# Patient Record
Sex: MALE | Race: WHITE | Employment: OTHER | URBAN - METROPOLITAN AREA
[De-identification: names, ages, dates, MRNs, and addresses within clinical notes are randomized per-mention and may not be internally consistent; named-entity substitution may affect disease eponyms.]

---

## 2017-01-19 DIAGNOSIS — F33.1 MAJOR DEPRESSIVE DISORDER, RECURRENT EPISODE, MODERATE (H): Primary | ICD-10-CM

## 2017-01-19 RX ORDER — CITALOPRAM HYDROBROMIDE 20 MG/1
20 TABLET ORAL DAILY
Qty: 90 TABLET | Refills: 3 | Status: SHIPPED | OUTPATIENT
Start: 2017-01-19 | End: 2018-06-27

## 2017-01-19 NOTE — TELEPHONE ENCOUNTER
citalopram (CELEXA) 20 MG      Last Written Prescription Date:  12/15/15  Last Fill Quantity: 90,   # refills: 3  Last Office Visit : 12/21/16  Future Office visit:  NONE  Last PHQ-9 score on record=   PHQ-9 SCORE 6/3/2016   Total Score -   Total Score 11     Protocol FOR DX  PHQ-9 every 6 months (6/3/16)

## 2017-02-06 DIAGNOSIS — Z98.2 S/P VP SHUNT: Primary | ICD-10-CM

## 2017-02-14 ENCOUNTER — OFFICE VISIT (OUTPATIENT)
Dept: NEUROSURGERY | Facility: CLINIC | Age: 82
End: 2017-02-14

## 2017-02-14 VITALS — DIASTOLIC BLOOD PRESSURE: 56 MMHG | SYSTOLIC BLOOD PRESSURE: 113 MMHG | HEART RATE: 67 BPM | HEIGHT: 71 IN

## 2017-02-14 DIAGNOSIS — Z98.2 S/P VP SHUNT: Primary | ICD-10-CM

## 2017-02-14 ASSESSMENT — PAIN SCALES - GENERAL: PAINLEVEL: NO PAIN (0)

## 2017-02-14 NOTE — MR AVS SNAPSHOT
After Visit Summary   2/14/2017    Gorge Corona    MRN: [id number]           Patient Information     Date Of Birth          11/27/1930        Visit Information        Provider Department      2/14/2017 4:00 PM Feroz Ventura MD [facility] Neurosurgery        Today's Diagnoses     S/P  shunt    -  1       Follow-ups after your visit        Follow-up notes from your care team     Return if symptoms worsen or fail to improve.      Your next 10 appointments already scheduled     Apr 03, 2017 12:00 PM CDT   Lab with  LAB   [facility] Lab ([facility])    [address] [place] [id number] [phone]            Apr 03, 2017 12:30 PM CDT   PFT VISIT with  PFL A   [facility] Pulmonary Function Testing ([facility])    [address] [place] [address] [id number] [phone]            Apr 03, 2017  1:00 PM CDT   (Arrive by 12:45 PM)   RETURN ARRHYTHMIA with Santi Ocasio MD   [facility] Heart Care ([facility])    [address] [place] [id number] [phone]              Who to contact     Please call your clinic at [phone] to:    Ask questions about your health    Make or cancel appointments    Discuss your medicines    Learn about your test results    Speak to your doctor   If you have compliments or concerns about an experience at your clinic, or if you wish to file a complaint, please contact [facility] Patient Relations at [phone] or email us at [email or web address]         Additional Information About Your Visit        MyChart Information     [employer] is an electronic gateway that provides easy, online access to your medical records. With [employer], you can request a clinic appointment, read your test results, renew a prescription or communicate with your care team.     To sign up for MyChart visit  "the website at [email or web address]   You will be asked to enter the access code listed below, as well as some personal information. Please follow the directions to create your username and password.     Your access code is: [email or web address]  Expires: 3/21/2017  1:00 PM     Your access code will  in 90 days. If you need help or a new code, please contact your [facility] or call [phone] for assistance.        Care EveryWhere ID     This is your Care EveryWhere ID. This could be used by other organizations to access your [facility] medical records  [id number]        Your Vitals Were     Pulse Height                67 1.791 m (5' 10.5\")           Blood Pressure from Last 3 Encounters:   17 114/70   17 113/56   16 110/70    Weight from Last 3 Encounters:   17 93.1 kg (205 lb 4.8 oz)   16 91.9 kg (202 lb 11.2 oz)   10/25/16 92.7 kg (204 lb 6.4 oz)              Today, you had the following     No orders found for display       Primary Care Provider Office Phone # Fax #    Monisha Olea [phone]       [address] [place] [address]        Thank you!     Thank you for choosing [facility]  for your care. Our goal is always to provide you with excellent care. Hearing back from our patients is one way we can continue to improve our services. Please take a few minutes to complete the written survey that you may receive in the mail after your visit with us. Thank you!             Your Updated Medication List - Protect others around you: Learn how to safely use, store and throw away your medicines at www.disposemymeds.org.          This list is accurate as of: 17 11:59 PM.  Always use your most recent med list.                   Brand Name Dispense Instructions for use    albuterol 108 (90 BASE) MCG/ACT Inhaler    PROAIR HFA/PROVENTIL HFA/VENTOLIN HFA    1 Inhaler    Inhale " 2 puffs into the lungs every 4 hours as needed for shortness of breath / dyspnea or wheezing       amiodarone 200 MG tablet    PACERONE/CODARONE    90 tablet    Take 1 tablet (200 mg) by mouth daily       Ammonium Lactate 10 % Crea     120 mL    Apply thin layer to skin on the body at least once daily.       aspirin 81 MG tablet      Take 81 mg by mouth daily       atorvastatin 10 MG tablet    LIPITOR    90 tablet    Take 1 tablet (10 mg) by mouth daily       calcium 600 MG tablet     100 tablet    Take 1 tablet by mouth 2 times daily.       citalopram 20 MG tablet    celeXA    90 tablet    Take 1 tablet (20 mg) by mouth daily       clindamycin 300 MG capsule    CLEOCIN    6 capsule    Take 2 capsules by mouth as needed for 1 dose. 1 hour prior to dental work.       coenzyme Q-10 75 MG Caps      Take  by mouth.       fluticasone-salmeterol 250-50 MCG/DOSE diskus inhaler    ADVAIR    120 Inhaler    Inhale 1 puff into the lungs every 12 hours       furosemide 40 MG tablet    LASIX    135 tablet    Take 1 tablet (40 mg) by mouth daily Take 1 1/2 tablets (60 mg) each am       HYDROcodone-acetaminophen 5-325 MG per tablet    NORCO    30 tablet    Take 1 tablet by mouth every 6 hours as needed for moderate to severe pain       levothyroxine 50 MCG tablet    SYNTHROID/LEVOTHROID    90 tablet    Take 1 tablet (50 mcg) by mouth daily       metoprolol 25 MG tablet    LOPRESSOR    30 tablet    Take 1 tablet (25 mg) by mouth as needed       morphine 15 MG 12 hr tablet    MS CONTIN    30 tablet    Take 1 tablet (15 mg) by mouth every 12 hours       OCUVITE PO      Take  by mouth.       order for DME     1 each    [place]       senna-docusate 8.6-50 MG per tablet    SENOKOT-S;PERICOLACE    60 tablet    Take 1 tablet by mouth 2 times daily.       tamsulosin 0.4 MG capsule    FLOMAX    90 capsule    Take 1 capsule (0.4 mg) by mouth daily       triamcinolone 0.1 % cream    KENALOG    80 g    Apply sparingly to affected area three  times daily as needed       vitamin D 1000 UNITS capsule      Take 1 capsule by mouth daily.

## 2017-02-14 NOTE — LETTER
2017       RE: Srikanth Steven  [address] [facility] [address]     Dear Colleague,    Thank you for referring your patient, Srikanth Steven, to the [facility] NEUROSURGERY at [facility]. Please see a copy of my visit note below.    HISTORY OF PRESENT ILLNESS:  We had the pleasure of seeing Mr. Steven at the [facility] Neurosurgery Clinic for followup after his last clinic appointment on 10/25/2016.  He had been going to [facility] for therapies and had been progressing well.  He presents to clinic today for followup with several falls and evaluation as well for his shunt.  He describes having increasing frequency of falls that occur out of nowhere and suddenly.  This has been going on for the last 3-4 months.  He does not describe any loss of consciousness or any numbness or weakness preceding or after the falls.  He has not hit his head during these falls either.  As part of the workup for these falls, he has undergone a CT scan of the head 02/10, which demonstrates his right frontal  shunt and stable ventricular size.   This imaging was reviewed with the patient as well as his wife, who is here with him.        Given the lack of findings present on his CT scan as well as the atypical falls the patient has been having, we reassured the patient and his wife that these falls do not sound shunt related.  At this point, we recommended evaluation by a medical specialist, specifically his primary care provider, for a more thorough investigation of other possible etiologies for his falls.      DICTATED BY:  Elida Landin M.D., Resident         EDUARDO MOLINA MD       As dictated by ELIDA LANDIN MD            D: 2017 19:36   T: 2017 13:24   MT: BRIGITTE      Name:     SRIKANTH STEVEN   MRN:      [id number]        Account:      [id number]   :      1930           Service Date: 2017      Document: [id number]

## 2017-02-16 DIAGNOSIS — E87.6 HYPOKALEMIA: ICD-10-CM

## 2017-02-16 NOTE — TELEPHONE ENCOUNTER
Spironolactone  Last Written Prescription Date: 1/12/16  Last Fill Quantity: 90, # refills: 3  Last Office Visit with [facility], [facility] or [facility] prescribing provider: 12/21/16       Potassium   Date Value Ref [place] Status   12/21/2016 4.0 3.4 - 5.3 mmol/L Final     Creatinine   Date Value Ref [place] Status   12/21/2016 1.34 (H) 0.66 - 1.25 mg/dL Final     BP Readings from Last 3 Encounters:   02/14/17 113/56   12/21/16 110/70   11/24/16 118/63   routing due to creat.

## 2017-02-20 NOTE — PROGRESS NOTES
HISTORY OF PRESENT ILLNESS:  We had the pleasure of seeing Mr. Steven at the [facility] Neurosurgery Clinic for followup after his last clinic appointment on 10/25/2016.  He had been going to [facility] for therapies and had been progressing well.  He presents to clinic today for followup with several falls and evaluation as well for his shunt.  He describes having increasing frequency of falls that occur out of nowhere and suddenly.  This has been going on for the last 3-4 months.  He does not describe any loss of consciousness or any numbness or weakness preceding or after the falls.  He has not hit his head during these falls either.  As part of the workup for these falls, he has undergone a CT scan of the head 02/10, which demonstrates his right frontal  shunt and stable ventricular size.   This imaging was reviewed with the patient as well as his wife, who is here with him.        Given the lack of findings present on his CT scan as well as the atypical falls the patient has been having, we reassured the patient and his wife that these falls do not sound shunt related.  At this point, we recommended evaluation by a medical specialist, specifically his primary care provider, for a more thorough investigation of other possible etiologies for his falls.      DICTATED BY:  Elida Landin M.D., Resident         EDUARDO MOLINA MD       As dictated by ELIDA LANDIN MD            D: 2017 19:36   T: 2017 13:24   MT: BRIGITTE      Name:     SRIKANTH STEVEN   MRN:      [id number]        Account:      [id number]   :      1930           Service Date: 2017      Document: [id number]

## 2017-02-21 RX ORDER — SPIRONOLACTONE 25 MG/1
25 TABLET ORAL DAILY
Qty: 90 TABLET | Refills: 3 | Status: SHIPPED | OUTPATIENT
Start: 2017-02-21 | End: 2018-07-10

## 2017-03-03 DIAGNOSIS — E03.8 OTHER SPECIFIED HYPOTHYROIDISM: ICD-10-CM

## 2017-03-04 ENCOUNTER — OFFICE VISIT (OUTPATIENT)
Dept: INTERNAL MEDICINE | Facility: CLINIC | Age: 82
End: 2017-03-04

## 2017-03-04 VITALS
HEART RATE: 64 BPM | OXYGEN SATURATION: 96 % | WEIGHT: 205.3 LBS | RESPIRATION RATE: 18 BRPM | DIASTOLIC BLOOD PRESSURE: 70 MMHG | SYSTOLIC BLOOD PRESSURE: 114 MMHG | BODY MASS INDEX: 29.04 KG/M2

## 2017-03-04 DIAGNOSIS — H81.10 BPPV (BENIGN PAROXYSMAL POSITIONAL VERTIGO), UNSPECIFIED LATERALITY: Primary | ICD-10-CM

## 2017-03-04 RX ORDER — MECLIZINE HYDROCHLORIDE 25 MG/1
25 TABLET ORAL 3 TIMES DAILY PRN
Qty: 30 TABLET | Refills: 3 | Status: SHIPPED | OUTPATIENT
Start: 2017-03-04 | End: 2017-06-10

## 2017-03-04 ASSESSMENT — PAIN SCALES - GENERAL: PAINLEVEL: NO PAIN (0)

## 2017-03-04 NOTE — MR AVS SNAPSHOT
After Visit Summary   3/4/2017    Gorge Corona    MRN: [id number]           Patient Information     Date Of Birth          11/27/1930        Visit Information        Provider Department      3/4/2017 11:30 AM Monisha Medina MD [facility] Primary [facility]        Today's Diagnoses     BPPV (benign paroxysmal positional vertigo), unspecified laterality    -  1      Care Instructions    Primary Care Center Phone Number [phone]  Primary Care Center Medication Refill Request Information:  * Please contact your pharmacy regarding ANY request for medication refills.  ** [facility] Prescription Fax = [phone]  * Please allow 3 business days for routine medication refills.  * Please allow 5 business days for controlled substance medication refills.     Primary Care Center Test Result notification information:  *You will be notified with in 7-10 days of your appointment day regarding the results of your test.  If you are on MyChart you will be notified as soon as the provider has reviewed the results and signed off on them.                Follow-ups after your visit        Additional Services     PHYSICAL THERAPY REFERRAL       *This therapy referral will be filtered to a centralized scheduling office at [facility] and the patient will receive a call to schedule an appointment at a [facility] location most convenient for them. *     [facility] provides Physical Therapy evaluation and treatment and many specialty services across the [facility] system.  If requesting a specialty program, please choose from the list below.    If you have not heard from the scheduling office within 2 business days, please call [phone] for all locations, with the exception of [place], please call [phone].  Treatment: Evaluation & Treatment  Special Instructions/Modalities: BBPV and peripheral neuropathy; falls  Special Programs: Balance/Vestibular    Please be aware  that coverage of these services is subject to the terms and limitations of your health insurance plan.  Call member services at your health plan with any benefit or coverage questions.      **Note to Provider:  If you are referring outside of [facility] for the therapy appointment, please list the name of the location in the  special instructions  above, print the referral and give to the patient to schedule the appointment.                  Your next 10 appointments already scheduled     Apr 03, 2017 12:00 PM CDT   Lab with  LAB    [facility] Lab ([facility])    [address] [place] [address] [id number] [phone]            Apr 03, 2017 12:30 PM CDT   PFT VISIT with  PFL A   [facility] Pulmonary Function Testing ([facility])    [address] [place] [id number] [phone]            Apr 03, 2017  1:00 PM CDT   (Arrive by 12:45 PM)   RETURN ARRHYTHMIA with Santi Ocasio MD   [facility] Heart Care ([facility])    [address] [place] [id number] [phone]              Who to contact     Please call your clinic at [phone] to:    Ask questions about your health    Make or cancel appointments    Discuss your medicines    Learn about your test results    Speak to your doctor   If you have compliments or concerns about an experience at your clinic, or if you wish to file a complaint, please contact [facility] Patient Relations at [phone] or email us at [email or web address]         Additional Information About Your Visit        [employer] Information     [employer] is an electronic gateway that provides easy, online access to your medical records. With [employer], you can request a clinic appointment, read your test results, renew a prescription or communicate with your care team.     To sign up for [employer] visit the website at  [email or web address]   You will be asked to enter the access code listed below, as well as some personal information. Please follow the directions to create your username and password.     Your access code is: [email or web address]  Expires: 3/21/2017  1:00 PM     Your access code will  in 90 days. If you need help or a new code, please contact your [facility] or call [phone] for assistance.        Care EveryWhere ID     This is your Care EveryWhere ID. This could be used by other organizations to access your [facility] [place] medical records  [id number]        Your Vitals Were     Pulse Respirations Pulse Oximetry BMI (Body Mass Index)          64 18 96% 29.04 kg/m2         Blood Pressure from Last 3 Encounters:   17 114/70   17 113/56   16 110/70    Weight from Last 3 Encounters:   17 93.1 kg (205 lb 4.8 oz)   16 91.9 kg (202 lb 11.2 oz)   10/25/16 92.7 kg (204 lb 6.4 oz)              We Performed the Following     PHYSICAL THERAPY REFERRAL          Today's Medication Changes          These changes are accurate as of: 3/4/17 11:59 PM.  If you have any questions, ask your nurse or doctor.               Start taking these medicines.        Dose/Directions    meclizine 25 MG tablet   Commonly known as:  ANTIVERT   Used for:  BPPV (benign paroxysmal positional vertigo), unspecified laterality   Started by:  Monisha Medina MD        Dose:  25 mg   Take 1 tablet (25 mg) by mouth 3 times daily as needed for dizziness   Quantity:  30 tablet   Refills:  3            Where to get your medicines      These medications were sent to [place] [address] [facility] - [facility], [place] - [address]  [address], [facility] [address]     Phone:  [phone]     meclizine 25 MG tablet                Primary Care Provider Office Phone # Fax #    Monisha Olea [phone]       [address]  [address]  [facility] [address]        Thank you!     Thank you for choosing [facility]  for your care. Our goal is always to provide you with excellent care. Hearing back from our patients is one way we can continue to improve our services. Please take a few minutes to complete the written survey that you may receive in the mail after your visit with us. Thank you!             Your Updated Medication List - Protect others around you: Learn how to safely use, store and throw away your medicines at www.disposemymeds.org.          This list is accurate as of: 3/4/17 11:59 PM.  Always use your most recent med list.                   Brand Name Dispense Instructions for use    albuterol 108 (90 BASE) MCG/ACT Inhaler    PROAIR HFA/PROVENTIL HFA/VENTOLIN HFA    1 Inhaler    Inhale 2 puffs into the lungs every 4 hours as needed for shortness of breath / dyspnea or wheezing       amiodarone 200 MG tablet    PACERONE/CODARONE    90 tablet    Take 1 tablet (200 mg) by mouth daily       Ammonium Lactate 10 % Crea     120 mL    Apply thin layer to skin on the body at least once daily.       aspirin 81 MG tablet      Take 81 mg by mouth daily       atorvastatin 10 MG tablet    LIPITOR    90 tablet    Take 1 tablet (10 mg) by mouth daily       calcium 600 MG tablet     100 tablet    Take 1 tablet by mouth 2 times daily.       citalopram 20 MG tablet    celeXA    90 tablet    Take 1 tablet (20 mg) by mouth daily       clindamycin 300 MG capsule    CLEOCIN    6 capsule    Take 2 capsules by mouth as needed for 1 dose. 1 hour prior to dental work.       coenzyme Q-10 75 MG Caps      Take  by mouth.       fluticasone-salmeterol 250-50 MCG/DOSE diskus inhaler    ADVAIR    120 Inhaler    Inhale 1 puff into the lungs every 12 hours       furosemide 40 MG tablet    LASIX    135 tablet    Take 1 tablet (40 mg) by mouth daily Take 1 1/2 tablets (60 mg) each am       HYDROcodone-acetaminophen 5-325 MG per tablet    NORCO    30  tablet    Take 1 tablet by mouth every 6 hours as needed for moderate to severe pain       levothyroxine 50 MCG tablet    SYNTHROID/LEVOTHROID    90 tablet    Take 1 tablet (50 mcg) by mouth daily       meclizine 25 MG tablet    ANTIVERT    30 tablet    Take 1 tablet (25 mg) by mouth 3 times daily as needed for dizziness       metoprolol 25 MG tablet    LOPRESSOR    30 tablet    Take 1 tablet (25 mg) by mouth as needed       morphine 15 MG 12 hr tablet    MS CONTIN    30 tablet    Take 1 tablet (15 mg) by mouth every 12 hours       OCUVITE PO      Take  by mouth.       order for DME     1 each    [place]       senna-docusate 8.6-50 MG per tablet    SENOKOT-S;PERICOLACE    60 tablet    Take 1 tablet by mouth 2 times daily.       spironolactone 25 MG tablet    ALDACTONE    90 tablet    Take 1 tablet (25 mg) by mouth daily       tamsulosin 0.4 MG capsule    FLOMAX    90 capsule    Take 1 capsule (0.4 mg) by mouth daily       triamcinolone 0.1 % cream    KENALOG    80 g    Apply sparingly to affected area three times daily as needed       vitamin D 1000 UNITS capsule      Take 1 capsule by mouth daily.

## 2017-03-04 NOTE — NURSING NOTE
Chief Complaint   Patient presents with     Fall     pt is here to discuss recent falls and balance issues        Namita Botello CMA at 11:03 AM on 3/4/2017

## 2017-03-04 NOTE — PATIENT INSTRUCTIONS
Primary Care Center Phone Number [phone]  Primary Care Center Medication Refill Request Information:  * Please contact your pharmacy regarding ANY request for medication refills.  ** [facility] Prescription Fax = [phone]  * Please allow 3 business days for routine medication refills.  * Please allow 5 business days for controlled substance medication refills.     Primary Care Center Test Result notification information:  *You will be notified with in 7-10 days of your appointment day regarding the results of your test.  If you are on MyChart you will be notified as soon as the provider has reviewed the results and signed off on them.

## 2017-03-04 NOTE — NURSING NOTE
Patient tolerated ear wash well.  Moderate amount of impacted cerumen removed from Bilateral ear/ears.  Tympanic membrane visible.  Namita Botello CMA at 11:33 AM on 3/4/2017

## 2017-03-06 NOTE — TELEPHONE ENCOUNTER
levothyroxine 50 MCG      Last Written Prescription Date:  11/27/16  Last Fill Quantity: 90,   # refills: 0  Last Office Visit : 3/4/17  Future Office visit:  NONE  TSH   Date Value Ref [place] Status   12/09/2015 2.52 0.40 - 4.00 mU/L Final    Routing refill request to provider for review/approval because:OVERDUE TSH  2015

## 2017-03-07 RX ORDER — LEVOTHYROXINE SODIUM 50 UG/1
50 TABLET ORAL DAILY
Qty: 90 TABLET | Refills: 3 | Status: SHIPPED | OUTPATIENT
Start: 2017-03-07 | End: 2017-12-29

## 2017-03-07 NOTE — PROGRESS NOTES
Gorge is a 86 year old man with  has a past medical history of Atrial fibrillation (H); Atrial flutter (H); Basal cell carcinoma of skin of trunk; BPH (benign prostatic hypertrophy) with urinary obstruction; CAD (coronary artery disease); Depression; H/O CT scan of head (1/11/2013); Hyperlipidemia; Lumbar spinal stenosis; Mitral regurgitation; Palpitations; Paroxysmal supraventricular tachycardia (H); Squamous cell carcinoma (H); Tachycardia; and  (ventriculoperitoneal) shunt status.    He was seen today for falls.  He had one episode in the recent past where he got up to go to the [employer], and after turning around in the bathroom, had an acute onset of a room spinning sensation; it caused him to fall, he called for his wife, who helped him up.  No trauma or head injury noted but it did concern them.  He reports that he's had overall worsening of his balance over the past few months, since Luis.  He has a previous diagnosis of peripheral neuropathy and a large workup at [facility] did not pinpoint any specific cause.  There have been no new medications, no other recent illness.  He denies chest pain, palpitations, syncope or near syncope type events.  He was also seen and evaluated just this week by Neurosurgery and it was determined that the  shunt was fine, it was not the cause of the problem.    On exam  B/P: 114/70, P: 64, R: 18  HEENT:  Bilateral cerumen  Cor: RRR, 2/6 systolic murmur noted  Lungs:  CTA  Neuro:  Positive Rhomberg test is noted; he sways, then opens his eyes immediately to right himself.  Remainder of the neuro exam is negative.  Good strength and coordination noted.    A/P:  Gorge is experiencing falls, likely multifactorial.  His peripheral neuropathy is certainly contributing.  The acute episode also sounds like BPPV and this could be treated, if recurrent, with home Eply (handouts given) 30 minutes after meclizine.    He might also benefit from more specific PT to help with strength and  balance.    BPPV (benign paroxysmal positional vertigo), unspecified laterality  -     meclizine (ANTIVERT) 25 MG tablet; Take 1 tablet (25 mg) by mouth 3 times daily as needed for dizziness  -     PHYSICAL THERAPY REFERRAL      Lefty Hobbs MD

## 2017-03-28 DIAGNOSIS — R33.9 URINARY RETENTION: ICD-10-CM

## 2017-03-30 RX ORDER — TAMSULOSIN HYDROCHLORIDE 0.4 MG/1
0.4 CAPSULE ORAL DAILY
Qty: 90 CAPSULE | Refills: 3 | Status: SHIPPED | OUTPATIENT
Start: 2017-03-30 | End: 2018-07-19

## 2017-03-30 NOTE — TELEPHONE ENCOUNTER
tamsulosin (FLOMAX) 0.4 MG      Last Written Prescription Date:  2/15/16  Last Fill Quantity: 90,   # refills: 3  Last Office Visit : 3/4/17  Future Office visit:NONE  BP Readings from Last 3 Encounters:   03/04/17 114/70   02/14/17 113/56   12/21/16 110/70

## 2017-04-03 ENCOUNTER — OFFICE VISIT (OUTPATIENT)
Dept: CARDIOLOGY | Facility: CLINIC | Age: 82
End: 2017-04-03
Attending: INTERNAL MEDICINE
Payer: MEDICARE

## 2017-04-03 ENCOUNTER — PRE VISIT (OUTPATIENT)
Dept: CARDIOLOGY | Facility: CLINIC | Age: 82
End: 2017-04-03

## 2017-04-03 VITALS
HEART RATE: 61 BPM | DIASTOLIC BLOOD PRESSURE: 63 MMHG | HEIGHT: 70 IN | OXYGEN SATURATION: 96 % | WEIGHT: 206.5 LBS | SYSTOLIC BLOOD PRESSURE: 120 MMHG | BODY MASS INDEX: 29.56 KG/M2

## 2017-04-03 DIAGNOSIS — I48.91 ATRIAL FIBRILLATION, UNSPECIFIED TYPE (H): ICD-10-CM

## 2017-04-03 DIAGNOSIS — Z79.899 ON AMIODARONE THERAPY: ICD-10-CM

## 2017-04-03 DIAGNOSIS — I50.32 CHRONIC DIASTOLIC HEART FAILURE (H): ICD-10-CM

## 2017-04-03 DIAGNOSIS — R06.00 DYSPNEA, UNSPECIFIED TYPE: ICD-10-CM

## 2017-04-03 DIAGNOSIS — I47.10 SVT (SUPRAVENTRICULAR TACHYCARDIA) (H): ICD-10-CM

## 2017-04-03 DIAGNOSIS — I06.0 RHEUMATIC AORTIC STENOSIS: Primary | ICD-10-CM

## 2017-04-03 DIAGNOSIS — Z98.61 CAD S/P PERCUTANEOUS CORONARY ANGIOPLASTY: ICD-10-CM

## 2017-04-03 DIAGNOSIS — I25.10 CAD S/P PERCUTANEOUS CORONARY ANGIOPLASTY: ICD-10-CM

## 2017-04-03 DIAGNOSIS — J98.6 ELEVATED DIAPHRAGM: ICD-10-CM

## 2017-04-03 DIAGNOSIS — I47.10 SVT (SUPRAVENTRICULAR TACHYCARDIA) (H): Primary | ICD-10-CM

## 2017-04-03 DIAGNOSIS — J44.9 CHRONIC OBSTRUCTIVE PULMONARY DISEASE, UNSPECIFIED COPD TYPE (H): ICD-10-CM

## 2017-04-03 LAB
ALBUMIN SERPL-MCNC: 3.4 G/DL (ref 3.4–5)
ALP SERPL-CCNC: 72 U/L (ref 40–150)
ALT SERPL W P-5'-P-CCNC: 20 U/L (ref 0–70)
ANION GAP SERPL CALCULATED.3IONS-SCNC: 5 MMOL/L (ref 3–14)
AST SERPL W P-5'-P-CCNC: 14 U/L (ref 0–45)
BILIRUB SERPL-MCNC: 0.7 MG/DL (ref 0.2–1.3)
BUN SERPL-MCNC: 17 MG/DL (ref 7–30)
CALCIUM SERPL-MCNC: 8.3 MG/DL (ref 8.5–10.1)
CHLORIDE SERPL-SCNC: 106 MMOL/L (ref 94–109)
CO2 SERPL-SCNC: 29 MMOL/L (ref 20–32)
CREAT SERPL-MCNC: 1.23 MG/DL (ref 0.66–1.25)
ERYTHROCYTE [DISTWIDTH] IN BLOOD BY AUTOMATED COUNT: 13.3 % (ref 10–15)
EXPTIME-PRE: 8.12 SEC
FEF2575-%PRED-PRE: 88 %
FEF2575-PRE: 1.53 L/SEC
FEF2575-PRED: 1.73 L/SEC
FEFMAX-%PRED-PRE: 88 %
FEFMAX-PRE: 5.67 L/SEC
FEFMAX-PRED: 6.39 L/SEC
FEV1-%PRED-PRE: 70 %
FEV1-PRE: 1.77 L
FEV1FEV6-PRE: 79 %
FEV1FEV6-PRED: 75 %
FEV1FVC-PRE: 80 %
FEV1FVC-PRED: 75 %
FIFMAX-PRE: 3.02 L/SEC
FVC-%PRED-PRE: 64 %
FVC-PRE: 2.21 L
FVC-PRED: 3.4 L
GFR SERPL CREATININE-BSD FRML MDRD: 56 ML/MIN/1.7M2
GLUCOSE SERPL-MCNC: 96 MG/DL (ref 70–99)
HCT VFR BLD AUTO: 38.1 % (ref 40–53)
HGB BLD-MCNC: 12.5 G/DL (ref 13.3–17.7)
MCH RBC QN AUTO: 31.3 PG (ref 26.5–33)
MCHC RBC AUTO-ENTMCNC: 32.8 G/DL (ref 31.5–36.5)
MCV RBC AUTO: 96 FL (ref 78–100)
MEP-PRE: 75 CMH2O
MIP-PRE: -60 CMH2O
MVV-%PRED-PRE: 49 %
MVV-PRE: 53 L/MIN
MVV-PRED: 107 L/MIN
PLATELET # BLD AUTO: 111 10E9/L (ref 150–450)
POTASSIUM SERPL-SCNC: 4 MMOL/L (ref 3.4–5.3)
PROT SERPL-MCNC: 6.6 G/DL (ref 6.8–8.8)
RBC # BLD AUTO: 3.99 10E12/L (ref 4.4–5.9)
SODIUM SERPL-SCNC: 141 MMOL/L (ref 133–144)
T4 FREE SERPL-MCNC: 1.09 NG/DL (ref 0.76–1.46)
TSH SERPL DL<=0.005 MIU/L-ACNC: 5.86 MU/L (ref 0.4–4)
WBC # BLD AUTO: 6.4 10E9/L (ref 4–11)

## 2017-04-03 PROCEDURE — 99213 OFFICE O/P EST LOW 20 MIN: CPT

## 2017-04-03 PROCEDURE — 84443 ASSAY THYROID STIM HORMONE: CPT | Performed by: INTERNAL MEDICINE

## 2017-04-03 PROCEDURE — 93010 ELECTROCARDIOGRAM REPORT: CPT | Mod: ZP | Performed by: INTERNAL MEDICINE

## 2017-04-03 PROCEDURE — 36415 COLL VENOUS BLD VENIPUNCTURE: CPT | Performed by: INTERNAL MEDICINE

## 2017-04-03 PROCEDURE — 99214 OFFICE O/P EST MOD 30 MIN: CPT | Mod: GC | Performed by: INTERNAL MEDICINE

## 2017-04-03 PROCEDURE — 80053 COMPREHEN METABOLIC PANEL: CPT | Performed by: INTERNAL MEDICINE

## 2017-04-03 PROCEDURE — 84439 ASSAY OF FREE THYROXINE: CPT | Performed by: INTERNAL MEDICINE

## 2017-04-03 PROCEDURE — 85027 COMPLETE CBC AUTOMATED: CPT | Performed by: INTERNAL MEDICINE

## 2017-04-03 PROCEDURE — 93005 ELECTROCARDIOGRAM TRACING: CPT | Mod: ZF

## 2017-04-03 RX ORDER — FUROSEMIDE 40 MG
40 TABLET ORAL DAILY
Qty: 90 TABLET | Refills: 0 | COMMUNITY
Start: 2017-04-03 | End: 2018-06-27

## 2017-04-03 ASSESSMENT — PAIN SCALES - GENERAL: PAINLEVEL: NO PAIN (0)

## 2017-04-03 NOTE — MR AVS SNAPSHOT
After Visit Summary   4/3/2017    Gorge Corona    MRN: [id number]           Patient Information     Date Of Birth          11/27/1930        Visit Information        Provider Department      4/3/2017 1:00 PM Santi Ocasio MD [facility]        Today's Diagnoses     Rheumatic aortic stenosis    -  1    SVT (supraventricular tachycardia) (H)        CAD S/P percutaneous coronary angioplasty        Atrial fibrillation, unspecified type (H)        Chronic diastolic heart failure (H)          Care Instructions    Echocardiogram on Thursday.   Return to clinic in 6 months with PFTs and lab.      Please do not hesitate to call if you have any cardiology related questions or concerns, or need to schedule an appointment, at [phone].         Cardiology Medication Refill Request Information:  * Please contact your pharmacy regarding ANY request for medication refills.  ** [facility] Prescription Fax = [phone]  * Please allow 3 business days for routine medication refills.    Cardiology Test Result notification information:  *You will be notified with in 7-10 days of your appointment day regarding the results of your test. If you are on MyChart you will be notified as soon as the provider has reviewed the results and signed off on them. Please call RN Care Coordinator with questions regarding results.            Follow-ups after your visit        Follow-up notes from your care team     Write patient with results Return in about 6 months (around 10/3/2017) for Ninfa, palpitations, aortic stenosis.      Your next 10 appointments already scheduled     Apr 06, 2017 10:30 AM CDT   Ech Complete with [id number] Echo ([facility])    [address] [place] [id number] [phone]           1.  Please bring or wear a comfortable two-piece outfit. 2.  You may eat, drink and take your normal medicines. 3.  For any questions that cannot be  answered, please contact the ordering physician            Oct 02, 2017  2:00 PM CDT   Lab with  LAB    Health Lab ([facility])    [address]  1st Floor  [facility] [id number] [phone]            Oct 02, 2017  2:30 PM CDT   PFT VISIT with  PFL B   [facility] Pulmonary Function Testing ([facility])    [address]  3rd [facility] [id number] [phone]            Oct 02, 2017  3:00 PM CDT   (Arrive by 2:45 PM)   RETURN ARRHYTHMIA with Santi Ocasio MD   [facility] Heart Care ([facility])    [address]  [address] [facility] [address] [phone]              Future tests that were ordered for you today     Open Future Orders        Priority Expected Expires Ordered    Comprehensive metabolic panel Routine  7/3/2018 4/3/2017    TSH with free T4 reflex Routine  7/3/2018 4/3/2017    General PFT Lab (Please always keep checked) Routine  7/3/2018 4/3/2017    Pulmonary Function Test Routine  7/3/2018 4/3/2017    Echocardiogram Complete Routine 4/6/2017 4/3/2018 4/3/2017            Who to contact     If you have questions or need follow up information about today's clinic visit or your schedule please contact [facility] directly at [phone].  Normal or non-critical lab and imaging results will be communicated to you by [employer], letter or phone within 4 business days after the clinic has received the results. If you do not hear from us within 7 days, please contact the clinic through [employer] or phone. If you have a critical or abnormal lab result, we will notify you by phone as soon as possible.  Submit refill requests through [employer] or call your pharmacy and they will forward the refill request to us. Please allow 3 business days for your refill to be completed.          Additional Information About Your Visit        [employer] Information     [employer] lets you send  "messages to your doctor, view your test results, renew your prescriptions, schedule appointments and more. To sign up, go to [email or web address] . Click on \"Log in\" on the left side of the screen, which will take you to the Welcome page. Then click on \"Sign up Now\" on the right side of the page.     You will be asked to enter the access code listed below, as well as some personal information. Please follow the directions to create your username and password.     Your access code is: [email or web address]  Expires: 2017  2:07 PM     Your access code will  in 90 days. If you need help or a new code, please call your [place] clinic or [phone].        Care EveryWhere ID     This is your Care EveryWhere ID. This could be used by other organizations to access your [place] medical records  [id number]        Your Vitals Were     Pulse Height Pulse Oximetry BMI (Body Mass Index)          61 1.778 m (5' 10\") 96% 29.63 kg/m2         Blood Pressure from Last 3 Encounters:   17 120/63   17 114/70   17 113/56    Weight from Last 3 Encounters:   17 93.7 kg (206 lb 8 oz)   17 93.1 kg (205 lb 4.8 oz)   16 91.9 kg (202 lb 11.2 oz)              We Performed the Following     EKG 12-lead, tracing only (Future)          Today's Medication Changes          These changes are accurate as of: 4/3/17  2:07 PM.  If you have any questions, ask your nurse or doctor.               These medicines have changed or have updated prescriptions.        Dose/Directions    furosemide 40 MG tablet   Commonly known as:  LASIX   This may have changed:  additional instructions   Used for:  Chronic diastolic heart failure (H), CAD S/P percutaneous coronary angioplasty   Changed by:  Santi Ocasio MD        Dose:  40 mg   Take 1 tablet (40 mg) by mouth daily   Quantity:  90 tablet   Refills:  0                Primary Care Provider Office Phone # Fax #    Monisha Jenna Olea [phone] " [phone]       [address]  [facility] [address]        Thank you!     Thank you for choosing [facility]  for your care. Our goal is always to provide you with excellent care. Hearing back from our patients is one way we can continue to improve our services. Please take a few minutes to complete the written survey that you may receive in the mail after your visit with us. Thank you!             Your Updated Medication List - Protect others around you: Learn how to safely use, store and throw away your medicines at www.disposemymeds.org.          This list is accurate as of: 4/3/17  2:07 PM.  Always use your most recent med list.                   Brand Name Dispense Instructions for use    albuterol 108 (90 BASE) MCG/ACT Inhaler    PROAIR HFA/PROVENTIL HFA/VENTOLIN HFA    1 Inhaler    Inhale 2 puffs into the lungs every 4 hours as needed for shortness of breath / dyspnea or wheezing       amiodarone 200 MG tablet    PACERONE/CODARONE    90 tablet    Take 1 tablet (200 mg) by mouth daily       Ammonium Lactate 10 % Crea     120 mL    Apply thin layer to skin on the body at least once daily.       aspirin 81 MG tablet      Take 81 mg by mouth daily       atorvastatin 10 MG tablet    LIPITOR    90 tablet    Take 1 tablet (10 mg) by mouth daily       calcium 600 MG tablet     100 tablet    Take 1 tablet by mouth 2 times daily.       citalopram 20 MG tablet    celeXA    90 tablet    Take 1 tablet (20 mg) by mouth daily       clindamycin 300 MG capsule    CLEOCIN    6 capsule    Take 2 capsules by mouth as needed for 1 dose. 1 hour prior to dental work.       coenzyme Q-10 75 MG Caps      Take  by mouth.       fluticasone-salmeterol 250-50 MCG/DOSE diskus inhaler    ADVAIR    120 Inhaler    Inhale 1 puff into the lungs every 12 hours       furosemide 40 MG tablet    LASIX    90 tablet    Take 1 tablet (40 mg) by mouth daily       HYDROcodone-acetaminophen 5-325 MG per tablet     NORCO    30 tablet    Take 1 tablet by mouth every 6 hours as needed for moderate to severe pain       levothyroxine 50 MCG tablet    SYNTHROID/LEVOTHROID    90 tablet    Take 1 tablet (50 mcg) by mouth daily       meclizine 25 MG tablet    ANTIVERT    30 tablet    Take 1 tablet (25 mg) by mouth 3 times daily as needed for dizziness       metoprolol 25 MG tablet    LOPRESSOR    30 tablet    Take 1 tablet (25 mg) by mouth as needed       morphine 15 MG 12 hr tablet    MS CONTIN    30 tablet    Take 1 tablet (15 mg) by mouth every 12 hours       OCUVITE PO      Take  by mouth.       order for DME     1 each    Walker       senna-docusate 8.6-50 MG per tablet    SENOKOT-S;PERICOLACE    60 tablet    Take 1 tablet by mouth 2 times daily.       spironolactone 25 MG tablet    ALDACTONE    90 tablet    Take 1 tablet (25 mg) by mouth daily       tamsulosin 0.4 MG capsule    FLOMAX    90 capsule    Take 1 capsule (0.4 mg) by mouth daily       triamcinolone 0.1 % cream    KENALOG    80 g    Apply sparingly to affected area three times daily as needed       vitamin D 1000 UNITS capsule      Take 1 capsule by mouth daily.

## 2017-04-03 NOTE — NURSING NOTE
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

## 2017-04-03 NOTE — NURSING NOTE
Cardiac Testing: Patient given instructions regarding  echocardiogram . Discussed purpose, preparation, procedure and when to expect results reported back to the patient. Patient demonstrated understanding of this information and agreed to call with further questions or concerns.  Med Reconcile: Reviewed and verified all current medications with the patient. The updated medication list was printed and given to the patient.  Return Appointment: Patient given instructions regarding scheduling next clinic visit. Patient demonstrated understanding of this information and agreed to call with further questions or concerns.  Patient stated he understood all health information given and agreed to call with further questions or concerns.

## 2017-04-03 NOTE — PROGRESS NOTES
"      Clinical Cardiac Electrophysiology    Chief Complaint: Evaluation of SVT    HPI:   Mr. Gorge Corona is a 85-year old male with a PMHx of CAD s/p CABG, presumed SVT (Aflutter vs. AFib per chart) who presented to clinic today on 09/23 to establish care after being transferred from Dr. Billy's clinic cohort.    Briefly, Mr. Corona was noted to have a EKG in 11/2014 that was suspicious for SVT with a HR of 144. He was followed by Dr. Billy and noted on multiple occasions that he was disinclined to pursue an invasive procedure. Hence Dr. Billy sought treatment with metoprolol 25 mg PO PRN for symptomatic relief. Mr. Corona noted that he was having regular episodes of palpitations requiring metoprolol, therefore he was started on amiodarone in Jul 2016. Following this, he has not had recurrences of the palpitations. Aside from this, Mr. Cornoa has also been devoid of lightheadedness/dizziness, chest pain, dyspnea on exertion, PND/orthopnea, and syncope. He has been troubled by petechiae over the dorsal aspects of his hands. Of note, Mr. Corona's granddaughter is a medical student at the [employer].     03April 2016....Interval Update: Overall doing well, feels he is \"healthy\". Biggest issue of recent has been falls, all mechanical, all related to loosing balance and/or tripping, often when turning. Now using a 4-wheeled walker all of the time. Worst fall was in the bathroom after getting out of the shower. This caused him and his wife to purchase a walk-in tub ($16k; they are planning to ask primary care provider whether there is any insurance deduction for medical necessity). Falls are not preceded by chest pain/pressure, palpitations, dyspnea, syncope, or tunnel vision. Has been re-evaluated by Neurology for his Normal Pressure Hydrocephalus and  shunt working properly without neurologic cause for falls. Prior physical therapy and balance rehab has not been helpful. Him and wife concur " that their house is overall very safe, no stairs, no slippery rugs, installed handlebars in multiple locations, etc.   He denies any exertional chest pain or pressure. No PND/Orthopnea, no lower extremity edema, but remains on Lasix and Spironolactone. No ongoing palpitations while on Amiodarone, they are very happy with the response of this medication in comparison to Metoprolol. Monitor blood pressure frequently; he was worried about a systolic blood pressure of 140 after a recent hot bath (provided reassurance). Wife was also worried diastolic blood pressure too low recently, in 50s. No lightheadedness, dizziness, incessant headaches.      Current Outpatient Prescriptions   Medication Sig Dispense Refill     tamsulosin (FLOMAX) 0.4 MG capsule Take 1 capsule (0.4 mg) by mouth daily 90 capsule 3     levothyroxine (SYNTHROID/LEVOTHROID) 50 MCG tablet Take 1 tablet (50 mcg) by mouth daily 90 tablet 3     meclizine (ANTIVERT) 25 MG tablet Take 1 tablet (25 mg) by mouth 3 times daily as needed for dizziness 30 tablet 3     spironolactone (ALDACTONE) 25 MG tablet Take 1 tablet (25 mg) by mouth daily 90 tablet 3     citalopram (CELEXA) 20 MG tablet Take 1 tablet (20 mg) by mouth daily 90 tablet 3     Ammonium Lactate 10 % CREA Apply thin layer to skin on the body at least once daily. 120 mL 11     fluticasone-salmeterol (ADVAIR) 250-50 MCG/DOSE diskus inhaler Inhale 1 puff into the lungs every 12 hours 120 Inhaler 1     aspirin 81 MG tablet Take 81 mg by mouth daily       amiodarone (PACERONE/CODARONE) 200 MG tablet Take 1 tablet (200 mg) by mouth daily 90 tablet 3     atorvastatin (LIPITOR) 10 MG tablet Take 1 tablet (10 mg) by mouth daily 90 tablet 3     order for DME Walker 1 each 0     triamcinolone (KENALOG) 0.1 % cream Apply sparingly to affected area three times daily as needed 80 g 1     HYDROcodone-acetaminophen (NORCO) 5-325 MG per tablet Take 1 tablet by mouth every 6 hours as needed for moderate to severe  pain 30 tablet 0     morphine (MS CONTIN) 15 MG 12 hr tablet Take 1 tablet (15 mg) by mouth every 12 hours 30 tablet 0     furosemide (LASIX) 40 MG tablet Take 1 tablet (40 mg) by mouth daily Take 1 1/2 tablets (60 mg) each am 135 tablet 3     metoprolol (LOPRESSOR) 25 MG tablet Take 1 tablet (25 mg) by mouth as needed 30 tablet 1     albuterol (PROAIR HFA, PROVENTIL HFA, VENTOLIN HFA) 108 (90 BASE) MCG/ACT inhaler Inhale 2 puffs into the lungs every 4 hours as needed for shortness of breath / dyspnea or wheezing 1 Inhaler 1     clindamycin (CLEOCIN) 300 MG capsule Take 2 capsules by mouth as needed for 1 dose. 1 hour prior to dental work. 6 capsule 0     senna-docusate (SENOKOT-S;PERICOLACE) 8.6-50 MG per tablet Take 1 tablet by mouth 2 times daily. 60 tablet      Cholecalciferol (VITAMIN D) 1000 UNITS capsule Take 1 capsule by mouth daily.       Calcium 600 MG tablet Take 1 tablet by mouth 2 times daily. 100 tablet 3     coenzyme Q-10 75 MG CAPS Take  by mouth.       Multiple Vitamins-Minerals (OCUVITE PO) Take  by mouth.  0       Past Medical History:   Diagnosis Date     Atrial fibrillation (H)      Atrial flutter (H)      Basal cell carcinoma of skin of trunk      BPH (benign prostatic hypertrophy) with urinary obstruction      CAD (coronary artery disease)     s/p CABG 1/2013; RUSSEL in 2/2013     Depression      H/O CT scan of head 1/11/2013 7/20/2006    Result Impression     CT of the Head without contrast 7/20/2006  History: Ataxia, incontinence, and NPH  Comparison Study: MR obtained to 4/19/2004  Technique: Axial CT images of the head were obtained at 2 intervals from the skull base to the vertex without intravenous contrast. The images were reviewed in brain, subdural and bone windows.  Findings: Patient has had a moderate promin     Hyperlipidemia      Lumbar spinal stenosis      Mitral regurgitation     s/p bioprosthesis 1/2013     Palpitations      Paroxysmal supraventricular tachycardia (H)       Squamous cell carcinoma (H)      Tachycardia     baroreflex injury dut to surgery      (ventriculoperitoneal) shunt status     for Normal pressure hydrocephalus       Past Surgical History:   Procedure Laterality Date     ANESTHESIA CARDIOVERSION  4/11/2013    Procedure: ANESTHESIA CARDIOVERSION;  Cardioversion;  Surgeon: Provider, Generic Anesthesia;  Location: [facility] OR     BYPASS GRAFT ARTERY CORONARY, REPAIR VALVE MITRAL, COMBINED  1/14/2013    Procedure: COMBINED BYPASS GRAFT ARTERY CORONARY, REPAIR VALVE MITRAL;  Median sternotomy, coronary artery bypass graft X3 using left internal mammary artery & right saphenous vein , mitral valve Replacement and on pump oxygenator, flexible cystoscopy, urethral dilation and insertion of stuart catheter.;  Surgeon: Chan Peres MD;  Location: [facility] OR     CORONARY ANGIOGRAPHY ADULT ORDER       CORONARY ARTERY BYPASS  1/2013    mitral valve tissue      INSERT CATHETER BLADDER  1/14/2013    Procedure: INSERT CATHETER BLADDER;  Flexible cystoscopy, urethral dilation,& insertion of stuart catheter;  Surgeon: Pete Bedoya MD;  Location: [facility] OR     MITRAL VALVE REPLACEMENT  2013    29 mm Epic porcine valve     MOHS MICROGRAPHIC PROCEDURE       REPLACE VALVE MITRAL  1/2013    tissue valve       Family History   Problem Relation Age of Onset     CANCER Father      KIDNEY DISEASE Mother        Social History   Substance Use Topics     Smoking status: Former Smoker     Types: Cigars, Pipe     Quit date: 1/1/1990     Smokeless tobacco: Former User     Alcohol use No       Allergies   Allergen Reactions     Penicillins Rash         ROS:   CONSTITUTIONAL:No report of fever, chills, or change in weight. Appetite is good.   RESPIRATORY: No cough, wheezing, SOB, or hemoptysis  CARDIOVASCULAR: see HPI  MUSCULO-SKELETAL: No joint pain/swelling, diffuse arthritis pain (mild)  NEURO: No paresthesias, syncope, pre-syncope, light headness, dizziness or vertigo  ENDOCRINE: No  "temperature intolerance, no skin/hair changes  PSYCHIATRIC: No change in mood, feeling down/anxious, no change in sleep or appetite  GI: no melena or hematochezia, no change in bowel habits  : no hematuria or dysuria  HEME: no easy bruising or bleeding, no history of anemia, no history of blood clots  SKIN: no rashes or sores, no unusual itching      Physical Examination:  Vitals: /63  Pulse 61  Ht 1.778 m (5' 10\")  Wt 93.7 kg (206 lb 8 oz)  SpO2 96%  BMI 29.63 kg/m2  BMI= Body mass index is 29.63 kg/(m^2).  GEN: A & O, healthy appearing elderly male, resting comfortably in exam chair, NAD, cooperative and conversational   HEENT: PERRL, no scleral icterus   NECK: Supple, no LAD, JVP not elevated above clavicle  RESP: CTA bilaterally, no wheezing, no crackles, no increased work of breathing   CV: Regular, bradycardic, S1 and S2 with mid-peaking systolic murmur II/VI heard throughout precordium, no S4  ABD: Soft, nontender to palpation   EXT: No peripheral edema, warm/well perfused   NEURO: CN II-XII intact, grossly nonfocal  SKIN: Dry skin    Laboratory/Imaging:   Ref. Range 4/3/2017 11:51   Sodium Latest Ref Range: 133 - 144 mmol/L 141   Potassium Latest Ref Range: 3.4 - 5.3 mmol/L 4.0   Chloride Latest Ref Range: 94 - 109 mmol/L 106   Carbon Dioxide Latest Ref Range: 20 - 32 mmol/L 29   Urea Nitrogen Latest Ref Range: 7 - 30 mg/dL 17   Creatinine Latest Ref Range: 0.66 - 1.25 mg/dL 1.23   GFR Estimate Latest Ref Range: >60 mL/min/1.7m2 56 (L)   GFR Estimate If Black Latest Ref Range: >60 mL/min/1.7m2 67   Calcium Latest Ref Range: 8.5 - 10.1 mg/dL 8.3 (L)   Anion Gap Latest Ref Range: 3 - 14 mmol/L 5   Albumin Latest Ref Range: 3.4 - 5.0 g/dL 3.4   Protein Total Latest Ref Range: 6.8 - 8.8 g/dL 6.6 (L)   Bilirubin Total Latest Ref Range: 0.2 - 1.3 mg/dL 0.7   Alkaline Phosphatase Latest Ref Range: 40 - 150 U/L 72   ALT Latest Ref Range: 0 - 70 U/L 20   AST Latest Ref Range: 0 - 45 U/L 14   TSH Latest " Ref Range: 0.40 - 4.00 mU/L 5.86 (H)   Glucose Latest Ref Range: 70 - 99 mg/dL 96   WBC Latest Ref Range: 4.0 - 11.0 10e9/L 6.4   Hemoglobin Latest Ref Range: 13.3 - 17.7 g/dL 12.5 (L)   Hematocrit Latest Ref Range: 40.0 - 53.0 % 38.1 (L)   Platelet Count Latest Ref Range: 150 - 450 10e9/L 111 (L)   RBC Count Latest Ref Range: 4.4 - 5.9 10e12/L 3.99 (L)   MCV Latest Ref Range: 78 - 100 fl 96   MCH Latest Ref Range: 26.5 - 33.0 pg 31.3   MCHC Latest Ref Range: 31.5 - 36.5 g/dL 32.8   RDW Latest Ref Range: 10.0 - 15.0 % 13.3     PFTs 4/3/17:  FEV1: 1.77 L  FVC:  2.21 L  FEV1/FVC: 80%    EKG 3/4/17: Sinus rhythm, 1st degree AV block (210 ms), nonspecific IVCD (128ms) normal axis, LVH by voltage criteria    EKG 09/23/16: sinus rhythm with first degree heart block, pathologic Q waves and T wave inversion in III and ST depression and TWI in III and aVF. All changes are chronic and noted on EKG from 02/2015 (though ND was borderline at 194).    TTE 07/15/2016:   Technically difficult study.Extremely poor acoustic windows. Limited information was obtained during study. Right ventricular function, chamber size, wall motion, and thickness are normal. A bioprosthetic mitral valve is present. The aortic valve cannot be assessed.    =============================================================    Assessment and Recommendations:  Mr. Gorge Corona is a 85-year old male with a PMHx of CAD s/p CABG (2013), 29mm EPIC mitral valve repalcement (mitral regurgitation), moderate AS by TTE, here for follow-up of SVT which is currently managed with Amiodarone.    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    - Remains intent on avoiding anticoagulation and any invasive procedure   - Continue Amiodarone 200mg daily; if palpitations remain quiescent will plan to reduce dose to 100mg at next visit   - Amiodarone labs (LFTs, TSH & Free T4) all within normal limits today; PFTs actually better than  prior   - Recheck PFTs, liver enzymes, and TFTs in 6 months at return visit    - CAD s/p CABG in 2013 and RUSSEL in 2013  - Probable Diastolic Dysfunction, HFpEF   - Continue Aspirin 81 mg q24h   - Continue Atorvastatin 10 mg q24h, Spironolactone 25 mg q24h, and Lasix 40mg daily   - Given 1st degree heart block and preserved EF, continue to defer regular BB therapy     - Aortic stenosis (moderate)   - Moderate per RAY 02/2015: BETI 1.4, Ao max gradient 31, peak velocity 2.8 m/s, EF 55-60%; not well seen on recent TTE 7/2016   - Appears to be asymptomatic    - Repeat TTE this week for progression     - Mitral valve regurgitation s/p bioprosthetic valve replacement in 2013   - Asymptomatic, monitor symptoms     RTC in 6 months with repeat Amiodarone monitoring labs and PFTs.    Seen and examined with Dr. Ocasio.     Sowmya Olivia MD  Cardiology Fellow  [phone]    Attending: Patient seen and examined with Dr. Olivia. The H&P is accurate as recorded. Any additional findings of my own have been incorporated into the body of the note. All labs and imaging studies reviewed personally. The assessment and plans outlined reflect our joint decision making after careful discussion and review.    Santi Ocasio MD

## 2017-04-03 NOTE — LETTER
"4/3/2017      RE: Gorge Corona  [id number] [facility] [address]       Dear Colleague,    Thank you for the opportunity to participate in the care of your patient, Gorge Corona, at the [facility]. Please see a copy of my visit note below.          Clinical Cardiac Electrophysiology    Chief Complaint: Evaluation of SVT    HPI:   Mr. Gorge Corona is a 85-year old male with a PMHx of CAD s/p CABG, presumed SVT (Aflutter vs. AFib per chart) who presented to clinic today on 09/23 to establish care after being transferred from Dr. Billy's clinic cohort.    Briefly, Mr. Corona was noted to have a EKG in 11/2014 that was suspicious for SVT with a HR of 144. He was followed by Dr. Billy and noted on multiple occasions that he was disinclined to pursue an invasive procedure. Hence Dr. Billy sought treatment with metoprolol 25 mg PO PRN for symptomatic relief. Mr. Corona noted that he was having regular episodes of palpitations requiring metoprolol, therefore he was started on amiodarone in Jul 2016. Following this, he has not had recurrences of the palpitations. Aside from this, Mr. Corona has also been devoid of lightheadedness/dizziness, chest pain, dyspnea on exertion, PND/orthopnea, and syncope. He has been troubled by petechiae over the dorsal aspects of his hands. Of note, Mr. Corona's granddaughter is a medical student at the [employer].     03April 2016....Interval Update: Overall doing well, feels he is \"healthy\". Biggest issue of recent has been falls, all mechanical, all related to loosing balance and/or tripping, often when turning. Now using a 4-wheeled walker all of the time. Worst fall was in the bathroom after getting out of the shower. This caused him and his wife to purchase a walk-in tub ($16k; they are planning to ask primary care provider whether there is any insurance deduction for medical " necessity). Falls are not preceded by chest pain/pressure, palpitations, dyspnea, syncope, or tunnel vision. Has been re-evaluated by Neurology for his Normal Pressure Hydrocephalus and  shunt working properly without neurologic cause for falls. Prior physical therapy and balance rehab has not been helpful. Him and wife concur that their house is overall very safe, no stairs, no slippery rugs, installed handlebars in multiple locations, etc.   He denies any exertional chest pain or pressure. No PND/Orthopnea, no lower extremity edema, but remains on Lasix and Spironolactone. No ongoing palpitations while on Amiodarone, they are very happy with the response of this medication in comparison to Metoprolol. Monitor blood pressure frequently; he was worried about a systolic blood pressure of 140 after a recent hot bath (provided reassurance). Wife was also worried diastolic blood pressure too low recently, in 50s. No lightheadedness, dizziness, incessant headaches.      Current Outpatient Prescriptions   Medication Sig Dispense Refill     tamsulosin (FLOMAX) 0.4 MG capsule Take 1 capsule (0.4 mg) by mouth daily 90 capsule 3     levothyroxine (SYNTHROID/LEVOTHROID) 50 MCG tablet Take 1 tablet (50 mcg) by mouth daily 90 tablet 3     meclizine (ANTIVERT) 25 MG tablet Take 1 tablet (25 mg) by mouth 3 times daily as needed for dizziness 30 tablet 3     spironolactone (ALDACTONE) 25 MG tablet Take 1 tablet (25 mg) by mouth daily 90 tablet 3     citalopram (CELEXA) 20 MG tablet Take 1 tablet (20 mg) by mouth daily 90 tablet 3     Ammonium Lactate 10 % CREA Apply thin layer to skin on the body at least once daily. 120 mL 11     fluticasone-salmeterol (ADVAIR) 250-50 MCG/DOSE diskus inhaler Inhale 1 puff into the lungs every 12 hours 120 Inhaler 1     aspirin 81 MG tablet Take 81 mg by mouth daily       amiodarone (PACERONE/CODARONE) 200 MG tablet Take 1 tablet (200 mg) by mouth daily 90 tablet 3     atorvastatin (LIPITOR) 10  MG tablet Take 1 tablet (10 mg) by mouth daily 90 tablet 3     order for DME Walker 1 each 0     triamcinolone (KENALOG) 0.1 % cream Apply sparingly to affected area three times daily as needed 80 g 1     HYDROcodone-acetaminophen (NORCO) 5-325 MG per tablet Take 1 tablet by mouth every 6 hours as needed for moderate to severe pain 30 tablet 0     morphine (MS CONTIN) 15 MG 12 hr tablet Take 1 tablet (15 mg) by mouth every 12 hours 30 tablet 0     furosemide (LASIX) 40 MG tablet Take 1 tablet (40 mg) by mouth daily Take 1 1/2 tablets (60 mg) each am 135 tablet 3     metoprolol (LOPRESSOR) 25 MG tablet Take 1 tablet (25 mg) by mouth as needed 30 tablet 1     albuterol (PROAIR HFA, PROVENTIL HFA, VENTOLIN HFA) 108 (90 BASE) MCG/ACT inhaler Inhale 2 puffs into the lungs every 4 hours as needed for shortness of breath / dyspnea or wheezing 1 Inhaler 1     clindamycin (CLEOCIN) 300 MG capsule Take 2 capsules by mouth as needed for 1 dose. 1 hour prior to dental work. 6 capsule 0     senna-docusate (SENOKOT-S;PERICOLACE) 8.6-50 MG per tablet Take 1 tablet by mouth 2 times daily. 60 tablet      Cholecalciferol (VITAMIN D) 1000 UNITS capsule Take 1 capsule by mouth daily.       Calcium 600 MG tablet Take 1 tablet by mouth 2 times daily. 100 tablet 3     coenzyme Q-10 75 MG CAPS Take  by mouth.       Multiple Vitamins-Minerals (OCUVITE PO) Take  by mouth.  0       Past Medical History:   Diagnosis Date     Atrial fibrillation (H)      Atrial flutter (H)      Basal cell carcinoma of skin of trunk      BPH (benign prostatic hypertrophy) with urinary obstruction      CAD (coronary artery disease)     s/p CABG 1/2013; RUSSEL in 2/2013     Depression      H/O CT scan of head 1/11/2013 7/20/2006    Result Impression     CT of the Head without contrast 7/20/2006  History: Ataxia, incontinence, and NPH  Comparison Study: MR obtained to 4/19/2004  Technique: Axial CT images of the head were obtained at 2 intervals from the skull base  to the vertex without intravenous contrast. The images were reviewed in brain, subdural and bone windows.  Findings: Patient has had a moderate promin     Hyperlipidemia      Lumbar spinal stenosis      Mitral regurgitation     s/p bioprosthesis 1/2013     Palpitations      Paroxysmal supraventricular tachycardia (H)      Squamous cell carcinoma (H)      Tachycardia     baroreflex injury dut to surgery      (ventriculoperitoneal) shunt status     for Normal pressure hydrocephalus       Past Surgical History:   Procedure Laterality Date     ANESTHESIA CARDIOVERSION  4/11/2013    Procedure: ANESTHESIA CARDIOVERSION;  Cardioversion;  Surgeon: Provider, Generic Anesthesia;  Location: [facility] OR     BYPASS GRAFT ARTERY CORONARY, REPAIR VALVE MITRAL, COMBINED  1/14/2013    Procedure: COMBINED BYPASS GRAFT ARTERY CORONARY, REPAIR VALVE MITRAL;  Median sternotomy, coronary artery bypass graft X3 using left internal mammary artery & right saphenous vein , mitral valve Replacement and on pump oxygenator, flexible cystoscopy, urethral dilation and insertion of stuart catheter.;  Surgeon: Chan Peres MD;  Location: [facility] OR     CORONARY ANGIOGRAPHY ADULT ORDER       CORONARY ARTERY BYPASS  1/2013    mitral valve tissue      INSERT CATHETER BLADDER  1/14/2013    Procedure: INSERT CATHETER BLADDER;  Flexible cystoscopy, urethral dilation,& insertion of stuart catheter;  Surgeon: Pete Bedoya MD;  Location: [facility] OR     MITRAL VALVE REPLACEMENT  2013    29 mm Epic porcine valve     MOHS MICROGRAPHIC PROCEDURE       REPLACE VALVE MITRAL  1/2013    tissue valve       Family History   Problem Relation Age of Onset     CANCER Father      KIDNEY DISEASE Mother        Social History   Substance Use Topics     Smoking status: Former Smoker     Types: Cigars, Pipe     Quit date: 1/1/1990     Smokeless tobacco: Former User     Alcohol use No       Allergies   Allergen Reactions     Penicillins Rash         ROS:  "  CONSTITUTIONAL:No report of fever, chills, or change in weight. Appetite is good.   RESPIRATORY: No cough, wheezing, SOB, or hemoptysis  CARDIOVASCULAR: see HPI  MUSCULO-SKELETAL: No joint pain/swelling, diffuse arthritis pain (mild)  NEURO: No paresthesias, syncope, pre-syncope, light headness, dizziness or vertigo  ENDOCRINE: No temperature intolerance, no skin/hair changes  PSYCHIATRIC: No change in mood, feeling down/anxious, no change in sleep or appetite  GI: no melena or hematochezia, no change in bowel habits  : no hematuria or dysuria  HEME: no easy bruising or bleeding, no history of anemia, no history of blood clots  SKIN: no rashes or sores, no unusual itching      Physical Examination:  Vitals: /63  Pulse 61  Ht 1.778 m (5' 10\")  Wt 93.7 kg (206 lb 8 oz)  SpO2 96%  BMI 29.63 kg/m2  BMI= Body mass index is 29.63 kg/(m^2).  GEN: A & O, healthy appearing elderly male, resting comfortably in exam chair, NAD, cooperative and conversational   HEENT: PERRL, no scleral icterus   NECK: Supple, no LAD, JVP not elevated above clavicle  RESP: CTA bilaterally, no wheezing, no crackles, no increased work of breathing   CV: Regular, bradycardic, S1 and S2 with mid-peaking systolic murmur II/VI heard throughout precordium, no S4  ABD: Soft, nontender to palpation   EXT: No peripheral edema, warm/well perfused   NEURO: CN II-XII intact, grossly nonfocal  SKIN: Dry skin    Laboratory/Imaging:   Ref. Range 4/3/2017 11:51   Sodium Latest Ref Range: 133 - 144 mmol/L 141   Potassium Latest Ref Range: 3.4 - 5.3 mmol/L 4.0   Chloride Latest Ref Range: 94 - 109 mmol/L 106   Carbon Dioxide Latest Ref Range: 20 - 32 mmol/L 29   Urea Nitrogen Latest Ref Range: 7 - 30 mg/dL 17   Creatinine Latest Ref Range: 0.66 - 1.25 mg/dL 1.23   GFR Estimate Latest Ref Range: >60 mL/min/1.7m2 56 (L)   GFR Estimate If Black Latest Ref Range: >60 mL/min/1.7m2 67   Calcium Latest Ref Range: 8.5 - 10.1 mg/dL 8.3 (L)   Anion Gap Latest " Ref Range: 3 - 14 mmol/L 5   Albumin Latest Ref Range: 3.4 - 5.0 g/dL 3.4   Protein Total Latest Ref Range: 6.8 - 8.8 g/dL 6.6 (L)   Bilirubin Total Latest Ref Range: 0.2 - 1.3 mg/dL 0.7   Alkaline Phosphatase Latest Ref Range: 40 - 150 U/L 72   ALT Latest Ref Range: 0 - 70 U/L 20   AST Latest Ref Range: 0 - 45 U/L 14   TSH Latest Ref Range: 0.40 - 4.00 mU/L 5.86 (H)   Glucose Latest Ref Range: 70 - 99 mg/dL 96   WBC Latest Ref Range: 4.0 - 11.0 10e9/L 6.4   Hemoglobin Latest Ref Range: 13.3 - 17.7 g/dL 12.5 (L)   Hematocrit Latest Ref Range: 40.0 - 53.0 % 38.1 (L)   Platelet Count Latest Ref Range: 150 - 450 10e9/L 111 (L)   RBC Count Latest Ref Range: 4.4 - 5.9 10e12/L 3.99 (L)   MCV Latest Ref Range: 78 - 100 fl 96   MCH Latest Ref Range: 26.5 - 33.0 pg 31.3   MCHC Latest Ref Range: 31.5 - 36.5 g/dL 32.8   RDW Latest Ref Range: 10.0 - 15.0 % 13.3     PFTs 4/3/17:  FEV1: 1.77 L  FVC:  2.21 L  FEV1/FVC: 80%    EKG 3/4/17: Sinus rhythm, 1st degree AV block (210 ms), nonspecific IVCD (128ms) normal axis, LVH by voltage criteria    EKG 09/23/16: sinus rhythm with first degree heart block, pathologic Q waves and T wave inversion in III and ST depression and TWI in III and aVF. All changes are chronic and noted on EKG from 02/2015 (though NM was borderline at 194).    TTE 07/15/2016:   Technically difficult study.Extremely poor acoustic windows. Limited information was obtained during study. Right ventricular function, chamber size, wall motion, and thickness are normal. A bioprosthetic mitral valve is present. The aortic valve cannot be assessed.    =============================================================    Assessment and Recommendations:  Mr. Gorge Corona is a 85-year old male with a PMHx of CAD s/p CABG (2013), 29mm EPIC mitral valve repalcement (mitral regurgitation), moderate AS by TTE, here for follow-up of SVT which is currently managed with Amiodarone.    - Palpitations and presumed SVT, probable  paroxysmal AFib (never documented) vs. AVNRT    - Remains intent on avoiding anticoagulation and any invasive procedure   - Continue Amiodarone 200mg daily; if palpitations remain quiescent will plan to reduce dose to 100mg at next visit   - Amiodarone labs (LFTs, TSH & Free T4) all within normal limits today; PFTs actually better than prior   - Recheck PFTs, liver enzymes, and TFTs in 6 months at return visit    - CAD s/p CABG in 2013 and RUSSEL in 2013  - Probable Diastolic Dysfunction, HFpEF   - Continue Aspirin 81 mg q24h   - Continue Atorvastatin 10 mg q24h, Spironolactone 25 mg q24h, and Lasix 40mg daily   - Given 1st degree heart block and preserved EF, continue to defer regular BB therapy     - Aortic stenosis (moderate)   - Moderate per RAY 02/2015: BETI 1.4, Ao max gradient 31, peak velocity 2.8 m/s, EF 55-60%; not well seen on recent TTE 7/2016   - Appears to be asymptomatic    - Repeat TTE this week for progression     - Mitral valve regurgitation s/p bioprosthetic valve replacement in 2013   - Asymptomatic, monitor symptoms     RTC in 6 months with repeat Amiodarone monitoring labs and PFTs.    Seen and examined with Dr. Ocasio.     Sowmya Olivia MD  Cardiology Fellow  [phone]    Attending: Patient seen and examined with Dr. Olivia. The H&P is accurate as recorded. Any additional findings of my own have been incorporated into the body of the note. All labs and imaging studies reviewed personally. The assessment and plans outlined reflect our joint decision making after careful discussion and review.    Santi Ocasio MD

## 2017-04-03 NOTE — PATIENT INSTRUCTIONS
Echocardiogram on Thursday.   Return to clinic in 6 months with PFTs and lab.      Please do not hesitate to call if you have any cardiology related questions or concerns, or need to schedule an appointment, at [phone].         Cardiology Medication Refill Request Information:  * Please contact your pharmacy regarding ANY request for medication refills.  ** [facility] Prescription Fax = [phone]  * Please allow 3 business days for routine medication refills.    Cardiology Test Result notification information:  *You will be notified with in 7-10 days of your appointment day regarding the results of your test. If you are on MyChart you will be notified as soon as the provider has reviewed the results and signed off on them. Please call RN Care Coordinator with questions regarding results.

## 2017-04-04 LAB — INTERPRETATION ECG - MUSE: NORMAL

## 2017-04-06 ENCOUNTER — RADIANT APPOINTMENT (OUTPATIENT)
Dept: CARDIOLOGY | Facility: CLINIC | Age: 82
End: 2017-04-06

## 2017-04-06 DIAGNOSIS — I06.0 RHEUMATIC AORTIC STENOSIS: ICD-10-CM

## 2017-04-11 ENCOUNTER — CARE COORDINATION (OUTPATIENT)
Dept: CARDIOLOGY | Facility: CLINIC | Age: 82
End: 2017-04-11

## 2017-04-11 DIAGNOSIS — I35.0 AORTIC VALVE STENOSIS: Primary | ICD-10-CM

## 2017-04-11 NOTE — PROGRESS NOTES
Spoke with pts wife. Stated pt is sleeping.   Reviewed echocardiogram results. Will discuss option of RAY with pt. Did mention she has heard patient wheezing and concerned over this. Afebrile.     Encouraged spouse to have pt see PCP and evaluate wheezing. Sp agreed and wcb if want to proceed with RAY.

## 2017-06-10 DIAGNOSIS — H81.10 BPPV (BENIGN PAROXYSMAL POSITIONAL VERTIGO), UNSPECIFIED LATERALITY: ICD-10-CM

## 2017-06-10 RX ORDER — MECLIZINE HYDROCHLORIDE 25 MG/1
25 TABLET ORAL 3 TIMES DAILY PRN
Qty: 30 TABLET | Refills: 3 | Status: SHIPPED | OUTPATIENT
Start: 2017-06-10 | End: 2019-09-30

## 2017-06-10 NOTE — TELEPHONE ENCOUNTER
meclizine (ANTIVERT) 25 MG tablet  Last Written Prescription Date:  3/4/17  Last Fill Quantity: 30,   # refills: 3  Last Office Visit with [facility], [facility] or [facility] prescribing provider: 3/4/17  Future Office visit:   none

## 2017-08-02 DIAGNOSIS — J20.9 ACUTE BRONCHITIS: ICD-10-CM

## 2017-08-02 DIAGNOSIS — R05.9 COUGH: ICD-10-CM

## 2017-08-03 NOTE — TELEPHONE ENCOUNTER
ADVAIR      Last Written Prescription Date:  11/23/16  Last Fill Quantity: 120,   # refills: 1  Last Office Visit : 3/4/17  Future Office visit:  NONE

## 2017-09-18 ENCOUNTER — ALLIED HEALTH/NURSE VISIT (OUTPATIENT)
Dept: INTERNAL MEDICINE | Facility: CLINIC | Age: 82
End: 2017-09-18

## 2017-09-18 DIAGNOSIS — Z23 NEED FOR PROPHYLACTIC VACCINATION AND INOCULATION AGAINST INFLUENZA: Primary | ICD-10-CM

## 2017-09-18 NOTE — NURSING NOTE
Gorge Corona comes into clinic today at the request of Dr. Hobbs Ordering Provider for Ear Wash and Influenza vaccine.    FLU VACCINE QUESTIONNAIRE:  Ask the following questions of all parties who want influenza vaccination:     CONTRAINDICATIONS  1.  Is the patient age less than 6 months?  NO  2.  Has the person to be vaccinated ever had Guillain-Warren syndrome? NO  3.  Has the person to be vaccinated had the vaccine this year? NO  4.  Is the person to be vaccinated sick today? NO  5.  Does the person to be vaccinated have an allergy to eggs or a component of the vaccine? NO  6.  Has the person to vaccinated ever had a serious reaction to an influenza vaccination in the past? NO    This service provided today was under the supervising provider of the day Dr. Mohan, who was available if needed.    Reason for visit: Med Injection only flu shot     Pt presents to clinic for bilateral ear wash, no amount of wax removed.  Patient had a piece of his hearing aid stuck in the canal of his ears.  This was removed and tympanic membranes visualized, pt tolerated procedure well.    DEEP NAJERA at 2:04 PM on 9/18/2017.

## 2017-10-09 ENCOUNTER — OFFICE VISIT (OUTPATIENT)
Dept: CARDIOLOGY | Facility: CLINIC | Age: 82
End: 2017-10-09
Attending: INTERNAL MEDICINE
Payer: MEDICARE

## 2017-10-09 ENCOUNTER — PRE VISIT (OUTPATIENT)
Dept: CARDIOLOGY | Facility: CLINIC | Age: 82
End: 2017-10-09

## 2017-10-09 VITALS
BODY MASS INDEX: 30.35 KG/M2 | SYSTOLIC BLOOD PRESSURE: 122 MMHG | WEIGHT: 212 LBS | HEART RATE: 63 BPM | OXYGEN SATURATION: 94 % | HEIGHT: 70 IN | DIASTOLIC BLOOD PRESSURE: 67 MMHG

## 2017-10-09 DIAGNOSIS — I25.10 CAD S/P PERCUTANEOUS CORONARY ANGIOPLASTY: ICD-10-CM

## 2017-10-09 DIAGNOSIS — I35.0 AORTIC VALVE STENOSIS, ETIOLOGY OF CARDIAC VALVE DISEASE UNSPECIFIED: ICD-10-CM

## 2017-10-09 DIAGNOSIS — I48.91 ATRIAL FIBRILLATION, UNSPECIFIED TYPE (H): ICD-10-CM

## 2017-10-09 DIAGNOSIS — Z98.61 CAD S/P PERCUTANEOUS CORONARY ANGIOPLASTY: ICD-10-CM

## 2017-10-09 DIAGNOSIS — I34.0 MITRAL VALVE INSUFFICIENCY, UNSPECIFIED ETIOLOGY: Primary | ICD-10-CM

## 2017-10-09 DIAGNOSIS — I50.32 CHRONIC DIASTOLIC HEART FAILURE (H): ICD-10-CM

## 2017-10-09 DIAGNOSIS — I47.10 SVT (SUPRAVENTRICULAR TACHYCARDIA) (H): ICD-10-CM

## 2017-10-09 DIAGNOSIS — I47.10 SVT (SUPRAVENTRICULAR TACHYCARDIA) (H): Primary | ICD-10-CM

## 2017-10-09 LAB
ALBUMIN SERPL-MCNC: 3.3 G/DL (ref 3.4–5)
ALP SERPL-CCNC: 69 U/L (ref 40–150)
ALT SERPL W P-5'-P-CCNC: 18 U/L (ref 0–70)
ANION GAP SERPL CALCULATED.3IONS-SCNC: 7 MMOL/L (ref 3–14)
AST SERPL W P-5'-P-CCNC: 16 U/L (ref 0–45)
BILIRUB SERPL-MCNC: 0.6 MG/DL (ref 0.2–1.3)
BUN SERPL-MCNC: 17 MG/DL (ref 7–30)
CALCIUM SERPL-MCNC: 8.3 MG/DL (ref 8.5–10.1)
CHLORIDE SERPL-SCNC: 105 MMOL/L (ref 94–109)
CO2 SERPL-SCNC: 26 MMOL/L (ref 20–32)
CREAT SERPL-MCNC: 1.38 MG/DL (ref 0.66–1.25)
GFR SERPL CREATININE-BSD FRML MDRD: 49 ML/MIN/1.7M2
GLUCOSE SERPL-MCNC: 99 MG/DL (ref 70–99)
POTASSIUM SERPL-SCNC: 4.2 MMOL/L (ref 3.4–5.3)
PROT SERPL-MCNC: 6.5 G/DL (ref 6.8–8.8)
SODIUM SERPL-SCNC: 138 MMOL/L (ref 133–144)
T4 FREE SERPL-MCNC: 1.14 NG/DL (ref 0.76–1.46)
TSH SERPL DL<=0.005 MIU/L-ACNC: 4.13 MU/L (ref 0.4–4)

## 2017-10-09 PROCEDURE — 36415 COLL VENOUS BLD VENIPUNCTURE: CPT | Performed by: INTERNAL MEDICINE

## 2017-10-09 PROCEDURE — 40000809 ZZH STATISTIC NO DOCUMENTATION TO SUPPORT CHARGE

## 2017-10-09 PROCEDURE — 99215 OFFICE O/P EST HI 40 MIN: CPT | Mod: ZP | Performed by: INTERNAL MEDICINE

## 2017-10-09 PROCEDURE — 84439 ASSAY OF FREE THYROXINE: CPT | Performed by: INTERNAL MEDICINE

## 2017-10-09 PROCEDURE — 99212 OFFICE O/P EST SF 10 MIN: CPT | Mod: ZF

## 2017-10-09 PROCEDURE — 84443 ASSAY THYROID STIM HORMONE: CPT | Performed by: INTERNAL MEDICINE

## 2017-10-09 PROCEDURE — 80053 COMPREHEN METABOLIC PANEL: CPT | Performed by: INTERNAL MEDICINE

## 2017-10-09 RX ORDER — AMIODARONE HYDROCHLORIDE 200 MG/1
200 TABLET ORAL DAILY
Qty: 90 TABLET | Refills: 3 | Status: ON HOLD | OUTPATIENT
Start: 2017-10-09 | End: 2018-08-18

## 2017-10-09 ASSESSMENT — PAIN SCALES - GENERAL: PAINLEVEL: NO PAIN (0)

## 2017-10-09 NOTE — MR AVS SNAPSHOT
After Visit Summary   10/9/2017    Gorge Corona    MRN: [id number]           Patient Information     Date Of Birth          11/27/1930        Visit Information        Provider Department      10/9/2017 5:00 PM Santi Ocasio MD [facility]        Today's Diagnoses     Mitral valve insufficiency, unspecified etiology    -  1    Aortic stenosis        CAD S/P percutaneous coronary angioplasty        Atrial fibrillation, unspecified type (H)          Care Instructions    Return to clinic in 6 months with an echocardiogram.       Please do not hesitate to call if you have any cardiology related questions or concerns, or need to schedule an appointment, at [phone].         Cardiology Medication Refill Request Information:  * Please contact your pharmacy regarding ANY request for medication refills.  ** [facility] Prescription Fax = [phone]  * Please allow 3 business days for routine medication refills.    Cardiology Test Result notification information:  *You will be notified with in 7-10 days of your appointment day regarding the results of your test. If you are on MyChart you will be notified as soon as the provider has reviewed the results and signed off on them. Please call RN Care Coordinator with questions regarding results.              Follow-ups after your visit        Follow-up notes from your care team     Discussed this visit Return in about 6 months (around 4/9/2018) for Jaquelin Ocasio, Routine Visit, Echo.      Your next 10 appointments already scheduled     Dec 27, 2017  9:00 AM CST   (Arrive by 8:45 AM)   PHYSICAL with Clovis Adames MD   [facility] ([facility])    [address] [place] [address]  [address] [employer] [address]   [phone]            Apr 09, 2018  2:00 PM CDT   Ech Complete with [id number]   [facility] Echo ([facility])    [address] [place] [address]  [address] [employer] [address]  "  [phone]           1. Please bring or wear a comfortable two-piece outfit. 2. You may eat, drink and take your normal medicines. 3. For any questions that cannot be answered, please contact the ordering physician            Apr 09, 2018  3:20 PM CDT   (Arrive by 3:05 PM)   RETURN ARRHYTHMIA with Santi Ocasio MD   [facility] ([facility])    [address] [place] [address]  3rd [facility] [id number] [phone]              Future tests that were ordered for you today     Open Future Orders        Priority Expected Expires Ordered    Echocardiogram Routine  10/9/2018 10/9/2017            Who to contact     If you have questions or need follow up information about today's clinic visit or your schedule please contact [facility] directly at [phone].  Normal or non-critical lab and imaging results will be communicated to you by Haloadhart, letter or phone within 4 business days after the clinic has received the results. If you do not hear from us within 7 days, please contact the clinic through Haloadhart or phone. If you have a critical or abnormal lab result, we will notify you by phone as soon as possible.  Submit refill requests through Quotte or call your pharmacy and they will forward the refill request to us. Please allow 3 business days for your refill to be completed.          Additional Information About Your Visit        Quotte Information     Quotte lets you send messages to your doctor, view your test results, renew your prescriptions, schedule appointments and more. To sign up, go to [email or web address] . Click on \"Log in\" on the left side of the screen, which will take you to the Welcome page. Then click on \"Sign up Now\" on the right side of the page.     You will be asked to enter the access code listed below, as well as some personal information. Please follow the directions to create your username and password.     Your access code is: " "[email or web address]  Expires: 2017  3:48 PM     Your access code will  in 90 days. If you need help or a new code, please call your [facility] clinic or [phone].        Care EveryWhere ID     This is your Care EveryWhere ID. This could be used by other organizations to access your [facility] medical records  [id number]        Your Vitals Were     Pulse Height Pulse Oximetry BMI (Body Mass Index)          63 1.778 m (5' 10\") 94% 30.42 kg/m2         Blood Pressure from Last 3 Encounters:   10/09/17 122/67   17 120/63   17 114/70    Weight from Last 3 Encounters:   10/09/17 96.2 kg (212 lb)   17 93.7 kg (206 lb 8 oz)   17 93.1 kg (205 lb 4.8 oz)                 Where to get your medicines      These medications were sent to [facility] [address] [place] [address] [address]  [address] [place] [address]     Phone:  [phone]     amiodarone 200 MG tablet          Primary Care Provider Office Phone # Fax #    Monisha Jenna Olea [phone]       [address] [address]        Equal Access to Services     NILDA DIAZ : Hadii aad ku hadasho Soyumikoali, waaxda luqadaha, qaybta kaalmada adeegyada, tejas tian. So [facility] [phone].    ATENCIÓN: Si habla español, tiene a ferrari disposición servicios gratuitos de asistencia lingüística. Llame al [phone].    We comply with applicable federal civil rights laws and [place] laws. We do not discriminate on the basis of race, color, national origin, age, disability, sex, sexual orientation, or gender identity.            Thank you!     Thank you for choosing [facility]  for your care. Our goal is always to provide you with excellent care. Hearing back from our patients is one way we can continue to improve our services. Please take a few minutes to complete the written survey that you may receive in the mail after your visit with us. Thank " you!             Your Updated Medication List - Protect others around you: Learn how to safely use, store and throw away your medicines at www.disposemymeds.org.          This list is accurate as of: 10/9/17  5:14 PM.  Always use your most recent med list.                   Brand Name Dispense Instructions for use Diagnosis    albuterol 108 (90 BASE) MCG/ACT Inhaler    PROAIR HFA/PROVENTIL HFA/VENTOLIN HFA    1 Inhaler    Inhale 2 puffs into the lungs every 4 hours as needed for shortness of breath / dyspnea or wheezing    Cough       amiodarone 200 MG tablet    PACERONE/CODARONE    90 tablet    Take 1 tablet (200 mg) by mouth daily    Atrial fibrillation, unspecified type (H)       Ammonium Lactate 10 % Crea     120 mL    Apply thin layer to skin on the body at least once daily.    Xerosis cutis, Nummular eczema       aspirin 81 MG tablet      Take 81 mg by mouth daily        atorvastatin 10 MG tablet    LIPITOR    90 tablet    Take 1 tablet (10 mg) by mouth daily    Major depressive disorder, recurrent episode, moderate (H), Essential hypertension, benign, Shortness of breath, Dyslipidemia       calcium 600 MG tablet     100 tablet    Take 1 tablet by mouth 2 times daily.        citalopram 20 MG tablet    celeXA    90 tablet    Take 1 tablet (20 mg) by mouth daily    Major depressive disorder, recurrent episode, moderate (H)       clindamycin 300 MG capsule    CLEOCIN    6 capsule    Take 2 capsules by mouth as needed for 1 dose. 1 hour prior to dental work.    Prophylactic antibiotic       coenzyme Q-10 75 MG Caps      Take  by mouth.        fluticasone-salmeterol 250-50 MCG/DOSE diskus inhaler    ADVAIR DISKUS    120 Inhaler    Inhale 1 puff into the lungs every 12 hours    Acute bronchitis, Cough       furosemide 40 MG tablet    LASIX    90 tablet    Take 1 tablet (40 mg) by mouth daily    Chronic diastolic heart failure (H), CAD S/P percutaneous coronary angioplasty       HYDROcodone-acetaminophen 5-325 MG per  tablet    NORCO    30 tablet    Take 1 tablet by mouth every 6 hours as needed for moderate to severe pain    Hip pain, left       levothyroxine 50 MCG tablet    SYNTHROID/LEVOTHROID    90 tablet    Take 1 tablet (50 mcg) by mouth daily    Other specified hypothyroidism       meclizine 25 MG tablet    ANTIVERT    30 tablet    Take 1 tablet (25 mg) by mouth 3 times daily as needed for dizziness    BPPV (benign paroxysmal positional vertigo), unspecified laterality       metoprolol 25 MG tablet    LOPRESSOR    30 tablet    Take 1 tablet (25 mg) by mouth as needed    Chronic diastolic heart failure (H), Atrial fibrillation (H)       morphine 15 MG 12 hr tablet    MS CONTIN    30 tablet    Take 1 tablet (15 mg) by mouth every 12 hours    Hip pain, left       OCUVITE PO      Take  by mouth.        order for DME     1 each    Walker    Spinal stenosis, lumbar region, without neurogenic claudication       senna-docusate 8.6-50 MG per tablet    SENOKOT-S;PERICOLACE    60 tablet    Take 1 tablet by mouth 2 times daily.    S/P CABG x 3       spironolactone 25 MG tablet    ALDACTONE    90 tablet    Take 1 tablet (25 mg) by mouth daily    Hypokalemia       tamsulosin 0.4 MG capsule    FLOMAX    90 capsule    Take 1 capsule (0.4 mg) by mouth daily    Urinary retention       triamcinolone 0.1 % cream    KENALOG    80 g    Apply sparingly to affected area three times daily as needed    Nummular eczema       vitamin D 1000 UNITS capsule      Take 1 capsule by mouth daily.

## 2017-10-09 NOTE — LETTER
"10/9/2017      RE: Gorge Corona  [address] [facility] [address]       Dear Colleague,    Thank you for the opportunity to participate in the care of your patient, Gorge Corona, at the [facility]. Please see a copy of my visit note below.    Clinical Cardiac Electrophysiology    Chief Complaint: Evaluation of SVT    HPI:   Mr. Gorge Corona is a 85-year old male with a PMHx of CAD s/p CABG, presumed SVT (Aflutter vs. AFib per chart) who presented to clinic today on 09/23 to establish care after being transferred from Dr. Billy's clinic cohort.    Briefly, Mr. Corona was noted to have a EKG in 11/2014 that was suspicious for SVT with a HR of 144. He was followed by Dr. Billy and noted on multiple occasions that he was disinclined to pursue an invasive procedure. Hence Dr. Billy sought treatment with metoprolol 25 mg PO PRN for symptomatic relief. Mr. Corona noted that he was having regular episodes of palpitations requiring metoprolol, therefore he was started on amiodarone in Jul 2016. Following this, he has not had recurrences of the palpitations. Aside from this, Mr. Corona has also been devoid of lightheadedness/dizziness, chest pain, dyspnea on exertion, PND/orthopnea, and syncope. He has been troubled by petechiae over the dorsal aspects of his hands. Of note, Mr. Corona's granddaughter is a medical student at the [employer].     03April 2016....Interval Update: Overall doing well, feels he is \"healthy\". Biggest issue of recent has been falls, all mechanical, all related to loosing balance and/or tripping, often when turning. Now using a 4-wheeled walker all of the time. Worst fall was in the bathroom after getting out of the shower. This caused him and his wife to purchase a walk-in tub ($16k; they are planning to ask primary care provider whether there is any insurance deduction for medical necessity). " Falls are not preceded by chest pain/pressure, palpitations, dyspnea, syncope, or tunnel vision. Has been re-evaluated by Neurology for his Normal Pressure Hydrocephalus and  shunt working properly without neurologic cause for falls. Prior physical therapy and balance rehab has not been helpful. Him and wife concur that their house is overall very safe, no stairs, no slippery rugs, installed handlebars in multiple locations, etc.   He denies any exertional chest pain or pressure. No PND/Orthopnea, no lower extremity edema, but remains on Lasix and Spironolactone. No ongoing palpitations while on Amiodarone, they are very happy with the response of this medication in comparison to Metoprolol. Monitor blood pressure frequently; he was worried about a systolic blood pressure of 140 after a recent hot bath (provided reassurance). Wife was also worried diastolic blood pressure too low recently, in 50s. No lightheadedness, dizziness, incessant headaches.     09Oct2017 Interval history: He reports no palpitations. He is not very active. He denies chest pain/discomfort or dyspnea on exertion. He just doesn't feel like doing much.      Current Outpatient Prescriptions   Medication Sig Dispense Refill     fluticasone-salmeterol (ADVAIR DISKUS) 250-50 MCG/DOSE diskus inhaler Inhale 1 puff into the lungs every 12 hours 120 Inhaler 1     meclizine (ANTIVERT) 25 MG tablet Take 1 tablet (25 mg) by mouth 3 times daily as needed for dizziness 30 tablet 3     furosemide (LASIX) 40 MG tablet Take 1 tablet (40 mg) by mouth daily 90 tablet 0     tamsulosin (FLOMAX) 0.4 MG capsule Take 1 capsule (0.4 mg) by mouth daily 90 capsule 3     levothyroxine (SYNTHROID/LEVOTHROID) 50 MCG tablet Take 1 tablet (50 mcg) by mouth daily 90 tablet 3     spironolactone (ALDACTONE) 25 MG tablet Take 1 tablet (25 mg) by mouth daily 90 tablet 3     citalopram (CELEXA) 20 MG tablet Take 1 tablet (20 mg) by mouth daily 90 tablet 3     Ammonium Lactate 10 %  CREA Apply thin layer to skin on the body at least once daily. 120 mL 11     aspirin 81 MG tablet Take 81 mg by mouth daily       amiodarone (PACERONE/CODARONE) 200 MG tablet Take 1 tablet (200 mg) by mouth daily 90 tablet 3     atorvastatin (LIPITOR) 10 MG tablet Take 1 tablet (10 mg) by mouth daily 90 tablet 3     order for DME Walker 1 each 0     triamcinolone (KENALOG) 0.1 % cream Apply sparingly to affected area three times daily as needed 80 g 1     HYDROcodone-acetaminophen (NORCO) 5-325 MG per tablet Take 1 tablet by mouth every 6 hours as needed for moderate to severe pain 30 tablet 0     metoprolol (LOPRESSOR) 25 MG tablet Take 1 tablet (25 mg) by mouth as needed 30 tablet 1     albuterol (PROAIR HFA, PROVENTIL HFA, VENTOLIN HFA) 108 (90 BASE) MCG/ACT inhaler Inhale 2 puffs into the lungs every 4 hours as needed for shortness of breath / dyspnea or wheezing 1 Inhaler 1     clindamycin (CLEOCIN) 300 MG capsule Take 2 capsules by mouth as needed for 1 dose. 1 hour prior to dental work. 6 capsule 0     senna-docusate (SENOKOT-S;PERICOLACE) 8.6-50 MG per tablet Take 1 tablet by mouth 2 times daily. 60 tablet      Cholecalciferol (VITAMIN D) 1000 UNITS capsule Take 1 capsule by mouth daily.       Calcium 600 MG tablet Take 1 tablet by mouth 2 times daily. 100 tablet 3     coenzyme Q-10 75 MG CAPS Take  by mouth.       Multiple Vitamins-Minerals (OCUVITE PO) Take  by mouth.  0     morphine (MS CONTIN) 15 MG 12 hr tablet Take 1 tablet (15 mg) by mouth every 12 hours (Patient not taking: Reported on 10/9/2017) 30 tablet 0       Past Medical History:   Diagnosis Date     Atrial fibrillation (H)      Atrial flutter (H)      Basal cell carcinoma of skin of trunk      BPH (benign prostatic hypertrophy) with urinary obstruction      CAD (coronary artery disease)     s/p CABG 1/2013; RUSSEL in 2/2013     Depression      H/O CT scan of head 1/11/2013 7/20/2006    Result Impression     CT of the Head without contrast  7/20/2006  History: Ataxia, incontinence, and NPH  Comparison Study: MR obtained to 4/19/2004  Technique: Axial CT images of the head were obtained at 2 intervals from the skull base to the vertex without intravenous contrast. The images were reviewed in brain, subdural and bone windows.  Findings: Patient has had a moderate promin     Hyperlipidemia      Lumbar spinal stenosis      Mitral regurgitation     s/p bioprosthesis 1/2013     Palpitations      Paroxysmal supraventricular tachycardia (H)      Squamous cell carcinoma      Tachycardia     baroreflex injury dut to surgery      (ventriculoperitoneal) shunt status     for Normal pressure hydrocephalus       Past Surgical History:   Procedure Laterality Date     ANESTHESIA CARDIOVERSION  4/11/2013    Procedure: ANESTHESIA CARDIOVERSION;  Cardioversion;  Surgeon: Provider, Generic Anesthesia;  Location: [facility] OR     BYPASS GRAFT ARTERY CORONARY, REPAIR VALVE MITRAL, COMBINED  1/14/2013    Procedure: COMBINED BYPASS GRAFT ARTERY CORONARY, REPAIR VALVE MITRAL;  Median sternotomy, coronary artery bypass graft X3 using left internal mammary artery & right saphenous vein , mitral valve Replacement and on pump oxygenator, flexible cystoscopy, urethral dilation and insertion of stuart catheter.;  Surgeon: Chan Peres MD;  Location: [facility] OR     CORONARY ANGIOGRAPHY ADULT ORDER       CORONARY ARTERY BYPASS  1/2013    mitral valve tissue      INSERT CATHETER BLADDER  1/14/2013    Procedure: INSERT CATHETER BLADDER;  Flexible cystoscopy, urethral dilation,& insertion of stuart catheter;  Surgeon: Pete Bedoya MD;  Location: [facility] OR     MITRAL VALVE REPLACEMENT  2013    29 mm Epic porcine valve     MOHS MICROGRAPHIC PROCEDURE       REPLACE VALVE MITRAL  1/2013    tissue valve       Family History   Problem Relation Age of Onset     CANCER Father      KIDNEY DISEASE Mother        Social History   Substance Use Topics     Smoking status: Former Smoker      "Types: Cigars, Pipe     Quit date: 1/1/1990     Smokeless tobacco: Former User     Alcohol use No       Allergies   Allergen Reactions     Penicillins Rash         ROS:   CONSTITUTIONAL:No report of fever, chills, or change in weight. Appetite is good.   RESPIRATORY: No cough, wheezing, SOB, or hemoptysis  CARDIOVASCULAR: see HPI  MUSCULO-SKELETAL: No joint pain/swelling, diffuse arthritis pain (mild)  NEURO: No paresthesias, syncope, pre-syncope, light headness, dizziness or vertigo  ENDOCRINE: No temperature intolerance, no skin/hair changes  PSYCHIATRIC: No change in mood, feeling down/anxious, no change in sleep or appetite  GI: no melena or hematochezia, no change in bowel habits  : no hematuria or dysuria  HEME: no easy bruising or bleeding, no history of anemia, no history of blood clots  SKIN: no rashes or sores, no unusual itching  09Oct2017/woa    Physical Examination:  Vitals: /67 (BP Location: Left arm, Patient Position: Chair, Cuff Size: Adult Regular)  Pulse 63  Ht 1.778 m (5' 10\")  Wt 96.2 kg (212 lb)  SpO2 94%  BMI 30.42 kg/m2  BMI= Body mass index is 30.42 kg/(m^2).  GEN: A & O, healthy appearing elderly male, resting comfortably in exam chair, NAD, cooperative and conversational   HEENT: PERRL, no scleral icterus   NECK: Supple, no LAD, JVP not elevated above clavicle  RESP: CTA bilaterally, no wheezing, no crackles, no increased work of breathing   CV: Regular, bradycardic, S1 and S2 with mid-peaking systolic murmur II/VI heard throughout precordium, no S4  ABD: Soft, nontender to palpation   EXT: No peripheral edema, warm/well perfused   NEURO: CN II-XII intact, grossly nonfocal  SKIN: Dry skin    Laboratory/Imaging:    Results for MAURIZIOSRIKANTH (MRN [id number]) as of 10/12/2017 12:30   Ref. Range 10/9/2017 15:34   ALT Latest Ref Range: 0 - 70 U/L 18   AST Latest Ref Range: 0 - 45 U/L 16   T4 Free Latest Ref Range: 0.76 - 1.46 ng/dL 1.14   TSH Latest Ref Range: 0.40 - 4.00 mU/L " 4.13 (H)     Results for SRIKANTH STEVEN (MRN [id number]) as of 10/12/2017 12:30   Ref. Range 10/9/2017 15:49   DLCOunc-Pred Latest Units: ml/min/mmHg 22.28   DLCOunc-Pre Latest Units: ml/min/mmHg 17.80   DLCOunc-%Pred-Pre Latest Units: % 79       [facility]  Diagnostic and Treamtent-3rd Floor  [address] [place] [address]     Name: SRIKANTH STEVEN  MRN: [id number]  : 1930  Study Date: 2017 09:51 AM  Age: 86 yrs  Gender: Male  [place] [facility]  Reason For Study: Rheumatic aortic stenosis  Ordering Physician: SHEILA BLISS  Referring Physician: SHEILA BLISS  Performed By: Cindy Barbosa RDCS     BSA: 2.1 m2  Height: 70 in  Weight: 205 lb  BP: 135/67 mmHg  _____________________________________________________________________________  __        Procedure  Echocardiogram with two-dimensional, color and spectral Doppler performed.  Technically difficult study.  _____________________________________________________________________________  __        Interpretation Summary  Technically difficult study.  Global and regional left ventricular function is normal with an EF of 55-60%.  Right ventricular function, chamber size, wall motion, and thickness are  normal.  Mild aortic stenosis.  A bioprosthetic mitral valve is present. Mean gradient is 6 mmHg at 54 bpm.  This study was compared to one done on 2016. Mild aortic stenosis is  likely present. Consider RAY for better assessment of the aortic valve.    Previous studies reviewed:  PFTs 4/3/17:  FEV1: 1.77 L  FVC:  2.21 L  FEV1/FVC: 80%    EKG 3/4/17: Sinus rhythm, 1st degree AV block (210 ms), nonspecific IVCD (128ms) normal axis, LVH by voltage criteria    EKG 16: sinus rhythm with first degree heart block, pathologic Q waves and T wave inversion in III and ST depression and TWI in III and aVF. All changes are chronic and noted on EKG from 2015 (though KS was borderline  at 194).    TTE 07/15/2016:   Technically difficult study.Extremely poor acoustic windows. Limited information was obtained during study. Right ventricular function, chamber size, wall motion, and thickness are normal. A bioprosthetic mitral valve is present. The aortic valve cannot be assessed.    =============================================================    Assessment and Recommendations:  Mr. Gorge Corona is a 85-year old male with a PMHx of CAD s/p CABG (2013), 29mm EPIC mitral valve repalcement (mitral regurgitation), moderate AS by TTE, here for follow-up of SVT which is currently managed with Amiodarone.    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    - he will reduce amiodarone dose to 100 mg daily    - CAD s/p CABG in 2013 and RUSSEL in 2013  - Probable Diastolic Dysfunction, HFpEF   - Continue Aspirin 81 mg q24h   - Continue Atorvastatin 10 mg q24h, Spironolactone 25 mg q24h, and Lasix 40mg daily   - Given 1st degree heart block and preserved EF, continue to defer regular BB therapy     - Aortic stenosis (moderate)   - appears mild to moderate by trans-thoracic echocardiogram     - Mitral valve regurgitation s/p bioprosthetic valve replacement in 2013   - Asymptomatic, monitor symptoms     RTC in 6 months with repeat Amiodarone monitoring labs and PFTs.    I appreciate the chance to help with Mr. Corona's care. Please let me know if you have any questions or concerns.      Santi Ocasio MD

## 2017-10-09 NOTE — PROGRESS NOTES
"      Clinical Cardiac Electrophysiology    Chief Complaint: Evaluation of SVT    HPI:   Mr. Gorge Corona is a 85-year old male with a PMHx of CAD s/p CABG, presumed SVT (Aflutter vs. AFib per chart) who presented to clinic today on 09/23 to establish care after being transferred from Dr. Billy's clinic cohort.    Briefly, Mr. Corona was noted to have a EKG in 11/2014 that was suspicious for SVT with a HR of 144. He was followed by Dr. iBlly and noted on multiple occasions that he was disinclined to pursue an invasive procedure. Hence Dr. Billy sought treatment with metoprolol 25 mg PO PRN for symptomatic relief. Mr. Corona noted that he was having regular episodes of palpitations requiring metoprolol, therefore he was started on amiodarone in Jul 2016. Following this, he has not had recurrences of the palpitations. Aside from this, Mr. Corona has also been devoid of lightheadedness/dizziness, chest pain, dyspnea on exertion, PND/orthopnea, and syncope. He has been troubled by petechiae over the dorsal aspects of his hands. Of note, Mr. Corona's granddaughter is a medical student at the [employer].     03April 2016....Interval Update: Overall doing well, feels he is \"healthy\". Biggest issue of recent has been falls, all mechanical, all related to loosing balance and/or tripping, often when turning. Now using a 4-wheeled walker all of the time. Worst fall was in the bathroom after getting out of the shower. This caused him and his wife to purchase a walk-in tub ($16k; they are planning to ask primary care provider whether there is any insurance deduction for medical necessity). Falls are not preceded by chest pain/pressure, palpitations, dyspnea, syncope, or tunnel vision. Has been re-evaluated by Neurology for his Normal Pressure Hydrocephalus and  shunt working properly without neurologic cause for falls. Prior physical therapy and balance rehab has not been helpful. Him and wife concur " that their house is overall very safe, no stairs, no slippery rugs, installed handlebars in multiple locations, etc.   He denies any exertional chest pain or pressure. No PND/Orthopnea, no lower extremity edema, but remains on Lasix and Spironolactone. No ongoing palpitations while on Amiodarone, they are very happy with the response of this medication in comparison to Metoprolol. Monitor blood pressure frequently; he was worried about a systolic blood pressure of 140 after a recent hot bath (provided reassurance). Wife was also worried diastolic blood pressure too low recently, in 50s. No lightheadedness, dizziness, incessant headaches.     09Oct2017 Interval history: He reports no palpitations. He is not very active. He denies chest pain/discomfort or dyspnea on exertion. He just doesn't feel like doing much.      Current Outpatient Prescriptions   Medication Sig Dispense Refill     fluticasone-salmeterol (ADVAIR DISKUS) 250-50 MCG/DOSE diskus inhaler Inhale 1 puff into the lungs every 12 hours 120 Inhaler 1     meclizine (ANTIVERT) 25 MG tablet Take 1 tablet (25 mg) by mouth 3 times daily as needed for dizziness 30 tablet 3     furosemide (LASIX) 40 MG tablet Take 1 tablet (40 mg) by mouth daily 90 tablet 0     tamsulosin (FLOMAX) 0.4 MG capsule Take 1 capsule (0.4 mg) by mouth daily 90 capsule 3     levothyroxine (SYNTHROID/LEVOTHROID) 50 MCG tablet Take 1 tablet (50 mcg) by mouth daily 90 tablet 3     spironolactone (ALDACTONE) 25 MG tablet Take 1 tablet (25 mg) by mouth daily 90 tablet 3     citalopram (CELEXA) 20 MG tablet Take 1 tablet (20 mg) by mouth daily 90 tablet 3     Ammonium Lactate 10 % CREA Apply thin layer to skin on the body at least once daily. 120 mL 11     aspirin 81 MG tablet Take 81 mg by mouth daily       amiodarone (PACERONE/CODARONE) 200 MG tablet Take 1 tablet (200 mg) by mouth daily 90 tablet 3     atorvastatin (LIPITOR) 10 MG tablet Take 1 tablet (10 mg) by mouth daily 90 tablet 3      order for DME Walker 1 each 0     triamcinolone (KENALOG) 0.1 % cream Apply sparingly to affected area three times daily as needed 80 g 1     HYDROcodone-acetaminophen (NORCO) 5-325 MG per tablet Take 1 tablet by mouth every 6 hours as needed for moderate to severe pain 30 tablet 0     metoprolol (LOPRESSOR) 25 MG tablet Take 1 tablet (25 mg) by mouth as needed 30 tablet 1     albuterol (PROAIR HFA, PROVENTIL HFA, VENTOLIN HFA) 108 (90 BASE) MCG/ACT inhaler Inhale 2 puffs into the lungs every 4 hours as needed for shortness of breath / dyspnea or wheezing 1 Inhaler 1     clindamycin (CLEOCIN) 300 MG capsule Take 2 capsules by mouth as needed for 1 dose. 1 hour prior to dental work. 6 capsule 0     senna-docusate (SENOKOT-S;PERICOLACE) 8.6-50 MG per tablet Take 1 tablet by mouth 2 times daily. 60 tablet      Cholecalciferol (VITAMIN D) 1000 UNITS capsule Take 1 capsule by mouth daily.       Calcium 600 MG tablet Take 1 tablet by mouth 2 times daily. 100 tablet 3     coenzyme Q-10 75 MG CAPS Take  by mouth.       Multiple Vitamins-Minerals (OCUVITE PO) Take  by mouth.  0     morphine (MS CONTIN) 15 MG 12 hr tablet Take 1 tablet (15 mg) by mouth every 12 hours (Patient not taking: Reported on 10/9/2017) 30 tablet 0       Past Medical History:   Diagnosis Date     Atrial fibrillation (H)      Atrial flutter (H)      Basal cell carcinoma of skin of trunk      BPH (benign prostatic hypertrophy) with urinary obstruction      CAD (coronary artery disease)     s/p CABG 1/2013; RUSSEL in 2/2013     Depression      H/O CT scan of head 1/11/2013 7/20/2006    Result Impression     CT of the Head without contrast 7/20/2006  History: Ataxia, incontinence, and NPH  Comparison Study: MR obtained to 4/19/2004  Technique: Axial CT images of the head were obtained at 2 intervals from the skull base to the vertex without intravenous contrast. The images were reviewed in brain, subdural and bone windows.  Findings: Patient has had a  moderate promin     Hyperlipidemia      Lumbar spinal stenosis      Mitral regurgitation     s/p bioprosthesis 1/2013     Palpitations      Paroxysmal supraventricular tachycardia (H)      Squamous cell carcinoma      Tachycardia     baroreflex injury dut to surgery      (ventriculoperitoneal) shunt status     for Normal pressure hydrocephalus       Past Surgical History:   Procedure Laterality Date     ANESTHESIA CARDIOVERSION  4/11/2013    Procedure: ANESTHESIA CARDIOVERSION;  Cardioversion;  Surgeon: Provider, Generic Anesthesia;  Location: [facility] OR     BYPASS GRAFT ARTERY CORONARY, REPAIR VALVE MITRAL, COMBINED  1/14/2013    Procedure: COMBINED BYPASS GRAFT ARTERY CORONARY, REPAIR VALVE MITRAL;  Median sternotomy, coronary artery bypass graft X3 using left internal mammary artery & right saphenous vein , mitral valve Replacement and on pump oxygenator, flexible cystoscopy, urethral dilation and insertion of stuart catheter.;  Surgeon: Chan Peres MD;  Location: [facility] OR     CORONARY ANGIOGRAPHY ADULT ORDER       CORONARY ARTERY BYPASS  1/2013    mitral valve tissue      INSERT CATHETER BLADDER  1/14/2013    Procedure: INSERT CATHETER BLADDER;  Flexible cystoscopy, urethral dilation,& insertion of stuart catheter;  Surgeon: Pete Bedoya MD;  Location: [facility] OR     MITRAL VALVE REPLACEMENT  2013    29 mm Epic porcine valve     MOHS MICROGRAPHIC PROCEDURE       REPLACE VALVE MITRAL  1/2013    tissue valve       Family History   Problem Relation Age of Onset     CANCER Father      KIDNEY DISEASE Mother        Social History   Substance Use Topics     Smoking status: Former Smoker     Types: Cigars, Pipe     Quit date: 1/1/1990     Smokeless tobacco: Former User     Alcohol use No       Allergies   Allergen Reactions     Penicillins Rash         ROS:   CONSTITUTIONAL:No report of fever, chills, or change in weight. Appetite is good.   RESPIRATORY: No cough, wheezing, SOB, or  "hemoptysis  CARDIOVASCULAR: see HPI  MUSCULO-SKELETAL: No joint pain/swelling, diffuse arthritis pain (mild)  NEURO: No paresthesias, syncope, pre-syncope, light headness, dizziness or vertigo  ENDOCRINE: No temperature intolerance, no skin/hair changes  PSYCHIATRIC: No change in mood, feeling down/anxious, no change in sleep or appetite  GI: no melena or hematochezia, no change in bowel habits  : no hematuria or dysuria  HEME: no easy bruising or bleeding, no history of anemia, no history of blood clots  SKIN: no rashes or sores, no unusual itching  09Oct2017/woa    Physical Examination:  Vitals: /67 (BP Location: Left arm, Patient Position: Chair, Cuff Size: Adult Regular)  Pulse 63  Ht 1.778 m (5' 10\")  Wt 96.2 kg (212 lb)  SpO2 94%  BMI 30.42 kg/m2  BMI= Body mass index is 30.42 kg/(m^2).  GEN: A & O, healthy appearing elderly male, resting comfortably in exam chair, NAD, cooperative and conversational   HEENT: PERRL, no scleral icterus   NECK: Supple, no LAD, JVP not elevated above clavicle  RESP: CTA bilaterally, no wheezing, no crackles, no increased work of breathing   CV: Regular, bradycardic, S1 and S2 with mid-peaking systolic murmur II/VI heard throughout precordium, no S4  ABD: Soft, nontender to palpation   EXT: No peripheral edema, warm/well perfused   NEURO: CN II-XII intact, grossly nonfocal  SKIN: Dry skin    Laboratory/Imaging:    Results for SRIKANTH STEVEN (MRN [id number]) as of 10/12/2017 12:30   Ref. Range 10/9/2017 15:34   ALT Latest Ref Range: 0 - 70 U/L 18   AST Latest Ref Range: 0 - 45 U/L 16   T4 Free Latest Ref Range: 0.76 - 1.46 ng/dL 1.14   TSH Latest Ref Range: 0.40 - 4.00 mU/L 4.13 (H)     Results for SRIKANTH STEVEN (MRN [id number]) as of 10/12/2017 12:30   Ref. Range 10/9/2017 15:49   DLCOunc-Pred Latest Units: ml/min/mmHg 22.28   DLCOunc-Pre Latest Units: ml/min/mmHg 17.80   DLCOunc-%Pred-Pre Latest Units: % 79       [facility]  [facility]3rd Floor  [address] [place] [address]     Name: SRIKANTH STEVEN  MRN: [id number]  : 1930  Study Date: 2017 09:51 AM  Age: 86 yrs  Gender: Male  Patient Location: [facility]  Reason For Study: Rheumatic aortic stenosis  Ordering Physician: SHEILA BLISS  Referring Physician: SHEILA BLISS  Performed By: Cindy Barbosa RDCS     BSA: 2.1 m2  Height: 70 in  Weight: 205 lb  BP: 135/67 mmHg  _____________________________________________________________________________  __        Procedure  Echocardiogram with two-dimensional, color and spectral Doppler performed.  Technically difficult study.  _____________________________________________________________________________  __        Interpretation Summary  Technically difficult study.  Global and regional left ventricular function is normal with an EF of 55-60%.  Right ventricular function, chamber size, wall motion, and thickness are  normal.  Mild aortic stenosis.  A bioprosthetic mitral valve is present. Mean gradient is 6 mmHg at 54 bpm.  This study was compared to one done on 2016. Mild aortic stenosis is  likely present. Consider RAY for better assessment of the aortic valve.    Previous studies reviewed:  PFTs 4/3/17:  FEV1: 1.77 L  FVC:  2.21 L  FEV1/FVC: 80%    EKG 3/4/17: Sinus rhythm, 1st degree AV block (210 ms), nonspecific IVCD (128ms) normal axis, LVH by voltage criteria    EKG 16: sinus rhythm with first degree heart block, pathologic Q waves and T wave inversion in III and ST depression and TWI in III and aVF. All changes are chronic and noted on EKG from 2015 (though ME was borderline at 194).    TTE 07/15/2016:   Technically difficult study.Extremely poor acoustic windows. Limited information was obtained during study. Right ventricular function, chamber size, wall motion, and thickness are normal. A bioprosthetic mitral valve is present. The aortic valve cannot be  assessed.    =============================================================    Assessment and Recommendations:  Mr. Gorge Corona is a 85-year old male with a PMHx of CAD s/p CABG (2013), 29mm EPIC mitral valve repalcement (mitral regurgitation), moderate AS by TTE, here for follow-up of SVT which is currently managed with Amiodarone.    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    - he will reduce amiodarone dose to 100 mg daily    - CAD s/p CABG in 2013 and RUSSEL in 2013  - Probable Diastolic Dysfunction, HFpEF   - Continue Aspirin 81 mg q24h   - Continue Atorvastatin 10 mg q24h, Spironolactone 25 mg q24h, and Lasix 40mg daily   - Given 1st degree heart block and preserved EF, continue to defer regular BB therapy     - Aortic stenosis (moderate)   - appears mild to moderate by trans-thoracic echocardiogram     - Mitral valve regurgitation s/p bioprosthetic valve replacement in 2013   - Asymptomatic, monitor symptoms     RTC in 6 months with repeat Amiodarone monitoring labs and PFTs.    I appreciate the chance to help with Mr. Corona's care. Please let me know if you have any questions or concerns.

## 2017-10-09 NOTE — PATIENT INSTRUCTIONS
Return to clinic in 6 months with an echocardiogram.       Please do not hesitate to call if you have any cardiology related questions or concerns, or need to schedule an appointment, at [phone].         Cardiology Medication Refill Request Information:  * Please contact your pharmacy regarding ANY request for medication refills.  ** [facility] Prescription Fax = [phone]  * Please allow 3 business days for routine medication refills.    Cardiology Test Result notification information:  *You will be notified with in 7-10 days of your appointment day regarding the results of your test. If you are on MyChart you will be notified as soon as the provider has reviewed the results and signed off on them. Please call RN Care Coordinator with questions regarding results.

## 2017-10-18 DIAGNOSIS — R06.02 SHORTNESS OF BREATH: ICD-10-CM

## 2017-10-18 DIAGNOSIS — F33.1 MAJOR DEPRESSIVE DISORDER, RECURRENT EPISODE, MODERATE (H): ICD-10-CM

## 2017-10-18 DIAGNOSIS — E78.5 DYSLIPIDEMIA: ICD-10-CM

## 2017-10-18 DIAGNOSIS — I10 ESSENTIAL HYPERTENSION, BENIGN: ICD-10-CM

## 2017-10-18 NOTE — TELEPHONE ENCOUNTER
Last Written Prescription Date:  8/21/16  Last Fill Quantity: 90,   # refills: 3  Last Office Visit : 3/4/17  Future Office visit:  12/27/17  Routing refill request to provider for review/approval because:  LDL   6/3/16   12/27/17 APPT.

## 2017-10-20 RX ORDER — ATORVASTATIN CALCIUM 10 MG/1
10 TABLET, FILM COATED ORAL DAILY
Qty: 90 TABLET | Refills: 1 | Status: SHIPPED | OUTPATIENT
Start: 2017-10-20 | End: 2018-08-08

## 2017-10-23 LAB
DLCOUNC-%PRED-PRE: 79 %
DLCOUNC-PRE: 17.8 ML/MIN/MMHG
DLCOUNC-PRED: 22.28 ML/MIN/MMHG
ERV-%PRED-PRE: 12 %
ERV-PRE: 0.08 L
ERV-PRED: 0.65 L
EXPTIME-PRE: 7.59 SEC
FEF2575-%PRED-POST: 91 %
FEF2575-%PRED-PRE: 85 %
FEF2575-POST: 1.56 L/SEC
FEF2575-PRE: 1.45 L/SEC
FEF2575-PRED: 1.71 L/SEC
FEFMAX-%PRED-PRE: 72 %
FEFMAX-PRE: 4.56 L/SEC
FEFMAX-PRED: 6.31 L/SEC
FEV1-%PRED-PRE: 63 %
FEV1-PRE: 1.6 L
FEV1FEV6-PRE: 80 %
FEV1FEV6-PRED: 75 %
FEV1FVC-PRE: 80 %
FEV1FVC-PRED: 75 %
FEV1SVC-PRE: 78 %
FEV1SVC-PRED: 57 %
FIFMAX-PRE: 3.56 L/SEC
FVC-%PRED-PRE: 59 %
FVC-PRE: 2.01 L
FVC-PRED: 3.39 L
IC-%PRED-PRE: 52 %
IC-PRE: 1.96 L
IC-PRED: 3.72 L
VA-%PRED-PRE: 46 %
VA-PRE: 3.18 L
VC-%PRED-PRE: 46 %
VC-PRE: 2.04 L
VC-PRED: 4.37 L

## 2017-12-08 ENCOUNTER — OFFICE VISIT (OUTPATIENT)
Dept: INTERNAL MEDICINE | Facility: CLINIC | Age: 82
End: 2017-12-08

## 2017-12-08 VITALS
DIASTOLIC BLOOD PRESSURE: 76 MMHG | HEART RATE: 69 BPM | WEIGHT: 210.1 LBS | RESPIRATION RATE: 20 BRPM | SYSTOLIC BLOOD PRESSURE: 118 MMHG | BODY MASS INDEX: 30.15 KG/M2

## 2017-12-08 DIAGNOSIS — L98.9 SKIN LESION: Primary | ICD-10-CM

## 2017-12-08 DIAGNOSIS — I35.0 NONRHEUMATIC AORTIC VALVE STENOSIS: ICD-10-CM

## 2017-12-08 ASSESSMENT — PAIN SCALES - GENERAL: PAINLEVEL: NO PAIN (0)

## 2017-12-08 NOTE — PATIENT INSTRUCTIONS
[facility] Phone Number [phone]  [facility] Medication Refill Request Information:  * Please contact your pharmacy regarding ANY request for medication refills.  ** [facility] Prescription Fax = [phone]  * Please allow 3 business days for routine medication refills.  * Please allow 5 business days for controlled substance medication refills.     [facility] Test Result notification information:  *You will be notified with in 7-10 days of your appointment day regarding the results of your test.  If you are on MyChart you will be notified as soon as the provider has reviewed the results and signed off on them.      Please schedule the following appointments:  Cardiology (Dr. Morris Jj) [phone] ([facility] 3rd floor)  Dermatology [phone] ([facility] 3rd floor)

## 2017-12-08 NOTE — NURSING NOTE
Chief Complaint   Patient presents with     Derm Problem     pt is here to discuss red area on neck x 6 weeks       Namita Botello CMA at 1:05 PM on 12/8/2017

## 2017-12-08 NOTE — PROGRESS NOTES
Rooming Note    Health Maintenance  Health Maintenance Due   Topic Date Due     ASTHMA ACTION PLAN Q1 YR  1935     ASTHMA CONTROL TEST Q6 MOS  1935     ADVANCE DIRECTIVE PLANNING Q5 YRS  11/27/1985     FALL RISK ASSESSMENT  11/27/1995     PHQ-9 Q3 MONTHS  09/03/2016     LIPID MONITORING Q1 YEAR  06/03/2017     DEPRESSION ACTION PLAN Q1 YR  06/03/2017   All health maintenance items discussed and pended.      Namita Botello CMA at 1:07 PM on 12/8/2017    Gorge is a 87 year old male with a past medical history of Atrial fibrillation (H); Atrial flutter (H); Basal cell carcinoma of skin of trunk; BPH (benign prostatic hypertrophy) with urinary obstruction; CAD (coronary artery disease); Depression; H/O CT scan of head (1/11/2013); Hyperlipidemia; Lumbar spinal stenosis; Mitral regurgitation; Palpitations; Paroxysmal supraventricular tachycardia (H); Squamous cell carcinoma; Tachycardia; and  (ventriculoperitoneal) shunt status.     He was seen today for derm problem and dyspnea on exertion.  There is a red nodular area on the lower angle of the jaw that is slowly enlarging.  No drainage, no fevers chills or other symptoms.  He also reports shortness of breath with exertion, a chronic problem, but worse recently.  No cough or sputum production; no fevers or chills, no leg edema more than usual.    On exam  B/P: 118/76, P: 69, R: 20  Cor RRR with II/VI harsh late peaking systolic murmur  Lungs CTA  Skin 7-8 mm nodule, raised, erythematous, lower angle of jaw    A/P 87 year old yo man with skin lesion of uncertain significance, also symptomatic AS.  I believe the progression of dyspnea is related to the aortic valve as he has no signs or symptoms of COPD, pneumonia, etc.      Skin lesion; has h/o multiple cancers (basal cell, squamous cell) needs Derm eval  -     DERMATOLOGY REFERRAL    Nonrheumatic aortic valve stenosis  -     CARDIOLOGY EVAL ADULT REFERRAL    Other orders  -     Lipid panel reflex to direct  LDL Fasting; Future      Monisha Hobbs MD

## 2017-12-08 NOTE — MR AVS SNAPSHOT
After Visit Summary   12/8/2017    Gorge Corona    MRN: [id number]           Patient Information     Date Of Birth          11/27/1930        Visit Information        Provider Department      12/8/2017 1:10 PM Monisha Medina MD [facility]        Today's Diagnoses     Skin lesion    -  1    Nonrheumatic aortic valve stenosis          Care Instructions    Primary Care Center Phone Number [phone]  Primary Care Center Medication Refill Request Information:  * Please contact your pharmacy regarding ANY request for medication refills.  ** [facility] Prescription Fax = [phone]  * Please allow 3 business days for routine medication refills.  * Please allow 5 business days for controlled substance medication refills.     Primary Care Center Test Result notification information:  *You will be notified with in 7-10 days of your appointment day regarding the results of your test.  If you are on MyChart you will be notified as soon as the provider has reviewed the results and signed off on them.      Please schedule the following appointments:  Cardiology (Dr. Morris Jj) [phone] ([facility])  Dermatology [phone] ([facility] [place])          Follow-ups after your visit        Additional Services     CARDIOLOGY EVAL ADULT REFERRAL       Your provider has referred you to:  CARDIOLOGY EVAL ADULT   Dr. Morris Jj    Please be aware that coverage of these services is subject to the terms and limitations of your health insurance plan.  Call member services at your health plan with any benefit or coverage questions.      Type of Referral:  New Cardiology Consult    Timeframe requested:  1 Week    Please bring the following to your appointment:  >>   Any x-rays, CTs or MRIs which have been performed.  Contact the facility where they were done to arrange for  prior to your scheduled appointment.    >>   List of current medications  >>   This referral  request   >>   Any documents/labs given to you for this referral            DERMATOLOGY REFERRAL       Your provider has referred you to: Adult Derm    Please be aware that coverage of these services is subject to the terms and limitations of your health insurance plan.  Call member services at your health plan with any benefit or coverage questions.      Please bring the following with you to your appointment:    (1) Any X-Rays, CTs or MRIs which have been performed.  Contact the facility where they were done to arrange for  prior to your scheduled appointment.    (2) List of current medications  (3) This referral request   (4) Any documents/labs given to you for this referral                  Your next 10 appointments already scheduled     Dec 27, 2017  9:00 AM CST   (Arrive by 8:45 AM)   PHYSICAL with Clovis Adames MD   [facility] ([facility])    [address] [place] [address]   [phone]            Feb 08, 2018  2:00 PM CST   (Arrive by 1:45 PM)   Return Visit with Elijah Alejandre MD   [facility] Dermatology ([facility])    [address] [place] [id number]   [phone]            Mar 15, 2018  6:30 PM CDT   (Arrive by 6:15 PM)   New Patient Visit with Morris Jj MD   [facility] Heart Care ([facility])    [address] [place] [id number] [phone]            Apr 09, 2018  2:00 PM CDT   Ech Complete with [email or web address]   [facility] Echo ([facility])    [address] [place] [id number] [phone]           1. Please bring or wear a comfortable two-piece outfit. 2. You may eat, drink and take your normal medicines. 3. For any questions that cannot be answered, please contact the ordering physician            Apr 09, 2018  3:20 PM CDT   (Arrive by 3:05 PM)   RETURN  ARRHYTHMIA with Santi Ocasio MD   [facility] ([facility])    [address] [address] [phone]              Who to contact     Please call your clinic at [phone] to:    Ask questions about your health    Make or cancel appointments    Discuss your medicines    Learn about your test results    Speak to your doctor   If you have compliments or concerns about an experience at your clinic, or if you wish to file a complaint, please contact [facility] Patient Relations at [phone] or email us at [email or web address]         Additional Information About Your Visit        [email or web address] Information     ReelBigt is an electronic gateway that provides easy, online access to your medical records. With Playblazer, you can request a clinic appointment, read your test results, renew a prescription or communicate with your care team.     To sign up for Playblazer visit the website at [email or web address]   You will be asked to enter the access code listed below, as well as some personal information. Please follow the directions to create your username and password.     Your access code is: [email or web address]  Expires: 2017  2:48 PM     Your access code will  in 90 days. If you need help or a new code, please contact your [facility] Clinic or call [phone] for assistance.        Care EveryWhere ID     This is your Care EveryWhere ID. This could be used by other organizations to access your [facility] medical records  [id number]        Your Vitals Were     Pulse Respirations BMI (Body Mass Index)             69 20 30.15 kg/m2          Blood Pressure from Last 3 Encounters:   17 118/76   10/09/17 122/67   17 120/63    Weight from Last 3 Encounters:   17 95.3 kg (210 lb 1.6 oz)   10/09/17 96.2 kg (212 lb)   17 93.7 kg (206 lb 8 oz)              We Performed  the Following     CARDIOLOGY EVAL ADULT REFERRAL     DERMATOLOGY REFERRAL        Primary Care Provider Office Phone # Fax #    Monisha Jenna Olea [phone] [address] [address]        Equal Access to Services     NILDA JOE : Hadii aad ku hadasho Soomaali, waaxda luqadaha, qaybta kaalmada adeegyada, waxelda idiin hayaan adecarmelo mg laelyseabhi jaylan. So [facility] [phone].    ATENCIÓN: Si habla español, tiene a ferrari disposición servicios gratuitos de asistencia lingüística. Llame al [phone].    We comply with applicable federal civil rights laws and [place] laws. We do not discriminate on the basis of race, color, national origin, age, disability, sex, sexual orientation, or gender identity.            Thank you!     Thank you for choosing [facility]  for your care. Our goal is always to provide you with excellent care. Hearing back from our patients is one way we can continue to improve our services. Please take a few minutes to complete the written survey that you may receive in the mail after your visit with us. Thank you!             Your Updated Medication List - Protect others around you: Learn how to safely use, store and throw away your medicines at www.disposemymeds.org.          This list is accurate as of: 12/8/17  2:04 PM.  Always use your most recent med list.                   Brand Name Dispense Instructions for use Diagnosis    albuterol 108 (90 BASE) MCG/ACT Inhaler    PROAIR HFA/PROVENTIL HFA/VENTOLIN HFA    1 Inhaler    Inhale 2 puffs into the lungs every 4 hours as needed for shortness of breath / dyspnea or wheezing    Cough       amiodarone 200 MG tablet    PACERONE/CODARONE    90 tablet    Take 1 tablet (200 mg) by mouth daily    Atrial fibrillation, unspecified type (H)       Ammonium Lactate 10 % Crea     120 mL    Apply thin layer to skin on the body at least once daily.    Xerosis cutis, Nummular eczema       aspirin  81 MG tablet      Take 81 mg by mouth daily        atorvastatin 10 MG tablet    LIPITOR    90 tablet    Take 1 tablet (10 mg) by mouth daily    Major depressive disorder, recurrent episode, moderate (H), Essential hypertension, benign, Shortness of breath, Dyslipidemia       calcium 600 MG tablet     100 tablet    Take 1 tablet by mouth 2 times daily.        citalopram 20 MG tablet    celeXA    90 tablet    Take 1 tablet (20 mg) by mouth daily    Major depressive disorder, recurrent episode, moderate (H)       clindamycin 300 MG capsule    CLEOCIN    6 capsule    Take 2 capsules by mouth as needed for 1 dose. 1 hour prior to dental work.    Prophylactic antibiotic       coenzyme Q-10 75 MG Caps      Take  by mouth.        fluticasone-salmeterol 250-50 MCG/DOSE diskus inhaler    ADVAIR DISKUS    120 Inhaler    Inhale 1 puff into the lungs every 12 hours    Acute bronchitis, Cough       furosemide 40 MG tablet    LASIX    90 tablet    Take 1 tablet (40 mg) by mouth daily    Chronic diastolic heart failure (H), CAD S/P percutaneous coronary angioplasty       HYDROcodone-acetaminophen 5-325 MG per tablet    NORCO    30 tablet    Take 1 tablet by mouth every 6 hours as needed for moderate to severe pain    Hip pain, left       levothyroxine 50 MCG tablet    SYNTHROID/LEVOTHROID    90 tablet    Take 1 tablet (50 mcg) by mouth daily    Other specified hypothyroidism       meclizine 25 MG tablet    ANTIVERT    30 tablet    Take 1 tablet (25 mg) by mouth 3 times daily as needed for dizziness    BPPV (benign paroxysmal positional vertigo), unspecified laterality       metoprolol 25 MG tablet    LOPRESSOR    30 tablet    Take 1 tablet (25 mg) by mouth as needed    Chronic diastolic heart failure (H), Atrial fibrillation (H)       morphine 15 MG 12 hr tablet    MS CONTIN    30 tablet    Take 1 tablet (15 mg) by mouth every 12 hours    Hip pain, left       OCUVITE PO      Take  by mouth.        order for DME     1 each    Walker     Spinal stenosis, lumbar region, without neurogenic claudication       senna-docusate 8.6-50 MG per tablet    SENOKOT-S;PERICOLACE    60 tablet    Take 1 tablet by mouth 2 times daily.    S/P CABG x 3       spironolactone 25 MG tablet    ALDACTONE    90 tablet    Take 1 tablet (25 mg) by mouth daily    Hypokalemia       tamsulosin 0.4 MG capsule    FLOMAX    90 capsule    Take 1 capsule (0.4 mg) by mouth daily    Urinary retention       triamcinolone 0.1 % cream    KENALOG    80 g    Apply sparingly to affected area three times daily as needed    Nummular eczema       vitamin D 1000 UNITS capsule      Take 1 capsule by mouth daily.

## 2017-12-14 ENCOUNTER — TELEPHONE (OUTPATIENT)
Dept: DERMATOLOGY | Facility: CLINIC | Age: 82
End: 2017-12-14

## 2017-12-14 ENCOUNTER — OFFICE VISIT (OUTPATIENT)
Dept: DERMATOLOGY | Facility: CLINIC | Age: 82
End: 2017-12-14
Payer: MEDICARE

## 2017-12-14 DIAGNOSIS — L82.1 SK (SEBORRHEIC KERATOSIS): ICD-10-CM

## 2017-12-14 DIAGNOSIS — Z12.83 SKIN CANCER SCREENING: ICD-10-CM

## 2017-12-14 DIAGNOSIS — L30.0 NUMMULAR DERMATITIS: ICD-10-CM

## 2017-12-14 DIAGNOSIS — D48.5 NEOPLASM OF UNCERTAIN BEHAVIOR OF SKIN: Primary | ICD-10-CM

## 2017-12-14 DIAGNOSIS — Z85.828 HISTORY OF NONMELANOMA SKIN CANCER: ICD-10-CM

## 2017-12-14 DIAGNOSIS — L57.0 AK (ACTINIC KERATOSIS): ICD-10-CM

## 2017-12-14 PROCEDURE — 88305 TISSUE EXAM BY PATHOLOGIST: CPT | Performed by: DERMATOLOGY

## 2017-12-14 RX ORDER — LIDOCAINE HYDROCHLORIDE AND EPINEPHRINE 10; 10 MG/ML; UG/ML
3 INJECTION, SOLUTION INFILTRATION; PERINEURAL ONCE
Qty: 3 ML | Refills: 0 | OUTPATIENT
Start: 2017-12-14 | End: 2017-12-14

## 2017-12-14 ASSESSMENT — PAIN SCALES - GENERAL
PAINLEVEL: MILD PAIN (2)
PAINLEVEL: NO PAIN (0)
PAINLEVEL: NO PAIN (0)

## 2017-12-14 NOTE — LETTER
"12/14/2017       RE: Gorge Corona  [address] [facility] [address]     Dear Colleague,    Thank you for referring your patient, Gorge Corona, to the [facility] DERMATOLOGY at [facility]. Please see a copy of my visit note below.    [facility] Dermatology Note    Dermatology Problem List  FBSE 12/14/17  0. NUB, L lower back. Ddx AK, SK, BLK, r/o SCC/BCC. S/p shave biopsy for removal on 12/14/17.   0. NUB, R clavicle. Ddx inflamed SK. S/p shave biopsy for removal on 12/14/17.   1. Hx NMSC   - nBCC, nasal tip, s/p MMS 8/2015   - nBCC with fibrosis, R dorsal forearms, s/p excision 6/2014   - SCCis, right superior forehead, s/p MMS 7/2012   - nBCC, left mid-back, s/p excision 6/2011  2. AKs. LiqN2.    3. Nummular dermatitis. Triamcinolone 0.1% ointment BID PRN.       Encounter Date: Dec 14, 2017    CC:   Chief Complaint   Patient presents with     Skin Check     Gorge is here for a skin check. There is a spot on his clavicle that was frozen that needs to be retreated.       History of Present Illness:  Mr. Gorge Corona is a 87 year old male who presents as a follow-up for a full body skin examination. The patient was last seen 12/7/16 when he had numerous AKs treated with cryotherapy. Today, he returns for a full body skin check.      Today, has a few concerning spots. First, he reports that he saw his PCP a few days ago and was treated with cryotherapy for a \"skin tag\" on his R clavicle. He states he has had an exuberant reaction to treatment and the spot has not resolved. He would like it removed today. Also reports multiple brown spots on his chest that are becoming more numerous and larger, but no associated pain, tenderness of bleeding. He continues to have intermittent itchy spots on his legs. He using triamcinolone 0.1% cream with good relief though is interested in a therapy to \"stop these from happening\". He intermittently uses " amlactin lotion as a moisturizer. He otherwise denies any new or changing lesions. No other bleeding, painful, pruritic or concerning spots. Health otherwise stable. No other skin concerns.     Past Medical History:   Patient Active Problem List   Diagnosis     Lumbar spinal stenosis     Lumbar spondylolysis     Major depressive disorder, recurrent episode, moderate (H)     Actinic keratosis     Other malignant neoplasm of skin, site unspecified     Benign neoplasm of skin of trunk, except scrotum     Other seborrheic keratosis     History of SCC (squamous cell carcinoma) of skin     H/O colonoscopy     Normal pressure hydrocephalus     ASCVD (arteriosclerotic cardiovascular disease)     Coronary artery disease, occlusive     H/O CT scan of head     Mitral valve regurgitation     CAD (coronary artery disease)     Atrial fibrillation (H)     Dyslipidemia     Preventative health care     CAD S/P percutaneous coronary angioplasty     BCC (basal cell carcinoma of skin)     Hypothyroidism     CHF (congestive heart failure) (H)     SCC (squamous cell carcinoma)     SVT (supraventricular tachycardia) (H)     Status post coronary angiogram     Past Medical History:   Diagnosis Date     Atrial fibrillation (H)      Atrial flutter (H)      Basal cell carcinoma of skin of trunk      BPH (benign prostatic hypertrophy) with urinary obstruction      CAD (coronary artery disease)     s/p CABG 1/2013; RUSSEL in 2/2013     Depression      H/O CT scan of head 1/11/2013 7/20/2006    Result Impression     CT of the Head without contrast 7/20/2006  History: Ataxia, incontinence, and NPH  Comparison Study: MR obtained to 4/19/2004  Technique: Axial CT images of the head were obtained at 2 intervals from the skull base to the vertex without intravenous contrast. The images were reviewed in brain, subdural and bone windows.  Findings: Patient has had a moderate promin     Hyperlipidemia      Lumbar spinal stenosis      Mitral regurgitation      s/p bioprosthesis 1/2013     Palpitations      Paroxysmal supraventricular tachycardia (H)      Squamous cell carcinoma      Tachycardia     baroreflex injury dut to surgery      (ventriculoperitoneal) shunt status     for Normal pressure hydrocephalus     Past Surgical History:   Procedure Laterality Date     ANESTHESIA CARDIOVERSION  4/11/2013    Procedure: ANESTHESIA CARDIOVERSION;  Cardioversion;  Surgeon: Provider, Generic Anesthesia;  Location: [facility] OR     BYPASS GRAFT ARTERY CORONARY, REPAIR VALVE MITRAL, COMBINED  1/14/2013    Procedure: COMBINED BYPASS GRAFT ARTERY CORONARY, REPAIR VALVE MITRAL;  Median sternotomy, coronary artery bypass graft X3 using left internal mammary artery & right saphenous vein , mitral valve Replacement and on pump oxygenator, flexible cystoscopy, urethral dilation and insertion of stuart catheter.;  Surgeon: Chan Peres MD;  Location: [facility] OR     CORONARY ANGIOGRAPHY ADULT ORDER       CORONARY ARTERY BYPASS  1/2013    mitral valve tissue      INSERT CATHETER BLADDER  1/14/2013    Procedure: INSERT CATHETER BLADDER;  Flexible cystoscopy, urethral dilation,& insertion of stuart catheter;  Surgeon: Pete Bedoya MD;  Location: [facility] OR     MITRAL VALVE REPLACEMENT  2013    29 mm Epic porcine valve     MOHS MICROGRAPHIC PROCEDURE       REPLACE VALVE MITRAL  1/2013    tissue valve       Social History:  The patient {works:875254}. The patient {denies/admits to:187753} use of tanning beds.    Family History:  {Familyhxderm:548576}    Medications:  Current Outpatient Prescriptions   Medication Sig Dispense Refill     atorvastatin (LIPITOR) 10 MG tablet Take 1 tablet (10 mg) by mouth daily 90 tablet 1     amiodarone (PACERONE/CODARONE) 200 MG tablet Take 1 tablet (200 mg) by mouth daily 90 tablet 3     fluticasone-salmeterol (ADVAIR DISKUS) 250-50 MCG/DOSE diskus inhaler Inhale 1 puff into the lungs every 12 hours 120 Inhaler 1     meclizine (ANTIVERT) 25 MG tablet  Take 1 tablet (25 mg) by mouth 3 times daily as needed for dizziness 30 tablet 3     furosemide (LASIX) 40 MG tablet Take 1 tablet (40 mg) by mouth daily 90 tablet 0     tamsulosin (FLOMAX) 0.4 MG capsule Take 1 capsule (0.4 mg) by mouth daily 90 capsule 3     levothyroxine (SYNTHROID/LEVOTHROID) 50 MCG tablet Take 1 tablet (50 mcg) by mouth daily 90 tablet 3     spironolactone (ALDACTONE) 25 MG tablet Take 1 tablet (25 mg) by mouth daily 90 tablet 3     citalopram (CELEXA) 20 MG tablet Take 1 tablet (20 mg) by mouth daily 90 tablet 3     Ammonium Lactate 10 % CREA Apply thin layer to skin on the body at least once daily. 120 mL 11     aspirin 81 MG tablet Take 81 mg by mouth daily       order for DME Walker 1 each 0     triamcinolone (KENALOG) 0.1 % cream Apply sparingly to affected area three times daily as needed 80 g 1     HYDROcodone-acetaminophen (NORCO) 5-325 MG per tablet Take 1 tablet by mouth every 6 hours as needed for moderate to severe pain 30 tablet 0     metoprolol (LOPRESSOR) 25 MG tablet Take 1 tablet (25 mg) by mouth as needed 30 tablet 1     albuterol (PROAIR HFA, PROVENTIL HFA, VENTOLIN HFA) 108 (90 BASE) MCG/ACT inhaler Inhale 2 puffs into the lungs every 4 hours as needed for shortness of breath / dyspnea or wheezing 1 Inhaler 1     clindamycin (CLEOCIN) 300 MG capsule Take 2 capsules by mouth as needed for 1 dose. 1 hour prior to dental work. 6 capsule 0     senna-docusate (SENOKOT-S;PERICOLACE) 8.6-50 MG per tablet Take 1 tablet by mouth 2 times daily. 60 tablet      Cholecalciferol (VITAMIN D) 1000 UNITS capsule Take 1 capsule by mouth daily.       Calcium 600 MG tablet Take 1 tablet by mouth 2 times daily. 100 tablet 3     coenzyme Q-10 75 MG CAPS Take  by mouth.       Multiple Vitamins-Minerals (OCUVITE PO) Take  by mouth.  0     morphine (MS CONTIN) 15 MG 12 hr tablet Take 1 tablet (15 mg) by mouth every 12 hours (Patient not taking: Reported on 12/14/2017) 30 tablet 0        Allergies    Allergen Reactions     Penicillins Rash         Review of Systems:  -Skin Establ Pt: The patient denies any new rash, pruritus, or lesions that are symptomatic, changing or bleeding, except as per HPI.  -Constitutional: The patient denies fatigue, fevers, chills, unintended weight loss, and night sweats.  -HEENT: Patient denies nonhealing oral sores.  -Skin: As above in HPI. No additional skin concerns.    Physical exam:  Vitals: There were no vitals taken for this visit.  GEN: This is a well developed, well-nourished male in no acute distress, in a pleasant mood.    SKIN: Total skin excluding the undergarment areas was performed. The exam included the head/face, neck, both arms, chest, back, abdomen, both legs, digits and/or nails.   - Olivas Type I  - There are waxy stuck on tan to brown papules on the trunk and extremiteis.  - There are erythematous macules with overyling adherent scale on the bilateral face, helices, and lower back. Total of 7 lesions.  - Few brown macules and papules without concerning dermoscopy features.   - Well healed scar at prior NMSC excision site without evidence of recurrence  - On the L lower back, there is a ~ 6 mm pink hyperkeratotic papule, mildly indurated.   - On the R clavicle, there is a ~ 1.2 cm white to brown waxy, stuck-on appearing plaque   - Few scattered pink eczematous papules and plaques on bilateral lower extremities without associated mild to moderate generalized xerosis   -No other lesions of concern on areas examined.     Impression/Plan:      1. Neoplasm of uncertain behavior on the right clavicle. The differential diagnosis includes inflamed SK. Will shave remove in clinic today given size and irritation to patient. Unlikely this will resolve with prior cryotherapy treatment.     Shave biopsy:  After discussion of benefits and risks including but not limited to bleeding/bruising, pain/swelling, infection, scar, incomplete removal, nerve damage/numbness,  recurrence, and non-diagnostic biopsy, written consent, verbal consent and photographs were obtained. Time-out was performed. The area was cleaned with isopropyl alcohol.  was injected to obtain adequate anesthesia of the lesion on the right clavicle. 3 ml of 1% lidocaine with 1:100,000 epinephrine was injected to obtain adequate anesthesia. A  shave biopsy was performed. Hemostasis was achieved with aluminium chloride. Vaseline and a sterile dressing were applied. The patient tolerated the procedure and no complications were noted. The patient was provided with verbal and written post care instructions.      2. Neoplasm of uncertain behavior on the left lower back. The differential diagnosis includes AK, irritated SK, BLK, r/o SCC/BCC.     Shave biopsy:  After discussion of benefits and risks including but not limited to bleeding/bruising, pain/swelling, infection, scar, incomplete removal, nerve damage/numbness, recurrence, and non-diagnostic biopsy, written consent, verbal consent and photographs were obtained. Time-out was performed. The area was cleaned with isopropyl alcohol.  was injected to obtain adequate anesthesia of the lesion on the L lower back. 1.0 ml of 1% lidocaine with 1:100,000 epinephrine was injected to obtain adequate anesthesia. A  shave biopsy was performed. Hemostasis was achieved with aluminium chloride. Vaseline and a sterile dressing were applied. The patient tolerated the procedure and no complications were noted. The patient was provided with verbal and written post care instructions.      3. Actinic keratosis    Cryotherapy procedure note: After verbal consent and discussion of risks and benefits including but no limited to dyspigmentation/scar, blister, and pain, 7 lesions as above were treated with 1-2mm freeze border for 1 cycles with liquid nitrogen. Post cryotherapy instructions were provided.     4. Seborrheic keratosis, multiple benign nevi. ABCDEs of melanoma reviewed. Sun  protection advised.     5. Nummular dermatitis. Etiology discussed with patient. Recommended more frequent emolliation with an ointment based moisturizer (Vaseline, Aquaphor) in addition to amlactin. Can continue to use triamcinolone 0.1% cream BID PRN for flares.     Follow-up in 1 year, earlier for new or changing lesions, or as needed pending biopsy results as above.   Dr. Parker staffed the patient.    Staff Involved:  Resident(Jose Coyle)/Staff(as above)                Photo placed in patient's chart for future reference    I talked with and examined Gorge Corona and I agree with the assessment and the plan. I was present for the entire procedure. MARAL Parker MD.

## 2017-12-14 NOTE — MR AVS SNAPSHOT
After Visit Summary   12/14/2017    Gorge Corona    MRN: [id number]           Patient Information     Date Of Birth          11/27/1930        Visit Information        Provider Department      12/14/2017 9:00 AM Jose Coyle MD [facility]        Today's Diagnoses     Neoplasm of uncertain behavior of skin    -  1      Care Instructions    Wound Care After a Biopsy    What is a skin biopsy?  A skin biopsy allows the doctor to examine a very small piece of tissue under the microscope to determine the diagnosis and the best treatment for the skin condition. A local anesthetic (numbing medicine)  is injected with a very small needle into the skin area to be tested. A small piece of skin is taken from the area. Sometimes a suture (stitch) is used.     What are the risks of a skin biopsy?  I will experience scar, bleeding, swelling, pain, crusting and redness. I may experience incomplete removal or recurrence. Risks of this procedure are excessive bleeding, bruising, infection, nerve damage, numbness, thick (hypertrophic or keloidal) scar and non-diagnostic biopsy.    How should I care for my wound for the first 24 hours?    Keep the wound dry and covered for 24 hours    If it bleeds, hold direct pressure on the area for 15 minutes. If bleeding does not stop then go to the emergency room    Avoid strenuous exercise the first 1-2 days or as your doctor instructs you    How should I care for the wound after 24 hours?    After 24 hours, remove the bandage    You may bathe or shower as normal    If you had a scalp biopsy, you can shampoo as usual and can use shower water to clean the biopsy site daily    Clean the wound twice a day with gentle soap and water    Do not scrub, be gentle    Apply white petroleum/Vaseline after cleaning the wound with a cotton swab or a clean finger, and keep the site covered with a Bandaid /bandage. Bandages are not necessary with a scalp biopsy    If you are  unable to cover the site with a Bandaid /bandage, re-apply ointment 2-3 times a day to keep the site moist. Moisture will help with healing    Avoid strenuous activity for first 1-2 days    Avoid lakes, rivers, pools, and oceans until the stitches are removed or the site is healed    How do I clean my wound?    Wash hands thoroughly with soap or use hand  before all wound care    Clean the wound with gentle soap and water    Apply white petroleum/Vaseline  to wound after it is clean    Replace the Bandaid /bandage to keep the wound covered for the first few days or as instructed by your doctor    If you had a scalp biopsy, warm shower water to the area on a daily basis should suffice    What should I use to clean my wound?     Cotton-tipped applicators (Qtips )    White petroleum jelly (Vaseline ). Use a clean new container and use Q-tips to apply.    Bandaids   as needed    Gentle soap     How should I care for my wound long term?    Do not get your wound dirty    Keep up with wound care for one week or until the area is healed.    A small scab will form and fall off by itself when the area is completely healed. The area will be red and will become pink in color as it heals. Sun protection is very important for how your scar will turn out. Sunscreen with an SPF 30 or greater is recommended once the area is healed.    If you have stitches, stitches need to be removed in 14 days. You may return to our clinic for this or you may have it done locally at your doctor s office.    You should have some soreness but it should be mild and slowly go away over several days. Talk to your doctor about using tylenol for pain,    When should I call my doctor?  If you have increased:     Pain or swelling    Pus or drainage (clear or slightly yellow drainage is ok)    Temperature over 100F    Spreading redness or warmth around wound    When will I hear about my results?  The biopsy results can take 2-3 weeks to come back.  The clinic will call you with the results, send you a Transcriptic message, or have you schedule a follow-up clinic or phone time to discuss the results. Contact our clinics if you do not hear from us in 3 weeks.     Who should I call with questions?    [facility]: [phone]     [facility]: [phone]    For urgent needs outside of business hours call the [facility] at [phone] and ask for the dermatology resident on call      Cryotherapy    What is it?    Use of a very cold liquid, such as liquid nitrogen, to freeze and destroy abnormal skin cells that need to be removed    What should I expect?    Tenderness and redness    A small blister that might grow and fill with dark purple blood. There may be crusting.    More than one treatment may be needed if the lesions do not go away.    How do I care for the treated area?    Gently wash the area with your hands when bathing.    Use a thin layer of Vaseline to help with healing. You may use a Band-Aid.     The area should heal within 7-10 days and may leave behind a pink or lighter color.     Do not use an antibiotic or Neosporin ointment.     You may take acetaminophen (Tylenol) for pain.     Call your Doctor if you have:    Severe pain    Signs of infection (warmth, redness, cloudy yellow drainage, and or a bad smell)    Questions or concerns    Who should I call with questions?       [facility]: [phone]       [facility]: [phone]       For urgent needs outside of business hours call the [facility] at [phone]        and ask for the dermatology resident on call            Follow-ups after your visit        Your next 10 appointments already scheduled     Dec 27, 2017  9:00 AM CST   (Arrive by 8:45 AM)   PHYSICAL with Clovis Adames MD   [facility] ([facility])     [address] [id number] [phone]            Mar 15, 2018  6:30 PM CDT   (Arrive by 6:15 PM)   New Patient Visit with Morris Jj MD    [facility])    [address] [place] [id number] [phone]            Apr 09, 2018  2:00 PM CDT   Ech Complete with [email or web address]    [facility] ([facility])    [address] [place] [id number] [phone]           1. Please bring or wear a comfortable two-piece outfit. 2. You may eat, drink and take your normal medicines. 3. For any questions that cannot be answered, please contact the ordering physician            Apr 09, 2018  3:20 PM CDT   (Arrive by 3:05 PM)   RETURN ARRHYTHMIA with Santi Ocasio MD   [facility] ([facility])    [address] [place] [id number] [phone]              Who to contact     Please call your clinic at [phone] to:    Ask questions about your health    Make or cancel appointments    Discuss your medicines    Learn about your test results    Speak to your doctor   If you have compliments or concerns about an experience at your clinic, or if you wish to file a complaint, please contact [facility] Patient Relations at [phone] or email us at [email or web address]         Additional Information About Your Visit        Kaybushart Information     DancingAnchovy is an electronic gateway that provides easy, online access to your medical records. With DancingAnchovy, you can request a clinic appointment, read your test results, renew a prescription or communicate with your care team.     To sign up for DancingAnchovy visit the website at [email or web address]   You will be asked to enter the access code listed below, as well as some personal information. Please follow the directions to  create your username and password.     Your access code is: [email or web address]  Expires: 2017  2:48 PM     Your access code will  in 90 days. If you need help or a new code, please contact your [facility] or call [phone] for assistance.        Care EveryWhere ID     This is your Care EveryWhere ID. This could be used by other organizations to access your [facility] medical records  [id number]         Blood Pressure from Last 3 Encounters:   17 118/76   10/09/17 122/67   17 120/63    Weight from Last 3 Encounters:   17 95.3 kg (210 lb 1.6 oz)   10/09/17 96.2 kg (212 lb)   17 93.7 kg (206 lb 8 oz)              We Performed the Following     Surgical pathology exam        Primary Care Provider Office Phone # Fax #    Monisha Jenna Olea [phone]       [address] [address]        Equal Access to Services     [facility]: Hadii aad ku hadasho Soomaali, waaxda luqadaha, qaybta kaalmada adeegyada, waxay reina haydavid griffin . So [facility] [phone].    ATENCIÓN: Si habla español, tiene a ferrari disposición servicios gratuitos de asistencia lingüística. Llame al [phone].    We comply with applicable federal civil rights laws and [place] laws. We do not discriminate on the basis of race, color, national origin, age, disability, sex, sexual orientation, or gender identity.            Thank you!     Thank you for choosing [facility]  for your care. Our goal is always to provide you with excellent care. Hearing back from our patients is one way we can continue to improve our services. Please take a few minutes to complete the written survey that you may receive in the mail after your visit with us. Thank you!             Your Updated Medication List - Protect others around you: Learn how to safely use, store and throw away your medicines at www.disposemymeds.org.          This list is  accurate as of: 12/14/17  9:45 AM.  Always use your most recent med list.                   Brand Name Dispense Instructions for use Diagnosis    albuterol 108 (90 BASE) MCG/ACT Inhaler    PROAIR HFA/PROVENTIL HFA/VENTOLIN HFA    1 Inhaler    Inhale 2 puffs into the lungs every 4 hours as needed for shortness of breath / dyspnea or wheezing    Cough       amiodarone 200 MG tablet    PACERONE/CODARONE    90 tablet    Take 1 tablet (200 mg) by mouth daily    Atrial fibrillation, unspecified type (H)       Ammonium Lactate 10 % Crea     120 mL    Apply thin layer to skin on the body at least once daily.    Xerosis cutis, Nummular eczema       aspirin 81 MG tablet      Take 81 mg by mouth daily        atorvastatin 10 MG tablet    LIPITOR    90 tablet    Take 1 tablet (10 mg) by mouth daily    Major depressive disorder, recurrent episode, moderate (H), Essential hypertension, benign, Shortness of breath, Dyslipidemia       calcium 600 MG tablet     100 tablet    Take 1 tablet by mouth 2 times daily.        citalopram 20 MG tablet    celeXA    90 tablet    Take 1 tablet (20 mg) by mouth daily    Major depressive disorder, recurrent episode, moderate (H)       clindamycin 300 MG capsule    CLEOCIN    6 capsule    Take 2 capsules by mouth as needed for 1 dose. 1 hour prior to dental work.    Prophylactic antibiotic       coenzyme Q-10 75 MG Caps      Take  by mouth.        fluticasone-salmeterol 250-50 MCG/DOSE diskus inhaler    ADVAIR DISKUS    120 Inhaler    Inhale 1 puff into the lungs every 12 hours    Acute bronchitis, Cough       furosemide 40 MG tablet    LASIX    90 tablet    Take 1 tablet (40 mg) by mouth daily    Chronic diastolic heart failure (H), CAD S/P percutaneous coronary angioplasty       HYDROcodone-acetaminophen 5-325 MG per tablet    NORCO    30 tablet    Take 1 tablet by mouth every 6 hours as needed for moderate to severe pain    Hip pain, left       levothyroxine 50 MCG tablet    SYNTHROID/LEVOTHROID     90 tablet    Take 1 tablet (50 mcg) by mouth daily    Other specified hypothyroidism       meclizine 25 MG tablet    ANTIVERT    30 tablet    Take 1 tablet (25 mg) by mouth 3 times daily as needed for dizziness    BPPV (benign paroxysmal positional vertigo), unspecified laterality       metoprolol 25 MG tablet    LOPRESSOR    30 tablet    Take 1 tablet (25 mg) by mouth as needed    Chronic diastolic heart failure (H), Atrial fibrillation (H)       morphine 15 MG 12 hr tablet    MS CONTIN    30 tablet    Take 1 tablet (15 mg) by mouth every 12 hours    Hip pain, left       OCUVITE PO      Take  by mouth.        order for DME     1 each    Walker    Spinal stenosis, lumbar region, without neurogenic claudication       senna-docusate 8.6-50 MG per tablet    SENOKOT-S;PERICOLACE    60 tablet    Take 1 tablet by mouth 2 times daily.    S/P CABG x 3       spironolactone 25 MG tablet    ALDACTONE    90 tablet    Take 1 tablet (25 mg) by mouth daily    Hypokalemia       tamsulosin 0.4 MG capsule    FLOMAX    90 capsule    Take 1 capsule (0.4 mg) by mouth daily    Urinary retention       triamcinolone 0.1 % cream    KENALOG    80 g    Apply sparingly to affected area three times daily as needed    Nummular eczema       vitamin D 1000 UNITS capsule      Take 1 capsule by mouth daily.

## 2017-12-14 NOTE — TELEPHONE ENCOUNTER
Called patient's wife back to see how he was doing. They got the bleeding to stop. Instructed them to reapply vaseline and a bandage if they took it off and to keep it on securely for 24 hours. Instructed them to call with any further questions.      Ainsley Pedro CMA

## 2017-12-14 NOTE — NURSING NOTE
Dermatology Rooming Note    Gorge Corona's goals for this visit include:   Chief Complaint   Patient presents with     Skin Check     Gorge is here for a skin check. There is a spot on his clavicle that was frozen that needs to be retreated.     Ainsley Pedro CMA

## 2017-12-14 NOTE — PATIENT INSTRUCTIONS
Wound Care After a Biopsy    What is a skin biopsy?  A skin biopsy allows the doctor to examine a very small piece of tissue under the microscope to determine the diagnosis and the best treatment for the skin condition. A local anesthetic (numbing medicine)  is injected with a very small needle into the skin area to be tested. A small piece of skin is taken from the area. Sometimes a suture (stitch) is used.     What are the risks of a skin biopsy?  I will experience scar, bleeding, swelling, pain, crusting and redness. I may experience incomplete removal or recurrence. Risks of this procedure are excessive bleeding, bruising, infection, nerve damage, numbness, thick (hypertrophic or keloidal) scar and non-diagnostic biopsy.    How should I care for my wound for the first 24 hours?    Keep the wound dry and covered for 24 hours    If it bleeds, hold direct pressure on the area for 15 minutes. If bleeding does not stop then go to the emergency room    Avoid strenuous exercise the first 1-2 days or as your doctor instructs you    How should I care for the wound after 24 hours?    After 24 hours, remove the bandage    You may bathe or shower as normal    If you had a scalp biopsy, you can shampoo as usual and can use shower water to clean the biopsy site daily    Clean the wound twice a day with gentle soap and water    Do not scrub, be gentle    Apply white petroleum/Vaseline after cleaning the wound with a cotton swab or a clean finger, and keep the site covered with a Bandaid /bandage. Bandages are not necessary with a scalp biopsy    If you are unable to cover the site with a Bandaid /bandage, re-apply ointment 2-3 times a day to keep the site moist. Moisture will help with healing    Avoid strenuous activity for first 1-2 days    Avoid lakes, rivers, pools, and oceans until the stitches are removed or the site is healed    How do I clean my wound?    Wash hands thoroughly with soap or use hand  before all  wound care    Clean the wound with gentle soap and water    Apply white petroleum/Vaseline  to wound after it is clean    Replace the Bandaid /bandage to keep the wound covered for the first few days or as instructed by your doctor    If you had a scalp biopsy, warm shower water to the area on a daily basis should suffice    What should I use to clean my wound?     Cotton-tipped applicators (Qtips )    White petroleum jelly (Vaseline ). Use a clean new container and use Q-tips to apply.    Bandaids   as needed    Gentle soap     How should I care for my wound long term?    Do not get your wound dirty    Keep up with wound care for one week or until the area is healed.    A small scab will form and fall off by itself when the area is completely healed. The area will be red and will become pink in color as it heals. Sun protection is very important for how your scar will turn out. Sunscreen with an SPF 30 or greater is recommended once the area is healed.    If you have stitches, stitches need to be removed in 14 days. You may return to our clinic for this or you may have it done locally at your doctor s office.    You should have some soreness but it should be mild and slowly go away over several days. Talk to your doctor about using tylenol for pain,    When should I call my doctor?  If you have increased:     Pain or swelling    Pus or drainage (clear or slightly yellow drainage is ok)    Temperature over 100F    Spreading redness or warmth around wound    When will I hear about my results?  The biopsy results can take 2-3 weeks to come back. The clinic will call you with the results, send you a CamSemit message, or have you schedule a follow-up clinic or phone time to discuss the results. Contact our clinics if you do not hear from us in 3 weeks.     Who should I call with questions?    [facility]: [phone]     [facility]: [phone]    For  urgent needs outside of business hours call the [facility] at [phone] and ask for the dermatology resident on call      Cryotherapy    What is it?    Use of a very cold liquid, such as liquid nitrogen, to freeze and destroy abnormal skin cells that need to be removed    What should I expect?    Tenderness and redness    A small blister that might grow and fill with dark purple blood. There may be crusting.    More than one treatment may be needed if the lesions do not go away.    How do I care for the treated area?    Gently wash the area with your hands when bathing.    Use a thin layer of Vaseline to help with healing. You may use a Band-Aid.     The area should heal within 7-10 days and may leave behind a pink or lighter color.     Do not use an antibiotic or Neosporin ointment.     You may take acetaminophen (Tylenol) for pain.     Call your Doctor if you have:    Severe pain    Signs of infection (warmth, redness, cloudy yellow drainage, and or a bad smell)    Questions or concerns    Who should I call with questions?       [facility]: [phone]       [facility]: [phone]       For urgent needs outside of business hours call the [facility] at [phone]        and ask for the dermatology resident on call

## 2017-12-14 NOTE — LETTER
"12/14/2017      RE: Gorge Corona  [id number] [facility] [id number]       [facility] Dermatology Note    Dermatology Problem List  FBSE 12/14/17  0. NUB, L lower back. Ddx AK, SK, BLK, r/o SCC/BCC. S/p shave biopsy for removal on 12/14/17.   0. NUB, R clavicle. Ddx inflamed SK. S/p shave biopsy for removal on 12/14/17.   1. Hx NMSC   - nBCC, nasal tip, s/p MMS 8/2015   - nBCC with fibrosis, R dorsal forearms, s/p excision 6/2014   - SCCis, right superior forehead, s/p MMS 7/2012   - nBCC, left mid-back, s/p excision 6/2011  2. AKs. LiqN2.    3. Nummular dermatitis. Triamcinolone 0.1% ointment BID PRN.       Encounter Date: Dec 14, 2017    CC:   Chief Complaint   Patient presents with     Skin Check     Gorge is here for a skin check. There is a spot on his clavicle that was frozen that needs to be retreated.       History of Present Illness:  Mr. Gorge Corona is a 87 year old male who presents as a follow-up for a full body skin examination. The patient was last seen 12/7/16 when he had numerous AKs treated with cryotherapy. Today, he returns for a full body skin check.      Today, has a few concerning spots. First, he reports that he saw his PCP a few days ago and was treated with cryotherapy for a \"skin tag\" on his R clavicle. He states he has had an exuberant reaction to treatment and the spot has not resolved. He would like it removed today. Also reports multiple brown spots on his chest that are becoming more numerous and larger, but no associated pain, tenderness of bleeding. He continues to have intermittent itchy spots on his legs. He using triamcinolone 0.1% cream with good relief though is interested in a therapy to \"stop these from happening\". He intermittently uses amlactin lotion as a moisturizer. He otherwise denies any new or changing lesions. No other bleeding, painful, pruritic or concerning spots. Health otherwise stable. No other skin concerns.     Past " Medical History:   Patient Active Problem List   Diagnosis     Lumbar spinal stenosis     Lumbar spondylolysis     Major depressive disorder, recurrent episode, moderate (H)     Actinic keratosis     Other malignant neoplasm of skin, site unspecified     Benign neoplasm of skin of trunk, except scrotum     Other seborrheic keratosis     History of SCC (squamous cell carcinoma) of skin     H/O colonoscopy     Normal pressure hydrocephalus     ASCVD (arteriosclerotic cardiovascular disease)     Coronary artery disease, occlusive     H/O CT scan of head     Mitral valve regurgitation     CAD (coronary artery disease)     Atrial fibrillation (H)     Dyslipidemia     Preventative health care     CAD S/P percutaneous coronary angioplasty     BCC (basal cell carcinoma of skin)     Hypothyroidism     CHF (congestive heart failure) (H)     SCC (squamous cell carcinoma)     SVT (supraventricular tachycardia) (H)     Status post coronary angiogram     Past Medical History:   Diagnosis Date     Atrial fibrillation (H)      Atrial flutter (H)      Basal cell carcinoma of skin of trunk      BPH (benign prostatic hypertrophy) with urinary obstruction      CAD (coronary artery disease)     s/p CABG 1/2013; RUSSEL in 2/2013     Depression      H/O CT scan of head 1/11/2013 7/20/2006    Result Impression     CT of the Head without contrast 7/20/2006  History: Ataxia, incontinence, and NPH  Comparison Study: MR obtained to 4/19/2004  Technique: Axial CT images of the head were obtained at 2 intervals from the skull base to the vertex without intravenous contrast. The images were reviewed in brain, subdural and bone windows.  Findings: Patient has had a moderate promin     Hyperlipidemia      Lumbar spinal stenosis      Mitral regurgitation     s/p bioprosthesis 1/2013     Palpitations      Paroxysmal supraventricular tachycardia (H)      Squamous cell carcinoma      Tachycardia     baroreflex injury dut to surgery       (ventriculoperitoneal) shunt status     for Normal pressure hydrocephalus     Past Surgical History:   Procedure Laterality Date     ANESTHESIA CARDIOVERSION  4/11/2013    Procedure: ANESTHESIA CARDIOVERSION;  Cardioversion;  Surgeon: Provider, Generic Anesthesia;  Location: [facility] OR     BYPASS GRAFT ARTERY CORONARY, REPAIR VALVE MITRAL, COMBINED  1/14/2013    Procedure: COMBINED BYPASS GRAFT ARTERY CORONARY, REPAIR VALVE MITRAL;  Median sternotomy, coronary artery bypass graft X3 using left internal mammary artery & right saphenous vein , mitral valve Replacement and on pump oxygenator, flexible cystoscopy, urethral dilation and insertion of stuart catheter.;  Surgeon: Chan Peres MD;  Location: [facility] OR     CORONARY ANGIOGRAPHY ADULT ORDER       CORONARY ARTERY BYPASS  1/2013    mitral valve tissue      INSERT CATHETER BLADDER  1/14/2013    Procedure: INSERT CATHETER BLADDER;  Flexible cystoscopy, urethral dilation,& insertion of stuart catheter;  Surgeon: Pete Bedoya MD;  Location: [facility] OR     MITRAL VALVE REPLACEMENT  2013    29 mm Epic porcine valve     MOHS MICROGRAPHIC PROCEDURE       REPLACE VALVE MITRAL  1/2013    tissue valve       Medications:  Current Outpatient Prescriptions   Medication Sig Dispense Refill     atorvastatin (LIPITOR) 10 MG tablet Take 1 tablet (10 mg) by mouth daily 90 tablet 1     amiodarone (PACERONE/CODARONE) 200 MG tablet Take 1 tablet (200 mg) by mouth daily 90 tablet 3     fluticasone-salmeterol (ADVAIR DISKUS) 250-50 MCG/DOSE diskus inhaler Inhale 1 puff into the lungs every 12 hours 120 Inhaler 1     meclizine (ANTIVERT) 25 MG tablet Take 1 tablet (25 mg) by mouth 3 times daily as needed for dizziness 30 tablet 3     furosemide (LASIX) 40 MG tablet Take 1 tablet (40 mg) by mouth daily 90 tablet 0     tamsulosin (FLOMAX) 0.4 MG capsule Take 1 capsule (0.4 mg) by mouth daily 90 capsule 3     levothyroxine (SYNTHROID/LEVOTHROID) 50 MCG tablet Take 1 tablet (50 mcg)  by mouth daily 90 tablet 3     spironolactone (ALDACTONE) 25 MG tablet Take 1 tablet (25 mg) by mouth daily 90 tablet 3     citalopram (CELEXA) 20 MG tablet Take 1 tablet (20 mg) by mouth daily 90 tablet 3     Ammonium Lactate 10 % CREA Apply thin layer to skin on the body at least once daily. 120 mL 11     aspirin 81 MG tablet Take 81 mg by mouth daily       order for DME Walker 1 each 0     triamcinolone (KENALOG) 0.1 % cream Apply sparingly to affected area three times daily as needed 80 g 1     HYDROcodone-acetaminophen (NORCO) 5-325 MG per tablet Take 1 tablet by mouth every 6 hours as needed for moderate to severe pain 30 tablet 0     metoprolol (LOPRESSOR) 25 MG tablet Take 1 tablet (25 mg) by mouth as needed 30 tablet 1     albuterol (PROAIR HFA, PROVENTIL HFA, VENTOLIN HFA) 108 (90 BASE) MCG/ACT inhaler Inhale 2 puffs into the lungs every 4 hours as needed for shortness of breath / dyspnea or wheezing 1 Inhaler 1     clindamycin (CLEOCIN) 300 MG capsule Take 2 capsules by mouth as needed for 1 dose. 1 hour prior to dental work. 6 capsule 0     senna-docusate (SENOKOT-S;PERICOLACE) 8.6-50 MG per tablet Take 1 tablet by mouth 2 times daily. 60 tablet      Cholecalciferol (VITAMIN D) 1000 UNITS capsule Take 1 capsule by mouth daily.       Calcium 600 MG tablet Take 1 tablet by mouth 2 times daily. 100 tablet 3     coenzyme Q-10 75 MG CAPS Take  by mouth.       Multiple Vitamins-Minerals (OCUVITE PO) Take  by mouth.  0     morphine (MS CONTIN) 15 MG 12 hr tablet Take 1 tablet (15 mg) by mouth every 12 hours (Patient not taking: Reported on 12/14/2017) 30 tablet 0        Allergies   Allergen Reactions     Penicillins Rash         Review of Systems:  -Skin Establ Pt: The patient denies any new rash, pruritus, or lesions that are symptomatic, changing or bleeding, except as per HPI.  -Constitutional: The patient denies fatigue, fevers, chills, unintended weight loss, and night sweats.  -HEENT: Patient denies  nonhealing oral sores.  -Skin: As above in HPI. No additional skin concerns.    Physical exam:  Vitals: There were no vitals taken for this visit.  GEN: This is a well developed, well-nourished male in no acute distress, in a pleasant mood.    SKIN: Total skin excluding the undergarment areas was performed. The exam included the head/face, neck, both arms, chest, back, abdomen, both legs, digits and/or nails.   - Olivas Type I  - There are waxy stuck on tan to brown papules on the trunk and extremiteis.  - There are erythematous macules with overyling adherent scale on the bilateral face, helices, and lower back. Total of 7 lesions.  - Few brown macules and papules without concerning dermoscopy features.   - Well healed scar at prior NMSC excision site without evidence of recurrence  - On the L lower back, there is a ~ 6 mm pink hyperkeratotic papule, mildly indurated.   - On the R clavicle, there is a ~ 1.2 cm white to brown waxy, stuck-on appearing plaque   - Few scattered pink eczematous papules and plaques on bilateral lower extremities without associated mild to moderate generalized xerosis   -No other lesions of concern on areas examined.     Impression/Plan:      1. Neoplasm of uncertain behavior on the right clavicle. The differential diagnosis includes inflamed SK. Will shave remove in clinic today given size and irritation to patient. Unlikely this will resolve with prior cryotherapy treatment.     Shave biopsy:  After discussion of benefits and risks including but not limited to bleeding/bruising, pain/swelling, infection, scar, incomplete removal, nerve damage/numbness, recurrence, and non-diagnostic biopsy, written consent, verbal consent and photographs were obtained. Time-out was performed. The area was cleaned with isopropyl alcohol.  was injected to obtain adequate anesthesia of the lesion on the right clavicle. 3 ml of 1% lidocaine with 1:100,000 epinephrine was injected to obtain adequate  anesthesia. A  shave biopsy was performed. Hemostasis was achieved with aluminium chloride. Vaseline and a sterile dressing were applied. The patient tolerated the procedure and no complications were noted. The patient was provided with verbal and written post care instructions.      2. Neoplasm of uncertain behavior on the left lower back. The differential diagnosis includes AK, irritated SK, BLK, r/o SCC/BCC.     Shave biopsy:  After discussion of benefits and risks including but not limited to bleeding/bruising, pain/swelling, infection, scar, incomplete removal, nerve damage/numbness, recurrence, and non-diagnostic biopsy, written consent, verbal consent and photographs were obtained. Time-out was performed. The area was cleaned with isopropyl alcohol.  was injected to obtain adequate anesthesia of the lesion on the L lower back. 1.0 ml of 1% lidocaine with 1:100,000 epinephrine was injected to obtain adequate anesthesia. A  shave biopsy was performed. Hemostasis was achieved with aluminium chloride. Vaseline and a sterile dressing were applied. The patient tolerated the procedure and no complications were noted. The patient was provided with verbal and written post care instructions.      3. Actinic keratosis    Cryotherapy procedure note: After verbal consent and discussion of risks and benefits including but no limited to dyspigmentation/scar, blister, and pain, 7 lesions as above were treated with 1-2mm freeze border for 1 cycles with liquid nitrogen. Post cryotherapy instructions were provided.     4. Seborrheic keratosis, multiple benign nevi. ABCDEs of melanoma reviewed. Sun protection advised.     5. Nummular dermatitis. Etiology discussed with patient. Recommended more frequent emolliation with an ointment based moisturizer (Vaseline, Aquaphor) in addition to amlactin. Can continue to use triamcinolone 0.1% cream BID PRN for flares.     Follow-up in 1 year, earlier for new or changing lesions, or as  needed pending biopsy results as above.   Dr. Parker staffed the patient.    Staff Involved:  Resident(Jose Coyle)/Staff(as above)                Photo placed in patient's chart for future reference    I talked with and examined Gorge ALFONSO Corona and I agree with the assessment and the plan. I was present for the entire procedure. MARAL Parker MD.      Jose Coyle MD

## 2017-12-14 NOTE — NURSING NOTE
Biopsy performed on the Right clavicle. Injected 3.0 ml of Xylocaine  (Lidocaine 1%     Biopsy performed on the Left Lower Back. Injected 1.5ml of Xylocaine  (Lidocaine 1%

## 2017-12-14 NOTE — PROGRESS NOTES
"[facility] Dermatology Note    Dermatology Problem List  FBSE 12/14/17  0. NUB, L lower back. Ddx AK, SK, BLK, r/o SCC/BCC. S/p shave biopsy for removal on 12/14/17.   0. NUB, R clavicle. Ddx inflamed SK. S/p shave biopsy for removal on 12/14/17.   1. Hx NMSC   - nBCC, nasal tip, s/p MMS 8/2015   - nBCC with fibrosis, R dorsal forearms, s/p excision 6/2014   - SCCis, right superior forehead, s/p MMS 7/2012   - nBCC, left mid-back, s/p excision 6/2011  2. AKs. LiqN2.    3. Nummular dermatitis. Triamcinolone 0.1% ointment BID PRN.       Encounter Date: Dec 14, 2017    CC:   Chief Complaint   Patient presents with     Skin Check     Gorge is here for a skin check. There is a spot on his clavicle that was frozen that needs to be retreated.       History of Present Illness:  Mr. Gorge Corona is a 87 year old male who presents as a follow-up for a full body skin examination. The patient was last seen 12/7/16 when he had numerous AKs treated with cryotherapy. Today, he returns for a full body skin check.      Today, has a few concerning spots. First, he reports that he saw his PCP a few days ago and was treated with cryotherapy for a \"skin tag\" on his R clavicle. He states he has had an exuberant reaction to treatment and the spot has not resolved. He would like it removed today. Also reports multiple brown spots on his chest that are becoming more numerous and larger, but no associated pain, tenderness of bleeding. He continues to have intermittent itchy spots on his legs. He using triamcinolone 0.1% cream with good relief though is interested in a therapy to \"stop these from happening\". He intermittently uses amlactin lotion as a moisturizer. He otherwise denies any new or changing lesions. No other bleeding, painful, pruritic or concerning spots. Health otherwise stable. No other skin concerns.     Past Medical History:   Patient Active Problem List   Diagnosis     Lumbar spinal stenosis     " Lumbar spondylolysis     Major depressive disorder, recurrent episode, moderate (H)     Actinic keratosis     Other malignant neoplasm of skin, site unspecified     Benign neoplasm of skin of trunk, except scrotum     Other seborrheic keratosis     History of SCC (squamous cell carcinoma) of skin     H/O colonoscopy     Normal pressure hydrocephalus     ASCVD (arteriosclerotic cardiovascular disease)     Coronary artery disease, occlusive     H/O CT scan of head     Mitral valve regurgitation     CAD (coronary artery disease)     Atrial fibrillation (H)     Dyslipidemia     Preventative health care     CAD S/P percutaneous coronary angioplasty     BCC (basal cell carcinoma of skin)     Hypothyroidism     CHF (congestive heart failure) (H)     SCC (squamous cell carcinoma)     SVT (supraventricular tachycardia) (H)     Status post coronary angiogram     Past Medical History:   Diagnosis Date     Atrial fibrillation (H)      Atrial flutter (H)      Basal cell carcinoma of skin of trunk      BPH (benign prostatic hypertrophy) with urinary obstruction      CAD (coronary artery disease)     s/p CABG 1/2013; RUSSEL in 2/2013     Depression      H/O CT scan of head 1/11/2013 7/20/2006    Result Impression     CT of the Head without contrast 7/20/2006  History: Ataxia, incontinence, and NPH  Comparison Study: MR obtained to 4/19/2004  Technique: Axial CT images of the head were obtained at 2 intervals from the skull base to the vertex without intravenous contrast. The images were reviewed in brain, subdural and bone windows.  Findings: Patient has had a moderate promin     Hyperlipidemia      Lumbar spinal stenosis      Mitral regurgitation     s/p bioprosthesis 1/2013     Palpitations      Paroxysmal supraventricular tachycardia (H)      Squamous cell carcinoma      Tachycardia     baroreflex injury dut to surgery      (ventriculoperitoneal) shunt status     for Normal pressure hydrocephalus     Past Surgical History:    Procedure Laterality Date     ANESTHESIA CARDIOVERSION  4/11/2013    Procedure: ANESTHESIA CARDIOVERSION;  Cardioversion;  Surgeon: Provider, Generic Anesthesia;  Location: [facility] OR     BYPASS GRAFT ARTERY CORONARY, REPAIR VALVE MITRAL, COMBINED  1/14/2013    Procedure: COMBINED BYPASS GRAFT ARTERY CORONARY, REPAIR VALVE MITRAL;  Median sternotomy, coronary artery bypass graft X3 using left internal mammary artery & right saphenous vein , mitral valve Replacement and on pump oxygenator, flexible cystoscopy, urethral dilation and insertion of stuart catheter.;  Surgeon: Chan Peres MD;  Location: [facility] OR     CORONARY ANGIOGRAPHY ADULT ORDER       CORONARY ARTERY BYPASS  1/2013    mitral valve tissue      INSERT CATHETER BLADDER  1/14/2013    Procedure: INSERT CATHETER BLADDER;  Flexible cystoscopy, urethral dilation,& insertion of stuart catheter;  Surgeon: Pete Bedoya MD;  Location: [facility] OR     MITRAL VALVE REPLACEMENT  2013    29 mm Epic porcine valve     MOHS MICROGRAPHIC PROCEDURE       REPLACE VALVE MITRAL  1/2013    tissue valve       Medications:  Current Outpatient Prescriptions   Medication Sig Dispense Refill     atorvastatin (LIPITOR) 10 MG tablet Take 1 tablet (10 mg) by mouth daily 90 tablet 1     amiodarone (PACERONE/CODARONE) 200 MG tablet Take 1 tablet (200 mg) by mouth daily 90 tablet 3     fluticasone-salmeterol (ADVAIR DISKUS) 250-50 MCG/DOSE diskus inhaler Inhale 1 puff into the lungs every 12 hours 120 Inhaler 1     meclizine (ANTIVERT) 25 MG tablet Take 1 tablet (25 mg) by mouth 3 times daily as needed for dizziness 30 tablet 3     furosemide (LASIX) 40 MG tablet Take 1 tablet (40 mg) by mouth daily 90 tablet 0     tamsulosin (FLOMAX) 0.4 MG capsule Take 1 capsule (0.4 mg) by mouth daily 90 capsule 3     levothyroxine (SYNTHROID/LEVOTHROID) 50 MCG tablet Take 1 tablet (50 mcg) by mouth daily 90 tablet 3     spironolactone (ALDACTONE) 25 MG tablet Take 1 tablet (25 mg) by  mouth daily 90 tablet 3     citalopram (CELEXA) 20 MG tablet Take 1 tablet (20 mg) by mouth daily 90 tablet 3     Ammonium Lactate 10 % CREA Apply thin layer to skin on the body at least once daily. 120 mL 11     aspirin 81 MG tablet Take 81 mg by mouth daily       order for DME Walker 1 each 0     triamcinolone (KENALOG) 0.1 % cream Apply sparingly to affected area three times daily as needed 80 g 1     HYDROcodone-acetaminophen (NORCO) 5-325 MG per tablet Take 1 tablet by mouth every 6 hours as needed for moderate to severe pain 30 tablet 0     metoprolol (LOPRESSOR) 25 MG tablet Take 1 tablet (25 mg) by mouth as needed 30 tablet 1     albuterol (PROAIR HFA, PROVENTIL HFA, VENTOLIN HFA) 108 (90 BASE) MCG/ACT inhaler Inhale 2 puffs into the lungs every 4 hours as needed for shortness of breath / dyspnea or wheezing 1 Inhaler 1     clindamycin (CLEOCIN) 300 MG capsule Take 2 capsules by mouth as needed for 1 dose. 1 hour prior to dental work. 6 capsule 0     senna-docusate (SENOKOT-S;PERICOLACE) 8.6-50 MG per tablet Take 1 tablet by mouth 2 times daily. 60 tablet      Cholecalciferol (VITAMIN D) 1000 UNITS capsule Take 1 capsule by mouth daily.       Calcium 600 MG tablet Take 1 tablet by mouth 2 times daily. 100 tablet 3     coenzyme Q-10 75 MG CAPS Take  by mouth.       Multiple Vitamins-Minerals (OCUVITE PO) Take  by mouth.  0     morphine (MS CONTIN) 15 MG 12 hr tablet Take 1 tablet (15 mg) by mouth every 12 hours (Patient not taking: Reported on 12/14/2017) 30 tablet 0        Allergies   Allergen Reactions     Penicillins Rash         Review of Systems:  -Skin Establ Pt: The patient denies any new rash, pruritus, or lesions that are symptomatic, changing or bleeding, except as per HPI.  -Constitutional: The patient denies fatigue, fevers, chills, unintended weight loss, and night sweats.  -HEENT: Patient denies nonhealing oral sores.  -Skin: As above in HPI. No additional skin concerns.    Physical  exam:  Vitals: There were no vitals taken for this visit.  GEN: This is a well developed, well-nourished male in no acute distress, in a pleasant mood.    SKIN: Total skin excluding the undergarment areas was performed. The exam included the head/face, neck, both arms, chest, back, abdomen, both legs, digits and/or nails.   - Olivas Type I  - There are waxy stuck on tan to brown papules on the trunk and extremiteis.  - There are erythematous macules with overyling adherent scale on the bilateral face, helices, and lower back. Total of 7 lesions.  - Few brown macules and papules without concerning dermoscopy features.   - Well healed scar at prior NMSC excision site without evidence of recurrence  - On the L lower back, there is a ~ 6 mm pink hyperkeratotic papule, mildly indurated.   - On the R clavicle, there is a ~ 1.2 cm white to brown waxy, stuck-on appearing plaque   - Few scattered pink eczematous papules and plaques on bilateral lower extremities without associated mild to moderate generalized xerosis   -No other lesions of concern on areas examined.     Impression/Plan:      1. Neoplasm of uncertain behavior on the right clavicle. The differential diagnosis includes inflamed SK. Will shave remove in clinic today given size and irritation to patient. Unlikely this will resolve with prior cryotherapy treatment.     Shave biopsy:  After discussion of benefits and risks including but not limited to bleeding/bruising, pain/swelling, infection, scar, incomplete removal, nerve damage/numbness, recurrence, and non-diagnostic biopsy, written consent, verbal consent and photographs were obtained. Time-out was performed. The area was cleaned with isopropyl alcohol.  was injected to obtain adequate anesthesia of the lesion on the right clavicle. 3 ml of 1% lidocaine with 1:100,000 epinephrine was injected to obtain adequate anesthesia. A  shave biopsy was performed. Hemostasis was achieved with aluminium chloride.  Vaseline and a sterile dressing were applied. The patient tolerated the procedure and no complications were noted. The patient was provided with verbal and written post care instructions.      2. Neoplasm of uncertain behavior on the left lower back. The differential diagnosis includes AK, irritated SK, BLK, r/o SCC/BCC.     Shave biopsy:  After discussion of benefits and risks including but not limited to bleeding/bruising, pain/swelling, infection, scar, incomplete removal, nerve damage/numbness, recurrence, and non-diagnostic biopsy, written consent, verbal consent and photographs were obtained. Time-out was performed. The area was cleaned with isopropyl alcohol.  was injected to obtain adequate anesthesia of the lesion on the L lower back. 1.0 ml of 1% lidocaine with 1:100,000 epinephrine was injected to obtain adequate anesthesia. A  shave biopsy was performed. Hemostasis was achieved with aluminium chloride. Vaseline and a sterile dressing were applied. The patient tolerated the procedure and no complications were noted. The patient was provided with verbal and written post care instructions.      3. Actinic keratosis    Cryotherapy procedure note: After verbal consent and discussion of risks and benefits including but no limited to dyspigmentation/scar, blister, and pain, 7 lesions as above were treated with 1-2mm freeze border for 1 cycles with liquid nitrogen. Post cryotherapy instructions were provided.     4. Seborrheic keratosis, multiple benign nevi. ABCDEs of melanoma reviewed. Sun protection advised.     5. Nummular dermatitis. Etiology discussed with patient. Recommended more frequent emolliation with an ointment based moisturizer (Vaseline, Aquaphor) in addition to amlactin. Can continue to use triamcinolone 0.1% cream BID PRN for flares.     Follow-up in 1 year, earlier for new or changing lesions, or as needed pending biopsy results as above.   Dr. Parker staffed the patient.    Staff  Involved:  Resident(Jose Coyle)/Staff(as above)

## 2017-12-14 NOTE — TELEPHONE ENCOUNTER
Patient's wife Kassi called triage stating the biopsy site was bleeding. I recommended her to use gauze or a wash cloth and hold firm pressure without peaking for 20 minutes. After 20 minutes to call my direct line [phone] and ask for Ainlsey.    Ainsley Pedro CMA

## 2017-12-18 NOTE — PROGRESS NOTES
Photo placed in patient's chart for future reference    I talked with and examined Gorge Corona and I agree with the assessment and the plan. I was present for the entire procedure. MARAL Parker MD.

## 2017-12-20 LAB — COPATH REPORT: NORMAL

## 2017-12-27 ENCOUNTER — OFFICE VISIT (OUTPATIENT)
Dept: INTERNAL MEDICINE | Facility: CLINIC | Age: 82
End: 2017-12-27
Payer: MEDICARE

## 2017-12-27 VITALS
HEART RATE: 61 BPM | DIASTOLIC BLOOD PRESSURE: 76 MMHG | RESPIRATION RATE: 18 BRPM | BODY MASS INDEX: 29.78 KG/M2 | SYSTOLIC BLOOD PRESSURE: 143 MMHG | TEMPERATURE: 97.7 F | OXYGEN SATURATION: 95 % | WEIGHT: 208 LBS | HEIGHT: 70 IN

## 2017-12-27 DIAGNOSIS — E78.5 DYSLIPIDEMIA: ICD-10-CM

## 2017-12-27 DIAGNOSIS — H61.23 BILATERAL IMPACTED CERUMEN: ICD-10-CM

## 2017-12-27 DIAGNOSIS — E03.9 HYPOTHYROIDISM, UNSPECIFIED TYPE: ICD-10-CM

## 2017-12-27 DIAGNOSIS — D69.6 THROMBOCYTOPENIA (H): ICD-10-CM

## 2017-12-27 DIAGNOSIS — I25.10 CORONARY ARTERY DISEASE, OCCLUSIVE: ICD-10-CM

## 2017-12-27 DIAGNOSIS — G47.00 INSOMNIA, UNSPECIFIED TYPE: Primary | ICD-10-CM

## 2017-12-27 LAB
ANION GAP SERPL CALCULATED.3IONS-SCNC: 6 MMOL/L (ref 3–14)
BASOPHILS # BLD AUTO: 0 10E9/L (ref 0–0.2)
BASOPHILS NFR BLD AUTO: 0.4 %
BUN SERPL-MCNC: 18 MG/DL (ref 7–30)
CALCIUM SERPL-MCNC: 8.1 MG/DL (ref 8.5–10.1)
CHLORIDE SERPL-SCNC: 108 MMOL/L (ref 94–109)
CHOLEST SERPL-MCNC: 147 MG/DL
CO2 SERPL-SCNC: 25 MMOL/L (ref 20–32)
CREAT SERPL-MCNC: 1.17 MG/DL (ref 0.66–1.25)
DIFFERENTIAL METHOD BLD: ABNORMAL
EOSINOPHIL # BLD AUTO: 0.2 10E9/L (ref 0–0.7)
EOSINOPHIL NFR BLD AUTO: 3.1 %
ERYTHROCYTE [DISTWIDTH] IN BLOOD BY AUTOMATED COUNT: 13.6 % (ref 10–15)
GFR SERPL CREATININE-BSD FRML MDRD: 59 ML/MIN/1.7M2
GLUCOSE SERPL-MCNC: 102 MG/DL (ref 70–99)
HCT VFR BLD AUTO: 38.1 % (ref 40–53)
HDLC SERPL-MCNC: 61 MG/DL
HGB BLD-MCNC: 12.3 G/DL (ref 13.3–17.7)
IMM GRANULOCYTES # BLD: 0.1 10E9/L (ref 0–0.4)
IMM GRANULOCYTES NFR BLD: 0.7 %
LDLC SERPL CALC-MCNC: 74 MG/DL
LYMPHOCYTES # BLD AUTO: 1.5 10E9/L (ref 0.8–5.3)
LYMPHOCYTES NFR BLD AUTO: 22.2 %
MCH RBC QN AUTO: 30.8 PG (ref 26.5–33)
MCHC RBC AUTO-ENTMCNC: 32.3 G/DL (ref 31.5–36.5)
MCV RBC AUTO: 95 FL (ref 78–100)
MONOCYTES # BLD AUTO: 0.9 10E9/L (ref 0–1.3)
MONOCYTES NFR BLD AUTO: 13.6 %
NEUTROPHILS # BLD AUTO: 4 10E9/L (ref 1.6–8.3)
NEUTROPHILS NFR BLD AUTO: 60 %
NONHDLC SERPL-MCNC: 86 MG/DL
NRBC # BLD AUTO: 0 10*3/UL
NRBC BLD AUTO-RTO: 0 /100
PLATELET # BLD AUTO: 105 10E9/L (ref 150–450)
POTASSIUM SERPL-SCNC: 4.2 MMOL/L (ref 3.4–5.3)
RBC # BLD AUTO: 4 10E12/L (ref 4.4–5.9)
SODIUM SERPL-SCNC: 139 MMOL/L (ref 133–144)
TRIGL SERPL-MCNC: 62 MG/DL
TSH SERPL DL<=0.005 MIU/L-ACNC: 7.73 MU/L (ref 0.4–4)
WBC # BLD AUTO: 6.7 10E9/L (ref 4–11)

## 2017-12-27 RX ORDER — LANOLIN ALCOHOL/MO/W.PET/CERES
3 CREAM (GRAM) TOPICAL
Qty: 100 TABLET | Refills: 3 | Status: SHIPPED | OUTPATIENT
Start: 2017-12-27

## 2017-12-27 ASSESSMENT — PAIN SCALES - GENERAL: PAINLEVEL: MODERATE PAIN (5)

## 2017-12-27 NOTE — NURSING NOTE
Patient tolerated ear wash well. Moderate amount of impacted cerumen removed from both ears. Jennifer Owens CMA 9:52 AM on 12/27/2017.

## 2017-12-27 NOTE — MR AVS SNAPSHOT
After Visit Summary   12/27/2017    Gorge Corona    MRN: [id number]           Patient Information     Date Of Birth          11/27/1930        Visit Information        Provider Department      12/27/2017 9:00 AM Clovis Adames MD [facility]        Today's Diagnoses     Insomnia, unspecified type    -  1    Coronary artery disease, occlusive        Hypothyroidism, unspecified type        Dyslipidemia        Thrombocytopenia (H)        Bilateral impacted cerumen           Follow-ups after your visit        Your next 10 appointments already scheduled     Dec 27, 2017 10:00 AM CST   LAB with  LAB   [facility] ([facility])    [address] [place] [id number] [phone]           Please do not eat 10-12 hours before your appointment if you are coming in fasting for labs on lipids, cholesterol, or glucose (sugar). This does not apply to pregnant women. Water, hot tea and black coffee (with nothing added) are okay. Do not drink other fluids, diet soda or chew gum.            Mar 15, 2018  6:30 PM CDT   (Arrive by 6:15 PM)   New Patient Visit with Morris Jj MD   [facility] ([facility])    [address] [place] [id number] [phone]            Apr 09, 2018  2:00 PM CDT   Ech Complete with [id number]   [facility] Echo ([facility])    [address] [place] [id number] [phone]           1. Please bring or wear a comfortable two-piece outfit. 2. You may eat, drink and take your normal medicines. 3. For any questions that cannot be answered, please contact the ordering physician            Apr 09, 2018  3:20 PM CDT   (Arrive by 3:05 PM)   RETURN ARRHYTHMIA with Santi Ocasio MD   [facility] ([facility])    [address] [place] [id number]  "  [phone]              Future tests that were ordered for you today     Open Future Orders        Priority Expected Expires Ordered    CBC with platelets differential Routine  2018    Basic metabolic panel Routine  2018    Lipid Profile Routine  2018    TSH Routine  2018            Who to contact     Please call your clinic at [phone] to:    Ask questions about your health    Make or cancel appointments    Discuss your medicines    Learn about your test results    Speak to your doctor   If you have compliments or concerns about an experience at your clinic, or if you wish to file a complaint, please contact [facility] Patient Relations at [phone] or email us at [email or web address]         Additional Information About Your Visit        [email or web address] Information     [email or web address] is an electronic gateway that provides easy, online access to your medical records. With [email or web address], you can request a clinic appointment, read your test results, renew a prescription or communicate with your care team.     To sign up for [email or web address] visit the website at [email or web address]   You will be asked to enter the access code listed below, as well as some personal information. Please follow the directions to create your username and password.     Your access code is: [email or web address]  Expires: 3/27/2018  9:46 AM     Your access code will  in 90 days. If you need help or a new code, please contact your [facility] or call [phone] for assistance.        Care EveryWhere ID     This is your Care EveryWhere ID. This could be used by other organizations to access your [facility] medical records  [id number]        Your Vitals Were     Pulse Temperature Respirations Height Pulse Oximetry BMI (Body Mass Index)    61 97.7  F (36.5  C) (Oral) 18 1.765 m (5' 9.5\") 95% 30.28 kg/m2       Blood Pressure " from Last 3 Encounters:   12/27/17 143/76   12/08/17 118/76   10/09/17 122/67    Weight from Last 3 Encounters:   12/27/17 94.3 kg (208 lb)   12/08/17 95.3 kg (210 lb 1.6 oz)   10/09/17 96.2 kg (212 lb)              We Performed the Following     REMOVE IMPACTED CERUMEN          Today's Medication Changes          These changes are accurate as of: 12/27/17  9:46 AM.  If you have any questions, ask your nurse or doctor.               Start taking these medicines.        Dose/Directions    melatonin 3 MG tablet   Used for:  Insomnia, unspecified type   Started by:  Clovis Adames MD        Dose:  3 mg   Take 1 tablet (3 mg) by mouth nightly as needed for sleep   Quantity:  100 tablet   Refills:  3         Stop taking these medicines if you haven't already. Please contact your care team if you have questions.     HYDROcodone-acetaminophen 5-325 MG per tablet   Commonly known as:  NORCO   Stopped by:  Clovis Adames MD           morphine 15 MG 12 hr tablet   Commonly known as:  MS CONTIN   Stopped by:  Clovis Adames MD                Where to get your medicines      These medications were sent to [facility] [address] [place] [address] [address]  [address] [place] [id number]     Phone:  [phone]     melatonin 3 MG tablet                Primary Care Provider Office Phone # Fax #    Monisha Jenna Olea [phone]       [address] [address]        Equal Access to Services     [facility]: Hadii aad ku hadasho Soomaali, waaxda luqadaha, qaybta kaalmada adeegyada, waxay reina tian. So [facility] [phone].    ATENCIÓN: Si habla español, tiene a ferrari disposición servicios gratuitos de asistencia lingüística. Llrye al [phone].    We comply with applicable federal civil rights laws and [place] laws. We do not discriminate on the basis of race, color, national origin, age, disability, sex,  sexual orientation, or gender identity.            Thank you!     Thank you for choosing [facility]  for your care. Our goal is always to provide you with excellent care. Hearing back from our patients is one way we can continue to improve our services. Please take a few minutes to complete the written survey that you may receive in the mail after your visit with us. Thank you!             Your Updated Medication List - Protect others around you: Learn how to safely use, store and throw away your medicines at www.disposemymeds.org.          This list is accurate as of: 12/27/17  9:46 AM.  Always use your most recent med list.                   Brand Name Dispense Instructions for use Diagnosis    albuterol 108 (90 BASE) MCG/ACT Inhaler    PROAIR HFA/PROVENTIL HFA/VENTOLIN HFA    1 Inhaler    Inhale 2 puffs into the lungs every 4 hours as needed for shortness of breath / dyspnea or wheezing    Cough       amiodarone 200 MG tablet    PACERONE/CODARONE    90 tablet    Take 1 tablet (200 mg) by mouth daily    Atrial fibrillation, unspecified type (H)       Ammonium Lactate 10 % Crea     120 mL    Apply thin layer to skin on the body at least once daily.    Xerosis cutis, Nummular eczema       aspirin 81 MG tablet      Take 81 mg by mouth daily        atorvastatin 10 MG tablet    LIPITOR    90 tablet    Take 1 tablet (10 mg) by mouth daily    Major depressive disorder, recurrent episode, moderate (H), Essential hypertension, benign, Shortness of breath, Dyslipidemia       calcium 600 MG tablet     100 tablet    Take 1 tablet by mouth 2 times daily.        citalopram 20 MG tablet    celeXA    90 tablet    Take 1 tablet (20 mg) by mouth daily    Major depressive disorder, recurrent episode, moderate (H)       clindamycin 300 MG capsule    CLEOCIN    6 capsule    Take 2 capsules by mouth as needed for 1 dose. 1 hour prior to dental work.    Prophylactic antibiotic       coenzyme Q-10 75 MG Caps      Take   by mouth.        fluticasone-salmeterol 250-50 MCG/DOSE diskus inhaler    ADVAIR DISKUS    120 Inhaler    Inhale 1 puff into the lungs every 12 hours    Acute bronchitis, Cough       furosemide 40 MG tablet    LASIX    90 tablet    Take 1 tablet (40 mg) by mouth daily    Chronic diastolic heart failure (H), CAD S/P percutaneous coronary angioplasty       levothyroxine 50 MCG tablet    SYNTHROID/LEVOTHROID    90 tablet    Take 1 tablet (50 mcg) by mouth daily    Other specified hypothyroidism       meclizine 25 MG tablet    ANTIVERT    30 tablet    Take 1 tablet (25 mg) by mouth 3 times daily as needed for dizziness    BPPV (benign paroxysmal positional vertigo), unspecified laterality       melatonin 3 MG tablet     100 tablet    Take 1 tablet (3 mg) by mouth nightly as needed for sleep    Insomnia, unspecified type       metoprolol 25 MG tablet    LOPRESSOR    30 tablet    Take 1 tablet (25 mg) by mouth as needed    Chronic diastolic heart failure (H), Atrial fibrillation (H)       OCUVITE PO      Take  by mouth.        order for DME     1 each    Walker    Spinal stenosis, lumbar region, without neurogenic claudication       senna-docusate 8.6-50 MG per tablet    SENOKOT-S;PERICOLACE    60 tablet    Take 1 tablet by mouth 2 times daily.    S/P CABG x 3       spironolactone 25 MG tablet    ALDACTONE    90 tablet    Take 1 tablet (25 mg) by mouth daily    Hypokalemia       tamsulosin 0.4 MG capsule    FLOMAX    90 capsule    Take 1 capsule (0.4 mg) by mouth daily    Urinary retention       triamcinolone 0.1 % cream    KENALOG    80 g    Apply sparingly to affected area three times daily as needed    Nummular eczema       vitamin D 1000 UNITS capsule      Take 1 capsule by mouth daily.

## 2017-12-27 NOTE — NURSING NOTE
Chief Complaint   Patient presents with     Physical     Patient is here for annual physical     Jennifer Owens CMA 8:46 AM on 12/27/2017.

## 2017-12-27 NOTE — PROGRESS NOTES
HPI  87-year-old presents today for physical examination.  He reports that he has been having more difficulty sleeping recently.  He will occasionally experience a bad night where he has difficulty falling asleep and reports that last night he did not sleep until 4:30 in the morning.  He typically goes to bed at 11 wakes up in the morning feeling refreshed.  Periodically has difficulty falling asleep and this seems to be more common in the last several weeks.  He has the past used a drill to help him sleep but has found that this is less effective recently.  Otherwise he is feeling somewhat unsteady.  He stumbled and fallen a couple of times at home but not recently.  He feels a little unsteady on his feet a little lightheaded and dizzy but he denies any syncope or loss of consciousness.  He has had more shortness of breath however with exertion.  He gets short of breath walking just in the house.  He is doing limited outside physical activity or exercise.  He denies any chest pain in association with this he has not had any PND orthopnea or peripheral edema.  He is scheduled for cardiology evaluation next year.  He has had previous porcine mitral valve replacement.  Reports no palpitations or irregular heartbeat.    Past and Family hx reviewed and updated    Past Medical History:   Diagnosis Date     Atrial fibrillation (H)      Atrial flutter (H)      Basal cell carcinoma of skin of trunk      BPH (benign prostatic hypertrophy) with urinary obstruction      CAD (coronary artery disease)     s/p CABG 1/2013; RUSSEL in 2/2013     Depression      H/O CT scan of head 1/11/2013 7/20/2006    Result Impression     CT of the Head without contrast 7/20/2006  History: Ataxia, incontinence, and NPH  Comparison Study: MR obtained to 4/19/2004  Technique: Axial CT images of the head were obtained at 2 intervals from the skull base to the vertex without intravenous contrast. The images were reviewed in brain, subdural and bone  windows.  Findings: Patient has had a moderate promin     Hyperlipidemia      Lumbar spinal stenosis      Mitral regurgitation     s/p bioprosthesis 1/2013     Palpitations      Paroxysmal supraventricular tachycardia (H)      Squamous cell carcinoma      Tachycardia     baroreflex injury dut to surgery      (ventriculoperitoneal) shunt status     for Normal pressure hydrocephalus     Past Surgical History:   Procedure Laterality Date     ANESTHESIA CARDIOVERSION  4/11/2013    Procedure: ANESTHESIA CARDIOVERSION;  Cardioversion;  Surgeon: Provider, Generic Anesthesia;  Location: [facility] OR     BYPASS GRAFT ARTERY CORONARY, REPAIR VALVE MITRAL, COMBINED  1/14/2013    Procedure: COMBINED BYPASS GRAFT ARTERY CORONARY, REPAIR VALVE MITRAL;  Median sternotomy, coronary artery bypass graft X3 using left internal mammary artery & right saphenous vein , mitral valve Replacement and on pump oxygenator, flexible cystoscopy, urethral dilation and insertion of stuart catheter.;  Surgeon: Chan Peres MD;  Location: [facility] OR     CORONARY ANGIOGRAPHY ADULT ORDER       CORONARY ARTERY BYPASS  1/2013    mitral valve tissue      INSERT CATHETER BLADDER  1/14/2013    Procedure: INSERT CATHETER BLADDER;  Flexible cystoscopy, urethral dilation,& insertion of stuart catheter;  Surgeon: Pete Bedoya MD;  Location: [facility] OR     MITRAL VALVE REPLACEMENT  2013    29 mm Epic porcine valve     MOHS MICROGRAPHIC PROCEDURE       REPLACE VALVE MITRAL  1/2013    tissue valve     Family History   Problem Relation Age of Onset     CANCER Father      KIDNEY DISEASE Mother      Social History     Social History     Marital status:      Spouse name: N/A     Number of children: N/A     Years of education: N/A     Social History Main Topics     Smoking status: Former Smoker     Types: Cigars, Pipe     Quit date: 1/1/1990     Smokeless tobacco: Former User     Alcohol use No     Drug use: No     Sexual activity: Not Asked     Other  "Topics Concern     None     Social History Narrative     Complete review of symptoms negative except as noted above.    Exam:  /76 (BP Location: Right arm, Patient Position: Sitting, Cuff Size: Adult Large)  Pulse 61  Temp 97.7  F (36.5  C) (Oral)  Resp 18  Ht 1.765 m (5' 9.5\")  Wt 94.3 kg (208 lb)  SpO2 95%  BMI 30.28 kg/m2  208 lbs 0 oz  Physical Exam   The patient is alert, oriented with a clear sensorium.   Skin shows no lesions or rashes and good turgor.   Head is normocephalic and atraumatic.   Eyes show PERRLA with miosis preventing view of optic fundi.   Ears show cerumen bilaterally.   Mouth shows clear oral mucosa.   Neck shows no nodes, thyromegaly or bruits.   Back is non tender.  Lungs are clear to percussion and auscultation.   Heart shows 3/6 SE murmur radiating to carotid with delayed carotid upstoke.   Abdomen is soft and nontender without masses or organomegaly.   Genitalia show atrophic testes. No evidence of inguinal hernia.  Extremities show no edema and no evidence of active synovitis.   Neurologic examination shows cranial nerves II-XII intact. Motor shows 5/5 strength. Reflexes are symmetric. Romberg positive.    ASSESSMENT  1 Insomnia  2 possible symptomatic aortic stenosis  3 coronary artery disease asymptomatic  4 hyperlipidemia needs follow-up  5 anemia and thrombocytopenia needs follow-up  6 bilateral impacted ear cerumen  7 status post mitral valve replacement  8 his depression in remission  9 history of lumbar spinal stenosis  10 hypothyroidism on replacement with recent elevated TSH, needs dose increase to 75 mcg daily  11 atrial fibrillation controlled on amiodarone  12 Impaired glucose tolerance    Plan  Try him on melatonin for sleep will irrigate the cerumen from his ears will assess his lipids TSH and BMP today.  Await his echo and cardiology consultation regarding his murmur and dyspnea.    This note was completed using Dragon voice recognition software.  Although " reviewed after completion, some word and grammatical errors may occur.    Clovis Adames MD  General Internal Medicine  [facility]  [phone]

## 2017-12-29 DIAGNOSIS — J44.1 COPD EXACERBATION (H): Primary | ICD-10-CM

## 2017-12-29 DIAGNOSIS — E03.8 OTHER SPECIFIED HYPOTHYROIDISM: ICD-10-CM

## 2017-12-29 RX ORDER — LEVOTHYROXINE SODIUM 50 UG/1
75 TABLET ORAL DAILY
Qty: 90 TABLET | Refills: 3 | Status: SHIPPED | OUTPATIENT
Start: 2017-12-29 | End: 2018-10-23

## 2017-12-29 RX ORDER — AZITHROMYCIN 250 MG/1
TABLET, FILM COATED ORAL
Qty: 6 TABLET | Refills: 0 | Status: SHIPPED | OUTPATIENT
Start: 2017-12-29 | End: 2018-02-28

## 2017-12-29 NOTE — TELEPHONE ENCOUNTER
I received a call from pt's wife today. She stated pt has had deep cough, cold symptoms including nasal congestion, wheezing, and a fever of up to 101 F. She stated pt gets these symptoms every year, would like to know if zpack is possible as it has helped in the past.     Per Dr. Hobbs, OK to order zpack but pt should also be evaluated to rule out influenza. Order placed and pt's wife updated on recommendations. She voiced understanding.    Alexandra Connolly RN

## 2018-02-28 ENCOUNTER — OFFICE VISIT (OUTPATIENT)
Dept: INTERNAL MEDICINE | Facility: CLINIC | Age: 83
End: 2018-02-28
Payer: MEDICARE

## 2018-02-28 VITALS
DIASTOLIC BLOOD PRESSURE: 75 MMHG | BODY MASS INDEX: 29.87 KG/M2 | HEART RATE: 66 BPM | WEIGHT: 205.2 LBS | RESPIRATION RATE: 19 BRPM | SYSTOLIC BLOOD PRESSURE: 136 MMHG

## 2018-02-28 DIAGNOSIS — R26.2 INABILITY TO AMBULATE DUE TO MULTIPLE JOINTS: Primary | ICD-10-CM

## 2018-02-28 ASSESSMENT — PAIN SCALES - GENERAL: PAINLEVEL: NO PAIN (0)

## 2018-02-28 NOTE — PATIENT INSTRUCTIONS
[facility] Medication Refill Request Information:  * Please contact your pharmacy regarding ANY request for medication refills.  ** [facility] Prescription Fax = [phone]  * Please allow 3 business days for routine medication refills.  * Please allow 5 business days for controlled substance medication refills.     [facility] Test Result notification information:  *You will be notified with in 7-10 days of your appointment day regarding the results of your test.  If you are on MyChart you will be notified as soon as the provider has reviewed the results and signed off on them.    [facility] [phone]

## 2018-02-28 NOTE — MR AVS SNAPSHOT
"              After Visit Summary   2/28/2018    Gorge Corona    MRN: [id number]           Patient Information     Date Of Birth          11/27/1930        Visit Information        Provider Department      2/28/2018 12:00 PM Monisha Medina MD [facility]        Today's Diagnoses     Inability to ambulate due to multiple joints    -  1      Care Instructions    Primary Care Center Medication Refill Request Information:  * Please contact your pharmacy regarding ANY request for medication refills.  ** [facility] Prescription Fax = [phone]  * Please allow 3 business days for routine medication refills.  * Please allow 5 business days for controlled substance medication refills.     Primary Care Center Test Result notification information:  *You will be notified with in 7-10 days of your appointment day regarding the results of your test.  If you are on MyChart you will be notified as soon as the provider has reviewed the results and signed off on them.    [facility] [phone]             Follow-ups after your visit        Additional Services     PHYSICAL THERAPY REFERRAL       Special Programs: Seating & Wheeled Mobility ([facility] location only)    Please be aware that coverage of these services is subject to the terms and limitations of your health insurance plan.  Call member services at your health plan with any benefit or coverage questions.      **Note to Provider:  If you are referring outside of [place] for the therapy appointment, please list the name of the location in the \"special instructions\" above, print the referral and give to the patient to schedule the appointment.                  Follow-up notes from your care team     Return in about 6 months (around 8/28/2018) for Routine Visit.      Your next 10 appointments already scheduled     Feb 28, 2018 12:00 PM CST   (Arrive by 11:45 AM)   Return Visit with Monisha Olea MD   [facility] " ([facility])    [address]  4th Floor  [place] [id number] [phone]            2018  5:00 PM CDT   Ech Complete with [id number]   [facility] ([facility])    [address]  3rd Floor  [place] [id number] [phone]           1. Please bring or wear a comfortable two-piece outfit. 2. You may eat, drink and take your normal medicines. 3. For any questions that cannot be answered, please contact the ordering physician            2018  6:30 PM CDT   (Arrive by 6:15 PM)   New Patient Visit with Morris Jj MD   [facility] ([facility])    [address]  [address]  [place] [id number] [phone]              Who to contact     Please call your clinic at [phone] to:    Ask questions about your health    Make or cancel appointments    Discuss your medicines    Learn about your test results    Speak to your doctor            Additional Information About Your Visit        Gather App Information     [employer] App is an electronic gateway that provides easy, online access to your medical records. With [employer] App, you can request a clinic appointment, read your test results, renew a prescription or communicate with your care team.     To sign up for [employer] App visit the website at [email or web address]   You will be asked to enter the access code listed below, as well as some personal information. Please follow the directions to create your username and password.     Your access code is: [email or web address]  Expires: 3/27/2018  9:46 AM     Your access code will  in 90 days. If you need help or a new code, please contact your [facility] or call [phone] for assistance.        Care EveryWhere ID     This is your Care EveryWhere ID. This could be used by other organizations to access your [place] medical records  [id number]        Your  Vitals Were     Pulse Respirations BMI (Body Mass Index)             66 19 29.87 kg/m2          Blood Pressure from Last 3 Encounters:   02/28/18 136/75   12/27/17 143/76   12/08/17 118/76    Weight from Last 3 Encounters:   02/28/18 93.1 kg (205 lb 3.2 oz)   12/27/17 94.3 kg (208 lb)   12/08/17 95.3 kg (210 lb 1.6 oz)              We Performed the Following     PHYSICAL THERAPY REFERRAL          Today's Medication Changes          These changes are accurate as of 2/28/18 11:55 AM.  If you have any questions, ask your nurse or doctor.               Stop taking these medicines if you haven't already. Please contact your care team if you have questions.     azithromycin 250 MG tablet   Commonly known as:  ZITHROMAX   Stopped by:  Monisha Medina MD                    Primary Care Provider Office Phone # Fax #    Monisha Olea [phone] [address] [address]        Equal Access to Services     [facility] AH: Hadii kg ku hadasho Soomaali, waaxda luqadaha, qaybta kaalmada adeegyada, waxelda huang haydavid griffin . So [facility] [phone].    ATENCIÓN: Si habla español, tiene a ferrari disposición servicios gratuitos de asistencia lingüística. Llame al [phone].    We comply with applicable federal civil rights laws and [place] laws. We do not discriminate on the basis of race, color, national origin, age, disability, sex, sexual orientation, or gender identity.            Thank you!     Thank you for choosing [facility]  for your care. Our goal is always to provide you with excellent care. Hearing back from our patients is one way we can continue to improve our services. Please take a few minutes to complete the written survey that you may receive in the mail after your visit with us. Thank you!             Your Updated Medication List - Protect others around you: Learn how to safely use, store and throw away your  medicines at www.disposemymeds.org.          This list is accurate as of 2/28/18 11:55 AM.  Always use your most recent med list.                   Brand Name Dispense Instructions for use Diagnosis    albuterol 108 (90 BASE) MCG/ACT Inhaler    PROAIR HFA/PROVENTIL HFA/VENTOLIN HFA    1 Inhaler    Inhale 2 puffs into the lungs every 4 hours as needed for shortness of breath / dyspnea or wheezing    Cough       amiodarone 200 MG tablet    PACERONE/CODARONE    90 tablet    Take 1 tablet (200 mg) by mouth daily    Atrial fibrillation, unspecified type (H)       Ammonium Lactate 10 % Crea     120 mL    Apply thin layer to skin on the body at least once daily.    Xerosis cutis, Nummular eczema       aspirin 81 MG tablet      Take 81 mg by mouth daily        atorvastatin 10 MG tablet    LIPITOR    90 tablet    Take 1 tablet (10 mg) by mouth daily    Major depressive disorder, recurrent episode, moderate (H), Essential hypertension, benign, Shortness of breath, Dyslipidemia       calcium 600 MG tablet     100 tablet    Take 1 tablet by mouth 2 times daily.        citalopram 20 MG tablet    celeXA    90 tablet    Take 1 tablet (20 mg) by mouth daily    Major depressive disorder, recurrent episode, moderate (H)       clindamycin 300 MG capsule    CLEOCIN    6 capsule    Take 2 capsules by mouth as needed for 1 dose. 1 hour prior to dental work.    Prophylactic antibiotic       coenzyme Q-10 75 MG Caps      Take  by mouth.        fluticasone-salmeterol 250-50 MCG/DOSE diskus inhaler    ADVAIR DISKUS    120 Inhaler    Inhale 1 puff into the lungs every 12 hours    Acute bronchitis, Cough       furosemide 40 MG tablet    LASIX    90 tablet    Take 1 tablet (40 mg) by mouth daily    Chronic diastolic heart failure (H), CAD S/P percutaneous coronary angioplasty       levothyroxine 50 MCG tablet    SYNTHROID/LEVOTHROID    90 tablet    Take 1.5 tablets (75 mcg) by mouth daily    Other specified hypothyroidism       meclizine 25 MG  tablet    ANTIVERT    30 tablet    Take 1 tablet (25 mg) by mouth 3 times daily as needed for dizziness    BPPV (benign paroxysmal positional vertigo), unspecified laterality       melatonin 3 MG tablet     100 tablet    Take 1 tablet (3 mg) by mouth nightly as needed for sleep    Insomnia, unspecified type       metoprolol tartrate 25 MG tablet    LOPRESSOR    30 tablet    Take 1 tablet (25 mg) by mouth as needed    Chronic diastolic heart failure (H), Atrial fibrillation (H)       OCUVITE PO      Take  by mouth.        order for DME     1 each    Walker    Spinal stenosis, lumbar region, without neurogenic claudication       senna-docusate 8.6-50 MG per tablet    SENOKOT-S;PERICOLACE    60 tablet    Take 1 tablet by mouth 2 times daily.    S/P CABG x 3       spironolactone 25 MG tablet    ALDACTONE    90 tablet    Take 1 tablet (25 mg) by mouth daily    Hypokalemia       tamsulosin 0.4 MG capsule    FLOMAX    90 capsule    Take 1 capsule (0.4 mg) by mouth daily    Urinary retention       triamcinolone 0.1 % cream    KENALOG    80 g    Apply sparingly to affected area three times daily as needed    Nummular eczema       vitamin D 1000 UNITS capsule      Take 1 capsule by mouth daily.

## 2018-02-28 NOTE — NURSING NOTE
Chief Complaint   Patient presents with     Dme     Patient is here to request motorized scooter     Sergei Ferrera CMA ([facility]) at 11:40 AM on 2/28/2018

## 2018-03-02 NOTE — PROGRESS NOTES
Gorge was seen today to discuss referrals as well as f/u HTN, afib, aortic stenosis, COPD.  Total time spent 25 minutes.  More than 50% of the time spent with Mr. Corona on counseling / coordinating his care.    He would like to be considered for a motorized scooter due to inability to ambulate secondary to joint pain and stiffness.  He denies chest pain or shortness of breath as his limitations.  He has been getting around with a walker, but it is not optimal.  He denies any recent falls.    Meanwhile I referred him to Dr. Jj to consider TAVR but he could not get an appointment until June.  He again denies chest pain, palpitations, dyspnea, syncope/near syncope or any cardiac symptoms.    On exam  B/P: 136/75, T: Data Unavailable, P: 66, R: 19  Cor RRR with III/IV systolic murmur, crescendo/descresendo pattern, mid to late peaking  Lungs CTA  Ext 1+ edema        A/P    Inability to ambulate due to multiple joints  -     PHYSICAL THERAPY REFERRAL  -     OCCUPATIONAL THERAPY REFERRAL    Symptomatic AS   --He is really doing fairly well, I offered to expedite the Cardiology referral but he prefers to wait until June.  He seems optimized medically at this point.      HTN, afib    --continue current medications.  Well controlled.    RTC 6 months or sooner prn    --Monisha Hobbs MD

## 2018-03-15 ENCOUNTER — TELEPHONE (OUTPATIENT)
Dept: INTERNAL MEDICINE | Facility: CLINIC | Age: 83
End: 2018-03-15

## 2018-03-15 DIAGNOSIS — M62.81 GENERALIZED MUSCLE WEAKNESS: Primary | ICD-10-CM

## 2018-03-15 DIAGNOSIS — Z91.81 PERSONAL HISTORY OF FALL: ICD-10-CM

## 2018-03-15 DIAGNOSIS — I50.9 CHF (CONGESTIVE HEART FAILURE) (H): ICD-10-CM

## 2018-03-15 NOTE — TELEPHONE ENCOUNTER
Pt's wife called into clinic requesting an order for a safe-step tub. Ordered per verbal with readback from discussion with PCP.     Alexandra Connolly RN

## 2018-03-19 ENCOUNTER — TELEPHONE (OUTPATIENT)
Dept: INTERNAL MEDICINE | Facility: CLINIC | Age: 83
End: 2018-03-19

## 2018-03-19 DIAGNOSIS — R26.2 INABILITY TO AMBULATE DUE TO MULTIPLE JOINTS: Primary | ICD-10-CM

## 2018-03-19 NOTE — TELEPHONE ENCOUNTER
----- Message from Ranulfo Jensen sent at 3/19/2018 10:56 AM CDT -----  Regarding: Referral   Contact: [phone]  Keny with [facility] outpatient scheduling called.   [phone]    She states she have sent a few messages in regards to correction for patient's referral but has not heard back.     They are not able to schedule the patient until it is corrected.     This is regarding a PT order (needed to be OT instead) that was ordered by Dr. Hobbs. She states Dr. Hobbs did changed it to OT but forgot to put referring for seating and wheel mobility. Need a new referral for this.         Referral placed per verbal with readback.    Alexandra Connolly, RN

## 2018-05-08 ENCOUNTER — RECORDS - HEALTHEAST (OUTPATIENT)
Dept: LAB | Facility: CLINIC | Age: 83
End: 2018-05-08

## 2018-05-09 ENCOUNTER — RECORDS - HEALTHEAST (OUTPATIENT)
Dept: LAB | Facility: CLINIC | Age: 83
End: 2018-05-09

## 2018-05-09 LAB
ALBUMIN UR-MCNC: ABNORMAL MG/DL
ANION GAP SERPL CALCULATED.3IONS-SCNC: 5 MMOL/L (ref 5–18)
APPEARANCE UR: ABNORMAL
BACTERIA #/AREA URNS HPF: ABNORMAL HPF
BILIRUB UR QL STRIP: NEGATIVE
BUN SERPL-MCNC: 9 MG/DL (ref 8–28)
CALCIUM SERPL-MCNC: 8.1 MG/DL (ref 8.5–10.5)
CAOX CRY #/AREA URNS HPF: PRESENT /[HPF]
CHLORIDE BLD-SCNC: 104 MMOL/L (ref 98–107)
CO2 SERPL-SCNC: 29 MMOL/L (ref 22–31)
COLOR UR AUTO: YELLOW
CREAT SERPL-MCNC: 0.96 MG/DL (ref 0.7–1.3)
ERYTHROCYTE [DISTWIDTH] IN BLOOD BY AUTOMATED COUNT: 15.9 % (ref 11–14.5)
GFR SERPL CREATININE-BSD FRML MDRD: >60 ML/MIN/1.73M2
GLUCOSE BLD-MCNC: 80 MG/DL (ref 70–125)
GLUCOSE UR STRIP-MCNC: NEGATIVE MG/DL
HCT VFR BLD AUTO: 24.7 % (ref 40–54)
HGB BLD-MCNC: 7.7 G/DL (ref 14–18)
HGB UR QL STRIP: NEGATIVE
KETONES UR STRIP-MCNC: NEGATIVE MG/DL
LEUKOCYTE ESTERASE UR QL STRIP: ABNORMAL
MCH RBC QN AUTO: 30.4 PG (ref 27–34)
MCHC RBC AUTO-ENTMCNC: 31.2 G/DL (ref 32–36)
MCV RBC AUTO: 98 FL (ref 80–100)
NITRATE UR QL: NEGATIVE
PH UR STRIP: 5.5 [PH] (ref 4.5–8)
PLATELET # BLD AUTO: 200 THOU/UL (ref 140–440)
PMV BLD AUTO: 12.2 FL (ref 8.5–12.5)
POTASSIUM BLD-SCNC: 3.7 MMOL/L (ref 3.5–5)
RBC # BLD AUTO: 2.53 MILL/UL (ref 4.4–6.2)
RBC #/AREA URNS AUTO: ABNORMAL HPF
SODIUM SERPL-SCNC: 138 MMOL/L (ref 136–145)
SP GR UR STRIP: 1.02 (ref 1–1.03)
SQUAMOUS #/AREA URNS AUTO: ABNORMAL LPF
UROBILINOGEN UR STRIP-ACNC: ABNORMAL
WBC #/AREA URNS AUTO: >100 HPF
WBC CLUMPS #/AREA URNS HPF: PRESENT /[HPF]
WBC: 5 THOU/UL (ref 4–11)

## 2018-05-12 LAB
BACTERIA SPEC CULT: ABNORMAL
BACTERIA SPEC CULT: ABNORMAL

## 2018-05-16 ENCOUNTER — RECORDS - HEALTHEAST (OUTPATIENT)
Dept: LAB | Facility: CLINIC | Age: 83
End: 2018-05-16

## 2018-05-16 LAB
25(OH)D3 SERPL-MCNC: 35.3 NG/ML (ref 30–80)
CALCIUM SERPL-MCNC: 8 MG/DL (ref 8.5–10.5)
HGB BLD-MCNC: 7.5 G/DL (ref 14–18)
HGB BLD-MCNC: 7.5 G/DL (ref 14–18)
PTH-INTACT SERPL-MCNC: 31 PG/ML (ref 10–86)

## 2018-05-18 ENCOUNTER — RECORDS - HEALTHEAST (OUTPATIENT)
Dept: LAB | Facility: CLINIC | Age: 83
End: 2018-05-18

## 2018-05-18 LAB
ALBUMIN UR-MCNC: ABNORMAL MG/DL
APPEARANCE UR: ABNORMAL
BACTERIA #/AREA URNS HPF: ABNORMAL HPF
BILIRUB UR QL STRIP: NEGATIVE
COLOR UR AUTO: YELLOW
GLUCOSE UR STRIP-MCNC: NEGATIVE MG/DL
HGB UR QL STRIP: NEGATIVE
KETONES UR STRIP-MCNC: NEGATIVE MG/DL
LEUKOCYTE ESTERASE UR QL STRIP: ABNORMAL
MUCOUS THREADS #/AREA URNS LPF: ABNORMAL LPF
NITRATE UR QL: NEGATIVE
PH UR STRIP: 6.5 [PH] (ref 4.5–8)
RBC #/AREA URNS AUTO: ABNORMAL HPF
SP GR UR STRIP: 1.01 (ref 1–1.03)
SQUAMOUS #/AREA URNS AUTO: ABNORMAL LPF
UROBILINOGEN UR STRIP-ACNC: ABNORMAL
WBC #/AREA URNS AUTO: >100 HPF
WBC CLUMPS #/AREA URNS HPF: PRESENT /[HPF]

## 2018-05-21 LAB — BACTERIA SPEC CULT: ABNORMAL

## 2018-05-22 ENCOUNTER — RECORDS - HEALTHEAST (OUTPATIENT)
Dept: LAB | Facility: CLINIC | Age: 83
End: 2018-05-22

## 2018-05-23 LAB
ANION GAP SERPL CALCULATED.3IONS-SCNC: 6 MMOL/L (ref 5–18)
BUN SERPL-MCNC: 10 MG/DL (ref 8–28)
CALCIUM SERPL-MCNC: 8.2 MG/DL (ref 8.5–10.5)
CHLORIDE BLD-SCNC: 107 MMOL/L (ref 98–107)
CO2 SERPL-SCNC: 27 MMOL/L (ref 22–31)
CREAT SERPL-MCNC: 0.81 MG/DL (ref 0.7–1.3)
GFR SERPL CREATININE-BSD FRML MDRD: >60 ML/MIN/1.73M2
GLUCOSE BLD-MCNC: 71 MG/DL (ref 70–125)
POTASSIUM BLD-SCNC: 4.2 MMOL/L (ref 3.5–5)
SODIUM SERPL-SCNC: 140 MMOL/L (ref 136–145)

## 2018-06-01 ENCOUNTER — RECORDS - HEALTHEAST (OUTPATIENT)
Dept: LAB | Facility: CLINIC | Age: 83
End: 2018-06-01

## 2018-06-01 ENCOUNTER — MEDICAL CORRESPONDENCE (OUTPATIENT)
Dept: HEALTH INFORMATION MANAGEMENT | Facility: CLINIC | Age: 83
End: 2018-06-01

## 2018-06-04 LAB
ANION GAP SERPL CALCULATED.3IONS-SCNC: 7 MMOL/L (ref 5–18)
BUN SERPL-MCNC: 14 MG/DL (ref 8–28)
CALCIUM SERPL-MCNC: 8.5 MG/DL (ref 8.5–10.5)
CHLORIDE BLD-SCNC: 107 MMOL/L (ref 98–107)
CO2 SERPL-SCNC: 27 MMOL/L (ref 22–31)
CREAT SERPL-MCNC: 0.78 MG/DL (ref 0.7–1.3)
GFR SERPL CREATININE-BSD FRML MDRD: >60 ML/MIN/1.73M2
GLUCOSE BLD-MCNC: 80 MG/DL (ref 70–125)
HGB BLD-MCNC: 8.5 G/DL (ref 14–18)
POTASSIUM BLD-SCNC: 4.2 MMOL/L (ref 3.5–5)
SODIUM SERPL-SCNC: 141 MMOL/L (ref 136–145)

## 2018-06-07 ENCOUNTER — RADIANT APPOINTMENT (OUTPATIENT)
Dept: CARDIOLOGY | Facility: CLINIC | Age: 83
End: 2018-06-07
Payer: MEDICARE

## 2018-06-07 ENCOUNTER — OFFICE VISIT (OUTPATIENT)
Dept: CARDIOLOGY | Facility: CLINIC | Age: 83
End: 2018-06-07
Attending: INTERNAL MEDICINE
Payer: MEDICARE

## 2018-06-07 VITALS
SYSTOLIC BLOOD PRESSURE: 136 MMHG | OXYGEN SATURATION: 96 % | WEIGHT: 197 LBS | DIASTOLIC BLOOD PRESSURE: 74 MMHG | BODY MASS INDEX: 28.2 KG/M2 | HEART RATE: 68 BPM | HEIGHT: 70 IN

## 2018-06-07 DIAGNOSIS — Z98.61 CAD S/P PERCUTANEOUS CORONARY ANGIOPLASTY: ICD-10-CM

## 2018-06-07 DIAGNOSIS — I35.0 AORTIC VALVE STENOSIS, ETIOLOGY OF CARDIAC VALVE DISEASE UNSPECIFIED: ICD-10-CM

## 2018-06-07 DIAGNOSIS — I50.32 CHRONIC DIASTOLIC HEART FAILURE (H): ICD-10-CM

## 2018-06-07 DIAGNOSIS — I25.10 CAD S/P PERCUTANEOUS CORONARY ANGIOPLASTY: ICD-10-CM

## 2018-06-07 DIAGNOSIS — I34.0 MITRAL VALVE INSUFFICIENCY, UNSPECIFIED ETIOLOGY: ICD-10-CM

## 2018-06-07 PROCEDURE — 99214 OFFICE O/P EST MOD 30 MIN: CPT | Mod: GC | Performed by: INTERNAL MEDICINE

## 2018-06-07 PROCEDURE — G0463 HOSPITAL OUTPT CLINIC VISIT: HCPCS | Mod: ZF

## 2018-06-07 RX ORDER — FLUNISOLIDE 0.25 MG/ML
1 SOLUTION NASAL EVERY 12 HOURS
COMMUNITY
End: 2018-06-27

## 2018-06-07 RX ORDER — FERROUS SULFATE 325(65) MG
325 TABLET ORAL
COMMUNITY
End: 2018-06-27

## 2018-06-07 RX ORDER — METOPROLOL TARTRATE 25 MG/1
12.5 TABLET, FILM COATED ORAL 2 TIMES DAILY
Qty: 90 TABLET | Refills: 3 | Status: SHIPPED | OUTPATIENT
Start: 2018-06-07 | End: 2018-06-27

## 2018-06-07 ASSESSMENT — PAIN SCALES - GENERAL: PAINLEVEL: NO PAIN (0)

## 2018-06-07 NOTE — PATIENT INSTRUCTIONS
You were seen today in the Cardiovascular Clinic at the [facility].      Cardiology Providers you saw during your visit:  Dr. Jj    Recommendations:  Please start Metoprolol 12.5 mg twice daily by mouth.  We will be working on obtaining the CD of your RAY images from [facility]    Follow-up:   Further follow up and plans will be based on the results of the images on the CD after the MD's review them.      Thank you for your visit today!     Jennifer Hugo RN  Structural Heart Care Coordinator   TAVR, MitraClip and Watchman Programs  [facility]  Office: [phone]    Lennie Sarabia RN  Structural Heart Care Coordinator   TAVR, MitraClip and Watchman Programs  [facility]  Office: [phone]    Clinics and Surgery Center  [address] [place] [address]  Cardiology Clinic [id number]  [place] [address]

## 2018-06-07 NOTE — LETTER
6/7/2018    RE: Gorge Corona  [address] [facility] [address]     Dear Colleague,    Thank you for the opportunity to participate in the care of your patient, Gorge Corona, at the [facility] at [facility]. Please see a copy of my visit note below.            CC  Follow up after recent hospital visit    HPI  Mr. Gorge Corona is a 87 yr old male with H/o SVT, post ablation and on amiodarone, H/o CAD s/p CABG x 4 ( LIMA to LAD, SVG to PDA, SVG to D1 and SVG to OM, S/p PCI with LM into OM for occluded SVG, MVR with procine 29 mm valve, aortic stenosis presenting for evaluation.   He was seen by the PCP and was referred for evaluation of his aortic stenosis in 02/2018. He had cardiac catheterization and RHC to evaluate his aortic stenosis in 2015. That time, it was deemed to be moderate aortic stenosis. However, could not follow up then. He had recent hospitalisation after fall and has femur fracture fixation. He is in a care home and is being discharged in couple of days.    Patient was seeing Dr. Billy until 2016. He also saw Dr. Ocasio for his SVT. He was on metoprolol and later amiodarone for it. When he was recently for his hip fracture hospitalization, he also had ablation for possible aflutter/AVT. Details of which are not currently know. He is not on any anticoagulation. He is taking metoprolol as needed and amiodarone every other day. However, wife is not sure about the medications as they are given by the staff in the care home.    He denies any chest pain, shortness of breath, orthopnea or PND. However, he does get lightheaded on and off. He is also feeling tired and fatigue. He has been diagnosed with anemia post hip surgery and is getting supplements for it.         PAST MEDICAL HISTORY:     Past Medical History:   Diagnosis Date     Atrial fibrillation (H)      Atrial flutter (H)      Basal cell carcinoma of skin of trunk      BPH  (benign prostatic hypertrophy) with urinary obstruction      CAD (coronary artery disease)     s/p CABG 1/2013; RUSSEL in 2/2013     Depression      H/O CT scan of head 1/11/2013 7/20/2006    Result Impression     CT of the Head without contrast 7/20/2006  History: Ataxia, incontinence, and NPH  Comparison Study: MR obtained to 4/19/2004  Technique: Axial CT images of the head were obtained at 2 intervals from the skull base to the vertex without intravenous contrast. The images were reviewed in brain, subdural and bone windows.  Findings: Patient has had a moderate promin     Hyperlipidemia      Lumbar spinal stenosis      Mitral regurgitation     s/p bioprosthesis 1/2013     Palpitations      Paroxysmal supraventricular tachycardia (H)      Squamous cell carcinoma      Tachycardia     baroreflex injury dut to surgery      (ventriculoperitoneal) shunt status     for Normal pressure hydrocephalus        PAST SURGICAL HISTORY:     Past Surgical History:   Procedure Laterality Date     ANESTHESIA CARDIOVERSION  4/11/2013    Procedure: ANESTHESIA CARDIOVERSION;  Cardioversion;  Surgeon: Provider, Generic Anesthesia;  Location: [facility] OR     BYPASS GRAFT ARTERY CORONARY, REPAIR VALVE MITRAL, COMBINED  1/14/2013    Procedure: COMBINED BYPASS GRAFT ARTERY CORONARY, REPAIR VALVE MITRAL;  Median sternotomy, coronary artery bypass graft X3 using left internal mammary artery & right saphenous vein , mitral valve Replacement and on pump oxygenator, flexible cystoscopy, urethral dilation and insertion of stuart catheter.;  Surgeon: Chan Peres MD;  Location: [facility] OR     CORONARY ANGIOGRAPHY ADULT ORDER       CORONARY ARTERY BYPASS  1/2013    mitral valve tissue      INSERT CATHETER BLADDER  1/14/2013    Procedure: INSERT CATHETER BLADDER;  Flexible cystoscopy, urethral dilation,& insertion of stuart catheter;  Surgeon: Pete Bedoya MD;  Location: [facility] OR     MITRAL VALVE REPLACEMENT  2013    29 mm Epic porcine  valve     MOHS MICROGRAPHIC PROCEDURE       REPLACE VALVE MITRAL  1/2013    tissue valve        CURRENT MEDICATIONS:     Current Outpatient Prescriptions   Medication Sig Dispense Refill     albuterol (PROAIR HFA, PROVENTIL HFA, VENTOLIN HFA) 108 (90 BASE) MCG/ACT inhaler Inhale 2 puffs into the lungs every 4 hours as needed for shortness of breath / dyspnea or wheezing 1 Inhaler 1     amiodarone (PACERONE/CODARONE) 200 MG tablet Take 1 tablet (200 mg) by mouth daily (Patient not taking: Reported on 6/7/2018) 90 tablet 3     Ammonium Lactate 10 % CREA Apply thin layer to skin on the body at least once daily. (Patient not taking: Reported on 6/7/2018) 120 mL 11     aspirin 81 MG tablet Take 81 mg by mouth daily       atorvastatin (LIPITOR) 10 MG tablet Take 1 tablet (10 mg) by mouth daily 90 tablet 1     Calcium 600 MG tablet Take 1 tablet by mouth 2 times daily. 100 tablet 3     Cholecalciferol (VITAMIN D) 1000 UNITS capsule Take 1 capsule by mouth daily.       citalopram (CELEXA) 20 MG tablet Take 1 tablet (20 mg) by mouth daily 90 tablet 3     clindamycin (CLEOCIN) 300 MG capsule Take 2 capsules by mouth as needed for 1 dose. 1 hour prior to dental work. (Patient not taking: Reported on 6/7/2018) 6 capsule 0     coenzyme Q-10 75 MG CAPS Take  by mouth.       ferrous sulfate (IRON) 325 (65 Fe) MG tablet Take 325 mg by mouth daily (with breakfast)       flunisolide (NASALIDE) 25 MCG/ACT (0.025%) SOLN spray Spray 1 spray into both nostrils every 12 hours       fluticasone-salmeterol (ADVAIR DISKUS) 250-50 MCG/DOSE diskus inhaler Inhale 1 puff into the lungs every 12 hours 120 Inhaler 1     furosemide (LASIX) 40 MG tablet Take 1 tablet (40 mg) by mouth daily 90 tablet 0     LANSOPRAZOLE PO Take 30 mg by mouth       levothyroxine (SYNTHROID/LEVOTHROID) 50 MCG tablet Take 1.5 tablets (75 mcg) by mouth daily 90 tablet 3     MAGNESIUM OXIDE PO Take 400 mg by mouth       meclizine (ANTIVERT) 25 MG tablet Take 1 tablet (25  mg) by mouth 3 times daily as needed for dizziness (Patient not taking: Reported on 6/7/2018) 30 tablet 3     melatonin 3 MG tablet Take 1 tablet (3 mg) by mouth nightly as needed for sleep 100 tablet 3     metoprolol tartrate (LOPRESSOR) 25 MG tablet Take 0.5 tablets (12.5 mg) by mouth 2 times daily 90 tablet 3     Multiple Vitamins-Minerals (OCUVITE PO) Take  by mouth.  0     order for DME Equipment being ordered: Safe step tub / walk-in tub (Patient not taking: Reported on 6/7/2018) 1 Device 0     order for DME Walker (Patient not taking: Reported on 6/7/2018) 1 each 0     senna-docusate (SENOKOT-S;PERICOLACE) 8.6-50 MG per tablet Take 1 tablet by mouth 2 times daily. 60 tablet      spironolactone (ALDACTONE) 25 MG tablet Take 1 tablet (25 mg) by mouth daily (Patient not taking: Reported on 6/7/2018) 90 tablet 3     tamsulosin (FLOMAX) 0.4 MG capsule Take 1 capsule (0.4 mg) by mouth daily 90 capsule 3     triamcinolone (KENALOG) 0.1 % cream Apply sparingly to affected area three times daily as needed (Patient not taking: Reported on 6/7/2018) 80 g 1     [DISCONTINUED] metoprolol (LOPRESSOR) 25 MG tablet Take 1 tablet (25 mg) by mouth as needed 30 tablet 1        ALLERGIES:     Allergies   Allergen Reactions     Penicillins Rash        FAMILY HISTORY:     Family History   Problem Relation Age of Onset     CANCER Father      KIDNEY DISEASE Mother         SOCIAL HISTORY:     Social History     Social History     Marital status:      Spouse name: N/A     Number of children: N/A     Years of education: N/A     Social History Main Topics     Smoking status: Former Smoker     Types: Cigars, Pipe     Quit date: 1/1/1990     Smokeless tobacco: Former User     Alcohol use No     Drug use: No     Sexual activity: Not Asked     Other Topics Concern     None     Social History Narrative        REVIEW OF SYSTEMS:     Constitutional: No fevers or chills  Skin: No new rash or itching  Eyes: No acute change in  "vision  Ears/Nose/Throat: No purulent rhinorrhea, new hearing loss, or new vertigo  Respiratory: No cough or hemoptysis  Cardiovascular: See HPI  Gastrointestinal: No change in appetite, vomiting, hematemesis or diarrhea  Genitourinary: No dysuria or hematuria  Musculoskeletal: as above  Neurologic: No new headaches, focal weakness or behavior changes  Psychiatric: No hallucinations, excessive alcohol consumption or illegal drug usage  Hematologic/Lymphatic/Immunologic: No bleeding, chills, fever, night sweats or weight loss  Endocrine: No new cold intolerance, heat intolerance, polyphagia, polydipsia or polyuria      PHYSICAL EXAMINATION:     /74 (BP Location: Left arm, Patient Position: Chair, Cuff Size: Adult Regular)  Pulse 68  Ht 1.778 m (5' 10\")  Wt 89.4 kg (197 lb)  SpO2 96%  BMI 28.27 kg/m2    GENERAL: No acute distress. He is in an wheel chair  HEENT: EOMI. Sclerae white, not injected. Nares clear. Pharynx without erythema or exudate.   Neck: No adenopathy. No thyromegaly. Symmetrical.   Heart: Regular rate and rhythm. Harsh crescendo decrescendo late peaking systolic murmur with radiation to carotids. Delayed carotid pulses. Old sternotomy scar noted   Lungs: Clear to auscultation. No ronchi, wheezes, rales.   Abdomen: Soft, nontender, nondistended. Bowel sounds present.  Extremities: No clubbing, cyanosis, or edema.   Neurologic: Alert and oriented to person/place/time, normal speech and affect. No focal deficits.  Skin: No petechiae, purpura or rash.     LABORATORY DATA:     LIPID RESULTS:  Lab Results   Component Value Date    CHOL 147 12/27/2017    HDL 61 12/27/2017    LDL 74 12/27/2017    TRIG 62 12/27/2017    CHOLHDLRATIO 3.1 12/17/2013       LIVER ENZYME RESULTS:  Lab Results   Component Value Date    AST 16 10/09/2017    ALT 18 10/09/2017       CBC RESULTS:  Lab Results   Component Value Date    WBC 6.7 12/27/2017    RBC 4.00 (L) 12/27/2017    HGB 12.3 (L) 12/27/2017    HCT 38.1 (L) " 12/27/2017    MCV 95 12/27/2017    MCH 30.8 12/27/2017    MCHC 32.3 12/27/2017    RDW 13.6 12/27/2017     (L) 12/27/2017       BMP RESULTS:  Lab Results   Component Value Date     12/27/2017    POTASSIUM 4.2 12/27/2017    CHLORIDE 108 12/27/2017    CO2 25 12/27/2017    ANIONGAP 6 12/27/2017     (H) 12/27/2017    BUN 18 12/27/2017    CR 1.17 12/27/2017    GFRESTIMATED 59 (L) 12/27/2017    GFRESTBLACK 71 12/27/2017    HE 8.1 (L) 12/27/2017        A1C RESULTS:  Lab Results   Component Value Date    A1C 5.3 01/14/2013       INR RESULTS:  Lab Results   Component Value Date    INR 1.91 (H) 04/18/2013    INR 2.26 (H) 04/14/2013          CLINICAL IMPRESSION:     87 yr old male with H/o SVT vs Aflutter, post ablation and on amiodorone, H/o CAD s/p CABG x 4 ( LIMA to LAD, SVG to PDA, SVG to D1 and SVG to OM ), S/p PCI with LM into OM for occluded SVG, MVR with procine Epic 29 mm valve, aortic stenosis presenting for evaluation.     Reviewed his echocardiogram and BETI is in the moderate range and there is increase in gradient across the mitral valve. Compared to prior echo dated 4/2017, mean gradient has increased from 6 to 10 mm Hg. However, this could be from anemia.    -to restart metoprolol at 12.5 mg bid  -to get RAY to evaluate the mitral valve and the aortic valve  -if imaging is not conclusive with his symptoms, might need RHC/LHC  -continue his amiodarone at the current dose  -I presume he is not on anticoagulation due to anemia.   -collect records about his recent hospitalisation    Patient was seen and examined with Dr. Анна Sosa MD  Interventional Cardiology Fellow    CC  Patient Care Team:  Monisha Medina MD as PCP - General (Internal Medicine)  Jose Manuel Weaver MD as MD (Dermatology)  Yara Galaviz, RN as Nurse Coordinator  MONISHA MEDINA          Patient seen and examined with Cardiovascular fellow and agree with the assessment and plan  described above.     Morris Jj M.D.  Interventional Cardiology  [facility]

## 2018-06-07 NOTE — NURSING NOTE
Chief Complaint   Patient presents with     New Patient     Aortic Valve Stenosis per [facility]      Vitals were taken and medications were reconciled.    Marlene Burris RMA  5:50 PM

## 2018-06-07 NOTE — MR AVS SNAPSHOT
After Visit Summary   6/7/2018    Gorge Corona    MRN: [id number]           Patient Information     Date Of Birth          11/27/1930        Visit Information        Provider Department      6/7/2018 6:30 PM Morris Jj MD [facility]        Today's Diagnoses     Chronic diastolic heart failure (H)        Cardiomyopathy (H)           Care Instructions    You were seen today in the Cardiovascular Clinic at the [facility].      Cardiology Providers you saw during your visit:  Dr. Jj    Recommendations:  Please start Metoprolol 12.5 mg twice daily by mouth.  We will be working on obtaining the CD of your RAY images from [facility]    Follow-up:   Further follow up and plans will be based on the results of the images on the CD after the MD's review them.      Thank you for your visit today!     Jennifer Hugo RN  Structural Heart Care Coordinator   TAVR, MitraClip and Watchman Programs  [facility]  Office: [phone]    Lennie Sarabia RN  Structural Heart Care Coordinator   TAVR, MitraClip and Watchman Programs  [facility]  Office: [phone]    Clinics and Surgery Center  [address] [place] [address]  Cardiology Clinic [address] [address]                  Follow-ups after your visit        Future tests that were ordered for you today     Open Future Orders        Priority Expected Expires Ordered    Transesophageal Echocardiogram Routine 6/11/2018 6/7/2019 6/7/2018            Who to contact     If you have questions or need follow up information about today's clinic visit or your schedule please contact [facility] directly at [phone].  Normal or non-critical lab and imaging results will be communicated to you by MyChart, letter or phone within 4 business days after the clinic has received the results. If you do not hear from us within 7 days, please contact the clinic through MyChart or phone. If  "you have a critical or abnormal lab result, we will notify you by phone as soon as possible.  Submit refill requests through [employer] or call your pharmacy and they will forward the refill request to us. Please allow 3 business days for your refill to be completed.          Additional Information About Your Visit        [employer]art Information     [employer] lets you send messages to your doctor, view your test results, renew your prescriptions, schedule appointments and more. To sign up, go to [email or web address] . Click on \"Log in\" on the left side of the screen, which will take you to the Welcome page. Then click on \"Sign up Now\" on the right side of the page.     You will be asked to enter the access code listed below, as well as some personal information. Please follow the directions to create your username and password.     Your access code is: [email or web address]  Expires: 2018  7:06 PM     Your access code will  in 90 days. If you need help or a new code, please call your [place] clinic or [phone].        Care EveryWhere ID     This is your Care EveryWhere ID. This could be used by other organizations to access your [place] medical records  [id number]        Your Vitals Were     Pulse Height Pulse Oximetry BMI (Body Mass Index)          68 1.778 m (5' 10\") 96% 28.27 kg/m2         Blood Pressure from Last 3 Encounters:   18 136/74   18 136/75   17 143/76    Weight from Last 3 Encounters:   18 89.4 kg (197 lb)   18 93.1 kg (205 lb 3.2 oz)   17 94.3 kg (208 lb)                 Today's Medication Changes          These changes are accurate as of 18  7:11 PM.  If you have any questions, ask your nurse or doctor.               These medicines have changed or have updated prescriptions.        Dose/Directions    metoprolol tartrate 25 MG tablet   Commonly known as:  LOPRESSOR   This may have changed:    - how much to take  - when to take this  - reasons to take this "   Used for:  Chronic diastolic heart failure (H)   Changed by:  Morris Jj MD        Dose:  12.5 mg   Take 0.5 tablets (12.5 mg) by mouth 2 times daily   Quantity:  90 tablet   Refills:  3            Where to get your medicines      These medications were sent to [id number] - [place], [place] - [address]  [address], [facility] [address]     Phone:  [phone]     metoprolol tartrate 25 MG tablet                Primary Care Provider Office Phone # Fax #    Monisha Jenna Anjel Olea [phone]       [address] [facility] [address] [place] [address]        Equal Access to Services     [facility] : [address] haddeepako [place], waaxda luqadaha, qaybta kaalmada adeegyada, tejas griffin . So [facility] [phone].    ATENCIÓN: Si habla español, tiene a ferrari disposición servicios gratuitos de asistencia lingüística. Llame al [phone].    We comply with applicable federal civil rights laws and [place] laws. We do not discriminate on the basis of race, color, national origin, age, disability, sex, sexual orientation, or gender identity.            Thank you!     Thank you for choosing [facility]  for your care. Our goal is always to provide you with excellent care. Hearing back from our patients is one way we can continue to improve our services. Please take a few minutes to complete the written survey that you may receive in the mail after your visit with us. Thank you!             Your Updated Medication List - Protect others around you: Learn how to safely use, store and throw away your medicines at www.disposemymeds.org.          This list is accurate as of 6/7/18  7:11 PM.  Always use your most recent med list.                   Brand Name Dispense Instructions for use Diagnosis    albuterol 108 (90 Base) MCG/ACT Inhaler    PROAIR HFA/PROVENTIL HFA/VENTOLIN HFA    1 Inhaler    Inhale 2 puffs into the lungs every 4 hours as needed for  shortness of breath / dyspnea or wheezing    Cough       amiodarone 200 MG tablet    PACERONE/CODARONE    90 tablet    Take 1 tablet (200 mg) by mouth daily    Atrial fibrillation, unspecified type (H)       Ammonium Lactate 10 % Crea     120 mL    Apply thin layer to skin on the body at least once daily.    Xerosis cutis, Nummular eczema       aspirin 81 MG tablet      Take 81 mg by mouth daily        atorvastatin 10 MG tablet    LIPITOR    90 tablet    Take 1 tablet (10 mg) by mouth daily    Major depressive disorder, recurrent episode, moderate (H), Essential hypertension, benign, Shortness of breath, Dyslipidemia       calcium 600 MG tablet     100 tablet    Take 1 tablet by mouth 2 times daily.        citalopram 20 MG tablet    celeXA    90 tablet    Take 1 tablet (20 mg) by mouth daily    Major depressive disorder, recurrent episode, moderate (H)       clindamycin 300 MG capsule    CLEOCIN    6 capsule    Take 2 capsules by mouth as needed for 1 dose. 1 hour prior to dental work.    Prophylactic antibiotic       coenzyme Q-10 75 MG Caps      Take  by mouth.        ferrous sulfate 325 (65 Fe) MG tablet    IRON     Take 325 mg by mouth daily (with breakfast)        flunisolide 25 MCG/ACT (0.025%) Soln spray    NASALIDE     Spray 1 spray into both nostrils every 12 hours        fluticasone-salmeterol 250-50 MCG/DOSE diskus inhaler    ADVAIR DISKUS    120 Inhaler    Inhale 1 puff into the lungs every 12 hours    Acute bronchitis, Cough       furosemide 40 MG tablet    LASIX    90 tablet    Take 1 tablet (40 mg) by mouth daily    Chronic diastolic heart failure (H), CAD S/P percutaneous coronary angioplasty       LANSOPRAZOLE PO      Take 30 mg by mouth        levothyroxine 50 MCG tablet    SYNTHROID/LEVOTHROID    90 tablet    Take 1.5 tablets (75 mcg) by mouth daily    Other specified hypothyroidism       MAGNESIUM OXIDE PO      Take 400 mg by mouth        meclizine 25 MG tablet    ANTIVERT    30 tablet    Take 1  tablet (25 mg) by mouth 3 times daily as needed for dizziness    BPPV (benign paroxysmal positional vertigo), unspecified laterality       melatonin 3 MG tablet     100 tablet    Take 1 tablet (3 mg) by mouth nightly as needed for sleep    Insomnia, unspecified type       metoprolol tartrate 25 MG tablet    LOPRESSOR    90 tablet    Take 0.5 tablets (12.5 mg) by mouth 2 times daily    Chronic diastolic heart failure (H)       OCUVITE PO      Take  by mouth.        * order for DME     1 each    Walker    Spinal stenosis, lumbar region, without neurogenic claudication       * order for DME     1 Device    Equipment being ordered: Safe step tub / walk-in tub    Generalized muscle weakness, Personal history of fall, CHF (congestive heart failure) (H)       senna-docusate 8.6-50 MG per tablet    SENOKOT-S;PERICOLACE    60 tablet    Take 1 tablet by mouth 2 times daily.    S/P CABG x 3       spironolactone 25 MG tablet    ALDACTONE    90 tablet    Take 1 tablet (25 mg) by mouth daily    Hypokalemia       tamsulosin 0.4 MG capsule    FLOMAX    90 capsule    Take 1 capsule (0.4 mg) by mouth daily    Urinary retention       triamcinolone 0.1 % cream    KENALOG    80 g    Apply sparingly to affected area three times daily as needed    Nummular eczema       vitamin D 1000 units capsule      Take 1 capsule by mouth daily.        * Notice:  This list has 2 medication(s) that are the same as other medications prescribed for you. Read the directions carefully, and ask your doctor or other care provider to review them with you.

## 2018-06-07 NOTE — PROGRESS NOTES
CC  Follow up after recent hospital visit    HPI  Mr. Gorge Corona is a 87 yr old male with H/o SVT, post ablation and on amiodarone, H/o CAD s/p CABG x 4 ( LIMA to LAD, SVG to PDA, SVG to D1 and SVG to OM, S/p PCI with LM into OM for occluded SVG, MVR with procine 29 mm valve, aortic stenosis presenting for evaluation.   He was seen by the PCP and was referred for evaluation of his aortic stenosis in 02/2018. He had cardiac catheterization and RHC to evaluate his aortic stenosis in 2015. That time, it was deemed to be moderate aortic stenosis. However, could not follow up then. He had recent hospitalisation after fall and has femur fracture fixation. He is in a care home and is being discharged in couple of days.    Patient was seeing Dr. Billy until 2016. He also saw Dr. Ocasio for his SVT. He was on metoprolol and later amiodarone for it. When he was recently for his hip fracture hospitalization, he also had ablation for possible aflutter/AVT. Details of which are not currently know. He is not on any anticoagulation. He is taking metoprolol as needed and amiodarone every other day. However, wife is not sure about the medications as they are given by the staff in the care home.    He denies any chest pain, shortness of breath, orthopnea or PND. However, he does get lightheaded on and off. He is also feeling tired and fatigue. He has been diagnosed with anemia post hip surgery and is getting supplements for it.         PAST MEDICAL HISTORY:     Past Medical History:   Diagnosis Date     Atrial fibrillation (H)      Atrial flutter (H)      Basal cell carcinoma of skin of trunk      BPH (benign prostatic hypertrophy) with urinary obstruction      CAD (coronary artery disease)     s/p CABG 1/2013; RUSSEL in 2/2013     Depression      H/O CT scan of head 1/11/2013 7/20/2006    Result Impression     CT of the Head without contrast 7/20/2006  History: Ataxia, incontinence, and NPH  Comparison Study:   obtained to 4/19/2004  Technique: Axial CT images of the head were obtained at 2 intervals from the skull base to the vertex without intravenous contrast. The images were reviewed in brain, subdural and bone windows.  Findings: Patient has had a moderate promin     Hyperlipidemia      Lumbar spinal stenosis      Mitral regurgitation     s/p bioprosthesis 1/2013     Palpitations      Paroxysmal supraventricular tachycardia (H)      Squamous cell carcinoma      Tachycardia     baroreflex injury dut to surgery      (ventriculoperitoneal) shunt status     for Normal pressure hydrocephalus        PAST SURGICAL HISTORY:     Past Surgical History:   Procedure Laterality Date     ANESTHESIA CARDIOVERSION  4/11/2013    Procedure: ANESTHESIA CARDIOVERSION;  Cardioversion;  Surgeon: Provider, Generic Anesthesia;  Location: [facility] OR     BYPASS GRAFT ARTERY CORONARY, REPAIR VALVE MITRAL, COMBINED  1/14/2013    Procedure: COMBINED BYPASS GRAFT ARTERY CORONARY, REPAIR VALVE MITRAL;  Median sternotomy, coronary artery bypass graft X3 using left internal mammary artery & right saphenous vein , mitral valve Replacement and on pump oxygenator, flexible cystoscopy, urethral dilation and insertion of stuart catheter.;  Surgeon: Chan Peres MD;  Location: [facility] OR     CORONARY ANGIOGRAPHY ADULT ORDER       CORONARY ARTERY BYPASS  1/2013    mitral valve tissue      INSERT CATHETER BLADDER  1/14/2013    Procedure: INSERT CATHETER BLADDER;  Flexible cystoscopy, urethral dilation,& insertion of stuart catheter;  Surgeon: Pete Bedoya MD;  Location: [facility] OR     MITRAL VALVE REPLACEMENT  2013    29 mm Epic porcine valve     MOHS MICROGRAPHIC PROCEDURE       REPLACE VALVE MITRAL  1/2013    tissue valve        CURRENT MEDICATIONS:     Current Outpatient Prescriptions   Medication Sig Dispense Refill     albuterol (PROAIR HFA, PROVENTIL HFA, VENTOLIN HFA) 108 (90 BASE) MCG/ACT inhaler Inhale 2 puffs into the lungs every 4 hours  as needed for shortness of breath / dyspnea or wheezing 1 Inhaler 1     amiodarone (PACERONE/CODARONE) 200 MG tablet Take 1 tablet (200 mg) by mouth daily (Patient not taking: Reported on 6/7/2018) 90 tablet 3     Ammonium Lactate 10 % CREA Apply thin layer to skin on the body at least once daily. (Patient not taking: Reported on 6/7/2018) 120 mL 11     aspirin 81 MG tablet Take 81 mg by mouth daily       atorvastatin (LIPITOR) 10 MG tablet Take 1 tablet (10 mg) by mouth daily 90 tablet 1     Calcium 600 MG tablet Take 1 tablet by mouth 2 times daily. 100 tablet 3     Cholecalciferol (VITAMIN D) 1000 UNITS capsule Take 1 capsule by mouth daily.       citalopram (CELEXA) 20 MG tablet Take 1 tablet (20 mg) by mouth daily 90 tablet 3     clindamycin (CLEOCIN) 300 MG capsule Take 2 capsules by mouth as needed for 1 dose. 1 hour prior to dental work. (Patient not taking: Reported on 6/7/2018) 6 capsule 0     coenzyme Q-10 75 MG CAPS Take  by mouth.       ferrous sulfate (IRON) 325 (65 Fe) MG tablet Take 325 mg by mouth daily (with breakfast)       flunisolide (NASALIDE) 25 MCG/ACT (0.025%) SOLN spray Spray 1 spray into both nostrils every 12 hours       fluticasone-salmeterol (ADVAIR DISKUS) 250-50 MCG/DOSE diskus inhaler Inhale 1 puff into the lungs every 12 hours 120 Inhaler 1     furosemide (LASIX) 40 MG tablet Take 1 tablet (40 mg) by mouth daily 90 tablet 0     LANSOPRAZOLE PO Take 30 mg by mouth       levothyroxine (SYNTHROID/LEVOTHROID) 50 MCG tablet Take 1.5 tablets (75 mcg) by mouth daily 90 tablet 3     MAGNESIUM OXIDE PO Take 400 mg by mouth       meclizine (ANTIVERT) 25 MG tablet Take 1 tablet (25 mg) by mouth 3 times daily as needed for dizziness (Patient not taking: Reported on 6/7/2018) 30 tablet 3     melatonin 3 MG tablet Take 1 tablet (3 mg) by mouth nightly as needed for sleep 100 tablet 3     metoprolol tartrate (LOPRESSOR) 25 MG tablet Take 0.5 tablets (12.5 mg) by mouth 2 times daily 90 tablet 3      Multiple Vitamins-Minerals (OCUVITE PO) Take  by mouth.  0     order for DME Equipment being ordered: Safe step tub / walk-in tub (Patient not taking: Reported on 6/7/2018) 1 Device 0     order for DME Walker (Patient not taking: Reported on 6/7/2018) 1 each 0     senna-docusate (SENOKOT-S;PERICOLACE) 8.6-50 MG per tablet Take 1 tablet by mouth 2 times daily. 60 tablet      spironolactone (ALDACTONE) 25 MG tablet Take 1 tablet (25 mg) by mouth daily (Patient not taking: Reported on 6/7/2018) 90 tablet 3     tamsulosin (FLOMAX) 0.4 MG capsule Take 1 capsule (0.4 mg) by mouth daily 90 capsule 3     triamcinolone (KENALOG) 0.1 % cream Apply sparingly to affected area three times daily as needed (Patient not taking: Reported on 6/7/2018) 80 g 1     [DISCONTINUED] metoprolol (LOPRESSOR) 25 MG tablet Take 1 tablet (25 mg) by mouth as needed 30 tablet 1        ALLERGIES:     Allergies   Allergen Reactions     Penicillins Rash        FAMILY HISTORY:     Family History   Problem Relation Age of Onset     CANCER Father      KIDNEY DISEASE Mother         SOCIAL HISTORY:     Social History     Social History     Marital status:      Spouse name: N/A     Number of children: N/A     Years of education: N/A     Social History Main Topics     Smoking status: Former Smoker     Types: Cigars, Pipe     Quit date: 1/1/1990     Smokeless tobacco: Former User     Alcohol use No     Drug use: No     Sexual activity: Not Asked     Other Topics Concern     None     Social History Narrative        REVIEW OF SYSTEMS:     Constitutional: No fevers or chills  Skin: No new rash or itching  Eyes: No acute change in vision  Ears/Nose/Throat: No purulent rhinorrhea, new hearing loss, or new vertigo  Respiratory: No cough or hemoptysis  Cardiovascular: See HPI  Gastrointestinal: No change in appetite, vomiting, hematemesis or diarrhea  Genitourinary: No dysuria or hematuria  Musculoskeletal: as above  Neurologic: No new headaches, focal  "weakness or behavior changes  Psychiatric: No hallucinations, excessive alcohol consumption or illegal drug usage  Hematologic/Lymphatic/Immunologic: No bleeding, chills, fever, night sweats or weight loss  Endocrine: No new cold intolerance, heat intolerance, polyphagia, polydipsia or polyuria      PHYSICAL EXAMINATION:     /74 (BP Location: Left arm, Patient Position: Chair, Cuff Size: Adult Regular)  Pulse 68  Ht 1.778 m (5' 10\")  Wt 89.4 kg (197 lb)  SpO2 96%  BMI 28.27 kg/m2    GENERAL: No acute distress. He is in an wheel chair  HEENT: EOMI. Sclerae white, not injected. Nares clear. Pharynx without erythema or exudate.   Neck: No adenopathy. No thyromegaly. Symmetrical.   Heart: Regular rate and rhythm. Harsh crescendo decrescendo late peaking systolic murmur with radiation to carotids. Delayed carotid pulses. Old sternotomy scar noted   Lungs: Clear to auscultation. No ronchi, wheezes, rales.   Abdomen: Soft, nontender, nondistended. Bowel sounds present.  Extremities: No clubbing, cyanosis, or edema.   Neurologic: Alert and oriented to person/place/time, normal speech and affect. No focal deficits.  Skin: No petechiae, purpura or rash.     LABORATORY DATA:     LIPID RESULTS:  Lab Results   Component Value Date    CHOL 147 12/27/2017    HDL 61 12/27/2017    LDL 74 12/27/2017    TRIG 62 12/27/2017    CHOLHDLRATIO 3.1 12/17/2013       LIVER ENZYME RESULTS:  Lab Results   Component Value Date    AST 16 10/09/2017    ALT 18 10/09/2017       CBC RESULTS:  Lab Results   Component Value Date    WBC 6.7 12/27/2017    RBC 4.00 (L) 12/27/2017    HGB 12.3 (L) 12/27/2017    HCT 38.1 (L) 12/27/2017    MCV 95 12/27/2017    MCH 30.8 12/27/2017    MCHC 32.3 12/27/2017    RDW 13.6 12/27/2017     (L) 12/27/2017       BMP RESULTS:  Lab Results   Component Value Date     12/27/2017    POTASSIUM 4.2 12/27/2017    CHLORIDE 108 12/27/2017    CO2 25 12/27/2017    ANIONGAP 6 12/27/2017     (H) " 12/27/2017    BUN 18 12/27/2017    CR 1.17 12/27/2017    GFRESTIMATED 59 (L) 12/27/2017    GFRESTBLACK 71 12/27/2017    HE 8.1 (L) 12/27/2017        A1C RESULTS:  Lab Results   Component Value Date    A1C 5.3 01/14/2013       INR RESULTS:  Lab Results   Component Value Date    INR 1.91 (H) 04/18/2013    INR 2.26 (H) 04/14/2013          CLINICAL IMPRESSION:     87 yr old male with H/o SVT vs Aflutter, post ablation and on amiodorone, H/o CAD s/p CABG x 4 ( LIMA to LAD, SVG to PDA, SVG to D1 and SVG to OM ), S/p PCI with LM into OM for occluded SVG, MVR with procine Epic 29 mm valve, aortic stenosis presenting for evaluation.     Reviewed his echocardiogram and BETI is in the moderate range and there is increase in gradient across the mitral valve. Compared to prior echo dated 4/2017, mean gradient has increased from 6 to 10 mm Hg. However, this could be from anemia.    -to restart metoprolol at 12.5 mg bid  -to get RAY to evaluate the mitral valve and the aortic valve  -if imaging is not conclusive with his symptoms, might need RHC/LHC  -continue his amiodarone at the current dose  -I presume he is not on anticoagulation due to anemia.   -collect records about his recent hospitalisation    Patient was seen and examined with Dr. Анна Sosa MD  Interventional Cardiology Fellow    CC  Patient Care Team:  Monisha Medina MD as PCP - General (Internal Medicine)  Jose Manuel Weaver MD as MD (Dermatology)  Yara Galaviz, RN as Nurse Coordinator  MONISHA MEDINA          Patient seen and examined with Cardiovascular fellow and agree with the assessment and plan described above.     Morris Jj M.D.  Interventional Cardiology  [facility]

## 2018-06-14 ENCOUNTER — MEDICAL CORRESPONDENCE (OUTPATIENT)
Dept: HEALTH INFORMATION MANAGEMENT | Facility: CLINIC | Age: 83
End: 2018-06-14

## 2018-06-14 DIAGNOSIS — M25.50 PAIN IN JOINT: Primary | ICD-10-CM

## 2018-06-14 NOTE — TELEPHONE ENCOUNTER
[facility] Call Center    Phone Message    May a detailed message be left on voicemail: yes    Reason for Call: Medication Refill Request    Has the patient contacted the pharmacy for the refill? Yes   Name of medication being requested: Tramadol  Provider who prescribed the medication: While in transitional care  Date medication is needed: 6/17/18         Action Taken: Message routed to:  [facility] ([facility]): Pt was prescribed Tramadol 50 MG 1/2 tab twice daily while in transitional care - spouse is requesting a refill to last until his appt. on 6/27/18

## 2018-06-15 RX ORDER — TRAMADOL HYDROCHLORIDE 50 MG/1
25 TABLET ORAL 2 TIMES DAILY PRN
Qty: 15 TABLET | Refills: 0 | Status: SHIPPED | OUTPATIENT
Start: 2018-06-15 | End: 2020-10-30

## 2018-06-15 NOTE — TELEPHONE ENCOUNTER
Patient is requesting Tramadol, which was received in the TCU,  was reviewed today, last fill was for # 7 tabs Tramadol 50 mg on 6/11/2018.  Patient is requesting a refill to last until his appt. on 6/27/18.  Patient taking one tab daily.    Yara Mott RN on 6/15/2018 at 11:13 AM

## 2018-06-15 NOTE — TELEPHONE ENCOUNTER
Rx was called into [facility] pharmacy in [place] at [phone]..    Yara Mott RN on 6/15/2018 at 1:28 PM

## 2018-06-27 ENCOUNTER — OFFICE VISIT (OUTPATIENT)
Dept: INTERNAL MEDICINE | Facility: CLINIC | Age: 83
End: 2018-06-27
Payer: MEDICARE

## 2018-06-27 VITALS
HEART RATE: 62 BPM | SYSTOLIC BLOOD PRESSURE: 111 MMHG | BODY MASS INDEX: 28.12 KG/M2 | DIASTOLIC BLOOD PRESSURE: 64 MMHG | WEIGHT: 196 LBS

## 2018-06-27 DIAGNOSIS — I35.0 NONRHEUMATIC AORTIC VALVE STENOSIS: ICD-10-CM

## 2018-06-27 DIAGNOSIS — D64.9 ANEMIA, UNSPECIFIED TYPE: Primary | ICD-10-CM

## 2018-06-27 DIAGNOSIS — F33.1 MAJOR DEPRESSIVE DISORDER, RECURRENT EPISODE, MODERATE (H): ICD-10-CM

## 2018-06-27 DIAGNOSIS — M25.552 HIP PAIN, LEFT: ICD-10-CM

## 2018-06-27 DIAGNOSIS — I50.32 CHRONIC DIASTOLIC HEART FAILURE (H): ICD-10-CM

## 2018-06-27 DIAGNOSIS — D64.9 ANEMIA, UNSPECIFIED TYPE: ICD-10-CM

## 2018-06-27 DIAGNOSIS — I25.10 CAD S/P PERCUTANEOUS CORONARY ANGIOPLASTY: ICD-10-CM

## 2018-06-27 DIAGNOSIS — Z98.61 CAD S/P PERCUTANEOUS CORONARY ANGIOPLASTY: ICD-10-CM

## 2018-06-27 LAB
ANION GAP SERPL CALCULATED.3IONS-SCNC: 7 MMOL/L (ref 3–14)
BUN SERPL-MCNC: 19 MG/DL (ref 7–30)
CALCIUM SERPL-MCNC: 8.5 MG/DL (ref 8.5–10.1)
CHLORIDE SERPL-SCNC: 106 MMOL/L (ref 94–109)
CO2 SERPL-SCNC: 27 MMOL/L (ref 20–32)
CREAT SERPL-MCNC: 1.03 MG/DL (ref 0.66–1.25)
ERYTHROCYTE [DISTWIDTH] IN BLOOD BY AUTOMATED COUNT: 13.6 % (ref 10–15)
GFR SERPL CREATININE-BSD FRML MDRD: 68 ML/MIN/1.7M2
GLUCOSE SERPL-MCNC: 106 MG/DL (ref 70–99)
HCT VFR BLD AUTO: 35.3 % (ref 40–53)
HGB BLD-MCNC: 11.3 G/DL (ref 13.3–17.7)
MCH RBC QN AUTO: 28.9 PG (ref 26.5–33)
MCHC RBC AUTO-ENTMCNC: 32 G/DL (ref 31.5–36.5)
MCV RBC AUTO: 90 FL (ref 78–100)
PLATELET # BLD AUTO: 143 10E9/L (ref 150–450)
POTASSIUM SERPL-SCNC: 3.9 MMOL/L (ref 3.4–5.3)
RBC # BLD AUTO: 3.91 10E12/L (ref 4.4–5.9)
SODIUM SERPL-SCNC: 140 MMOL/L (ref 133–144)
WBC # BLD AUTO: 6.4 10E9/L (ref 4–11)

## 2018-06-27 RX ORDER — TRAMADOL HYDROCHLORIDE 50 MG/1
50 TABLET ORAL DAILY PRN
Qty: 10 TABLET | Refills: 0 | Status: SHIPPED | OUTPATIENT
Start: 2018-06-27 | End: 2020-10-30

## 2018-06-27 RX ORDER — FUROSEMIDE 40 MG
20 TABLET ORAL DAILY
Qty: 90 TABLET | Refills: 0 | Status: SHIPPED | OUTPATIENT
Start: 2018-06-27 | End: 2018-11-02

## 2018-06-27 RX ORDER — TRAMADOL HYDROCHLORIDE 50 MG/1
50 TABLET ORAL AT BEDTIME
Qty: 10 TABLET | Refills: 0 | Status: CANCELLED | OUTPATIENT
Start: 2018-06-27

## 2018-06-27 ASSESSMENT — PAIN SCALES - GENERAL: PAINLEVEL: MODERATE PAIN (4)

## 2018-06-27 NOTE — MR AVS SNAPSHOT
After Visit Summary   6/27/2018    Gorge Corona    MRN: [id number]           Patient Information     Date Of Birth          11/27/1930        Visit Information        Provider Department      6/27/2018 2:30 PM Ja Carvalho MD M [facility]        Today's Diagnoses     Anemia, unspecified type    -  1    Chronic diastolic heart failure (H)        CAD S/P percutaneous coronary angioplasty        Hip pain, left        Nonrheumatic aortic valve stenosis          Care Instructions    Primary Care Center: [phone]     Primary Care Center Medication Refill Request Information:  * Please contact your pharmacy regarding ANY request for medication refills.  ** [facility] Prescription Fax = [phone]  * Please allow 3 business days for routine medication refills.  * Please allow 5 business days for controlled substance medication refills.     Primary Care Center Test Result notification information:  *You will be notified with in 7-10 days of your appointment day regarding the results of your test.  If you are on MyChart you will be notified as soon as the provider has reviewed the results and signed off on them.          Follow-ups after your visit        Your next 10 appointments already scheduled     Jul 02, 2018 11:30 AM CDT   (Arrive by 11:15 AM)   Return Visit with MD VINCENT Dukes [facility] ([facility])    [address]  [address] [facility] [id number] [phone]            Jul 03, 2018  1:00 PM CDT   Ech Abraham with UUETEER1   [facility] [place],  [facility] ([facility] [place])    [address] [id number] [phone]           1.  Please bring or wear a comfortable two-piece outfit. 2.  Arrival time: -   [facility]:  arrive 75 minutes prior to examination time. -   [facility]:  arrive 90 minutes prior to examination time. -   [facility]:   arrive 15 minutes  prior to examination time. 3.  Plan to have someone here to drive you home after the test. -   Someone should stay with you for 6 hours after your test. 4.  No food or drink: -   6 hours before the test 5.  If you take antacids or water pills (diuretics): Do not take them until after your test. You may take blood pressure medicine with a few sips of water. 6.  If you have diabetes: -   Morning slots preferred -   If you take insulin, call your diabetes care team. Ask if you should take a   dose the morning of your test. -   If you take diabetes medicine by mouth, don't take it on the morning of your test. Bring it with you to take after the test. (If you have questions, call your diabetes care team.) 7.  Bring a list of any medicines you are taking. 8.  Do not drive for 24 hours after the test. 9.   A responsible adult must stay with you for 24 hours after the test.  10.  For any questions that cannot be answered, please contact the ordering physician              Future tests that were ordered for you today     Open Future Orders        Priority Expected Expires Ordered    Transesophageal Echocardiogram Routine 7/3/2018 7/10/2018 6/27/2018    BASIC METABOLIC PANEL (Today) Routine 6/27/2018 6/27/2019 6/27/2018    CBC with platelets Routine 6/27/2018 7/11/2018 6/27/2018            Who to contact     Please call your clinic at [phone] to:    Ask questions about your health    Make or cancel appointments    Discuss your medicines    Learn about your test results    Speak to your doctor            Additional Information About Your Visit        [employer] Information     [employer] is an electronic gateway that provides easy, online access to your medical records. With [employer], you can request a clinic appointment, read your test results, renew a prescription or communicate with your care team.     To sign up for [employer] visit the website at [email or web address]   You will be asked to enter the access code listed  below, as well as some personal information. Please follow the directions to create your username and password.     Your access code is: [email or web address]  Expires: 2018  7:06 PM     Your access code will  in 90 days. If you need help or a new code, please contact your [facility] or call [phone] for assistance.        Care EveryWhere ID     This is your Care EveryWhere ID. This could be used by other organizations to access your [facility] medical records  [id number]        Your Vitals Were     Pulse BMI (Body Mass Index)                62 28.12 kg/m2           Blood Pressure from Last 3 Encounters:   18 111/64   18 136/74   18 136/75    Weight from Last 3 Encounters:   18 88.9 kg (196 lb)   18 89.4 kg (197 lb)   18 93.1 kg (205 lb 3.2 oz)                 Today's Medication Changes          These changes are accurate as of 18  3:31 PM.  If you have any questions, ask your nurse or doctor.               These medicines have changed or have updated prescriptions.        Dose/Directions    furosemide 40 MG tablet   Commonly known as:  LASIX   This may have changed:  how much to take   Used for:  Chronic diastolic heart failure (H), CAD S/P percutaneous coronary angioplasty        Dose:  20 mg   Take 0.5 tablets (20 mg) by mouth daily   Quantity:  90 tablet   Refills:  0       * traMADol 50 MG tablet   Commonly known as:  ULTRAM   This may have changed:  Another medication with the same name was added. Make sure you understand how and when to take each.   Used for:  Pain in joint        Dose:  25 mg   Take 0.5 tablets (25 mg) by mouth 2 times daily as needed for severe pain   Quantity:  15 tablet   Refills:  0       * traMADol 50 MG tablet   Commonly known as:  ULTRAM   This may have changed:  You were already taking a medication with the same name, and this prescription was added. Make sure you understand how and when to take each.   Used  for:  Hip pain, left        Dose:  50 mg   Take 1 tablet (50 mg) by mouth daily as needed for severe pain   Quantity:  10 tablet   Refills:  0       * Notice:  This list has 2 medication(s) that are the same as other medications prescribed for you. Read the directions carefully, and ask your doctor or other care provider to review them with you.      Stop taking these medicines if you haven't already. Please contact your care team if you have questions.     metoprolol tartrate 25 MG tablet   Commonly known as:  LOPRESSOR                Where to get your medicines      These medications were sent to [facility] [address] [place], [place] - [address]  [address] [facility] [address]     Phone:  [phone]     furosemide 40 MG tablet         Some of these will need a paper prescription and others can be bought over the counter.  Ask your nurse if you have questions.     Bring a paper prescription for each of these medications     traMADol 50 MG tablet               Information about OPIOIDS     PRESCRIPTION OPIOIDS: WHAT YOU NEED TO KNOW   We gave you an opioid (narcotic) pain medicine. It is important to manage your pain, but opioids are not always the best choice. You should first try all the other options your care team gave you. Take this medicine for as short a time (and as few doses) as possible.     These medicines have risks:    DO NOT drive when on new or higher doses of pain medicine. These medicines can affect your alertness and reaction times, and you could be arrested for driving under the influence (DUI). If you need to use opioids long-term, talk to your care team about driving.    DO NOT operate heave machinery    DO NOT do any other dangerous activities while taking these medicines.     DO NOT drink any alcohol while taking these medicines.      If the opioid prescribed includes acetaminophen, DO NOT take with any other medicines that contain acetaminophen. Read all labels carefully.  Look for the word  acetaminophen  or  Tylenol.  Ask your pharmacist if you have questions or are unsure.    You can get addicted to pain medicines, especially if you have a history of addiction (chemical, alcohol or substance dependence). Talk to your care team about ways to reduce this risk.    Store your pills in a secure place, locked if possible. We will not replace any lost or stolen medicine. If you don t finish your medicine, please throw away (dispose) as directed by your pharmacist. The [place] Pollution Control Agency has more information about safe disposal: https://www.pca.[facility].[place]/living-green/managing-unwanted-medications.     All opioids tend to cause constipation. Drink plenty of water and eat foods that have a lot of fiber, such as fruits, vegetables, prune juice, apple juice and high-fiber cereal. Take a laxative (Miralax, milk of magnesia, Colace, Senna) if you don t move your bowels at least every other day.          Primary Care Provider Office Phone # Fax #    Monisha Olea [phone] [address] [address]        Equal Access to Services     [facility] : Hadii aad ku hadasho Soyumikoali, waaxda luqadaha, qaybta kaalmada adecarmeloyada, tejas griffin . So [facility] [phone].    ATENCIÓN: Si habla español, tiene a ferrari disposición servicios gratuitos de asistencia lingüística. Llame al [phone].    We comply with applicable federal civil rights laws and [place] laws. We do not discriminate on the basis of race, color, national origin, age, disability, sex, sexual orientation, or gender identity.            Thank you!     Thank you for choosing [facility]  for your care. Our goal is always to provide you with excellent care. Hearing back from our patients is one way we can continue to improve our services. Please take a few minutes to complete the written survey that you may receive in the  mail after your visit with us. Thank you!             Your Updated Medication List - Protect others around you: Learn how to safely use, store and throw away your medicines at www.disposemymeds.org.          This list is accurate as of 6/27/18  3:31 PM.  Always use your most recent med list.                   Brand Name Dispense Instructions for use Diagnosis    albuterol 108 (90 Base) MCG/ACT Inhaler    PROAIR HFA/PROVENTIL HFA/VENTOLIN HFA    1 Inhaler    Inhale 2 puffs into the lungs every 4 hours as needed for shortness of breath / dyspnea or wheezing    Cough       amiodarone 200 MG tablet    PACERONE/CODARONE    90 tablet    Take 1 tablet (200 mg) by mouth daily    Atrial fibrillation, unspecified type (H)       Ammonium Lactate 10 % Crea     120 mL    Apply thin layer to skin on the body at least once daily.    Xerosis cutis, Nummular eczema       atorvastatin 10 MG tablet    LIPITOR    90 tablet    Take 1 tablet (10 mg) by mouth daily    Major depressive disorder, recurrent episode, moderate (H), Essential hypertension, benign, Shortness of breath, Dyslipidemia       calcium 600 MG tablet     100 tablet    Take 1 tablet by mouth 2 times daily.        citalopram 20 MG tablet    celeXA    90 tablet    Take 1 tablet (20 mg) by mouth daily    Major depressive disorder, recurrent episode, moderate (H)       clindamycin 300 MG capsule    CLEOCIN    6 capsule    Take 2 capsules by mouth as needed for 1 dose. 1 hour prior to dental work.    Prophylactic antibiotic       coenzyme Q-10 75 MG Caps      Take  by mouth.        fluticasone-salmeterol 250-50 MCG/DOSE diskus inhaler    ADVAIR DISKUS    120 Inhaler    Inhale 1 puff into the lungs every 12 hours    Acute bronchitis, Cough       furosemide 40 MG tablet    LASIX    90 tablet    Take 0.5 tablets (20 mg) by mouth daily    Chronic diastolic heart failure (H), CAD S/P percutaneous coronary angioplasty       LANSOPRAZOLE PO      Take 30 mg by mouth         levothyroxine 50 MCG tablet    SYNTHROID/LEVOTHROID    90 tablet    Take 1.5 tablets (75 mcg) by mouth daily    Other specified hypothyroidism       MAGNESIUM OXIDE PO      Take 400 mg by mouth        meclizine 25 MG tablet    ANTIVERT    30 tablet    Take 1 tablet (25 mg) by mouth 3 times daily as needed for dizziness    BPPV (benign paroxysmal positional vertigo), unspecified laterality       melatonin 3 MG tablet     100 tablet    Take 1 tablet (3 mg) by mouth nightly as needed for sleep    Insomnia, unspecified type       OCUVITE PO      Take  by mouth.        * order for DME     1 each    Walker    Spinal stenosis, lumbar region, without neurogenic claudication       * order for DME     1 Device    Equipment being ordered: Safe step tub / walk-in tub    Generalized muscle weakness, Personal history of fall, CHF (congestive heart failure) (H)       senna-docusate 8.6-50 MG per tablet    SENOKOT-S;PERICOLACE    60 tablet    Take 1 tablet by mouth 2 times daily.    S/P CABG x 3       spironolactone 25 MG tablet    ALDACTONE    90 tablet    Take 1 tablet (25 mg) by mouth daily    Hypokalemia       tamsulosin 0.4 MG capsule    FLOMAX    90 capsule    Take 1 capsule (0.4 mg) by mouth daily    Urinary retention       * traMADol 50 MG tablet    ULTRAM    15 tablet    Take 0.5 tablets (25 mg) by mouth 2 times daily as needed for severe pain    Pain in joint       * traMADol 50 MG tablet    ULTRAM    10 tablet    Take 1 tablet (50 mg) by mouth daily as needed for severe pain    Hip pain, left       triamcinolone 0.1 % cream    KENALOG    80 g    Apply sparingly to affected area three times daily as needed    Nummular eczema       vitamin D 1000 units capsule      Take 1 capsule by mouth daily.        * Notice:  This list has 4 medication(s) that are the same as other medications prescribed for you. Read the directions carefully, and ask your doctor or other care provider to review them with you.

## 2018-06-27 NOTE — PROGRESS NOTES
[facility]  Primary Care Center   Ja Carvalho MD  06/27/2018      Chief Complaint:   Hospital F/U     History of Present Illness:   Gorge Corona is a 87 year old male with a history of HTN, afib, aortic stenosis, and COPD who presents for follow-up from transitional care after left hip arthroplasty.    He does not feel he has healed well from his left hip arthroplasty. He has sharp pain from his left knee to his hip. Currently his pain rates 6-7/10 but it can increase to 10/10 pain when he walks. It is better when he lays down, worse with movement. He has no limitation to movement other than pain. Working with PT/OT to improve strength and also try to address his pain. He has no swelling, erythema, fevers, chills, numbness, or tingling. Tramadol and acetaminophen did not help. Ice packs and ibuprofen helped significantly but only for a short period of time. He spoke with his surgeon about his pain who thought that tramadol in combination with PT/OT would improve his pain. He has noticed some improvement in pain with PT/OT but is worried that his pain is persisting.    He is also frustrated by urinary frequency. He has to use the bathroom repeatedly throughout the day and finds this frustrated given that he has pain with walking. He tries to be careful when he moves around the house and uses a walker to avoid falls. Of note he is on furosemide for chronic diastolic heart failure.    Also has intermittent low blood pressure. He feels tired and is dizzy when his blood pressure is in the 90s. He checks his blood pressure with a monitor at home.    His wife is his primary caretaker.    Review of Systems:   Pertinent items are noted in HPI, remainder of complete ROS is negative.      Past Medical History:  Past Medical History:   Diagnosis Date     Atrial fibrillation (H)      Atrial flutter (H)      Basal cell carcinoma of skin of trunk      BPH (benign prostatic hypertrophy) with urinary obstruction      CAD  (coronary artery disease)     s/p CABG 1/2013; RUSSEL in 2/2013     Depression      H/O CT scan of head 1/11/2013 7/20/2006    Result Impression     CT of the Head without contrast 7/20/2006  History: Ataxia, incontinence, and NPH  Comparison Study: MR obtained to 4/19/2004  Technique: Axial CT images of the head were obtained at 2 intervals from the skull base to the vertex without intravenous contrast. The images were reviewed in brain, subdural and bone windows.  Findings: Patient has had a moderate promin     Hyperlipidemia      Lumbar spinal stenosis      Mitral regurgitation     s/p bioprosthesis 1/2013     Palpitations      Paroxysmal supraventricular tachycardia (H)      Squamous cell carcinoma      Tachycardia     baroreflex injury dut to surgery      (ventriculoperitoneal) shunt status     for Normal pressure hydrocephalus     Patient Active Problem List   Diagnosis     Lumbar spinal stenosis     Lumbar spondylolysis     Major depressive disorder, recurrent episode, moderate (H)     Actinic keratosis     Other malignant neoplasm of skin, site unspecified     Benign neoplasm of skin of trunk, except scrotum     Other seborrheic keratosis     History of SCC (squamous cell carcinoma) of skin     H/O colonoscopy     Normal pressure hydrocephalus     ASCVD (arteriosclerotic cardiovascular disease)     Coronary artery disease, occlusive     H/O CT scan of head     Mitral valve regurgitation     CAD (coronary artery disease)     Atrial fibrillation (H)     Dyslipidemia     Preventative health care     CAD S/P percutaneous coronary angioplasty     BCC (basal cell carcinoma of skin)     Hypothyroidism     CHF (congestive heart failure) (H)     SCC (squamous cell carcinoma)     SVT (supraventricular tachycardia) (H)     Status post coronary angiogram     Past Surgical History:  Past Surgical History:   Procedure Laterality Date     ANESTHESIA CARDIOVERSION  4/11/2013    Procedure: ANESTHESIA CARDIOVERSION;   Cardioversion;  Surgeon: Provider, Generic Anesthesia;  Location: [facility] OR     BYPASS GRAFT ARTERY CORONARY, REPAIR VALVE MITRAL, COMBINED  1/14/2013    Procedure: COMBINED BYPASS GRAFT ARTERY CORONARY, REPAIR VALVE MITRAL;  Median sternotomy, coronary artery bypass graft X3 using left internal mammary artery & right saphenous vein , mitral valve Replacement and on pump oxygenator, flexible cystoscopy, urethral dilation and insertion of stuart catheter.;  Surgeon: Chan Peres MD;  Location: [facility] OR     CORONARY ANGIOGRAPHY ADULT ORDER       CORONARY ARTERY BYPASS  1/2013    mitral valve tissue      INSERT CATHETER BLADDER  1/14/2013    Procedure: INSERT CATHETER BLADDER;  Flexible cystoscopy, urethral dilation,& insertion of stuart catheter;  Surgeon: Pete Bedoya MD;  Location: [facility] OR     MITRAL VALVE REPLACEMENT  2013    29 mm Epic porcine valve     MOHS MICROGRAPHIC PROCEDURE       REPLACE VALVE MITRAL  1/2013    tissue valve     Active Medications:  Current Outpatient Prescriptions:      albuterol (PROAIR HFA, PROVENTIL HFA, VENTOLIN HFA) 108 (90 BASE) MCG/ACT inhaler, Inhale 2 puffs into the lungs every 4 hours as needed for shortness of breath / dyspnea or wheezing, Disp: 1 Inhaler, Rfl: 1     amiodarone (PACERONE/CODARONE) 200 MG tablet, Take 1 tablet (200 mg) by mouth daily, Disp: 90 tablet, Rfl: 3     Ammonium Lactate 10 % CREA, Apply thin layer to skin on the body at least once daily., Disp: 120 mL, Rfl: 11     aspirin 81 MG tablet, Take 81 mg by mouth daily, Disp: , Rfl:      atorvastatin (LIPITOR) 10 MG tablet, Take 1 tablet (10 mg) by mouth daily, Disp: 90 tablet, Rfl: 1     Calcium 600 MG tablet, Take 1 tablet by mouth 2 times daily., Disp: 100 tablet, Rfl: 3     Cholecalciferol (VITAMIN D) 1000 UNITS capsule, Take 1 capsule by mouth daily., Disp: , Rfl:      citalopram (CELEXA) 20 MG tablet, Take 1 tablet (20 mg) by mouth daily, Disp: 90 tablet, Rfl: 3     clindamycin (CLEOCIN) 300  MG capsule, Take 2 capsules by mouth as needed for 1 dose. 1 hour prior to dental work., Disp: 6 capsule, Rfl: 0     coenzyme Q-10 75 MG CAPS, Take  by mouth., Disp: , Rfl:      ferrous sulfate (IRON) 325 (65 Fe) MG tablet, Take 325 mg by mouth daily (with breakfast), Disp: , Rfl:      flunisolide (NASALIDE) 25 MCG/ACT (0.025%) SOLN spray, Spray 1 spray into both nostrils every 12 hours, Disp: , Rfl:      fluticasone-salmeterol (ADVAIR DISKUS) 250-50 MCG/DOSE diskus inhaler, Inhale 1 puff into the lungs every 12 hours, Disp: 120 Inhaler, Rfl: 1     furosemide (LASIX) 40 MG tablet, Take 1 tablet (40 mg) by mouth daily, Disp: 90 tablet, Rfl: 0     LANSOPRAZOLE PO, Take 30 mg by mouth, Disp: , Rfl:      levothyroxine (SYNTHROID/LEVOTHROID) 50 MCG tablet, Take 1.5 tablets (75 mcg) by mouth daily, Disp: 90 tablet, Rfl: 3     MAGNESIUM OXIDE PO, Take 400 mg by mouth, Disp: , Rfl:      meclizine (ANTIVERT) 25 MG tablet, Take 1 tablet (25 mg) by mouth 3 times daily as needed for dizziness, Disp: 30 tablet, Rfl: 3     melatonin 3 MG tablet, Take 1 tablet (3 mg) by mouth nightly as needed for sleep, Disp: 100 tablet, Rfl: 3     metoprolol tartrate (LOPRESSOR) 25 MG tablet, Take 0.5 tablets (12.5 mg) by mouth 2 times daily, Disp: 90 tablet, Rfl: 3     Multiple Vitamins-Minerals (OCUVITE PO), Take  by mouth., Disp: , Rfl: 0     order for DME, Equipment being ordered: Safe step tub / walk-in tub, Disp: 1 Device, Rfl: 0     order for DME, Walker, Disp: 1 each, Rfl: 0     senna-docusate (SENOKOT-S;PERICOLACE) 8.6-50 MG per tablet, Take 1 tablet by mouth 2 times daily., Disp: 60 tablet, Rfl:      spironolactone (ALDACTONE) 25 MG tablet, Take 1 tablet (25 mg) by mouth daily, Disp: 90 tablet, Rfl: 3     tamsulosin (FLOMAX) 0.4 MG capsule, Take 1 capsule (0.4 mg) by mouth daily, Disp: 90 capsule, Rfl: 3     traMADol (ULTRAM) 50 MG tablet, Take 0.5 tablets (25 mg) by mouth 2 times daily as needed for severe pain, Disp: 15 tablet, Rfl:  0     triamcinolone (KENALOG) 0.1 % cream, Apply sparingly to affected area three times daily as needed, Disp: 80 g, Rfl: 1      Allergies:   Penicillins     Family History:   Family History   Problem Relation Age of Onset     Cancer Father      KIDNEY DISEASE Mother       Social History:   Social History   Substance Use Topics     Smoking status: Former Smoker     Types: Cigars, Pipe     Quit date: 1/1/1990     Smokeless tobacco: Former User     Alcohol use No     Physical Exam:   /64  Pulse 62  Wt 88.9 kg (196 lb)  BMI 28.12 kg/m2     Constitutional: Sitting in wheelchair, in no apparent distress  HEENT: Normocephalic, EOMI  Neck: Supple  CV: RRR, normal S1/S2, grade III/VI systolic crescendo-decrescendo murmur  Resp: CTAB, no crackles or wheezes  GI: Soft, non-distended, non-tender, normoactive bowel sounds  MSK: Extremities grossly normal, no swelling  Skin: No rashes or lesions  Neuro: A&O, CN II-XII grossly intact  Psych: Appropriate affect, communicative     Assessment and Plan:  Chronic diastolic heart failure (H)  CAD S/P percutaneous coronary angioplasty  - BASIC METABOLIC PANEL (Today)  - furosemide (LASIX) 40 MG tablet  Dispense: 90 tablet; Refill: 0    Nonrheumatic aortic valve stenosis  He has aortic stenosis and mitral insufficiency. Recently saw Cardiology who recommended RAY. He will follow-up on Monday and will schedule the RAY afterwards.  - Transesophageal Echocardiogram    Anemia, unspecified type  History of anemia 2/2 bleeding. Improving over the past 2 months. Continues to be fatigued and short of breath. Ordered CBC to check Hgb.  - CBC with platelets    Hip pain, left  Recovering from left hip arthroplasty, here for follow-up from transitional care. Continues to have pain in left upper leg in the setting of PT/OT. Recommend tramadol at night and heating pads for symptomatic relief, also to continue moving as tolerated.  - traMADol (ULTRAM) 50 MG tablet  Dispense: 10 tablet; Refill:  0      Follow-up: Return on Monday for further follow-up.      Adrian Montilla, MS4  Addend:  Mr Corona was seen and examined with the Student Mr Montilla I have reviewed his note and plan to see me again on 7/2/18.I agree.The patient's labs show marked resolution of the anemia,  But he was transfused 6 units so need to assess retic count now,His renal and electrolytes  Are wnl. Results transmitted to patient's wife and will see again to discuss RAY requested by cardiology his wife is sole caregiver and may need social service intervention to assist her  Ja Carvalho MD

## 2018-06-27 NOTE — NURSING NOTE
Chief Complaint   Patient presents with     Hospital F/U     pt here following a hospital visit       Zoey Son CMA at 2:13 PM on 6/27/2018.

## 2018-06-28 ENCOUNTER — TELEPHONE (OUTPATIENT)
Dept: CARDIOLOGY | Facility: CLINIC | Age: 83
End: 2018-06-28

## 2018-06-28 NOTE — TELEPHONE ENCOUNTER
citalopram (CELEXA) 20 MG tablet    Last Written Prescription Date:  1/19/17  Last Fill Quantity: 90,   # refills: 3  Last Office Visit : 6/27/18  Future Office visit:  7/2/18    Routing  Because:   [id number]

## 2018-06-28 NOTE — TELEPHONE ENCOUNTER
[facility] Call Center    Phone Message    May a detailed message be left on voicemail: no    Reason for Call: Other: Pt called in to speak with Lennie or Jennifer regarding the Pt's order for a RAY and getting some information from [facility]. She was not sure of the whole situation but wanted a call back.     Action Taken: Message routed to:  [facility] ([facility]): [facility] ADULT [facility]

## 2018-06-29 RX ORDER — CITALOPRAM HYDROBROMIDE 20 MG/1
20 TABLET ORAL DAILY
Qty: 90 TABLET | Refills: 1 | Status: SHIPPED | OUTPATIENT
Start: 2018-06-29 | End: 2018-11-09

## 2018-06-29 NOTE — TELEPHONE ENCOUNTER
[facility] Call Center    Phone Message    May a detailed message be left on voicemail: no    Reason for Call: Other: Pt's wife Kassi called in again this morning to talk with Lennie or Jennifer. She now has more information to share. Pt was prescribed Lasix which one of the PCC providers cut in half and changed the dosage on his metoprolol (which I don't see listed). She also wants to follow-up on the Pt's RAY scheduled on 7/3/18; she said that apparently cardiology was waiting on his RAY recs from an outside clinic before doing another RAY. Please call Kassi back asap per her request.     Action Taken: Message routed to:  [facility] ([facility]): [facility]OLOG[facility] ADULT [facility]

## 2018-07-02 NOTE — TELEPHONE ENCOUNTER
Spoke with Kassi on Friday about her concerns on the medication changes the Gorge's PCP wanted for him and the need for the repeat RAY.     2nd request for RAY images from [facility] requested. CD received on 6/29 images will be uploaded. Will have Dr. Jj review ASAP.    Phone call place this morning no answer voicemail left letting them know that CD is here will review with Dr. Jj but maybe next week when this happens due to availability of MD. Contact info let for any further questions

## 2018-07-10 DIAGNOSIS — E87.6 HYPOKALEMIA: ICD-10-CM

## 2018-07-10 RX ORDER — SPIRONOLACTONE 25 MG/1
25 TABLET ORAL DAILY
Qty: 90 TABLET | Refills: 3 | Status: SHIPPED | OUTPATIENT
Start: 2018-07-10 | End: 2019-10-18

## 2018-07-19 DIAGNOSIS — R33.9 URINARY RETENTION: ICD-10-CM

## 2018-07-20 RX ORDER — TAMSULOSIN HYDROCHLORIDE 0.4 MG/1
0.4 CAPSULE ORAL DAILY
Qty: 90 CAPSULE | Refills: 3 | Status: SHIPPED | OUTPATIENT
Start: 2018-07-20 | End: 2019-10-18

## 2018-07-21 ENCOUNTER — OFFICE VISIT (OUTPATIENT)
Dept: OPHTHALMOLOGY | Facility: CLINIC | Age: 83
End: 2018-07-21
Payer: MEDICARE

## 2018-07-21 DIAGNOSIS — H54.3 LOW VISION, BOTH EYES: Primary | ICD-10-CM

## 2018-07-21 ASSESSMENT — VISUAL ACUITY
OD_CC: 20/80
OD_SC: 20/70+2
METHOD: SNELLEN - LINEAR

## 2018-07-21 ASSESSMENT — REFRACTION_MANIFEST
OS_SPHERE: +3.50
OD_CYLINDER: SPHERE
OD_SPHERE: PLANO
OD_SPHERE: +3.50
OS_SPHERE: PLANO
OS_CYLINDER: SPHERE

## 2018-07-21 ASSESSMENT — REFRACTION_WEARINGRX
OD_CYLINDER: SPHERE
OS_CYLINDER: SPHERE
SPECS_TYPE: OTC READERS
OD_SPHERE: +1.75
OS_SPHERE: +1.75

## 2018-07-21 NOTE — PROGRESS NOTES
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 Seen every 3 months at [facility]. Receiving injections left eye only  -Pt presents today for second opinion on refractive and visual options  -No significant distance Rx s/p CE/IOL each eye  -Does appreciate increased add at near. Reviewed closer working distance with higher add.  -Long discussion regarding expectations with AMD. I would recommend he meet with a low vision specialist after determining specific tasks he is unable to complete now    Refer to Dr. Lombardo or Keerthi Ignacio for low vision services    20 minutes spent in examination and discussion with pt

## 2018-07-21 NOTE — MR AVS SNAPSHOT
After Visit Summary   2018    Gorge Corona    MRN: [id number]           Patient Information     Date Of Birth          1930        Visit Information        Provider Department      2018 10:20 AM Zuleyka Ortiz OD [facility]        Today's Diagnoses     Low vision, both eyes    -  1       Follow-ups after your visit        Additional Services     OCCUPATIONAL THERAPY REFERRAL                 Who to contact     Please call your clinic at [phone] to:    Ask questions about your health    Make or cancel appointments    Discuss your medicines    Learn about your test results    Speak to your doctor            Additional Information About Your Visit        MyChart Information     [employer] is an electronic gateway that provides easy, online access to your medical records. With [employer], you can request a clinic appointment, read your test results, renew a prescription or communicate with your care team.     To sign up for [employer] visit the website at [email or web address]   You will be asked to enter the access code listed below, as well as some personal information. Please follow the directions to create your username and password.     Your access code is: [email or web address]  Expires: 10/19/2018  9:19 AM     Your access code will  in 90 days. If you need help or a new code, please contact your [facility] or call [phone] for assistance.        Care EveryWhere ID     This is your Care EveryWhere ID. This could be used by other organizations to access your [place] medical records  [id number]         Blood Pressure from Last 3 Encounters:   18 111/64   18 136/74   18 136/75    Weight from Last 3 Encounters:   18 88.9 kg (196 lb)   18 89.4 kg (197 lb)   18 93.1 kg (205 lb 3.2 oz)              We Performed the Following     OCCUPATIONAL THERAPY REFERRAL        Primary Care Provider Office  Phone # Fax Nohelia Bearden Jenna Anjel Olea [phone] [address] [address]        Equal Access to Services     PAYALKARL JOE : Hadii aad ku remigioo Soyumikoali, waaxda luqadaha, qaybta kaalmada ademannyda, tejas sotoabhi jaylan. So [facility] [phone].    ATENCIÓN: Si habla español, tiene a ferrari disposición servicios gratuitos de asistencia lingüística. [place] al [phone].    We comply with applicable federal civil rights laws and [place] laws. We do not discriminate on the basis of race, color, national origin, age, disability, sex, sexual orientation, or gender identity.            Thank you!     Thank you for choosing [facility]  for your care. Our goal is always to provide you with excellent care. Hearing back from our patients is one way we can continue to improve our services. Please take a few minutes to complete the written survey that you may receive in the mail after your visit with us. Thank you!             Your Updated Medication List - Protect others around you: Learn how to safely use, store and throw away your medicines at www.disposemymeds.org.          This list is accurate as of 7/21/18 12:26 PM.  Always use your most recent med list.                   Brand Name Dispense Instructions for use Diagnosis    albuterol 108 (90 Base) MCG/ACT Inhaler    PROAIR HFA/PROVENTIL HFA/VENTOLIN HFA    1 Inhaler    Inhale 2 puffs into the lungs every 4 hours as needed for shortness of breath / dyspnea or wheezing    Cough       amiodarone 200 MG tablet    PACERONE/CODARONE    90 tablet    Take 1 tablet (200 mg) by mouth daily    Atrial fibrillation, unspecified type (H)       Ammonium Lactate 10 % Crea     120 mL    Apply thin layer to skin on the body at least once daily.    Xerosis cutis, Nummular eczema       atorvastatin 10 MG tablet    LIPITOR    90 tablet    Take 1 tablet (10 mg) by mouth daily    Major depressive disorder,  recurrent episode, moderate (H), Essential hypertension, benign, Shortness of breath, Dyslipidemia       calcium 600 MG tablet     100 tablet    Take 1 tablet by mouth 2 times daily.        citalopram 20 MG tablet    celeXA    90 tablet    Take 1 tablet (20 mg) by mouth daily    Major depressive disorder, recurrent episode, moderate (H)       clindamycin 300 MG capsule    CLEOCIN    6 capsule    Take 2 capsules by mouth as needed for 1 dose. 1 hour prior to dental work.    Prophylactic antibiotic       coenzyme Q-10 75 MG Caps      Take  by mouth.        fluticasone-salmeterol 250-50 MCG/DOSE diskus inhaler    ADVAIR DISKUS    120 Inhaler    Inhale 1 puff into the lungs every 12 hours    Acute bronchitis, Cough       furosemide 40 MG tablet    LASIX    90 tablet    Take 0.5 tablets (20 mg) by mouth daily    Chronic diastolic heart failure (H), CAD S/P percutaneous coronary angioplasty       LANSOPRAZOLE PO      Take 30 mg by mouth        levothyroxine 50 MCG tablet    SYNTHROID/LEVOTHROID    90 tablet    Take 1.5 tablets (75 mcg) by mouth daily    Other specified hypothyroidism       MAGNESIUM OXIDE PO      Take 400 mg by mouth        meclizine 25 MG tablet    ANTIVERT    30 tablet    Take 1 tablet (25 mg) by mouth 3 times daily as needed for dizziness    BPPV (benign paroxysmal positional vertigo), unspecified laterality       melatonin 3 MG tablet     100 tablet    Take 1 tablet (3 mg) by mouth nightly as needed for sleep    Insomnia, unspecified type       OCUVITE PO      Take  by mouth.        * order for DME     1 each    Walker    Spinal stenosis, lumbar region, without neurogenic claudication       * order for DME     1 Device    Equipment being ordered: Safe step tub / walk-in tub    Generalized muscle weakness, Personal history of fall, CHF (congestive heart failure) (H)       senna-docusate 8.6-50 MG per tablet    SENOKOT-S;PERICOLACE    60 tablet    Take 1 tablet by mouth 2 times daily.    S/P CABG x 3        spironolactone 25 MG tablet    ALDACTONE    90 tablet    Take 1 tablet (25 mg) by mouth daily    Hypokalemia       tamsulosin 0.4 MG capsule    FLOMAX    90 capsule    Take 1 capsule (0.4 mg) by mouth daily    Urinary retention       * traMADol 50 MG tablet    ULTRAM    15 tablet    Take 0.5 tablets (25 mg) by mouth 2 times daily as needed for severe pain    Pain in joint       * traMADol 50 MG tablet    ULTRAM    10 tablet    Take 1 tablet (50 mg) by mouth daily as needed for severe pain    Hip pain, left       triamcinolone 0.1 % cream    KENALOG    80 g    Apply sparingly to affected area three times daily as needed    Nummular eczema       vitamin D 1000 units capsule      Take 1 capsule by mouth daily.        * Notice:  This list has 4 medication(s) that are the same as other medications prescribed for you. Read the directions carefully, and ask your doctor or other care provider to review them with you.

## 2018-07-31 ENCOUNTER — MEDICAL CORRESPONDENCE (OUTPATIENT)
Dept: HEALTH INFORMATION MANAGEMENT | Facility: CLINIC | Age: 83
End: 2018-07-31

## 2018-08-08 DIAGNOSIS — F33.1 MAJOR DEPRESSIVE DISORDER, RECURRENT EPISODE, MODERATE (H): ICD-10-CM

## 2018-08-08 DIAGNOSIS — I10 ESSENTIAL HYPERTENSION, BENIGN: ICD-10-CM

## 2018-08-08 DIAGNOSIS — E78.5 DYSLIPIDEMIA: ICD-10-CM

## 2018-08-08 DIAGNOSIS — R06.02 SHORTNESS OF BREATH: ICD-10-CM

## 2018-08-09 RX ORDER — ATORVASTATIN CALCIUM 10 MG/1
10 TABLET, FILM COATED ORAL DAILY
Qty: 90 TABLET | Refills: 0 | Status: SHIPPED | OUTPATIENT
Start: 2018-08-09 | End: 2018-12-07

## 2018-08-13 ENCOUNTER — MEDICAL CORRESPONDENCE (OUTPATIENT)
Dept: HEALTH INFORMATION MANAGEMENT | Facility: CLINIC | Age: 83
End: 2018-08-13

## 2018-08-17 ENCOUNTER — TRANSFERRED RECORDS (OUTPATIENT)
Dept: HEALTH INFORMATION MANAGEMENT | Facility: CLINIC | Age: 83
End: 2018-08-17

## 2018-08-17 ENCOUNTER — APPOINTMENT (OUTPATIENT)
Dept: GENERAL RADIOLOGY | Facility: CLINIC | Age: 83
End: 2018-08-17
Attending: EMERGENCY MEDICINE
Payer: MEDICARE

## 2018-08-17 ENCOUNTER — MEDICAL CORRESPONDENCE (OUTPATIENT)
Dept: HEALTH INFORMATION MANAGEMENT | Facility: CLINIC | Age: 83
End: 2018-08-17

## 2018-08-17 ENCOUNTER — HOSPITAL ENCOUNTER (OUTPATIENT)
Facility: CLINIC | Age: 83
Setting detail: OBSERVATION
Discharge: HOME | End: 2018-08-18
Attending: EMERGENCY MEDICINE | Admitting: EMERGENCY MEDICINE
Payer: MEDICARE

## 2018-08-17 DIAGNOSIS — K21.9 GASTROESOPHAGEAL REFLUX DISEASE WITHOUT ESOPHAGITIS: ICD-10-CM

## 2018-08-17 DIAGNOSIS — R00.1 BRADYCARDIA: ICD-10-CM

## 2018-08-17 DIAGNOSIS — R55 NEAR SYNCOPE: ICD-10-CM

## 2018-08-17 DIAGNOSIS — I25.10 CORONARY ARTERY DISEASE INVOLVING NATIVE CORONARY ARTERY OF NATIVE HEART WITHOUT ANGINA PECTORIS: Primary | ICD-10-CM

## 2018-08-17 DIAGNOSIS — R00.1 SINUS BRADYCARDIA: ICD-10-CM

## 2018-08-17 LAB
ALBUMIN SERPL-MCNC: 3.3 G/DL (ref 3.4–5)
ALBUMIN UR-MCNC: NEGATIVE MG/DL
ALP SERPL-CCNC: 114 U/L (ref 40–150)
ALT SERPL W P-5'-P-CCNC: 15 U/L (ref 0–70)
ANION GAP SERPL CALCULATED.3IONS-SCNC: 6 MMOL/L (ref 3–14)
APPEARANCE UR: CLEAR
AST SERPL W P-5'-P-CCNC: 11 U/L (ref 0–45)
BASOPHILS # BLD AUTO: 0 10E9/L (ref 0–0.2)
BASOPHILS NFR BLD AUTO: 0.3 %
BILIRUB SERPL-MCNC: 0.4 MG/DL (ref 0.2–1.3)
BILIRUB UR QL STRIP: NEGATIVE
BUN SERPL-MCNC: 18 MG/DL (ref 7–30)
CALCIUM SERPL-MCNC: 8.2 MG/DL (ref 8.5–10.1)
CHLORIDE SERPL-SCNC: 103 MMOL/L (ref 94–109)
CO2 SERPL-SCNC: 30 MMOL/L (ref 20–32)
COLOR UR AUTO: NORMAL
CREAT SERPL-MCNC: 1.15 MG/DL (ref 0.66–1.25)
DIFFERENTIAL METHOD BLD: ABNORMAL
EOSINOPHIL # BLD AUTO: 0.2 10E9/L (ref 0–0.7)
EOSINOPHIL NFR BLD AUTO: 3.3 %
ERYTHROCYTE [DISTWIDTH] IN BLOOD BY AUTOMATED COUNT: 14.9 % (ref 10–15)
GFR SERPL CREATININE-BSD FRML MDRD: 60 ML/MIN/1.7M2
GLUCOSE SERPL-MCNC: 81 MG/DL (ref 70–99)
GLUCOSE UR STRIP-MCNC: NEGATIVE MG/DL
HCT VFR BLD AUTO: 34.3 % (ref 40–53)
HGB BLD-MCNC: 11 G/DL (ref 13.3–17.7)
HGB UR QL STRIP: NEGATIVE
HYALINE CASTS #/AREA URNS LPF: 1 /LPF (ref 0–2)
IMM GRANULOCYTES # BLD: 0 10E9/L (ref 0–0.4)
IMM GRANULOCYTES NFR BLD: 0.3 %
INR PPP: 1.12 (ref 0.86–1.14)
INTERPRETATION ECG - MUSE: NORMAL
KETONES UR STRIP-MCNC: NEGATIVE MG/DL
LEUKOCYTE ESTERASE UR QL STRIP: NEGATIVE
LYMPHOCYTES # BLD AUTO: 1.8 10E9/L (ref 0.8–5.3)
LYMPHOCYTES NFR BLD AUTO: 25.3 %
MCH RBC QN AUTO: 27.4 PG (ref 26.5–33)
MCHC RBC AUTO-ENTMCNC: 32.1 G/DL (ref 31.5–36.5)
MCV RBC AUTO: 85 FL (ref 78–100)
MONOCYTES # BLD AUTO: 0.9 10E9/L (ref 0–1.3)
MONOCYTES NFR BLD AUTO: 12.2 %
NEUTROPHILS # BLD AUTO: 4.1 10E9/L (ref 1.6–8.3)
NEUTROPHILS NFR BLD AUTO: 58.6 %
NITRATE UR QL: NEGATIVE
NRBC # BLD AUTO: 0 10*3/UL
NRBC BLD AUTO-RTO: 0 /100
NT-PROBNP SERPL-MCNC: 483 PG/ML (ref 0–1800)
PH UR STRIP: 7 PH (ref 5–7)
PLATELET # BLD AUTO: 133 10E9/L (ref 150–450)
PLATELET # BLD EST: ABNORMAL 10*3/UL
POTASSIUM SERPL-SCNC: 3.9 MMOL/L (ref 3.4–5.3)
PROT SERPL-MCNC: 6.6 G/DL (ref 6.8–8.8)
RBC # BLD AUTO: 4.02 10E12/L (ref 4.4–5.9)
RBC #/AREA URNS AUTO: 0 /HPF (ref 0–2)
SODIUM SERPL-SCNC: 138 MMOL/L (ref 133–144)
SOURCE: NORMAL
SP GR UR STRIP: 1 (ref 1–1.03)
TROPONIN I BLD-MCNC: 0 UG/L (ref 0–0.1)
TROPONIN I SERPL-MCNC: <0.015 UG/L (ref 0–0.04)
UROBILINOGEN UR STRIP-MCNC: NORMAL MG/DL (ref 0–2)
WBC # BLD AUTO: 7 10E9/L (ref 4–11)
WBC #/AREA URNS AUTO: 0 /HPF (ref 0–5)

## 2018-08-17 PROCEDURE — 99220 ZZC INITIAL OBSERVATION CARE,LEVL III: CPT | Mod: Z6 | Performed by: EMERGENCY MEDICINE

## 2018-08-17 PROCEDURE — 25000128 H RX IP 250 OP 636: Performed by: NURSE PRACTITIONER

## 2018-08-17 PROCEDURE — G0378 HOSPITAL OBSERVATION PER HR: HCPCS

## 2018-08-17 PROCEDURE — 25000132 ZZH RX MED GY IP 250 OP 250 PS 637: Mod: GY | Performed by: NURSE PRACTITIONER

## 2018-08-17 PROCEDURE — 99285 EMERGENCY DEPT VISIT HI MDM: CPT | Mod: 25 | Performed by: EMERGENCY MEDICINE

## 2018-08-17 PROCEDURE — A9270 NON-COVERED ITEM OR SERVICE: HCPCS | Mod: GY | Performed by: NURSE PRACTITIONER

## 2018-08-17 PROCEDURE — 84484 ASSAY OF TROPONIN QUANT: CPT

## 2018-08-17 PROCEDURE — 96360 HYDRATION IV INFUSION INIT: CPT

## 2018-08-17 PROCEDURE — 85610 PROTHROMBIN TIME: CPT | Performed by: EMERGENCY MEDICINE

## 2018-08-17 PROCEDURE — 80053 COMPREHEN METABOLIC PANEL: CPT | Performed by: EMERGENCY MEDICINE

## 2018-08-17 PROCEDURE — 84484 ASSAY OF TROPONIN QUANT: CPT | Mod: 91 | Performed by: EMERGENCY MEDICINE

## 2018-08-17 PROCEDURE — 93005 ELECTROCARDIOGRAM TRACING: CPT | Performed by: EMERGENCY MEDICINE

## 2018-08-17 PROCEDURE — 85025 COMPLETE CBC W/AUTO DIFF WBC: CPT | Performed by: EMERGENCY MEDICINE

## 2018-08-17 PROCEDURE — 81001 URINALYSIS AUTO W/SCOPE: CPT | Performed by: EMERGENCY MEDICINE

## 2018-08-17 PROCEDURE — 83880 ASSAY OF NATRIURETIC PEPTIDE: CPT | Performed by: EMERGENCY MEDICINE

## 2018-08-17 PROCEDURE — 71045 X-RAY EXAM CHEST 1 VIEW: CPT

## 2018-08-17 RX ORDER — ONDANSETRON 4 MG/1
4 TABLET, ORALLY DISINTEGRATING ORAL EVERY 6 HOURS PRN
Status: DISCONTINUED | OUTPATIENT
Start: 2018-08-17 | End: 2018-08-18 | Stop reason: HOSPADM

## 2018-08-17 RX ORDER — ACETAMINOPHEN 325 MG/1
650 TABLET ORAL EVERY 4 HOURS PRN
Status: DISCONTINUED | OUTPATIENT
Start: 2018-08-17 | End: 2018-08-18 | Stop reason: HOSPADM

## 2018-08-17 RX ORDER — FUROSEMIDE 20 MG
20 TABLET ORAL DAILY
Status: DISCONTINUED | OUTPATIENT
Start: 2018-08-18 | End: 2018-08-18 | Stop reason: HOSPADM

## 2018-08-17 RX ORDER — CITALOPRAM HYDROBROMIDE 20 MG/1
20 TABLET ORAL DAILY
Status: DISCONTINUED | OUTPATIENT
Start: 2018-08-18 | End: 2018-08-18 | Stop reason: HOSPADM

## 2018-08-17 RX ORDER — LANOLIN ALCOHOL/MO/W.PET/CERES
3 CREAM (GRAM) TOPICAL
Status: DISCONTINUED | OUTPATIENT
Start: 2018-08-17 | End: 2018-08-18 | Stop reason: HOSPADM

## 2018-08-17 RX ORDER — LEVOTHYROXINE SODIUM 25 UG/1
75 TABLET ORAL DAILY
Status: DISCONTINUED | OUTPATIENT
Start: 2018-08-18 | End: 2018-08-18 | Stop reason: HOSPADM

## 2018-08-17 RX ORDER — TAMSULOSIN HYDROCHLORIDE 0.4 MG/1
0.4 CAPSULE ORAL DAILY
Status: DISCONTINUED | OUTPATIENT
Start: 2018-08-18 | End: 2018-08-18 | Stop reason: HOSPADM

## 2018-08-17 RX ORDER — LIDOCAINE 40 MG/G
CREAM TOPICAL
Status: DISCONTINUED | OUTPATIENT
Start: 2018-08-17 | End: 2018-08-18 | Stop reason: HOSPADM

## 2018-08-17 RX ORDER — ONDANSETRON 2 MG/ML
4 INJECTION INTRAMUSCULAR; INTRAVENOUS EVERY 6 HOURS PRN
Status: DISCONTINUED | OUTPATIENT
Start: 2018-08-17 | End: 2018-08-18 | Stop reason: HOSPADM

## 2018-08-17 RX ORDER — ACETAMINOPHEN 650 MG/1
650 SUPPOSITORY RECTAL EVERY 4 HOURS PRN
Status: DISCONTINUED | OUTPATIENT
Start: 2018-08-17 | End: 2018-08-18 | Stop reason: HOSPADM

## 2018-08-17 RX ORDER — AMOXICILLIN 250 MG
1 CAPSULE ORAL 2 TIMES DAILY
Status: DISCONTINUED | OUTPATIENT
Start: 2018-08-17 | End: 2018-08-18 | Stop reason: HOSPADM

## 2018-08-17 RX ORDER — LANSOPRAZOLE 30 MG/1
30 CAPSULE, DELAYED RELEASE ORAL
Status: DISCONTINUED | OUTPATIENT
Start: 2018-08-18 | End: 2018-08-18 | Stop reason: HOSPADM

## 2018-08-17 RX ORDER — ATORVASTATIN CALCIUM 10 MG/1
10 TABLET, FILM COATED ORAL DAILY
Status: DISCONTINUED | OUTPATIENT
Start: 2018-08-18 | End: 2018-08-18 | Stop reason: HOSPADM

## 2018-08-17 RX ORDER — SPIRONOLACTONE 25 MG/1
25 TABLET ORAL DAILY
Status: DISCONTINUED | OUTPATIENT
Start: 2018-08-18 | End: 2018-08-18 | Stop reason: HOSPADM

## 2018-08-17 RX ORDER — SODIUM CHLORIDE 9 MG/ML
INJECTION, SOLUTION INTRAVENOUS CONTINUOUS
Status: DISCONTINUED | OUTPATIENT
Start: 2018-08-17 | End: 2018-08-18 | Stop reason: HOSPADM

## 2018-08-17 RX ORDER — NITROGLYCERIN 0.4 MG/1
0.4 TABLET SUBLINGUAL EVERY 5 MIN PRN
Status: DISCONTINUED | OUTPATIENT
Start: 2018-08-17 | End: 2018-08-18 | Stop reason: HOSPADM

## 2018-08-17 RX ORDER — IPRATROPIUM BROMIDE AND ALBUTEROL SULFATE 2.5; .5 MG/3ML; MG/3ML
3 SOLUTION RESPIRATORY (INHALATION) EVERY 4 HOURS PRN
Status: DISCONTINUED | OUTPATIENT
Start: 2018-08-17 | End: 2018-08-18 | Stop reason: HOSPADM

## 2018-08-17 RX ADMIN — SODIUM CHLORIDE: 9 INJECTION, SOLUTION INTRAVENOUS at 22:47

## 2018-08-17 RX ADMIN — SENNOSIDES AND DOCUSATE SODIUM 1 TABLET: 8.6; 5 TABLET ORAL at 22:54

## 2018-08-17 ASSESSMENT — ACTIVITIES OF DAILY LIVING (ADL)
DRESS: 0-->INDEPENDENT
RETIRED_EATING: 0-->INDEPENDENT
TOILETING: 0-->INDEPENDENT
BATHING: 0-->INDEPENDENT
RETIRED_COMMUNICATION: 0-->UNDERSTANDS/COMMUNICATES WITHOUT DIFFICULTY
SWALLOWING: 0-->SWALLOWS FOODS/LIQUIDS WITHOUT DIFFICULTY
TRANSFERRING: 0-->INDEPENDENT

## 2018-08-17 ASSESSMENT — ENCOUNTER SYMPTOMS
LIGHT-HEADEDNESS: 1
SHORTNESS OF BREATH: 0
DIZZINESS: 1
VOMITING: 0
DIAPHORESIS: 0
NAUSEA: 0

## 2018-08-17 ASSESSMENT — PAIN DESCRIPTION - DESCRIPTORS: DESCRIPTORS: DULL

## 2018-08-17 NOTE — H&P
ED OBSERVATION HISTORY & PHYSICAL    Admission Date: 8/17/18  Attending Physician: Wong Henry MD  Admitting Physician: Josep Santizo MD  NP/PA: Jenna Lam CNP    REASON FOR ADMISSION:   Chief Complaint   Patient presents with     Dizziness       HPI:    Gorge Corona is a 87 year old male with a history of HTN, COPD, afib, SVT s/p ablation, CAD s/p CABG x4, aortic stenosis, hypothyroid, CHF, anemia, and lumbar spondylolysis, who presented to the ED with dizziness this morning. He completed home OT this morning and therapist did set of vitals, HR 50. Wife continued to monitor and called EMS when it dropped to 40 with a blood pressure of around 100 systolic. No chest pain, no shortness of breath, no increased edema. Pt reports feeling dizzy and lightheaded. No diaphoresis, nausea, or vomiting. He was hospitalized for a left femur fracture and during that time developed Atrial Fib/flutter, for which he was placed on metoprolol 12.5mg BID. Cardiology office was contacted and advised patient to stop taking metoprolol and come to the Emergency Dept. ECHO 6/7/18 EF 55-60%, mild aortic stenosis, bioprosthetic mitral valve.    In the ED HR 55-63, /60, 94% on RA. EKG shows bradycardia with first degree block. Troponin negative. . Creatinine 1.15. Hgb 11, hematocrit 34.3.     On admission to the observation unit the patient was stable.    ROS:    CONSTITUTIONAL: +dizzy today. Generally feels well. Denies fever, chills, sweats, fatigue, weakness, or appetite changes.  SKIN: Denies rash, itching, bruising, new lumps or bumps, ecchymosis, hair changes or nail changes.  EYES: Denies visual changes, blurred vision, double vision, or eye pain  RESPIRATORY: Denies dyspnea at rest or with activity, cough, or hemoptysis.  CARDIOVASCULAR: +some tenderness on left chest wall with palpation. Denies palpitations, chest pain/pressure, orthopnea, edema or open areas on extremities.  GASTROINTESTINAL:  Denies dysphagia,  heartburn, nausea, vomiting, abdominal pain, constipation, or diarrhea.  GENITOURINARY: Denies dysuria, frequency, urgency, hesitancy, hematuria, or incontinence  MUSCULOSKELETAL: Denies muscle/joint pain and weakness  NEUROLOGIC: Denies headaches, dizziness, numbness or tingling of hands and feet, confusion, memory changes, lightheadedness/dizziness or difficulties with balance.  PSYCHIATRIC: Denies anxiety, depression, mental status changes, or change in mood.  HEME/LYMPH: Denies active bleeding, swollen nodes  VASCULAR ACCESS: Denies pain, redness, or discharge.    ROS negative other than the symptoms noted above.    History:    Past Medical History:   Diagnosis Date     Atrial fibrillation (H)      Atrial flutter (H)      Basal cell carcinoma of skin of trunk      BPH (benign prostatic hypertrophy) with urinary obstruction      CAD (coronary artery disease)     s/p CABG 1/2013; RUSSEL in 2/2013     Depression      H/O CT scan of head 1/11/2013 7/20/2006    Result Impression     CT of the Head without contrast 7/20/2006  History: Ataxia, incontinence, and NPH  Comparison Study: MR obtained to 4/19/2004  Technique: Axial CT images of the head were obtained at 2 intervals from the skull base to the vertex without intravenous contrast. The images were reviewed in brain, subdural and bone windows.  Findings: Patient has had a moderate promin     Hyperlipidemia      Lumbar spinal stenosis      Mitral regurgitation     s/p bioprosthesis 1/2013     Palpitations      Paroxysmal supraventricular tachycardia (H)      Squamous cell carcinoma      Tachycardia     baroreflex injury dut to surgery      (ventriculoperitoneal) shunt status     for Normal pressure hydrocephalus       Past Surgical History:   Procedure Laterality Date     ANESTHESIA CARDIOVERSION  4/11/2013    Procedure: ANESTHESIA CARDIOVERSION;  Cardioversion;  Surgeon: Provider, Generic Anesthesia;  Location: [facility] OR     BYPASS GRAFT ARTERY CORONARY, REPAIR  VALVE MITRAL, COMBINED  1/14/2013    Procedure: COMBINED BYPASS GRAFT ARTERY CORONARY, REPAIR VALVE MITRAL;  Median sternotomy, coronary artery bypass graft X3 using left internal mammary artery & right saphenous vein , mitral valve Replacement and on pump oxygenator, flexible cystoscopy, urethral dilation and insertion of staurt catheter.;  Surgeon: Chan Peres MD;  Location: [facility] OR     CORONARY ANGIOGRAPHY ADULT ORDER       CORONARY ARTERY BYPASS  1/2013    mitral valve tissue      INSERT CATHETER BLADDER  1/14/2013    Procedure: INSERT CATHETER BLADDER;  Flexible cystoscopy, urethral dilation,& insertion of stuart catheter;  Surgeon: Pete Bedoya MD;  Location: [facility] OR     MITRAL VALVE REPLACEMENT  2013    29 mm Epic porcine valve     MOHS MICROGRAPHIC PROCEDURE       REPLACE VALVE MITRAL  1/2013    tissue valve       Family History   Problem Relation Age of Onset     Cancer Father      KIDNEY DISEASE Mother      Macular Degeneration No family hx of      Glaucoma No family hx of        Social History     Social History     Marital status:      Spouse name: N/A     Number of children: N/A     Years of education: N/A     Occupational History     Not on file.     Social History Main Topics     Smoking status: Former Smoker     Types: Cigars, Pipe     Quit date: 1/1/1990     Smokeless tobacco: Former User     Alcohol use No     Drug use: No     Sexual activity: Not on file     Other Topics Concern     Not on file     Social History Narrative         No current facility-administered medications on file prior to encounter.   Current Outpatient Prescriptions on File Prior to Encounter:  albuterol (PROAIR HFA, PROVENTIL HFA, VENTOLIN HFA) 108 (90 BASE) MCG/ACT inhaler Inhale 2 puffs into the lungs every 4 hours as needed for shortness of breath / dyspnea or wheezing   amiodarone (PACERONE/CODARONE) 200 MG tablet Take 1 tablet (200 mg) by mouth daily   Ammonium Lactate 10 % CREA Apply thin  layer to skin on the body at least once daily.   atorvastatin (LIPITOR) 10 MG tablet Take 1 tablet (10 mg) by mouth daily   Calcium 600 MG tablet Take 1 tablet by mouth 2 times daily.   Cholecalciferol (VITAMIN D) 1000 UNITS capsule Take 1 capsule by mouth daily.   citalopram (CELEXA) 20 MG tablet Take 1 tablet (20 mg) by mouth daily   clindamycin (CLEOCIN) 300 MG capsule Take 2 capsules by mouth as needed for 1 dose. 1 hour prior to dental work.   coenzyme Q-10 75 MG CAPS Take  by mouth.   fluticasone-salmeterol (ADVAIR DISKUS) 250-50 MCG/DOSE diskus inhaler Inhale 1 puff into the lungs every 12 hours   furosemide (LASIX) 40 MG tablet Take 0.5 tablets (20 mg) by mouth daily   LANSOPRAZOLE PO Take 30 mg by mouth   levothyroxine (SYNTHROID/LEVOTHROID) 50 MCG tablet Take 1.5 tablets (75 mcg) by mouth daily   MAGNESIUM OXIDE PO Take 400 mg by mouth   meclizine (ANTIVERT) 25 MG tablet Take 1 tablet (25 mg) by mouth 3 times daily as needed for dizziness   melatonin 3 MG tablet Take 1 tablet (3 mg) by mouth nightly as needed for sleep   Multiple Vitamins-Minerals (OCUVITE PO) Take  by mouth.   order for DME Equipment being ordered: Safe step tub / walk-in tub   order for DME Walker   senna-docusate (SENOKOT-S;PERICOLACE) 8.6-50 MG per tablet Take 1 tablet by mouth 2 times daily.   spironolactone (ALDACTONE) 25 MG tablet Take 1 tablet (25 mg) by mouth daily   tamsulosin (FLOMAX) 0.4 MG capsule Take 1 capsule (0.4 mg) by mouth daily   traMADol (ULTRAM) 50 MG tablet Take 1 tablet (50 mg) by mouth daily as needed for severe pain   traMADol (ULTRAM) 50 MG tablet Take 0.5 tablets (25 mg) by mouth 2 times daily as needed for severe pain   triamcinolone (KENALOG) 0.1 % cream Apply sparingly to affected area three times daily as needed       Exam:  Vitals:  B/P: 102/60, T: Data Unavailable, P: 60, R: 19    All vital signs were reviewed.  GENERAL APPEARANCE: Pleasant, generally appears well, A/O x4. NAD.  SKIN: Clean, dry, and  intact without visible lesions, rash, jaundice, cyanosis, erythema, ecchymoses to exposed areas.   NECK: Supple, no masses. No jugular venous distention.   CARDIOVASCULAR: S1, S2 RRR. No murmurs, rubs, or gallops.   RESPIRATORY: Respiratory effort WNL. CTA  bilaterally without crackles/rales/wheeze   ABDOMEN: Active BS in all 4 quadrants. Abdomen soft and non-tender. No masses or hepatosplenomegaly.  MUSCULOSKELETAL: Gait is steady. Strength 5/5 in major muscle groups of bilateral UE and LE.  Extremities normal, no gross deformities noted, non-tender to palpation.   PV: 2+ bilateral radial and pedal pulses. No edema noted.   NEURO: CN II-XII grossly intact. Speech normal. Appropriate throughout interview.   Sensation grossly WNL. Finger to nose and rapid alternating movements WDL.  HEME/LYMPH: No visible bleeding.  PSYCHIATRIC: Mentation and affect appear normal  VASCULAR ACCESS: CDI without erythema or discharge. Non-tender.    Data:    Results for orders placed or performed during the hospital encounter of 08/17/18   XR Chest Port 1 View    Narrative    Exam: XR CHEST PORT 1 VW, 8/17/2018 3:02 PM    Indication: Chest pain;     Comparison: Chest x-ray 9/23/2016     Findings:   Portable upright AP radiograph of the chest. Postsurgical changes  prior CABG, including median sternotomy wires and mediastinal surgical  clips. The cardiomediastinal silhouette is mildly enlarged. Mild  interstitial opacities bilaterally. Unchanged elevation of the left  hemidiaphragm. Streaky left basilar opacities. Clips project over the  left upper quadrant.       Impression    Impression:   1. Mild cardiomegaly and interstitial pulmonary edema.   2. Chronic elevation of the left hemidiaphragm, with patchy left  basilar opacities that may represent atelectasis. Differential also  includes infection.    I have personally reviewed the examination and initial interpretation  and I agree with the findings.    HEMALATHA GILMORE MD   CBC with  platelets differential   Result Value Ref Range    WBC 7.0 4.0 - 11.0 10e9/L    RBC Count 4.02 (L) 4.4 - 5.9 10e12/L    Hemoglobin 11.0 (L) 13.3 - 17.7 g/dL    Hematocrit 34.3 (L) 40.0 - 53.0 %    MCV 85 78 - 100 fl    MCH 27.4 26.5 - 33.0 pg    MCHC 32.1 31.5 - 36.5 g/dL    RDW 14.9 10.0 - 15.0 %    Platelet Count 133 (L) 150 - 450 10e9/L    Diff Method Automated Method     % Neutrophils 58.6 %    % Lymphocytes 25.3 %    % Monocytes 12.2 %    % Eosinophils 3.3 %    % Basophils 0.3 %    % Immature Granulocytes 0.3 %    Nucleated RBCs 0 0 /100    Absolute Neutrophil 4.1 1.6 - 8.3 10e9/L    Absolute Lymphocytes 1.8 0.8 - 5.3 10e9/L    Absolute Monocytes 0.9 0.0 - 1.3 10e9/L    Absolute Eosinophils 0.2 0.0 - 0.7 10e9/L    Absolute Basophils 0.0 0.0 - 0.2 10e9/L    Abs Immature Granulocytes 0.0 0 - 0.4 10e9/L    Absolute Nucleated RBC 0.0     Platelet Estimate Confirming automated cell count    Troponin I   Result Value Ref Range    Troponin I ES <0.015 0.000 - 0.045 ug/L   INR   Result Value Ref Range    INR 1.12 0.86 - 1.14   Comprehensive metabolic panel   Result Value Ref Range    Sodium 138 133 - 144 mmol/L    Potassium 3.9 3.4 - 5.3 mmol/L    Chloride 103 94 - 109 mmol/L    Carbon Dioxide 30 20 - 32 mmol/L    Anion Gap 6 3 - 14 mmol/L    Glucose 81 70 - 99 mg/dL    Urea Nitrogen 18 7 - 30 mg/dL    Creatinine 1.15 0.66 - 1.25 mg/dL    GFR Estimate 60 (L) >60 mL/min/1.7m2    GFR Estimate If Black 73 >60 mL/min/1.7m2    Calcium 8.2 (L) 8.5 - 10.1 mg/dL    Bilirubin Total 0.4 0.2 - 1.3 mg/dL    Albumin 3.3 (L) 3.4 - 5.0 g/dL    Protein Total 6.6 (L) 6.8 - 8.8 g/dL    Alkaline Phosphatase 114 40 - 150 U/L    ALT 15 0 - 70 U/L    AST 11 0 - 45 U/L   Nt probnp inpatient (BNP)   Result Value Ref Range    N-Terminal Pro BNP Inpatient 483 0 - 1800 pg/mL   UA with Microscopic reflex to Culture   Result Value Ref Range    Color Urine Straw     Appearance Urine Clear     Glucose Urine Negative NEG^Negative mg/dL    Bilirubin  Urine Negative NEG^Negative    Ketones Urine Negative NEG^Negative mg/dL    Specific Gravity Urine 1.005 1.003 - 1.035    Blood Urine Negative NEG^Negative    pH Urine 7.0 5.0 - 7.0 pH    Protein Albumin Urine Negative NEG^Negative mg/dL    Urobilinogen mg/dL Normal 0.0 - 2.0 mg/dL    Nitrite Urine Negative NEG^Negative    Leukocyte Esterase Urine Negative NEG^Negative    Source Midstream Urine     WBC Urine 0 0 - 5 /HPF    RBC Urine 0 0 - 2 /HPF    Hyaline Casts 1 0 - 2 /LPF   EKG 12 lead   Result Value Ref Range    Interpretation ECG Click View Image link to view waveform and result    Troponin POCT   Result Value Ref Range    Troponin I 0.00 0.00 - 0.10 ug/L             EKG Interpretation:      Interpreted by Josep Santizo  Symptoms at time of EKG: dizzy   Rhythm: Normal sinus  and Sinus bradycardia  Rate: 58  Axis: Normal  Ectopy: None  Conduction: Normal and 1st degree AV block  ST Segments/ T Waves: No ST-T wave changes and No acute ischemic changes  Q Waves: None    Clinical Impression: sinus bradycardia with first degree block        Assessment/Plan:  Gorge Corona is a 87 year old male with HTN, COPD, afib, SVT s/p ablation, CAD s/p CABG x4, aortic stenosis, hypothyroid, CHF, anemia, and lumbar spondylolysis, who presented to the ED from home with dizziness this morning. He is hemodynamically stable. Plan for monitoring overnight with cardiology consult to help determine medication regimen regarding his metoprolol and A. Fib/flutter treatment. Also holding his Amiodarone tonight as wife reports he takes it every other day and she has no idea whether it was in his pill box for him to take today or not.     1. Dizziness/Near Syncope  -Admit to ED Obs  -Telemetry monitoring  -VS Q4hrs  -Cardiology consult placed  -ECHO done 6/7/18, did not repeat    ##Chronic Medical Problems  ##HTN  ##Anemia  ##COPD  ##Hypothyroid  ##CAD s/p cabg x4  ##SVT s/p ablation  ##left femur fracture    FEN:  -Regular diet as  tolerated.  -Monitor BMP and replace electrolytes per protocol    Prophy:  -No VTE prophy as patient is up ad vincenzo and anticipate short observation stay   -Encourage ambulation with assist as tolerated   -PRN Senna and Miralax    Consults:   Cardiology    CODE STATUS:  FULL CODE      DISPOSITION: Anticipate discharge to home in less than 2 midnights      Jenna Lam APRN, CNP  Nurse Practitioner   Emergency Department Observation Unit

## 2018-08-17 NOTE — ED TRIAGE NOTES
BIBA from home. Was feeling dizzy at home, checked his HR it was in the 40's.   For EMS HR 50's. Started on metoprolol last month by cardiologist, on the 27th his PCP said to stop metoprolol, wife called cardiologist who said to keep taking metopolol.

## 2018-08-17 NOTE — ED NOTES
Bed: ED02  Expected date: 8/17/18  Expected time: 1:10 PM  Means of arrival: Ambulance  Comments:  [id number]  87 male  Edson, dizzy  Yellow

## 2018-08-17 NOTE — IP AVS SNAPSHOT
MRN:[id number]                      After Visit Summary   8/17/2018    Gorge Corona    MRN: [id number]           Thank you!     Thank you for choosing [facility] for your care. Our goal is always to provide you with excellent care. Hearing back from our patients is one way we can continue to improve our services. Please take a few minutes to complete the written survey that you may receive in the mail after you visit with us. Thank you!        Patient Information     Date Of Birth          11/27/1930        Designated Caregiver       Most Recent Value    Caregiver    Will someone help with your care after discharge? yes    Name of designated caregiver Kassi    Phone number of caregiver [phone]    Caregiver address same as pt      About your hospital stay     You were admitted on:  August 17, 2018 You last received care in the:  Unit 6D Observation [facility]    You were discharged on:  August 18, 2018        Reason for your hospital stay       Bradycardia                  Who to Call     For medical emergencies, please call 911.  For non-urgent questions about your medical care, please call your primary care provider or clinic, [phone]          Attending Provider     Provider Specialty    Josep Santizo MD Emergency Medicine    Anjel, Deidre Plata MD Emergency Medicine    Wong Henry MD Emergency Medicine       Primary Care Provider Office Phone # Fax #    Monisha Jenna Olea [phone]       When to contact your care team       Call 911 if any of the following occur:    Chest pain    Trouble breathing    Slow heart rate with dizziness or lightheadedness    Fainting or loss of consciousness    Chest, shoulder, arm, neck, or back pain    Slow heart rate (under 50 beats per minute) if associated with symptoms    Call your healthcare provider right away if any of the following occur:    Occasional weakness, dizziness, or lightheadedness                   After Care Instructions     Activity       Your activity upon discharge: As tolerated            Diet       Follow this diet upon discharge: Cardiac diet            Discharge Instructions       You were admitted to ED observation with a slow heart rate. You were seen by cardiology who recommended stopping Amiodarone. Recommend stopping Metoprolol 12.5 mg twice a day. Start Metoprolol 12.5 mg extended release daily. Cardiology will arrange moving your appointment up with Dr. Jj.                  Follow-up Appointments     Adult [facility] Follow-up and recommended labs and tests       Follow up with cardiology. Phone number [phone].    Appointments on [place] and/or [place] campus (with [facility] or [facility] provider or service). Call [phone] if you haven't heard regarding these appointments within 7 days of discharge.                  Your next 10 appointments already scheduled     Aug 21, 2018 12:00 PM CDT   L/Vision Eval with Keerthi Ignacio, OT   [facility] ([facility]    [address] [facility] [address]   [phone]            Aug 31, 2018  9:30 AM CDT   (Arrive by 9:15 AM)   Return Visit with Monisha Olea MD   [facility] ([facility])    [address]  4th [address]  [facility] [id number] [phone]            Feb 21, 2019  5:00 PM CST   (Arrive by 4:45 PM)   Return Visit with Morris Jj MD   [facility] ([facility])    [address] [place] [address]  [address] [place] [id number] [phone]              Pending Results     Date and Time Order Name Status Description    8/18/2018 0845 EKG 12-lead, tracing only Preliminary             Statement of Approval     Ordered          08/18/18 1314  I have reviewed and agree with all the recommendations and orders detailed in this document.  EFFECTIVE NOW     Approved and electronically  "signed by:  Jacquelin Cash APRN CNP             Admission Information     Date & Time Provider Department Dept. Phone    2018 Wong Henry MD Unit 6D Observation [facility] [phone]      Your Vitals Were     Blood Pressure Pulse Temperature Respirations Pulse Oximetry       120/68 60 98.2  F (36.8  C) (Oral) 18 96%       MyChart Information     [facility] [employer] lets you send messages to your doctor, view your test results, renew your prescriptions, schedule appointments and more. To sign up, go to [email or web address] . Click on \"Log in\" on the left side of the screen, which will take you to the Welcome page. Then click on \"Sign up Now\" on the right side of the page.     You will be asked to enter the access code listed below, as well as some personal information. Please follow the directions to create your username and password.     Your access code is: [email or web address]  Expires: 10/19/2018  9:19 AM     Your access code will  in 90 days. If you need help or a new code, please call your [place] clinic or [phone].        Care EveryWhere ID     This is your Care EveryWhere ID. This could be used by other organizations to access your [place] medical records  [id number]        Equal Access to Services     [facility] : Hadii kg [address] hadasho Soyumikoali, waaxda luqadaha, qaybta kaalmada adeegyada, tejas griffin . So [facility] [phone].    ATENCIÓN: Si habla español, tiene a ferrari disposición servicios gratuitos de asistencia lingüística. Llame al [phone].    We comply with applicable federal civil rights laws and [place] laws. We do not discriminate on the basis of race, color, national origin, age, disability, sex, sexual orientation, or gender identity.               Review of your medicines      START taking        Dose / Directions    metoprolol succinate 25 MG 24 hr tablet   Commonly known as:  TOPROL-XL   Used for:  Coronary artery disease involving native coronary " artery of native heart without angina pectoris        Dose:  12.5 mg   Take 0.5 tablets (12.5 mg) by mouth daily   Quantity:  45 tablet   Refills:  1         CONTINUE these medicines which may have CHANGED, or have new prescriptions. If we are uncertain of the size of tablets/capsules you have at home, strength may be listed as something that might have changed.        Dose / Directions    LANsoprazole 15 MG CR capsule   Commonly known as:  PREVACID   This may have changed:  when to take this   Used for:  Gastroesophageal reflux disease without esophagitis        Dose:  30 mg   Take 2 capsules (30 mg) by mouth daily   Quantity:  30 capsule   Refills:  1         CONTINUE these medicines which have NOT CHANGED        Dose / Directions    albuterol 108 (90 Base) MCG/ACT inhaler   Commonly known as:  PROAIR HFA/PROVENTIL HFA/VENTOLIN HFA   Used for:  Cough        Dose:  2 puff   Inhale 2 puffs into the lungs every 4 hours as needed for shortness of breath / dyspnea or wheezing   Quantity:  1 Inhaler   Refills:  1       Ammonium Lactate 10 % Crea   Used for:  Xerosis cutis, Nummular eczema        Apply thin layer to skin on the body at least once daily.   Quantity:  120 mL   Refills:  11       atorvastatin 10 MG tablet   Commonly known as:  LIPITOR   Used for:  Major depressive disorder, recurrent episode, moderate (H), Essential hypertension, benign, Shortness of breath, Dyslipidemia        Dose:  10 mg   Take 1 tablet (10 mg) by mouth daily   Quantity:  90 tablet   Refills:  0       calcium 600 MG tablet        Dose:  1 tablet   Take 1 tablet by mouth 2 times daily.   Quantity:  100 tablet   Refills:  3       citalopram 20 MG tablet   Commonly known as:  celeXA   Used for:  Major depressive disorder, recurrent episode, moderate (H)        Dose:  20 mg   Take 1 tablet (20 mg) by mouth daily   Quantity:  90 tablet   Refills:  1       clindamycin 300 MG capsule   Commonly known as:  CLEOCIN   Used for:  Prophylactic  antibiotic        Dose:  600 mg   Take 2 capsules by mouth as needed for 1 dose. 1 hour prior to dental work.   Quantity:  6 capsule   Refills:  0       coenzyme Q-10 75 MG Caps        Take  by mouth.   Refills:  0       fluticasone-salmeterol 250-50 MCG/DOSE diskus inhaler   Commonly known as:  ADVAIR DISKUS   Used for:  Acute bronchitis, Cough        Dose:  1 puff   Inhale 1 puff into the lungs every 12 hours   Quantity:  120 Inhaler   Refills:  1       furosemide 40 MG tablet   Commonly known as:  LASIX   Used for:  Chronic diastolic heart failure (H), CAD S/P percutaneous coronary angioplasty        Dose:  20 mg   Take 0.5 tablets (20 mg) by mouth daily   Quantity:  90 tablet   Refills:  0       levothyroxine 50 MCG tablet   Commonly known as:  SYNTHROID/LEVOTHROID   Used for:  Other specified hypothyroidism        Dose:  75 mcg   Take 1.5 tablets (75 mcg) by mouth daily   Quantity:  90 tablet   Refills:  3       MAGNESIUM OXIDE PO        Dose:  400 mg   Take 400 mg by mouth   Refills:  0       meclizine 25 MG tablet   Commonly known as:  ANTIVERT   Used for:  BPPV (benign paroxysmal positional vertigo), unspecified laterality        Dose:  25 mg   Take 1 tablet (25 mg) by mouth 3 times daily as needed for dizziness   Quantity:  30 tablet   Refills:  3       melatonin 3 MG tablet   Used for:  Insomnia, unspecified type        Dose:  3 mg   Take 1 tablet (3 mg) by mouth nightly as needed for sleep   Quantity:  100 tablet   Refills:  3       OCUVITE PO        Take  by mouth.   Refills:  0       * order for DME   Used for:  Spinal stenosis, lumbar region, without neurogenic claudication        Walker   Quantity:  1 each   Refills:  0       * order for DME   Used for:  Generalized muscle weakness, Personal history of fall, CHF (congestive heart failure) (H)        Equipment being ordered: Safe step tub / walk-in tub   Quantity:  1 Device   Refills:  0       senna-docusate 8.6-50 MG per tablet   Commonly known as:   SENOKOT-S;PERICOLACE   Used for:  S/P CABG x 3        Dose:  1 tablet   Take 1 tablet by mouth 2 times daily.   Quantity:  60 tablet   Refills:  0       spironolactone 25 MG tablet   Commonly known as:  ALDACTONE   Used for:  Hypokalemia        Dose:  25 mg   Take 1 tablet (25 mg) by mouth daily   Quantity:  90 tablet   Refills:  3       tamsulosin 0.4 MG capsule   Commonly known as:  FLOMAX   Used for:  Urinary retention        Dose:  0.4 mg   Take 1 capsule (0.4 mg) by mouth daily   Quantity:  90 capsule   Refills:  3       * traMADol 50 MG tablet   Commonly known as:  ULTRAM   Used for:  Pain in joint        Dose:  25 mg   Take 0.5 tablets (25 mg) by mouth 2 times daily as needed for severe pain   Quantity:  15 tablet   Refills:  0       * traMADol 50 MG tablet   Commonly known as:  ULTRAM   Used for:  Hip pain, left        Dose:  50 mg   Take 1 tablet (50 mg) by mouth daily as needed for severe pain   Quantity:  10 tablet   Refills:  0       triamcinolone 0.1 % cream   Commonly known as:  KENALOG   Used for:  Nummular eczema        Apply sparingly to affected area three times daily as needed   Quantity:  80 g   Refills:  1       vitamin D 1000 units capsule        Dose:  1 capsule   Take 1 capsule by mouth daily.   Refills:  0       * Notice:  This list has 4 medication(s) that are the same as other medications prescribed for you. Read the directions carefully, and ask your doctor or other care provider to review them with you.      STOP taking     amiodarone 200 MG tablet   Commonly known as:  PACERONE/CODARONE           LOPRESSOR PO                Where to get your medicines      These medications were sent to [facility] [place], [place] - [address]  [address] [address]     Phone:  [phone]     LANsoprazole 15 MG CR capsule    metoprolol succinate 25 MG 24 hr tablet                Protect others around you: Learn how to safely use, store and throw away  your medicines at www.disposemymeds.org.             Medication List: This is a list of all your medications and when to take them. Check marks below indicate your daily home schedule. Keep this list as a reference.      Medications           Morning Afternoon Evening Bedtime As Needed    albuterol 108 (90 Base) MCG/ACT inhaler   Commonly known as:  PROAIR HFA/PROVENTIL HFA/VENTOLIN HFA   Inhale 2 puffs into the lungs every 4 hours as needed for shortness of breath / dyspnea or wheezing                                Ammonium Lactate 10 % Crea   Apply thin layer to skin on the body at least once daily.                                atorvastatin 10 MG tablet   Commonly known as:  LIPITOR   Take 1 tablet (10 mg) by mouth daily   Last time this was given:  10 mg on 8/18/2018  9:41 AM                                calcium 600 MG tablet   Take 1 tablet by mouth 2 times daily.                                citalopram 20 MG tablet   Commonly known as:  celeXA   Take 1 tablet (20 mg) by mouth daily   Last time this was given:  20 mg on 8/18/2018  9:43 AM                                clindamycin 300 MG capsule   Commonly known as:  CLEOCIN   Take 2 capsules by mouth as needed for 1 dose. 1 hour prior to dental work.                                coenzyme Q-10 75 MG Caps   Take  by mouth.                                fluticasone-salmeterol 250-50 MCG/DOSE diskus inhaler   Commonly known as:  ADVAIR DISKUS   Inhale 1 puff into the lungs every 12 hours                                furosemide 40 MG tablet   Commonly known as:  LASIX   Take 0.5 tablets (20 mg) by mouth daily   Last time this was given:  20 mg on 8/18/2018  9:42 AM                                LANsoprazole 15 MG CR capsule   Commonly known as:  PREVACID   Take 2 capsules (30 mg) by mouth daily   Last time this was given:  30 mg on 8/18/2018  9:41 AM                                levothyroxine 50 MCG tablet   Commonly known as:  SYNTHROID/LEVOTHROID    Take 1.5 tablets (75 mcg) by mouth daily   Last time this was given:  75 mcg on 8/18/2018  9:41 AM                                MAGNESIUM OXIDE PO   Take 400 mg by mouth                                meclizine 25 MG tablet   Commonly known as:  ANTIVERT   Take 1 tablet (25 mg) by mouth 3 times daily as needed for dizziness                                melatonin 3 MG tablet   Take 1 tablet (3 mg) by mouth nightly as needed for sleep                                metoprolol succinate 25 MG 24 hr tablet   Commonly known as:  TOPROL-XL   Take 0.5 tablets (12.5 mg) by mouth daily                                OCUVITE PO   Take  by mouth.                                * order for DME   Walker                                * order for DME   Equipment being ordered: Safe step tub / walk-in tub                                senna-docusate 8.6-50 MG per tablet   Commonly known as:  SENOKOT-S;PERICOLACE   Take 1 tablet by mouth 2 times daily.   Last time this was given:  1 tablet on 8/18/2018  9:43 AM                                spironolactone 25 MG tablet   Commonly known as:  ALDACTONE   Take 1 tablet (25 mg) by mouth daily   Last time this was given:  25 mg on 8/18/2018  9:43 AM                                tamsulosin 0.4 MG capsule   Commonly known as:  FLOMAX   Take 1 capsule (0.4 mg) by mouth daily   Last time this was given:  0.4 mg on 8/18/2018  9:43 AM                                * traMADol 50 MG tablet   Commonly known as:  ULTRAM   Take 0.5 tablets (25 mg) by mouth 2 times daily as needed for severe pain                                * traMADol 50 MG tablet   Commonly known as:  ULTRAM   Take 1 tablet (50 mg) by mouth daily as needed for severe pain                                triamcinolone 0.1 % cream   Commonly known as:  KENALOG   Apply sparingly to affected area three times daily as needed                                vitamin D 1000 units capsule   Take 1 capsule by mouth daily.                                 * Notice:  This list has 4 medication(s) that are the same as other medications prescribed for you. Read the directions carefully, and ask your doctor or other care provider to review them with you.              More Information        Bradycardia    When your heart rate is slow, less than 60 beats per minute, it is called bradycardia. Bradycardia can be normal, caused by medicines, or a sign of a disease. The slow heart rate may not be constant; it can come and go. It is a concern when it is very low, or you have symptoms.  Signs and symptoms  The following are signs and symptoms of bradycardia:    Heart rate less than 60 per minute    Dizziness or feeling lightheaded    Weakness    Trouble breathing    Fainting    Sleepiness    More trouble exercising than usual because of fatigue    Confusion or trouble concentrating  Causes  There are many causes of bradycardia. Some can be related to your heart, but some may be related to other factors.  Non-heart-related causes:    Advanced age    Side effect of certain medicines (such as beta-blockers, calcium channel blockers, digitalis, antiarrhythmic medicines like amiodarone, clonidine, lithium)    Medical conditions such as hypoglycemia (low blood sugar), hypothyroidism (low thyroid), electrolyte disorder,  hypothermia, sleep apnea    Athletes, especially long-distance runners, may have a slow heart rate. This can be normal.    Sleep apnea    Brain injury such as stroke or bleeding inside the brain  Heart-related causes:    Coronary artery disease (angina or prior heart attack, also known as acute myocardial infarction, or AMI)    Heart valve disease    Heart muscle disease (cardiomyopathy)    Congestive heart failure    Sick sinus syndrome, which is when your heart's natural pacemaker is no longer working properly    Diseases that infiltrate the heart such as sarcoid    Heart infections  Sometimes the cause for the arrhythmia cannot be  found.  Bradycardia that causes symptoms is sometimes reversible, and can be treated with medicines. When more severe bradycardia persists, a pacemaker is generally recommended. When the bradycardia does not cause symptoms, your doctor may decide to evaluate it in his or her office.  Home care  The following will help you care for yourself at home:    Resume your usual activities when you are feeling back to normal.    If you develop any of the symptoms below during exertion, then you should not exert yourself until evaluated further by your doctor.    Work with your doctor on any needed lifestyle changes, such as changing your diet, stopping smoking if you are a smoker, and a planned exercise program.  Follow-up care  Follow up with your doctor, or as advised.  Call 911  Call 911 if any of the following occur:    Chest pain    Trouble breathing    Slow heart rate with dizziness or lightheadedness    Fainting or loss of consciousness    Chest, shoulder, arm, neck, or back pain    Slow heart rate (under 50 beats per minute) if associated with symptoms  When to seek medical advice  Call your healthcare provider right away if any of the following occur:    Occasional weakness, dizziness, or lightheadedness  Date Last Reviewed: 4/25/2016 2000-2017 [employer]. [address] [place] [address], [place] [address]. All rights reserved. This information is not intended as a substitute for professional medical care. Always follow your healthcare professional's instructions.

## 2018-08-17 NOTE — IP AVS SNAPSHOT
Unit 6D Observation [address] [address]    Phone:  [phone]    Fax:  [phone]                                       After Visit Summary   8/17/2018    Gorge Corona    MRN: [id number]           After Visit Summary Signature Page     I have received my discharge instructions, and my questions have been answered. I have discussed any challenges I see with this plan with the nurse or doctor.    ..........................................................................................................................................  Patient/Patient Representative Signature      ..........................................................................................................................................  Patient Representative Print Name and Relationship to Patient    ..................................................               ................................................  Date                                            Time    ..........................................................................................................................................  Reviewed by Signature/Title    ...................................................              ..............................................  Date                                                            Time

## 2018-08-17 NOTE — ED PROVIDER NOTES
History     Chief Complaint   Patient presents with     Dizziness     HPI  Gorge Corona is a 87 year old male who presents to the ER with complaints of some dizziness this morning.  The patient was found to have a heart rate in the 40s by his care provider and this was followed along by his wife with his monitor at home and through the pulse oximeter it was found that it was consistently running in the 40s and 50s.  The patient had a blood pressure of around 100 systolic at the time and complained of no chest pain no shortness of breath no increased edema and states that he was somewhat dizzy and lightheaded but not vertiginous.  The patient had no diaphoresis and no nausea and vomiting.  Patient's recent history includes hospitalization for a left femur fracture that was rotted in the hospital and during which time he developed A. fib flutter for which she was placed on metoprolol.  When the the patient's wife called the cardiology office they told her to hold the metoprolol and bring them to the ER.    I have reviewed the Medications, Allergies, Past Medical and Surgical History, and Social History in the Epic system.    Previous Medications    ALBUTEROL (PROAIR HFA, PROVENTIL HFA, VENTOLIN HFA) 108 (90 BASE) MCG/ACT INHALER    Inhale 2 puffs into the lungs every 4 hours as needed for shortness of breath / dyspnea or wheezing    AMIODARONE (PACERONE/CODARONE) 200 MG TABLET    Take 1 tablet (200 mg) by mouth daily    AMMONIUM LACTATE 10 % CREA    Apply thin layer to skin on the body at least once daily.    ATORVASTATIN (LIPITOR) 10 MG TABLET    Take 1 tablet (10 mg) by mouth daily    CALCIUM 600 MG TABLET    Take 1 tablet by mouth 2 times daily.    CHOLECALCIFEROL (VITAMIN D) 1000 UNITS CAPSULE    Take 1 capsule by mouth daily.    CITALOPRAM (CELEXA) 20 MG TABLET    Take 1 tablet (20 mg) by mouth daily    CLINDAMYCIN (CLEOCIN) 300 MG CAPSULE    Take 2 capsules by mouth as needed for 1 dose. 1 hour prior to  dental work.    COENZYME Q-10 75 MG CAPS    Take  by mouth.    FLUTICASONE-SALMETEROL (ADVAIR DISKUS) 250-50 MCG/DOSE DISKUS INHALER    Inhale 1 puff into the lungs every 12 hours    FUROSEMIDE (LASIX) 40 MG TABLET    Take 0.5 tablets (20 mg) by mouth daily    LANSOPRAZOLE PO    Take 30 mg by mouth    LEVOTHYROXINE (SYNTHROID/LEVOTHROID) 50 MCG TABLET    Take 1.5 tablets (75 mcg) by mouth daily    MAGNESIUM OXIDE PO    Take 400 mg by mouth    MECLIZINE (ANTIVERT) 25 MG TABLET    Take 1 tablet (25 mg) by mouth 3 times daily as needed for dizziness    MELATONIN 3 MG TABLET    Take 1 tablet (3 mg) by mouth nightly as needed for sleep    MULTIPLE VITAMINS-MINERALS (OCUVITE PO)    Take  by mouth.    ORDER FOR DME    Walker    ORDER FOR DME    Equipment being ordered: Safe step tub / walk-in tub    SENNA-DOCUSATE (SENOKOT-S;PERICOLACE) 8.6-50 MG PER TABLET    Take 1 tablet by mouth 2 times daily.    SPIRONOLACTONE (ALDACTONE) 25 MG TABLET    Take 1 tablet (25 mg) by mouth daily    TAMSULOSIN (FLOMAX) 0.4 MG CAPSULE    Take 1 capsule (0.4 mg) by mouth daily    TRAMADOL (ULTRAM) 50 MG TABLET    Take 0.5 tablets (25 mg) by mouth 2 times daily as needed for severe pain    TRAMADOL (ULTRAM) 50 MG TABLET    Take 1 tablet (50 mg) by mouth daily as needed for severe pain    TRIAMCINOLONE (KENALOG) 0.1 % CREAM    Apply sparingly to affected area three times daily as needed     Past Medical History:   Diagnosis Date     Atrial fibrillation (H)      Atrial flutter (H)      Basal cell carcinoma of skin of trunk      BPH (benign prostatic hypertrophy) with urinary obstruction      CAD (coronary artery disease)     s/p CABG 1/2013; RUSSEL in 2/2013     Depression      H/O CT scan of head 1/11/2013 7/20/2006    Result Impression     CT of the Head without contrast 7/20/2006  History: Ataxia, incontinence, and NPH  Comparison Study: MR obtained to 4/19/2004  Technique: Axial CT images of the head were obtained at 2 intervals from the  skull base to the vertex without intravenous contrast. The images were reviewed in brain, subdural and bone windows.  Findings: Patient has had a moderate promin     Hyperlipidemia      Lumbar spinal stenosis      Mitral regurgitation     s/p bioprosthesis 1/2013     Palpitations      Paroxysmal supraventricular tachycardia (H)      Squamous cell carcinoma      Tachycardia     baroreflex injury dut to surgery      (ventriculoperitoneal) shunt status     for Normal pressure hydrocephalus       Past Surgical History:   Procedure Laterality Date     ANESTHESIA CARDIOVERSION  4/11/2013    Procedure: ANESTHESIA CARDIOVERSION;  Cardioversion;  Surgeon: Provider, Generic Anesthesia;  Location: [facility] OR     BYPASS GRAFT ARTERY CORONARY, REPAIR VALVE MITRAL, COMBINED  1/14/2013    Procedure: COMBINED BYPASS GRAFT ARTERY CORONARY, REPAIR VALVE MITRAL;  Median sternotomy, coronary artery bypass graft X3 using left internal mammary artery & right saphenous vein , mitral valve Replacement and on pump oxygenator, flexible cystoscopy, urethral dilation and insertion of stuart catheter.;  Surgeon: Chan Peres MD;  Location: [facility] OR     CORONARY ANGIOGRAPHY ADULT ORDER       CORONARY ARTERY BYPASS  1/2013    mitral valve tissue      INSERT CATHETER BLADDER  1/14/2013    Procedure: INSERT CATHETER BLADDER;  Flexible cystoscopy, urethral dilation,& insertion of stuart catheter;  Surgeon: Pete Bedoya MD;  Location: [facility] OR     MITRAL VALVE REPLACEMENT  2013    29 mm Epic porcine valve     MOHS MICROGRAPHIC PROCEDURE       REPLACE VALVE MITRAL  1/2013    tissue valve       Family History   Problem Relation Age of Onset     Cancer Father      KIDNEY DISEASE Mother      Macular Degeneration No family hx of      Glaucoma No family hx of        Social History   Substance Use Topics     Smoking status: Former Smoker     Types: Cigars, Pipe     Quit date: 1/1/1990     Smokeless tobacco: Former User     Alcohol use No        Review of Systems   Constitutional: Negative for diaphoresis.   Respiratory: Negative for shortness of breath.    Cardiovascular: Negative for chest pain and leg swelling.   Gastrointestinal: Negative for nausea and vomiting.   Neurological: Positive for dizziness and light-headedness.   All other systems reviewed and are negative.      Physical Exam   BP: 109/67  Pulse: 60  Heart Rate: 62  Resp: 16  SpO2: 99 %      Physical Exam   Constitutional: He is oriented to person, place, and time.   Elderly alert conversant   HENT:   Head: Atraumatic.   Eyes: EOM are normal. Pupils are equal, round, and reactive to light.   Neck: Neck supple. No JVD present.   Cardiovascular:   Slow but regular   Pulmonary/Chest: Breath sounds normal. He has no wheezes. He has no rales. He exhibits no tenderness.   Abdominal: Soft. There is no tenderness. There is no rebound and no guarding.   Musculoskeletal: He exhibits no edema or tenderness.   Neurological: He is alert and oriented to person, place, and time. No cranial nerve deficit.   Grossly intact and symmetric   Skin: Skin is warm.   Psychiatric: He has a normal mood and affect.       ED Course     ED Course     Patient was placed on cardiac monitor and oximetry.  IV was started for blood draw.    EKG was performed and revealed a sinus bradycardia at a rate of 58 with a first-degree block and a KS interval of 0.202.  Patient's QRS duration was 0.116 and the patient had a normal axis with no acute ST or T wave changes significant for ischemia.  This is read by me personally.    Procedures            Results for orders placed or performed during the hospital encounter of 08/17/18 (from the past 24 hour(s))   EKG 12 lead   Result Value Ref Range    Interpretation ECG Click View Image link to view waveform and result    XR Chest Port 1 View    Narrative    Exam: XR CHEST PORT 1 VW, 8/17/2018 3:02 PM    Indication: Chest pain;     Comparison: Chest x-ray 9/23/2016     Findings:    Portable upright AP radiograph of the chest. Postsurgical changes  prior CABG, including median sternotomy wires and mediastinal surgical  clips. The cardiomediastinal silhouette is mildly enlarged. Mild  interstitial opacities bilaterally. Unchanged elevation of the left  hemidiaphragm. Streaky left basilar opacities. Clips project over the  left upper quadrant.       Impression    Impression:   1. Mild cardiomegaly and interstitial pulmonary edema.   2. Chronic elevation of the left hemidiaphragm, with patchy left  basilar opacities that may represent atelectasis. Differential also  includes infection.    I have personally reviewed the examination and initial interpretation  and I agree with the findings.    HEMALATHA GILMORE MD   Troponin POCT   Result Value Ref Range    Troponin I 0.00 0.00 - 0.10 ug/L   CBC with platelets differential   Result Value Ref Range    WBC 7.0 4.0 - 11.0 10e9/L    RBC Count 4.02 (L) 4.4 - 5.9 10e12/L    Hemoglobin 11.0 (L) 13.3 - 17.7 g/dL    Hematocrit 34.3 (L) 40.0 - 53.0 %    MCV 85 78 - 100 fl    MCH 27.4 26.5 - 33.0 pg    MCHC 32.1 31.5 - 36.5 g/dL    RDW 14.9 10.0 - 15.0 %    Platelet Count 133 (L) 150 - 450 10e9/L    Diff Method Automated Method     % Neutrophils 58.6 %    % Lymphocytes 25.3 %    % Monocytes 12.2 %    % Eosinophils 3.3 %    % Basophils 0.3 %    % Immature Granulocytes 0.3 %    Nucleated RBCs 0 0 /100    Absolute Neutrophil 4.1 1.6 - 8.3 10e9/L    Absolute Lymphocytes 1.8 0.8 - 5.3 10e9/L    Absolute Monocytes 0.9 0.0 - 1.3 10e9/L    Absolute Eosinophils 0.2 0.0 - 0.7 10e9/L    Absolute Basophils 0.0 0.0 - 0.2 10e9/L    Abs Immature Granulocytes 0.0 0 - 0.4 10e9/L    Absolute Nucleated RBC 0.0     Platelet Estimate Confirming automated cell count    Troponin I   Result Value Ref Range    Troponin I ES <0.015 0.000 - 0.045 ug/L   INR   Result Value Ref Range    INR 1.12 0.86 - 1.14   Comprehensive metabolic panel   Result Value Ref Range    Sodium 138 133 - 144  mmol/L    Potassium 3.9 3.4 - 5.3 mmol/L    Chloride 103 94 - 109 mmol/L    Carbon Dioxide 30 20 - 32 mmol/L    Anion Gap 6 3 - 14 mmol/L    Glucose 81 70 - 99 mg/dL    Urea Nitrogen 18 7 - 30 mg/dL    Creatinine 1.15 0.66 - 1.25 mg/dL    GFR Estimate 60 (L) >60 mL/min/1.7m2    GFR Estimate If Black 73 >60 mL/min/1.7m2    Calcium 8.2 (L) 8.5 - 10.1 mg/dL    Bilirubin Total 0.4 0.2 - 1.3 mg/dL    Albumin 3.3 (L) 3.4 - 5.0 g/dL    Protein Total 6.6 (L) 6.8 - 8.8 g/dL    Alkaline Phosphatase 114 40 - 150 U/L    ALT 15 0 - 70 U/L    AST 11 0 - 45 U/L   Nt probnp inpatient (BNP)   Result Value Ref Range    N-Terminal Pro BNP Inpatient 483 0 - 1800 pg/mL   UA with Microscopic reflex to Culture   Result Value Ref Range    Color Urine Straw     Appearance Urine Clear     Glucose Urine Negative NEG^Negative mg/dL    Bilirubin Urine Negative NEG^Negative    Ketones Urine Negative NEG^Negative mg/dL    Specific Gravity Urine 1.005 1.003 - 1.035    Blood Urine Negative NEG^Negative    pH Urine 7.0 5.0 - 7.0 pH    Protein Albumin Urine Negative NEG^Negative mg/dL    Urobilinogen mg/dL Normal 0.0 - 2.0 mg/dL    Nitrite Urine Negative NEG^Negative    Leukocyte Esterase Urine Negative NEG^Negative    Source Midstream Urine     WBC Urine 0 0 - 5 /HPF    RBC Urine 0 0 - 2 /HPF    Hyaline Casts 1 0 - 2 /LPF         Labs Ordered and Resulted from Time of ED Arrival Up to the Time of Departure from the ED   CBC WITH PLATELETS DIFFERENTIAL - Abnormal; Notable for the following:        Result Value    RBC Count 4.02 (*)     Hemoglobin 11.0 (*)     Hematocrit 34.3 (*)     Platelet Count 133 (*)     All other components within normal limits   COMPREHENSIVE METABOLIC PANEL - Abnormal; Notable for the following:     GFR Estimate 60 (*)     Calcium 8.2 (*)     Albumin 3.3 (*)     Protein Total 6.6 (*)     All other components within normal limits   TROPONIN I   INR   NT PROBNP INPATIENT   ROUTINE UA WITH MICROSCOPIC REFLEX TO CULTURE   PULSE  OXIMETRY NURSING   CARDIAC CONTINUOUS MONITORING   PERIPHERAL IV CATHETER   ISTAT TROPONIN NURSING POCT   PATIENT CARE ORDER   TROPONIN POCT     Assessments & Plan (with Medical Decision Making)     I have reviewed the nursing notes.    Patient was observed in the ER and continued to be monitored.  Patient's heart rate maintained in the upper 50s to around 60.  Patient is currently hemodynamically stable but at this time will be transferred upstairs for continued monitoring through the night and a cardiology consultation to help determine medication regimen recommendations regarding his metoprolol and A. fib flutter treatment.    I have reviewed the findings, diagnosis, and plan with the patient.    Final diagnoses:   Near syncope   Sinus bradycardia       Josep Santizo MD    IJose, am serving as a trained medical scribe to document services personally performed by Josep Santizo MD, based on the provider's statements to me.   I, Josep Santizo MD, was physically present and have reviewed and verified the accuracy of this note documented by Jose Gordon.    8/17/2018   [facility], [place], EMERGENCY DEPARTMENT     Josep Santizo MD  08/17/18 [id number]

## 2018-08-18 VITALS
OXYGEN SATURATION: 96 % | TEMPERATURE: 98.2 F | DIASTOLIC BLOOD PRESSURE: 68 MMHG | HEART RATE: 60 BPM | SYSTOLIC BLOOD PRESSURE: 120 MMHG | RESPIRATION RATE: 18 BRPM

## 2018-08-18 LAB
ANION GAP SERPL CALCULATED.3IONS-SCNC: 6 MMOL/L (ref 3–14)
BUN SERPL-MCNC: 18 MG/DL (ref 7–30)
CALCIUM SERPL-MCNC: 8.6 MG/DL (ref 8.5–10.1)
CHLORIDE SERPL-SCNC: 105 MMOL/L (ref 94–109)
CO2 SERPL-SCNC: 30 MMOL/L (ref 20–32)
CREAT SERPL-MCNC: 1.13 MG/DL (ref 0.66–1.25)
GFR SERPL CREATININE-BSD FRML MDRD: 61 ML/MIN/1.7M2
GLUCOSE SERPL-MCNC: 78 MG/DL (ref 70–99)
MAGNESIUM SERPL-MCNC: 2.3 MG/DL (ref 1.6–2.3)
PHOSPHATE SERPL-MCNC: 3.7 MG/DL (ref 2.5–4.5)
POTASSIUM SERPL-SCNC: 4 MMOL/L (ref 3.4–5.3)
SODIUM SERPL-SCNC: 141 MMOL/L (ref 133–144)
TSH SERPL DL<=0.005 MIU/L-ACNC: 0.62 MU/L (ref 0.4–4)

## 2018-08-18 PROCEDURE — 36415 COLL VENOUS BLD VENIPUNCTURE: CPT | Performed by: PHYSICIAN ASSISTANT

## 2018-08-18 PROCEDURE — 84443 ASSAY THYROID STIM HORMONE: CPT | Performed by: PHYSICIAN ASSISTANT

## 2018-08-18 PROCEDURE — A9270 NON-COVERED ITEM OR SERVICE: HCPCS | Mod: GY | Performed by: NURSE PRACTITIONER

## 2018-08-18 PROCEDURE — 84100 ASSAY OF PHOSPHORUS: CPT | Performed by: PHYSICIAN ASSISTANT

## 2018-08-18 PROCEDURE — 93010 ELECTROCARDIOGRAM REPORT: CPT | Performed by: INTERNAL MEDICINE

## 2018-08-18 PROCEDURE — 80048 BASIC METABOLIC PNL TOTAL CA: CPT | Performed by: PHYSICIAN ASSISTANT

## 2018-08-18 PROCEDURE — 93005 ELECTROCARDIOGRAM TRACING: CPT

## 2018-08-18 PROCEDURE — 99217 ZZC OBSERVATION CARE DISCHARGE: CPT | Mod: Z6 | Performed by: EMERGENCY MEDICINE

## 2018-08-18 PROCEDURE — 25000132 ZZH RX MED GY IP 250 OP 250 PS 637: Mod: GY | Performed by: NURSE PRACTITIONER

## 2018-08-18 PROCEDURE — 83735 ASSAY OF MAGNESIUM: CPT | Performed by: PHYSICIAN ASSISTANT

## 2018-08-18 PROCEDURE — G0378 HOSPITAL OBSERVATION PER HR: HCPCS

## 2018-08-18 PROCEDURE — 96361 HYDRATE IV INFUSION ADD-ON: CPT

## 2018-08-18 RX ORDER — MECOBALAMIN 5000 MCG
30 TABLET,DISINTEGRATING ORAL DAILY
Qty: 30 CAPSULE | Refills: 1 | Status: SHIPPED | OUTPATIENT
Start: 2018-08-18 | End: 2020-02-19

## 2018-08-18 RX ORDER — METOPROLOL SUCCINATE 25 MG/1
12.5 TABLET, EXTENDED RELEASE ORAL DAILY
Qty: 45 TABLET | Refills: 1 | Status: SHIPPED | OUTPATIENT
Start: 2018-08-18 | End: 2019-03-05

## 2018-08-18 RX ADMIN — FUROSEMIDE 20 MG: 20 TABLET ORAL at 09:42

## 2018-08-18 RX ADMIN — TAMSULOSIN HYDROCHLORIDE 0.4 MG: 0.4 CAPSULE ORAL at 09:43

## 2018-08-18 RX ADMIN — LANSOPRAZOLE 30 MG: 30 CAPSULE, DELAYED RELEASE ORAL at 09:41

## 2018-08-18 RX ADMIN — LEVOTHYROXINE SODIUM 75 MCG: 25 TABLET ORAL at 09:41

## 2018-08-18 RX ADMIN — SPIRONOLACTONE 25 MG: 25 TABLET, FILM COATED ORAL at 09:43

## 2018-08-18 RX ADMIN — CITALOPRAM HYDROBROMIDE 20 MG: 20 TABLET ORAL at 09:43

## 2018-08-18 RX ADMIN — SENNOSIDES AND DOCUSATE SODIUM 1 TABLET: 8.6; 5 TABLET ORAL at 09:43

## 2018-08-18 RX ADMIN — ATORVASTATIN CALCIUM 10 MG: 10 TABLET, FILM COATED ORAL at 09:41

## 2018-08-18 NOTE — DISCHARGE SUMMARY
Discharge Summary    Gorge Corona MRN# [id number]   YOB: 1930 Age: 87 year old     Date of Admission:  8/17/2018  Date of Discharge:  8/18/2018  Admitting Physician:  Josep Santizo MD  Discharge Physician:  Wong Henry MD   Discharging Service:  Emergency Department Observation Unit     Primary Provider: Monisha Medina          Discharge Diagnosis:     Near syncope    Bradycardia    * No resolved hospital problems. *               Discharge Disposition:   Discharged to home           Condition on Discharge:   Discharge condition: Stable               Procedures:   Chest xray          Discharge Medications:     Current Discharge Medication List      START taking these medications    Details   metoprolol succinate (TOPROL-XL) 25 MG 24 hr tablet Take 0.5 tablets (12.5 mg) by mouth daily  Qty: 45 tablet, Refills: 1    Associated Diagnoses: Coronary artery disease involving native coronary artery of native heart without angina pectoris         CONTINUE these medications which have CHANGED    Details   LANsoprazole (PREVACID) 15 MG CR capsule Take 2 capsules (30 mg) by mouth daily  Qty: 30 capsule, Refills: 1    Associated Diagnoses: Gastroesophageal reflux disease without esophagitis         CONTINUE these medications which have NOT CHANGED    Details   albuterol (PROAIR HFA, PROVENTIL HFA, VENTOLIN HFA) 108 (90 BASE) MCG/ACT inhaler Inhale 2 puffs into the lungs every 4 hours as needed for shortness of breath / dyspnea or wheezing  Qty: 1 Inhaler, Refills: 1    Associated Diagnoses: Cough      atorvastatin (LIPITOR) 10 MG tablet Take 1 tablet (10 mg) by mouth daily  Qty: 90 tablet, Refills: 0    Associated Diagnoses: Major depressive disorder, recurrent episode, moderate (H); Essential hypertension, benign; Shortness of breath; Dyslipidemia      Calcium 600 MG tablet Take 1 tablet by mouth 2 times daily.  Qty: 100 tablet, Refills: 3      Cholecalciferol (VITAMIN D) 1000  UNITS capsule Take 1 capsule by mouth daily.      citalopram (CELEXA) 20 MG tablet Take 1 tablet (20 mg) by mouth daily  Qty: 90 tablet, Refills: 1    Associated Diagnoses: Major depressive disorder, recurrent episode, moderate (H)      coenzyme Q-10 75 MG CAPS Take  by mouth.      fluticasone-salmeterol (ADVAIR DISKUS) 250-50 MCG/DOSE diskus inhaler Inhale 1 puff into the lungs every 12 hours  Qty: 120 Inhaler, Refills: 1    Associated Diagnoses: Acute bronchitis; Cough      furosemide (LASIX) 40 MG tablet Take 0.5 tablets (20 mg) by mouth daily  Qty: 90 tablet, Refills: 0    Associated Diagnoses: Chronic diastolic heart failure (H); CAD S/P percutaneous coronary angioplasty      levothyroxine (SYNTHROID/LEVOTHROID) 50 MCG tablet Take 1.5 tablets (75 mcg) by mouth daily  Qty: 90 tablet, Refills: 3    Associated Diagnoses: Other specified hypothyroidism      MAGNESIUM OXIDE PO Take 400 mg by mouth      melatonin 3 MG tablet Take 1 tablet (3 mg) by mouth nightly as needed for sleep  Qty: 100 tablet, Refills: 3    Associated Diagnoses: Insomnia, unspecified type      Multiple Vitamins-Minerals (OCUVITE PO) Take  by mouth.  Refills: 0      spironolactone (ALDACTONE) 25 MG tablet Take 1 tablet (25 mg) by mouth daily  Qty: 90 tablet, Refills: 3    Associated Diagnoses: Hypokalemia      tamsulosin (FLOMAX) 0.4 MG capsule Take 1 capsule (0.4 mg) by mouth daily  Qty: 90 capsule, Refills: 3    Associated Diagnoses: Urinary retention      Ammonium Lactate 10 % CREA Apply thin layer to skin on the body at least once daily.  Qty: 120 mL, Refills: 11    Associated Diagnoses: Xerosis cutis; Nummular eczema      clindamycin (CLEOCIN) 300 MG capsule Take 2 capsules by mouth as needed for 1 dose. 1 hour prior to dental work.  Qty: 6 capsule, Refills: 0    Associated Diagnoses: Prophylactic antibiotic      meclizine (ANTIVERT) 25 MG tablet Take 1 tablet (25 mg) by mouth 3 times daily as needed for dizziness  Qty: 30 tablet, Refills:  3    Associated Diagnoses: BPPV (benign paroxysmal positional vertigo), unspecified laterality      !! order for DME Equipment being ordered: Safe step tub / walk-in tub  Qty: 1 Device, Refills: 0    Associated Diagnoses: Generalized muscle weakness; Personal history of fall; CHF (congestive heart failure) (H)      !! order for DME Walker  Qty: 1 each, Refills: 0    Associated Diagnoses: Spinal stenosis, lumbar region, without neurogenic claudication      senna-docusate (SENOKOT-S;PERICOLACE) 8.6-50 MG per tablet Take 1 tablet by mouth 2 times daily.  Qty: 60 tablet    Associated Diagnoses: S/P CABG x 3      !! traMADol (ULTRAM) 50 MG tablet Take 1 tablet (50 mg) by mouth daily as needed for severe pain  Qty: 10 tablet, Refills: 0    Associated Diagnoses: Hip pain, left      !! traMADol (ULTRAM) 50 MG tablet Take 0.5 tablets (25 mg) by mouth 2 times daily as needed for severe pain  Qty: 15 tablet, Refills: 0    Associated Diagnoses: Pain in joint      triamcinolone (KENALOG) 0.1 % cream Apply sparingly to affected area three times daily as needed  Qty: 80 g, Refills: 1    Associated Diagnoses: Nummular eczema       !! - Potential duplicate medications found. Please discuss with provider.      STOP taking these medications       amiodarone (PACERONE/CODARONE) 200 MG tablet Comments:   Reason for Stopping:         Metoprolol Tartrate (LOPRESSOR PO) Comments:   Reason for Stopping:                     Consultations:   Consultation during this admission received from cardiology             Brief History of Illness:   Gorge Corona is a 87 year old male with a history of HTN, COPD, afib, SVT s/p ablation, CAD s/p CABG x4, aortic stenosis, hypothyroid, CHF, anemia, and lumbar spondylolysis, who presented to the ED with dizziness this morning.          Hospital Course:    Gorge Corona is a 87 year old male with HTN, COPD, afib, SVT s/p ablation, CAD s/p CABG x4, aortic stenosis, hypothyroid, CHF, anemia, and lumbar  spondylolysis, who presented to the ED from home with dizziness this morning. He is hemodynamically stable. OVernight, patient's heartrate did not decrease lower than 50. Cardiology consulted and recommended discontinuing Amiodarone stopping Metoprolol 12.5 bid and starting 12.5 extended release daily. Patient does not have a follow up appointment with cardiology until February. Cardiology fellow will contact patient's primary cardiologist to move up appointment.                 Final Day of Progress before Discharge:       Physical Exam:  Blood pressure 120/68, pulse 60, temperature 98.2  F (36.8  C), temperature source Oral, resp. rate 18, SpO2 96 %.    EXAM:  GENERAL APPEARANCE: Pleasant, generally appears well, A/O x4. NAD.  SKIN: Clean, dry, and intact without visible lesions, rash, jaundice, cyanosis, erythema, ecchymoses to exposed areas.   NECK: Supple, no masses. No jugular venous distention.   CARDIOVASCULAR: S1, S2 RRR. No murmurs, rubs, or gallops.   RESPIRATORY: Respiratory effort WNL. CTA  bilaterally without crackles/rales/wheeze   ABDOMEN: Active BS in all 4 quadrants. Abdomen soft and non-tender. No masses or hepatosplenomegaly.  MUSCULOSKELETAL: Gait is steady. Strength 5/5 in major muscle groups of bilateral UE and LE.  Extremities normal, no gross deformities noted, non-tender to palpation.   PV: 2+ bilateral radial and pedal pulses. No edema noted.   NEURO: CN II-XII grossly intact. Speech normal. Appropriate throughout interview.   Sensation grossly WNL. Finger to nose and rapid alternating movements WDL.  HEME/LYMPH: No visible bleeding.  PSYCHIATRIC: Mentation and affect appear normal         Data:  All laboratory data reviewed             Significant Results:   None  Results for orders placed or performed during the hospital encounter of 08/17/18   XR Chest Port 1 View    Narrative    Exam: XR CHEST PORT 1 VW, 8/17/2018 3:02 PM    Indication: Chest pain;     Comparison: Chest x-ray 9/23/2016      Findings:   Portable upright AP radiograph of the chest. Postsurgical changes  prior CABG, including median sternotomy wires and mediastinal surgical  clips. The cardiomediastinal silhouette is mildly enlarged. Mild  interstitial opacities bilaterally. Unchanged elevation of the left  hemidiaphragm. Streaky left basilar opacities. Clips project over the  left upper quadrant.       Impression    Impression:   1. Mild cardiomegaly and interstitial pulmonary edema.   2. Chronic elevation of the left hemidiaphragm, with patchy left  basilar opacities that may represent atelectasis. Differential also  includes infection.    I have personally reviewed the examination and initial interpretation  and I agree with the findings.    HEMALATHA GILMORE MD   CBC with platelets differential   Result Value Ref Range    WBC 7.0 4.0 - 11.0 10e9/L    RBC Count 4.02 (L) 4.4 - 5.9 10e12/L    Hemoglobin 11.0 (L) 13.3 - 17.7 g/dL    Hematocrit 34.3 (L) 40.0 - 53.0 %    MCV 85 78 - 100 fl    MCH 27.4 26.5 - 33.0 pg    MCHC 32.1 31.5 - 36.5 g/dL    RDW 14.9 10.0 - 15.0 %    Platelet Count 133 (L) 150 - 450 10e9/L    Diff Method Automated Method     % Neutrophils 58.6 %    % Lymphocytes 25.3 %    % Monocytes 12.2 %    % Eosinophils 3.3 %    % Basophils 0.3 %    % Immature Granulocytes 0.3 %    Nucleated RBCs 0 0 /100    Absolute Neutrophil 4.1 1.6 - 8.3 10e9/L    Absolute Lymphocytes 1.8 0.8 - 5.3 10e9/L    Absolute Monocytes 0.9 0.0 - 1.3 10e9/L    Absolute Eosinophils 0.2 0.0 - 0.7 10e9/L    Absolute Basophils 0.0 0.0 - 0.2 10e9/L    Abs Immature Granulocytes 0.0 0 - 0.4 10e9/L    Absolute Nucleated RBC 0.0     Platelet Estimate Confirming automated cell count    Troponin I   Result Value Ref Range    Troponin I ES <0.015 0.000 - 0.045 ug/L   INR   Result Value Ref Range    INR 1.12 0.86 - 1.14   Comprehensive metabolic panel   Result Value Ref Range    Sodium 138 133 - 144 mmol/L    Potassium 3.9 3.4 - 5.3 mmol/L    Chloride 103 94 - 109  mmol/L    Carbon Dioxide 30 20 - 32 mmol/L    Anion Gap 6 3 - 14 mmol/L    Glucose 81 70 - 99 mg/dL    Urea Nitrogen 18 7 - 30 mg/dL    Creatinine 1.15 0.66 - 1.25 mg/dL    GFR Estimate 60 (L) >60 mL/min/1.7m2    GFR Estimate If Black 73 >60 mL/min/1.7m2    Calcium 8.2 (L) 8.5 - 10.1 mg/dL    Bilirubin Total 0.4 0.2 - 1.3 mg/dL    Albumin 3.3 (L) 3.4 - 5.0 g/dL    Protein Total 6.6 (L) 6.8 - 8.8 g/dL    Alkaline Phosphatase 114 40 - 150 U/L    ALT 15 0 - 70 U/L    AST 11 0 - 45 U/L   Nt probnp inpatient (BNP)   Result Value Ref Range    N-Terminal Pro BNP Inpatient 483 0 - 1800 pg/mL   UA with Microscopic reflex to Culture   Result Value Ref Range    Color Urine Straw     Appearance Urine Clear     Glucose Urine Negative NEG^Negative mg/dL    Bilirubin Urine Negative NEG^Negative    Ketones Urine Negative NEG^Negative mg/dL    Specific Gravity Urine 1.005 1.003 - 1.035    Blood Urine Negative NEG^Negative    pH Urine 7.0 5.0 - 7.0 pH    Protein Albumin Urine Negative NEG^Negative mg/dL    Urobilinogen mg/dL Normal 0.0 - 2.0 mg/dL    Nitrite Urine Negative NEG^Negative    Leukocyte Esterase Urine Negative NEG^Negative    Source Midstream Urine     WBC Urine 0 0 - 5 /HPF    RBC Urine 0 0 - 2 /HPF    Hyaline Casts 1 0 - 2 /LPF   Basic metabolic panel   Result Value Ref Range    Sodium 141 133 - 144 mmol/L    Potassium 4.0 3.4 - 5.3 mmol/L    Chloride 105 94 - 109 mmol/L    Carbon Dioxide 30 20 - 32 mmol/L    Anion Gap 6 3 - 14 mmol/L    Glucose 78 70 - 99 mg/dL    Urea Nitrogen 18 7 - 30 mg/dL    Creatinine 1.13 0.66 - 1.25 mg/dL    GFR Estimate 61 >60 mL/min/1.7m2    GFR Estimate If Black 74 >60 mL/min/1.7m2    Calcium 8.6 8.5 - 10.1 mg/dL   Magnesium   Result Value Ref Range    Magnesium 2.3 1.6 - 2.3 mg/dL   Phosphorus   Result Value Ref Range    Phosphorus 3.7 2.5 - 4.5 mg/dL   TSH with free T4 reflex   Result Value Ref Range    TSH 0.62 0.40 - 4.00 mU/L   EKG 12 lead   Result Value Ref Range    Interpretation ECG  Click View Image link to view waveform and result    EKG 12-lead, tracing only   Result Value Ref Range    Interpretation ECG Click View Image link to view waveform and result    Cardiology General Adult IP Consult: Patient to be seen: Routine within 24 hrs; Call back #: [id number]; near syncope on metoprolol and amiodarone. Hx afib, CHF, CAD w/CABGx4, SVT s/p ablation, now ED Obs for monitoring and advice from cards on meds; C...    Narrative    Flaquito Delgado MD     8/18/2018  1:34 PM  Cardiology Consult         Date of Service (when I saw the patient): 08/18/18    ASSESSMENT:   Gorge Corona is a 87 year old male with a PMHx of CAD s/p CABG   (LIMA to LAD, SVG to PDA, SVG to D1, SVG to OM) and PCI (LM into   OM due to occluded SVG), MVR with Epic porcine 29mm Valve, Aortic   Stenosis, SVT who presents for dizziness/LH in the setting of low   HR.     Patient's LH/Dizziness could be multifactorial form his aortic   valve disease and mitral valve disease. When coupled with a low   HR, patient is a set up for these symptoms. Therefore, will try   to minimize his AV blocking medications in order to increase his   HR. Given that patient does not have a formal diagnosis of atrial   fibrillation (see below and/or previous EP note), would recommend   stopping the Amio. Would also stop the BB; however, his last ECHO   showed a gradient across his mitral valve to be around 6-10.   Therefore, instead of stopping the medication, we will half the   BB to Metoprolol XL 12.5 mg daily.     Would recommend outpatient ECHO to re-assess AV valve and MVG on   these new medication changes.     RECOMMEND:  1. Stop Amio  2. Reduce Metoprolol to 12.5mg daily   3. Outpatient assessment of AV Valve and SVT (might need repeat   ECHO and Ziopatch to document atrial fibrillation).  4. No current indication for pacemaker. If patient continues to   have symptoms of LH/Dizziness despite medication changes as   above, would then place a Ziopatch to  document degree of   bradycardia.     Flaquito Delgado PGY-5  Cardiology Fellow   Pager: [phone]          Flaquito Delgado    REASON FOR CONSULT: LH/Dizziness     History of Present Illness   Gorge Corona is a 87 year old male with a PMHx of CAD s/p CABG   (LIMA to LAD, SVG to PDA, SVG to D1, SVG to OM) and PCI (LM into   OM due to occluded SVG), MVR with Epic porcine 29mm Valve, Aortic   Stenosis, SVT who presents for dizziness. Patient states that   yesterday his HR was in the 40's (normally in the 60's) and he   felt LH/dizzy. Patient states he can get LH/Dizzy when he stands   up or exerts himself. He denies chest pain but does report   chronic shortness of breath. He is on metoprolol 12.5 mg BID and   Amio 200mg every other day for rate control of atrial   fibrillation. When patient presented his labs were unremarkable,   he was admitted to ED Observation. His BB and Amio were stopped   and patient's HR is back to around 60-70. Patient states his   symptoms have resolved.     Regarding his SVT history, many notes state that he has atrial   fibrillation. However, according to recent EP note, this has   never been documented. This is likely why patient is not on AC at   this time. Regarding his aortic stenosis, patient is following   with Dr. Jj. He underwent TTE which showed mild aortic   stenosis with mean gradient of 13 and BETI of 1.7. Patient did   have a RAY at [facility]; however, I do not see the results of this   study. He is supposed to follow with Анна in Feb 2019.   Family is requesting an earlier appointment.     Past Medical History    I have reviewed this patient's medical history and updated it   with pertinent information if needed.   Past Medical History:   Diagnosis Date     Atrial fibrillation (H)      Atrial flutter (H)      Basal cell carcinoma of skin of trunk      BPH (benign prostatic hypertrophy) with urinary obstruction      CAD (coronary artery disease)     s/p CABG 1/2013;  RUSSEL in 2/2013     Depression      H/O CT scan of head 1/11/2013 7/20/2006    Result Impression     CT of the Head without   contrast 7/20/2006  History: Ataxia, incontinence, and NPH    Comparison Study: MR obtained to 4/19/2004  Technique: Axial CT   images of the head were obtained at 2 intervals from the skull   base to the vertex without intravenous contrast. The images were   reviewed in brain, subdural and bone windows.  Findings: Patient   has had a moderate promin     Hyperlipidemia      Lumbar spinal stenosis      Mitral regurgitation     s/p bioprosthesis 1/2013     Palpitations      Paroxysmal supraventricular tachycardia (H)      Squamous cell carcinoma      Tachycardia     baroreflex injury dut to surgery      (ventriculoperitoneal) shunt status     for Normal pressure hydrocephalus       Past Surgical History   I have reviewed this patient's surgical history and updated it   with pertinent information if needed.  Past Surgical History:   Procedure Laterality Date     ANESTHESIA CARDIOVERSION  4/11/2013    Procedure: ANESTHESIA CARDIOVERSION;  Cardioversion;  Surgeon:   Provider, Generic Anesthesia;  Location: [facility] OR     BYPASS GRAFT ARTERY CORONARY, REPAIR VALVE MITRAL, COMBINED    1/14/2013    Procedure: COMBINED BYPASS GRAFT ARTERY CORONARY, REPAIR VALVE   MITRAL;  Median sternotomy, coronary artery bypass graft X3 using   left internal mammary artery & right saphenous vein , mitral   valve Replacement and on pump oxygenator, flexible cystoscopy,   urethral dilation and insertion of stuart catheter.;  Surgeon:   Chan Peres MD;  Location: [facility] OR     CORONARY ANGIOGRAPHY ADULT ORDER       CORONARY ARTERY BYPASS  1/2013    mitral valve tissue      INSERT CATHETER BLADDER  1/14/2013    Procedure: INSERT CATHETER BLADDER;  Flexible cystoscopy,   urethral dilation,& insertion of stuart catheter;  Surgeon:   Pete Bedoya MD;  Location: [facility] OR     MITRAL VALVE REPLACEMENT  2013     29 mm Epic porcine valve     MOHS MICROGRAPHIC PROCEDURE       REPLACE VALVE MITRAL  1/2013    tissue valve       Prior to Admission Medications   Prior to Admission Medications   Prescriptions Last Dose Informant Patient Reported? Taking?   Ammonium Lactate 10 % CREA Unknown at Unknown time  No No   Sig: Apply thin layer to skin on the body at least once daily.   Calcium 600 MG tablet 8/17/2018 at Unknown time   Spouse/Significant Other Yes Yes   Sig: Take 1 tablet by mouth 2 times daily.   Cholecalciferol (VITAMIN D) 1000 UNITS capsule 8/17/2018 at   Unknown time Spouse/Significant Other Yes Yes   Sig: Take 1 capsule by mouth daily.   MAGNESIUM OXIDE PO 8/17/2018 at Unknown time  Yes Yes   Sig: Take 400 mg by mouth   Metoprolol Tartrate (LOPRESSOR PO) 8/17/2018 at Unknown time  Yes   No   Sig: Take 12.5 mg by mouth 2 times daily   Multiple Vitamins-Minerals (OCUVITE PO) 8/17/2018 at Unknown time   Spouse/Significant Other Yes Yes   Sig: Take  by mouth.   albuterol (PROAIR HFA, PROVENTIL HFA, VENTOLIN HFA) 108 (90 BASE)   MCG/ACT inhaler 8/17/2018 at Unknown time Spouse/Significant   Other No Yes   Sig: Inhale 2 puffs into the lungs every 4 hours as needed for   shortness of breath / dyspnea or wheezing   amiodarone (PACERONE/CODARONE) 200 MG tablet Unknown at Unknown   time  No No   Sig: Take 1 tablet (200 mg) by mouth daily   atorvastatin (LIPITOR) 10 MG tablet 8/16/2018 at Unknown time  No   Yes   Sig: Take 1 tablet (10 mg) by mouth daily   citalopram (CELEXA) 20 MG tablet 8/17/2018 at Unknown time  No   Yes   Sig: Take 1 tablet (20 mg) by mouth daily   clindamycin (CLEOCIN) 300 MG capsule Unknown at Unknown time   Spouse/Significant Other No No   Sig: Take 2 capsules by mouth as needed for 1 dose. 1 hour prior   to dental work.   coenzyme Q-10 75 MG CAPS 8/17/2018 at Unknown time   Spouse/Significant Other Yes Yes   Sig: Take  by mouth.   fluticasone-salmeterol (ADVAIR DISKUS) 250-50 MCG/DOSE diskus   inhaler  8/17/2018 at Unknown time  No Yes   Sig: Inhale 1 puff into the lungs every 12 hours   furosemide (LASIX) 40 MG tablet 8/17/2018 at Unknown time  No Yes     Sig: Take 0.5 tablets (20 mg) by mouth daily   levothyroxine (SYNTHROID/LEVOTHROID) 50 MCG tablet 8/17/2018 at   Unknown time  No Yes   Sig: Take 1.5 tablets (75 mcg) by mouth daily   meclizine (ANTIVERT) 25 MG tablet More than a month at Unknown   time  No No   Sig: Take 1 tablet (25 mg) by mouth 3 times daily as needed for   dizziness   melatonin 3 MG tablet 8/16/2018 at Unknown time  No Yes   Sig: Take 1 tablet (3 mg) by mouth nightly as needed for sleep   order for DME Unknown at Unknown time  No No   Sig: Walker   order for DME Unknown at Unknown time  No No   Sig: Equipment being ordered: Safe step tub / walk-in tub   senna-docusate (SENOKOT-S;PERICOLACE) 8.6-50 MG per tablet   Unknown at Unknown time Spouse/Significant Other No No   Sig: Take 1 tablet by mouth 2 times daily.   spironolactone (ALDACTONE) 25 MG tablet 8/17/2018 at Unknown time    No Yes   Sig: Take 1 tablet (25 mg) by mouth daily   tamsulosin (FLOMAX) 0.4 MG capsule 8/17/2018 at Unknown time  No   Yes   Sig: Take 1 capsule (0.4 mg) by mouth daily   traMADol (ULTRAM) 50 MG tablet Unknown at Unknown time  No No   Sig: Take 0.5 tablets (25 mg) by mouth 2 times daily as needed   for severe pain   traMADol (ULTRAM) 50 MG tablet Unknown at Unknown time  No No   Sig: Take 1 tablet (50 mg) by mouth daily as needed for severe   pain   triamcinolone (KENALOG) 0.1 % cream Unknown at Unknown time  No   No   Sig: Apply sparingly to affected area three times daily as needed        Facility-Administered Medications: None     Allergies   Allergies   Allergen Reactions     Penicillins Rash       Social History   I have reviewed this patient's social history and updated it with   pertinent information if needed. Gorge Corona  reports that he   quit smoking about 28 years ago. His smoking use included  Cigars   and Pipe. He has quit using smokeless tobacco. He reports that he   does not drink alcohol or use illicit drugs.    Family History   I have reviewed this patient's family history and updated it with   pertinent information if needed.   Family History   Problem Relation Age of Onset     Cancer Father      KIDNEY DISEASE Mother      Macular Degeneration No family hx of      Glaucoma No family hx of        Review of Systems   The 10 point Review of Systems is negative other than noted in   the HPI or here.     Physical Exam   Temp: 98.2  F (36.8  C) Temp src: Oral BP: 120/68 Pulse: 60 Heart   Rate: 69 Resp: 18 SpO2: 96 % O2 Device: None (Room air)    Vital Signs with Ranges  Temp:  [97.8  F (36.6  C)-98.6  F (37  C)] 98.2  F (36.8  C)  Pulse:  [60] 60  Heart Rate:  [55-71] 69  Resp:  [11-25] 18  BP: ()/(57-84) 120/68  SpO2:  [90 %-99 %] 96 %  0 lbs 0 oz    GEN: NAD, pleasant  HEENT: no icterus   CV: RRR with 3/6 systolic murmur heard best at the aortic level.   CHEST: CTAB  ABD: soft, NT/ND  NEURO: No focal defects   PSYCH: cooperative, affect appropriate    Data   Data reviewed today:  I personally reviewed the EKG tracing   showing Sinus Bradycardia (right around 60) .    Recent Labs  Lab 08/18/18  0644 08/17/18  1524 08/17/18  1517   WBC  --  7.0  --    HGB  --  11.0*  --    MCV  --  85  --    PLT  --  133*  --    INR  --  1.12  --     138  --    POTASSIUM 4.0 3.9  --    CHLORIDE 105 103  --    CO2 30 30  --    BUN 18 18  --    CR 1.13 1.15  --    ANIONGAP 6 6  --    HE 8.6 8.2*  --    GLC 78 81  --    ALBUMIN  --  3.3*  --    PROTTOTAL  --  6.6*  --    BILITOTAL  --  0.4  --    ALKPHOS  --  114  --    ALT  --  15  --    AST  --  11  --    TROPI  --  <0.015  --    TROPONIN  --   --  0.00       Recent Results (from the past 24 hour(s))   XR Chest Port 1 View    Narrative    Exam: XR CHEST PORT 1 VW, 8/17/2018 3:02 PM    Indication: Chest pain;     Comparison: Chest x-ray 9/23/2016     Findings:    Portable upright AP radiograph of the chest. Postsurgical changes  prior CABG, including median sternotomy wires and mediastinal   surgical  clips. The cardiomediastinal silhouette is mildly enlarged. Mild  interstitial opacities bilaterally. Unchanged elevation of the   left  hemidiaphragm. Streaky left basilar opacities. Clips project over   the  left upper quadrant.       Impression    Impression:   1. Mild cardiomegaly and interstitial pulmonary edema.   2. Chronic elevation of the left hemidiaphragm, with patchy left  basilar opacities that may represent atelectasis. Differential   also  includes infection.    I have personally reviewed the examination and initial   interpretation  and I agree with the findings.    HEMALATHA GILMORE MD       Thank you for allowing me to care for this patient. This has been   discussed with the attending physician.    Flaquito Delgado PGY-5  Cardiology Fellow   Pager: [phone]     Troponin POCT   Result Value Ref Range    Troponin I 0.00 0.00 - 0.10 ug/L      Recent Results (from the past 48 hour(s))   XR Chest Port 1 View    Narrative    Exam: XR CHEST PORT 1 VW, 8/17/2018 3:02 PM    Indication: Chest pain;     Comparison: Chest x-ray 9/23/2016     Findings:   Portable upright AP radiograph of the chest. Postsurgical changes  prior CABG, including median sternotomy wires and mediastinal surgical  clips. The cardiomediastinal silhouette is mildly enlarged. Mild  interstitial opacities bilaterally. Unchanged elevation of the left  hemidiaphragm. Streaky left basilar opacities. Clips project over the  left upper quadrant.       Impression    Impression:   1. Mild cardiomegaly and interstitial pulmonary edema.   2. Chronic elevation of the left hemidiaphragm, with patchy left  basilar opacities that may represent atelectasis. Differential also  includes infection.    I have personally reviewed the examination and initial interpretation  and I agree with the findings.    HEMALATHA GILMORE  MD                Pending Results:   Unresulted Labs Ordered in the Past 30 Days of this Admission     No orders found for last 61 day(s).                  Discharge Instructions and Follow-Up:     Discharge Procedure Orders  Reason for your hospital stay   Order Comments: Bradycardia     Adult [facility] Follow-up and recommended labs and tests   Order Comments: Follow up with cardiology. Phone number [phone].    Appointments on [place] and/or [facility] (with [facility] or [facility] provider or service). Call [phone] if you haven't heard regarding these appointments within 7 days of discharge.     Activity   Order Comments: Your activity upon discharge: As tolerated   Order Specific Question Answer Comments   Is discharge order? Yes      When to contact your care team   Order Comments: Call 033 if any of the following occur:    Chest pain    Trouble breathing    Slow heart rate with dizziness or lightheadedness    Fainting or loss of consciousness    Chest, shoulder, arm, neck, or back pain    Slow heart rate (under 50 beats per minute) if associated with symptoms    Call your healthcare provider right away if any of the following occur:    Occasional weakness, dizziness, or lightheadedness     Discharge Instructions   Order Comments: You were admitted to ED observation with a slow heart rate. You were seen by cardiology who recommended stopping Amiodarone. Recommend stopping Metoprolol 12.5 mg twice a day. Start Metoprolol 12.5 mg extended release daily. Cardiology will arrange moving your appointment up with Dr. Jj.     Full Code     Diet   Order Comments: Follow this diet upon discharge: Cardiac diet   Order Specific Question Answer Comments   Is discharge order? Yes             Attestation:  Jacquelin Cash.

## 2018-08-18 NOTE — PLAN OF CARE
Discharge instruction reviewed.  Patient verbalized understanding. PIV removed, patient ambulated to hallway with walker and assist x 1. W/C provided with transport to discharge pharmacy, then to the main lobby with Family. Patient discharged with all belongings.

## 2018-08-18 NOTE — PLAN OF CARE
Problem: Patient Care Overview  Goal: Plan of Care/Patient Progress Review  Outpatient/Observation goals to be met before discharge home:  - Diagnostic tests and consults completed and resulted: No   - No further episodes of syncope and any new arrhythmia addressed with controlled heart rates: Yes, pt denies any symptoms  - Vital signs normal or at patient baseline and orthostatic vitals are normal and patient not lightheaded with standing: Yes   - Tolerating oral intake to maintain hydration: Yes   - Safe disposition plan has been identified: Pending

## 2018-08-18 NOTE — PLAN OF CARE
Problem: Patient Care Overview  Goal: Plan of Care/Patient Progress Review  Outpatient/Observation goals to be met before discharge home:  - Diagnostic tests and consults completed and resulted: No   - No further episodes of syncope and any new arrhythmia addressed with controlled heart rates: Yes  - Vital signs normal or at patient baseline and orthostatic vitals are normal and patient not lightheaded with standing: Yes   - Tolerating oral intake to maintain hydration: Yes   - Safe disposition plan has been identified: No

## 2018-08-18 NOTE — PLAN OF CARE
Problem: Patient Care Overview  Goal: Plan of Care/Patient Progress Review  Outpatient/Observation goals to be met before discharge home:  - Diagnostic tests and consults completed and resulted: YES, Cardiology Consulted, recommended change in medications. Ortho BP performed, patient walked in hallway with A1 and walker, no lightheadedness or dizziness noted  - No further episodes of syncope and any new arrhythmia addressed with controlled heart rates: Yes, pt denies continues on Tele Monitoring,   - Vital signs normal or at patient baseline and orthostatic vitals are normal and patient not lightheaded with standing: Yes   /68  Pulse 60  Temp 98.2  F (36.8  C) (Oral)  Resp 18  SpO2 96%  - Tolerating oral intake to maintain hydration: Yes   - Safe disposition plan has been identified: Yes, discharge home with wife (caretaker)

## 2018-08-18 NOTE — PLAN OF CARE
Problem: Patient Care Overview  Goal: Plan of Care/Patient Progress Review  Outpatient/Observation goals to be met before discharge home:  - Diagnostic tests and consults completed and resulted: No   - No further episodes of syncope and any new arrhythmia addressed with controlled heart rates: Monitoring   - Vital signs normal or at patient baseline and orthostatic vitals are normal and patient not lightheaded with standing: Yes   - Tolerating oral intake to maintain hydration: Yes   - Safe disposition plan has been identified: No

## 2018-08-18 NOTE — CONSULTS
Cardiology Consult         Date of Service (when I saw the patient): 08/18/18    ASSESSMENT:   Gorge Corona is a 87 year old male with a PMHx of CAD s/p CABG (LIMA to LAD, SVG to PDA, SVG to D1, SVG to OM) and PCI (LM into OM due to occluded SVG), MVR with Epic porcine 29mm Valve, Aortic Stenosis, SVT who presents for dizziness/LH in the setting of low HR.     Patient's LH/Dizziness could be multifactorial form his aortic valve disease and mitral valve disease. When coupled with a low HR, patient is a set up for these symptoms. Therefore, will try to minimize his AV blocking medications in order to increase his HR. Given that patient does not have a formal diagnosis of atrial fibrillation (see below and/or previous EP note), would recommend stopping the Amio. Would also stop the BB; however, his last ECHO showed a gradient across his mitral valve to be around 6-10. Therefore, instead of stopping the medication, we will half the BB to Metoprolol XL 12.5 mg daily.     Would recommend outpatient ECHO to re-assess AV valve and MVG on these new medication changes.     RECOMMEND:  1. Stop Amio  2. Reduce Metoprolol to 12.5mg daily   3. Outpatient assessment of AV Valve and SVT (might need repeat ECHO and Ziopatch to document atrial fibrillation).  4. No current indication for pacemaker. If patient continues to have symptoms of LH/Dizziness despite medication changes as above, would then place a Ziopatch to document degree of bradycardia.     Flaquito Delgado PGY-5  Cardiology Fellow   Pager: [phone]          Flaquito Delgado    REASON FOR CONSULT: LH/Dizziness     History of Present Illness   Gorge Corona is a 87 year old male with a PMHx of CAD s/p CABG (LIMA to LAD, SVG to PDA, SVG to D1, SVG to OM) and PCI (LM into OM due to occluded SVG), MVR with Epic porcine 29mm Valve, Aortic Stenosis, SVT who presents for dizziness. Patient states that yesterday his HR was in the 40's (normally in the 60's) and he felt LH/dizzy.  Patient states he can get LH/Dizzy when he stands up or exerts himself. He denies chest pain but does report chronic shortness of breath. He is on metoprolol 12.5 mg BID and Amio 200mg every other day for rate control of atrial fibrillation. When patient presented his labs were unremarkable, he was admitted to ED Observation. His BB and Amio were stopped and patient's HR is back to around 60-70. Patient states his symptoms have resolved.     Regarding his SVT history, many notes state that he has atrial fibrillation. However, according to recent EP note, this has never been documented. This is likely why patient is not on AC at this time. Regarding his aortic stenosis, patient is following with Dr. Jj. He underwent TTE which showed mild aortic stenosis with mean gradient of 13 and BETI of 1.7. Patient did have a RAY at [facility]; however, I do not see the results of this study. He is supposed to follow with Анна in Feb 2019. Family is requesting an earlier appointment.     Past Medical History    I have reviewed this patient's medical history and updated it with pertinent information if needed.   Past Medical History:   Diagnosis Date     Atrial fibrillation (H)      Atrial flutter (H)      Basal cell carcinoma of skin of trunk      BPH (benign prostatic hypertrophy) with urinary obstruction      CAD (coronary artery disease)     s/p CABG 1/2013; RUSSEL in 2/2013     Depression      H/O CT scan of head 1/11/2013 7/20/2006    Result Impression     CT of the Head without contrast 7/20/2006  History: Ataxia, incontinence, and NPH  Comparison Study: MR obtained to 4/19/2004  Technique: Axial CT images of the head were obtained at 2 intervals from the skull base to the vertex without intravenous contrast. The images were reviewed in brain, subdural and bone windows.  Findings: Patient has had a moderate promin     Hyperlipidemia      Lumbar spinal stenosis      Mitral regurgitation     s/p bioprosthesis 1/2013      Palpitations      Paroxysmal supraventricular tachycardia (H)      Squamous cell carcinoma      Tachycardia     baroreflex injury dut to surgery      (ventriculoperitoneal) shunt status     for Normal pressure hydrocephalus       Past Surgical History   I have reviewed this patient's surgical history and updated it with pertinent information if needed.  Past Surgical History:   Procedure Laterality Date     ANESTHESIA CARDIOVERSION  4/11/2013    Procedure: ANESTHESIA CARDIOVERSION;  Cardioversion;  Surgeon: Provider, Generic Anesthesia;  Location: [facility] OR     BYPASS GRAFT ARTERY CORONARY, REPAIR VALVE MITRAL, COMBINED  1/14/2013    Procedure: COMBINED BYPASS GRAFT ARTERY CORONARY, REPAIR VALVE MITRAL;  Median sternotomy, coronary artery bypass graft X3 using left internal mammary artery & right saphenous vein , mitral valve Replacement and on pump oxygenator, flexible cystoscopy, urethral dilation and insertion of stuart catheter.;  Surgeon: Chan Peres MD;  Location: [facility] OR     CORONARY ANGIOGRAPHY ADULT ORDER       CORONARY ARTERY BYPASS  1/2013    mitral valve tissue      INSERT CATHETER BLADDER  1/14/2013    Procedure: INSERT CATHETER BLADDER;  Flexible cystoscopy, urethral dilation,& insertion of stuart catheter;  Surgeon: Pete Bedoya MD;  Location: [facility] OR     MITRAL VALVE REPLACEMENT  2013    29 mm Epic porcine valve     MOHS MICROGRAPHIC PROCEDURE       REPLACE VALVE MITRAL  1/2013    tissue valve       Prior to Admission Medications   Prior to Admission Medications   Prescriptions Last Dose Informant Patient Reported? Taking?   Ammonium Lactate 10 % CREA Unknown at Unknown time  No No   Sig: Apply thin layer to skin on the body at least once daily.   Calcium 600 MG tablet 8/17/2018 at Unknown time Spouse/Significant Other Yes Yes   Sig: Take 1 tablet by mouth 2 times daily.   Cholecalciferol (VITAMIN D) 1000 UNITS capsule 8/17/2018 at Unknown time Spouse/Significant Other Yes Yes    Sig: Take 1 capsule by mouth daily.   MAGNESIUM OXIDE PO 8/17/2018 at Unknown time  Yes Yes   Sig: Take 400 mg by mouth   Metoprolol Tartrate (LOPRESSOR PO) 8/17/2018 at Unknown time  Yes No   Sig: Take 12.5 mg by mouth 2 times daily   Multiple Vitamins-Minerals (OCUVITE PO) 8/17/2018 at Unknown time Spouse/Significant Other Yes Yes   Sig: Take  by mouth.   albuterol (PROAIR HFA, PROVENTIL HFA, VENTOLIN HFA) 108 (90 BASE) MCG/ACT inhaler 8/17/2018 at Unknown time Spouse/Significant Other No Yes   Sig: Inhale 2 puffs into the lungs every 4 hours as needed for shortness of breath / dyspnea or wheezing   amiodarone (PACERONE/CODARONE) 200 MG tablet Unknown at Unknown time  No No   Sig: Take 1 tablet (200 mg) by mouth daily   atorvastatin (LIPITOR) 10 MG tablet 8/16/2018 at Unknown time  No Yes   Sig: Take 1 tablet (10 mg) by mouth daily   citalopram (CELEXA) 20 MG tablet 8/17/2018 at Unknown time  No Yes   Sig: Take 1 tablet (20 mg) by mouth daily   clindamycin (CLEOCIN) 300 MG capsule Unknown at Unknown time Spouse/Significant Other No No   Sig: Take 2 capsules by mouth as needed for 1 dose. 1 hour prior to dental work.   coenzyme Q-10 75 MG CAPS 8/17/2018 at Unknown time Spouse/Significant Other Yes Yes   Sig: Take  by mouth.   fluticasone-salmeterol (ADVAIR DISKUS) 250-50 MCG/DOSE diskus inhaler 8/17/2018 at Unknown time  No Yes   Sig: Inhale 1 puff into the lungs every 12 hours   furosemide (LASIX) 40 MG tablet 8/17/2018 at Unknown time  No Yes   Sig: Take 0.5 tablets (20 mg) by mouth daily   levothyroxine (SYNTHROID/LEVOTHROID) 50 MCG tablet 8/17/2018 at Unknown time  No Yes   Sig: Take 1.5 tablets (75 mcg) by mouth daily   meclizine (ANTIVERT) 25 MG tablet More than a month at Unknown time  No No   Sig: Take 1 tablet (25 mg) by mouth 3 times daily as needed for dizziness   melatonin 3 MG tablet 8/16/2018 at Unknown time  No Yes   Sig: Take 1 tablet (3 mg) by mouth nightly as needed for sleep   order for DME  Unknown at Unknown time  No No   Sig: Walker   order for DME Unknown at Unknown time  No No   Sig: Equipment being ordered: Safe step tub / walk-in tub   senna-docusate (SENOKOT-S;PERICOLACE) 8.6-50 MG per tablet Unknown at Unknown time Spouse/Significant Other No No   Sig: Take 1 tablet by mouth 2 times daily.   spironolactone (ALDACTONE) 25 MG tablet 8/17/2018 at Unknown time  No Yes   Sig: Take 1 tablet (25 mg) by mouth daily   tamsulosin (FLOMAX) 0.4 MG capsule 8/17/2018 at Unknown time  No Yes   Sig: Take 1 capsule (0.4 mg) by mouth daily   traMADol (ULTRAM) 50 MG tablet Unknown at Unknown time  No No   Sig: Take 0.5 tablets (25 mg) by mouth 2 times daily as needed for severe pain   traMADol (ULTRAM) 50 MG tablet Unknown at Unknown time  No No   Sig: Take 1 tablet (50 mg) by mouth daily as needed for severe pain   triamcinolone (KENALOG) 0.1 % cream Unknown at Unknown time  No No   Sig: Apply sparingly to affected area three times daily as needed      Facility-Administered Medications: None     Allergies   Allergies   Allergen Reactions     Penicillins Rash       Social History   I have reviewed this patient's social history and updated it with pertinent information if needed. Gorge Corona  reports that he quit smoking about 28 years ago. His smoking use included Cigars and Pipe. He has quit using smokeless tobacco. He reports that he does not drink alcohol or use illicit drugs.    Family History   I have reviewed this patient's family history and updated it with pertinent information if needed.   Family History   Problem Relation Age of Onset     Cancer Father      KIDNEY DISEASE Mother      Macular Degeneration No family hx of      Glaucoma No family hx of        Review of Systems   The 10 point Review of Systems is negative other than noted in the HPI or here.     Physical Exam   Temp: 98.2  F (36.8  C) Temp src: Oral BP: 120/68 Pulse: 60 Heart Rate: 69 Resp: 18 SpO2: 96 % O2 Device: None (Room air)     Vital Signs with Ranges  Temp:  [97.8  F (36.6  C)-98.6  F (37  C)] 98.2  F (36.8  C)  Pulse:  [60] 60  Heart Rate:  [55-71] 69  Resp:  [11-25] 18  BP: ()/(57-84) 120/68  SpO2:  [90 %-99 %] 96 %  0 lbs 0 oz    GEN: NAD, pleasant  HEENT: no icterus   CV: RRR with 3/6 systolic murmur heard best at the aortic level.   CHEST: CTAB  ABD: soft, NT/ND  NEURO: No focal defects   PSYCH: cooperative, affect appropriate    Data   Data reviewed today:  I personally reviewed the EKG tracing showing Sinus Bradycardia (right around 60) .    Recent Labs  Lab 08/18/18  0644 08/17/18  1524 08/17/18  1517   WBC  --  7.0  --    HGB  --  11.0*  --    MCV  --  85  --    PLT  --  133*  --    INR  --  1.12  --     138  --    POTASSIUM 4.0 3.9  --    CHLORIDE 105 103  --    CO2 30 30  --    BUN 18 18  --    CR 1.13 1.15  --    ANIONGAP 6 6  --    HE 8.6 8.2*  --    GLC 78 81  --    ALBUMIN  --  3.3*  --    PROTTOTAL  --  6.6*  --    BILITOTAL  --  0.4  --    ALKPHOS  --  114  --    ALT  --  15  --    AST  --  11  --    TROPI  --  <0.015  --    TROPONIN  --   --  0.00       Recent Results (from the past 24 hour(s))   XR Chest Port 1 View    Narrative    Exam: XR CHEST PORT 1 VW, 8/17/2018 3:02 PM    Indication: Chest pain;     Comparison: Chest x-ray 9/23/2016     Findings:   Portable upright AP radiograph of the chest. Postsurgical changes  prior CABG, including median sternotomy wires and mediastinal surgical  clips. The cardiomediastinal silhouette is mildly enlarged. Mild  interstitial opacities bilaterally. Unchanged elevation of the left  hemidiaphragm. Streaky left basilar opacities. Clips project over the  left upper quadrant.       Impression    Impression:   1. Mild cardiomegaly and interstitial pulmonary edema.   2. Chronic elevation of the left hemidiaphragm, with patchy left  basilar opacities that may represent atelectasis. Differential also  includes infection.    I have personally reviewed the examination and  initial interpretation  and I agree with the findings.    HEMALATHA GILMORE MD       Thank you for allowing me to care for this patient. This has been discussed with the attending physician.    Flaquito Delgado PGY-5  Cardiology Fellow   Pager: [phone]

## 2018-08-21 ENCOUNTER — HOSPITAL ENCOUNTER (OUTPATIENT)
Dept: OCCUPATIONAL THERAPY | Facility: CLINIC | Age: 83
Setting detail: THERAPIES SERIES
End: 2018-08-21
Attending: OPTOMETRIST
Payer: MEDICARE

## 2018-08-21 LAB — INTERPRETATION ECG - MUSE: NORMAL

## 2018-08-21 PROCEDURE — 40000249 ZZH STATISTIC VISIT LOW VISION CLINIC: Performed by: OCCUPATIONAL THERAPIST

## 2018-08-21 PROCEDURE — 97165 OT EVAL LOW COMPLEX 30 MIN: CPT | Mod: GO | Performed by: OCCUPATIONAL THERAPIST

## 2018-08-21 PROCEDURE — G8988 SELF CARE GOAL STATUS: HCPCS | Mod: GO,CI | Performed by: OCCUPATIONAL THERAPIST

## 2018-08-21 PROCEDURE — 97535 SELF CARE MNGMENT TRAINING: CPT | Mod: GO | Performed by: OCCUPATIONAL THERAPIST

## 2018-08-21 PROCEDURE — G8987 SELF CARE CURRENT STATUS: HCPCS | Mod: GO,CJ | Performed by: OCCUPATIONAL THERAPIST

## 2018-08-21 ASSESSMENT — ACTIVITIES OF DAILY LIVING (ADL): PRIOR_ADL/IADL_STATUS: IMPAIRED PRIOR TO ONSET

## 2018-08-21 ASSESSMENT — VISUAL ACUITY
OS: 20/600
OD: 20/70

## 2018-08-21 NOTE — PROGRESS NOTES
[facility]          OUTPATIENT OCCUPATIONALTHERAPY  EVALUATION  PLAN OF TREATMENT FOR OUTPATIENT REHABILITATION  (COMPLETE FOR INITIAL CLAIMS ONLY)  Patient's Last Name, First Name, M.I.  YOB: 1930  Gorge Corona      Provider's Name  [facility] Medical Record No.  [id number]   Onset Date:  07/21/2018 Start of Care Date:  08/21/18   Type:     ___PT  _X_OT   ___SLP Medical Diagnosis:  AMD both eyes   Therapy Diagnosis: Impaired ADL/IADL with deficits in Reading based ADL, Written communication (glare and light managment)    Visits from SOC: 1     _________________________________________________________________________________  Plan of Treatment/Functional Goals:  Planned Interventions: [place] awareness, Visual skills training for near tasks, Low vision compensatory training for reading, Low vision compensatory training for written communication, Instruction in environmental adaptations for glare, Instruction in environmental adaptations for contrast, Instruction in environmental adaptations for lighting, Optical device/ADL device instruction and training, Instruction in community resources        Goals  1. Patient will verbalize awareness of visual field Loss and demonstrate improved use of visual skills/adaptive equipment for increased independence in reading-based activities of daily living, written communication and detail ADL tasks.         Target Date: 11/13/18      2.                  3.  Patient will demonstrate understanding of the impact of lighting, contrast and glare on ADL activities, in conjunction with environmentally-based ADL modifications         Target Date: 11/13/18      4. Patient will verbalize awareness of community resources for the following:, Audio access to print materials, Access to large print materials, Access to low vision devices          Target Date: 11/13/18      5.                   6.                    7.                 8.                            Therapy Frequency/Duration:  4 tx visits over 12 weeks - 1X every 3 weeks for 12 weeks    Keerthi Ignacio, OT       I CERTIFY THE NEED FOR THESE SERVICES FURNISHED UNDER        THIS PLAN OF TREATMENT AND WHILE UNDER MY CARE     (Physician co-signature of this document indicates review and certification of the therapy plan).                  Certification date from: 08/21/18 Certification date to: 11/13/18          Referring Physician: Zuleyka Ortiz OD     Initial Assessment        See Epic Evaluation Start Of Care Date: 08/21/18     Keerthi Ignacio

## 2018-08-21 NOTE — PROGRESS NOTES
08/21/18 1200   Visit Type   Type of Visit Initial       Present No   General Information   Start Of Care Date 08/21/18   Referring Physician Zuleyka Ortiz OD   Orders Evaluate And Treat As Indicated   Date of Order 07/21/18   Medical Diagnosis AMD OU, Moderate/Severe visual impairment   Onset Of Illness/injury Or Date Of Surgery 07/21/2018   Surgical/Medical history reviewed (6-7 years AMD, cardiac history - triple bypass, mitral valve)   Precautions/Limitations Hip surgery: femur April 9th, walks with walker   Prior ADL/IADL status Impaired prior to onset  (impaired secondary to mobility limitations, cardiac history)   Others present at visit Spouse/significant other  (Kassi)   Patient/family Goals Statement reading paper, stopped driving in March,  reading for pleasure and information,.   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 Yes   Have you fallen and had an injury in the past year? Yes   Is patient a fall risk? Yes   Fall screen comments PT is working on balance   Cognitive/Behavioral   Communication Intact   Cognitive Status Intact for evaluation process  (occassionally relies on wife for temporal history)   Behavior Appropriate   Patient/family aware of diagnosis Yes   How well do you understand your eye condition? Not well   Vision related restrictions on visiting with family/friends None   Reported emotional impact of visual impairment Moderate  (concerned about future of right eye. )   Adjustment to disability Fair   Physical Status/Equipment   Physical Status  Weakness;Impaired balance   Mobility equipment used Walker   Physical Status/Equipment comments received home OT and PT s/p hip surgery. In home recommendations made.   Visual Report   Functional Complaints Reading;Writing  (managing light and glare)   Visual Complaints Scotoma Hindrance right eye;Scotoma Hindrance left eye   Ja Do Symptoms? (yes - sees people)   Magnifier (strength and type) none   Reading glasses Single Lens  (+3.0 OTC)   Technology (cell phone - [employer]. Reports he uses well)   Lighting and Glare   Is your lighting adequate? Yes/ at home  (added light to reading table)   Is glare a problem? Yes/ outdoors   Are you satisfied with your sunglasses? 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 home making secondary to other physical limitatons   Education   Learner Patient;Significant Other  (wife Kassi)   Readiness Acceptance   Method Booklet/handout;Explanation;Demonstration   Response Verbalizes understanding;Demonstrates understanding;Needs reinforcement   Clinical Impression, OT Eval   Criteria for Skilled Therapeutic Interventions Met yes;treatment indicated   Therapy  Diagnosis: Impaired ADL/IADL with deficits in Reading based ADL;Written communication  (glare and light managment)   Assessment of Occupational Performance 3-5 Performance Deficits   Identified Performance Deficits as above   Clinical Decision Making (Complexity) Low complexity   OT Visual Rehabilitation  Evaluation Plan   Therapy Plan Occupational therapy intervention   Planned Interventions Scotoma awareness;Visual skills training for near tasks;Low vision compensatory training for reading;Low vision compensatory training for written communication;Instruction in environmental adaptations for glare;Instruction in environmental adaptations for contrast;Instruction in environmental adaptations for lighting;Optical device/ADL device instruction and training;Instruction in community resources   Frequency / Duration 4 tx visits over 12 weeks - 1X every 3 weeks for 12 weeks   Risks and Benefits of Treatment have been explained. Yes   Patient, Family in agreement with plan of care Yes   GOALS   Goals Near Vision;Environmental Modification;Resource Education   Goals Addressed this Session Near vision;Environmental modification;Resource education   Goal 1 - Near Vision   Goal Description: Near Vision Patient will verbalize awareness of visual field Loss and demonstrate improved use of visual skills/adaptive equipment for increased independence in reading-based activities of daily living, written communication and detail ADL tasks.   Target Date 11/13/18   Goal 3 - Environmental modification   Goal Description: Environment modification Patient will demonstrate understanding of the impact of lighting, contrast and glare on ADL activities, in conjunction with environmentally-based ADL modifications   Target Date 11/13/18   Goal 4 - Resource education   Goal Description: Resource education Patient will verbalize awareness of community resources for the following:;Audio access to print materials;Access to large print materials;Access to low vision devices   Target Date 11/13/18   Total Evaluation Time   Total Evaluation Time 30   Therapy Certification   Certification date from 08/21/18   Certification date to 11/13/18   Medical Diagnosis AMD both eyes

## 2018-08-24 ENCOUNTER — MEDICAL CORRESPONDENCE (OUTPATIENT)
Dept: HEALTH INFORMATION MANAGEMENT | Facility: CLINIC | Age: 83
End: 2018-08-24

## 2018-08-28 ENCOUNTER — MEDICAL CORRESPONDENCE (OUTPATIENT)
Dept: HEALTH INFORMATION MANAGEMENT | Facility: CLINIC | Age: 83
End: 2018-08-28

## 2018-08-31 ENCOUNTER — OFFICE VISIT (OUTPATIENT)
Dept: INTERNAL MEDICINE | Facility: CLINIC | Age: 83
End: 2018-08-31
Payer: MEDICARE

## 2018-08-31 VITALS
WEIGHT: 198.3 LBS | SYSTOLIC BLOOD PRESSURE: 97 MMHG | DIASTOLIC BLOOD PRESSURE: 59 MMHG | BODY MASS INDEX: 28.45 KG/M2 | HEART RATE: 59 BPM

## 2018-08-31 DIAGNOSIS — I48.20 CHRONIC ATRIAL FIBRILLATION (H): Primary | ICD-10-CM

## 2018-08-31 DIAGNOSIS — R92.8 OTHER ABNORMAL AND INCONCLUSIVE FINDINGS ON DIAGNOSTIC IMAGING OF BREAST: ICD-10-CM

## 2018-08-31 DIAGNOSIS — N63.20 LEFT BREAST MASS: ICD-10-CM

## 2018-08-31 DIAGNOSIS — M62.81 GENERALIZED MUSCLE WEAKNESS: ICD-10-CM

## 2018-08-31 ASSESSMENT — PAIN SCALES - GENERAL: PAINLEVEL: NO PAIN (0)

## 2018-08-31 NOTE — NURSING NOTE
Chief Complaint   Patient presents with     Hospital F/U     pt here following a hospital visit     Referral     pt would like a referral to cardio, physical therapy     Mass     pt states his left breast is lumpy       Zoey Son CMA at 9:31 AM on 8/31/2018.

## 2018-08-31 NOTE — MR AVS SNAPSHOT
After Visit Summary   8/31/2018    Gorge Corona    MRN: [id number]           Patient Information     Date Of Birth          11/27/1930        Visit Information        Provider Department      8/31/2018 9:30 AM Monisha Medina MD [facility]        Today's Diagnoses     Chronic atrial fibrillation (H)    -  1    Generalized muscle weakness        Left breast mass        Other abnormal and inconclusive findings on diagnostic imaging of breast           Care Instructions    Primary Care Center Medication Refill Request Information:  * Please contact your pharmacy regarding ANY request for medication refills.  ** [facility] Prescription Fax = [phone]  * Please allow 3 business days for routine medication refills.  * Please allow 5 business days for controlled substance medication refills.     Primary Care Center Test Result notification information:  *You will be notified with in 7-10 days of your appointment day regarding the results of your test.  If you are on MyChart you will be notified as soon as the provider has reviewed the results and signed off on them.    Primary Care Center: [phone]             Follow-ups after your visit        Additional Services     CARDIOLOGY EVAL ADULT REFERRAL       Please be aware that coverage of these services is subject to the terms and limitations of your health insurance plan.  Call member services at your health plan with any benefit or coverage questions.      Please bring the following to your appointment:  Any x-rays, CTs or MRIs which have been performed. Contact the facility where they were done to arrange for  prior to your scheduled appointment.    List of current medications  This referral request   Any documents/labs given to you for this referral            PHYSICAL THERAPY REFERRAL       [facility] provides Physical Therapy evaluation and treatment and many specialty services across the  "[place] system.  If requesting a specialty program, please choose from the list below.    If you have not heard from the scheduling office within 2 business days, please call [phone] for all locations, with the exception of [place], please call [phone] and [place], please call [phone]  Treatment: Evaluation & Treat                  Your next 10 appointments already scheduled     Sep 05, 2018  9:30 AM CDT   (Arrive by 9:15 AM)   RETURN ARRHYTHMIA with ZAID De Jesus [facility] ([facility])    [address]  [address]  [facility] [id number] [phone]            Sep 10, 2018 10:30 AM CDT   L/Vision Treatment with Keerthi Ignacio OT   [facility] [place], Occupational Therapy, Vision - Outpatie ([facility])    [address]  [address]th [address], [address]  [place] [facility] [facility] [id number] [phone]            Sep 13, 2018  9:00 AM CDT   (Arrive by 8:45 AM)   MA DIAGNOSTIC DIGITAL BILATERAL with [address] [facility] ([facility])    [address] [id number] [phone]           Do not use any powder, lotion or deodorant under your arms or on your breast. If you do, we will ask you to remove it before your exam.  Wear comfortable, two-piece clothing.  If you have any allergies, tell your care team.  Bring any previous mammograms from other facilities or have them mailed to the breast center.  Three-dimensional (3D) mammograms are available at [place] locations in [facility], [place], [place], [facility], [place], [place], and [place]. [facility] locations include [place] and [facility] Center in [place]. Benefits of 3D mammograms include: - Improved rate of cancer detection - Decreases your chance of having to go back for more tests, which means fewer: - \"False-positive\" results (This means that there is an abnormal " area but it isn't cancer.) - Invasive testing procedures, such as a biopsy or surgery - Can provide clearer images of the breast if you have dense breast tissue. 3D mammography is an optional exam that anyone can have with a 2D mammogram. It doesn't replace or take the place of a 2D mammogram. 2D mammograms remain an effective screening test for all women.  Not all insurance companies cover the cost of a 3D mammogram. Check with your insurance.            Sep 13, 2018  9:30 AM CDT   US BREAST LEFT CMPL 4 QUAD with [id number]   [facility] ([facility])    [address] [address] [phone]           Please bring a list of your medicines (including vitamins, minerals and over-the-counter drugs). Also, tell your doctor about any allergies you may have. Wear comfortable clothes and leave your valuables at home.  You do not need to do anything special to prepare for your exam.  Please call the Imaging Department at your exam site with any questions.            Feb 21, 2019  5:00 PM CST   (Arrive by 4:45 PM)   Return Visit with Morris Jj MD   [facility] ([facility])    [address] [place] [address]  [address] [place] [address] [phone]              Future tests that were ordered for you today     Open Future Orders        Priority Expected Expires Ordered    MA Diagnostic Digital Bilateral Routine  8/31/2019 8/31/2018    US Breast Left Complete 4 Quadrants Routine  8/31/2019 8/31/2018            Who to contact     Please call your clinic at [phone] to:    Ask questions about your health    Make or cancel appointments    Discuss your medicines    Learn about your test results    Speak to your doctor            Additional Information About Your Visit        Care EveryWhere ID     This is your Care EveryWhere ID. This could be used by other organizations to access your [place] medical records  [id number]        Your  Vitals Were     Pulse BMI (Body Mass Index)                59 28.45 kg/m2           Blood Pressure from Last 3 Encounters:   08/31/18 97/59   08/18/18 120/68   06/27/18 111/64    Weight from Last 3 Encounters:   08/31/18 89.9 kg (198 lb 4.8 oz)   06/27/18 88.9 kg (196 lb)   06/07/18 89.4 kg (197 lb)              We Performed the Following     CARDIOLOGY EVAL ADULT REFERRAL     PHYSICAL THERAPY REFERRAL        Primary Care Provider Office Phone # Fax #    Monisha Jenna Olea [phone] [address] [address]        Equal Access to Services     NILDA DIAZ : Hadii kg floodo [place], waaxda luqadaha, qaybta kaalmada adecarmeloyada, tejas griffin . So [facility] [phone].    ATENCIÓN: Si habla español, tiene a ferrari disposición servicios gratuitos de asistencia lingüística. Llame al [phone].    We comply with applicable federal civil rights laws and [place] laws. We do not discriminate on the basis of race, color, national origin, age, disability, sex, sexual orientation, or gender identity.            Thank you!     Thank you for choosing [facility]  for your care. Our goal is always to provide you with excellent care. Hearing back from our patients is one way we can continue to improve our services. Please take a few minutes to complete the written survey that you may receive in the mail after your visit with us. Thank you!             Your Updated Medication List - Protect others around you: Learn how to safely use, store and throw away your medicines at www.disposemymeds.org.          This list is accurate as of 8/31/18 10:30 AM.  Always use your most recent med list.                   Brand Name Dispense Instructions for use Diagnosis    albuterol 108 (90 Base) MCG/ACT inhaler    PROAIR HFA/PROVENTIL HFA/VENTOLIN HFA    1 Inhaler    Inhale 2 puffs into the lungs every 4 hours as needed for shortness of breath /  dyspnea or wheezing    Cough       Ammonium Lactate 10 % Crea     120 mL    Apply thin layer to skin on the body at least once daily.    Xerosis cutis, Nummular eczema       atorvastatin 10 MG tablet    LIPITOR    90 tablet    Take 1 tablet (10 mg) by mouth daily    Major depressive disorder, recurrent episode, moderate (H), Essential hypertension, benign, Shortness of breath, Dyslipidemia       calcium 600 MG tablet     100 tablet    Take 1 tablet by mouth 2 times daily.        citalopram 20 MG tablet    celeXA    90 tablet    Take 1 tablet (20 mg) by mouth daily    Major depressive disorder, recurrent episode, moderate (H)       clindamycin 300 MG capsule    CLEOCIN    6 capsule    Take 2 capsules by mouth as needed for 1 dose. 1 hour prior to dental work.    Prophylactic antibiotic       coenzyme Q-10 75 MG Caps      Take  by mouth.        fluticasone-salmeterol 250-50 MCG/DOSE diskus inhaler    ADVAIR DISKUS    120 Inhaler    Inhale 1 puff into the lungs every 12 hours    Acute bronchitis, Cough       furosemide 40 MG tablet    LASIX    90 tablet    Take 0.5 tablets (20 mg) by mouth daily    Chronic diastolic heart failure (H), CAD S/P percutaneous coronary angioplasty       LANsoprazole 15 MG CR capsule    PREVACID    30 capsule    Take 2 capsules (30 mg) by mouth daily    Gastroesophageal reflux disease without esophagitis       levothyroxine 50 MCG tablet    SYNTHROID/LEVOTHROID    90 tablet    Take 1.5 tablets (75 mcg) by mouth daily    Other specified hypothyroidism       MAGNESIUM OXIDE PO      Take 400 mg by mouth        meclizine 25 MG tablet    ANTIVERT    30 tablet    Take 1 tablet (25 mg) by mouth 3 times daily as needed for dizziness    BPPV (benign paroxysmal positional vertigo), unspecified laterality       melatonin 3 MG tablet     100 tablet    Take 1 tablet (3 mg) by mouth nightly as needed for sleep    Insomnia, unspecified type       metoprolol succinate 25 MG 24 hr tablet    TOPROL-XL    45  tablet    Take 0.5 tablets (12.5 mg) by mouth daily    Coronary artery disease involving native coronary artery of native heart without angina pectoris       OCUVITE PO      Take  by mouth.        * order for DME     1 each    Walker    Spinal stenosis, lumbar region, without neurogenic claudication       * order for DME     1 Device    Equipment being ordered: Safe step tub / walk-in tub    Generalized muscle weakness, Personal history of fall, CHF (congestive heart failure) (H)       senna-docusate 8.6-50 MG per tablet    SENOKOT-S;PERICOLACE    60 tablet    Take 1 tablet by mouth 2 times daily.    S/P CABG x 3       spironolactone 25 MG tablet    ALDACTONE    90 tablet    Take 1 tablet (25 mg) by mouth daily    Hypokalemia       tamsulosin 0.4 MG capsule    FLOMAX    90 capsule    Take 1 capsule (0.4 mg) by mouth daily    Urinary retention       * traMADol 50 MG tablet    ULTRAM    15 tablet    Take 0.5 tablets (25 mg) by mouth 2 times daily as needed for severe pain    Pain in joint       * traMADol 50 MG tablet    ULTRAM    10 tablet    Take 1 tablet (50 mg) by mouth daily as needed for severe pain    Hip pain, left       triamcinolone 0.1 % cream    KENALOG    80 g    Apply sparingly to affected area three times daily as needed    Nummular eczema       vitamin D 1000 units capsule      Take 1 capsule by mouth daily.        * Notice:  This list has 4 medication(s) that are the same as other medications prescribed for you. Read the directions carefully, and ask your doctor or other care provider to review them with you.

## 2018-08-31 NOTE — PATIENT INSTRUCTIONS
[facility] Medication Refill Request Information:  * Please contact your pharmacy regarding ANY request for medication refills.  ** [facility] Prescription Fax = [phone]  * Please allow 3 business days for routine medication refills.  * Please allow 5 business days for controlled substance medication refills.     [facility] Test Result notification information:  *You will be notified with in 7-10 days of your appointment day regarding the results of your test.  If you are on MyChart you will be notified as soon as the provider has reviewed the results and signed off on them.    [facility]: [phone]

## 2018-09-05 ENCOUNTER — OFFICE VISIT (OUTPATIENT)
Dept: CARDIOLOGY | Facility: CLINIC | Age: 83
End: 2018-09-05
Attending: NURSE PRACTITIONER
Payer: MEDICARE

## 2018-09-05 VITALS
DIASTOLIC BLOOD PRESSURE: 66 MMHG | WEIGHT: 200.7 LBS | HEIGHT: 70 IN | SYSTOLIC BLOOD PRESSURE: 107 MMHG | OXYGEN SATURATION: 94 % | BODY MASS INDEX: 28.73 KG/M2 | HEART RATE: 65 BPM

## 2018-09-05 DIAGNOSIS — I48.91 ATRIAL FIBRILLATION, UNSPECIFIED TYPE (H): Primary | ICD-10-CM

## 2018-09-05 PROCEDURE — G0463 HOSPITAL OUTPT CLINIC VISIT: HCPCS | Mod: 25,ZF

## 2018-09-05 PROCEDURE — 93010 ELECTROCARDIOGRAM REPORT: CPT | Mod: ZP | Performed by: INTERNAL MEDICINE

## 2018-09-05 PROCEDURE — 99214 OFFICE O/P EST MOD 30 MIN: CPT | Mod: 25 | Performed by: NURSE PRACTITIONER

## 2018-09-05 PROCEDURE — 93005 ELECTROCARDIOGRAM TRACING: CPT | Mod: ZF

## 2018-09-05 ASSESSMENT — PAIN SCALES - GENERAL: PAINLEVEL: NO PAIN (0)

## 2018-09-05 NOTE — NURSING NOTE
Chief Complaint   Patient presents with     Follow Up For     1 mo follow-up inpt for bradycardia, dizziness - amio stopped.     Vitals were taken and medications were reconciled. EKG was performed    Marlene JO  9:38 AM

## 2018-09-05 NOTE — LETTER
9/5/2018      RE: Gorge Corona  [address] [facility] [address]       Dear Colleague,    Thank you for the opportunity to participate in the care of your patient, Gorge Corona, at the [facility] HEART [facility]. Please see a copy of my visit note below.    Electrophysiology Clinic Note  HPI:   Mr. Corona is an 87 year old male who has a past medical history significant for CAD s/p CABG X4 (LIMA-LAD, SVG-PDA, SVG-D1, SVG-OM) 2013  and PCI LM into OM due to occluded SVG, s/p porcine MVR 2013, aortic stenosis, SVT, HLD, BPH, normal pressure hydrocephalus s/p  shunt, and depression. He presents today for follow up.     He has a known history of CAD and MS for which he had CABG X4 and porcine MVR in 2013. He then later had PCI to LM into OM due to occluded SVG graft. His echos have shown normal LV function. Most recent echo on 6/7/18 showed normal LVEF, slightly higher mitral valve gradient (however HR was higher), and mild aortic stenosis with significant aortic valve calcification. He also has a history of SVT with spontaneous conversion in 2014. He has declined any invasive procedures for delineation of mechanism previously. No AF has ever been documented. He was treated with metoprolol and then amiodarone after recurrent palpitations.  He has been maintained on amiodarone 100 mg daily. In 6/2018, low dose metoprolol was added to help HR due to mitral valve gradient. He was then admitted from 8/17/18-8/18/18 with bradycardia and dizziness. He was brought to [facility] by his wife after they noted his pulse laura 40 bpm. He also was reporting dizziness, mostly positional. In [facility] his ECG showed SR 50's. He was admitted to observation. Cardiology was consulted and recommended stopping amiodarone and continuation of metoprolol only.     He presents today for follow up after hospitalization. He reports feeling well. His symptoms have improved. His wife reports that  she is almost certain that he accidentally took an extra dose of some medications which resulted in the lower pulse rate and dizziness prompting his recent ER visit. He is now feeling at baseline. They monitor his BP and pulse rate at home which has been normal again. He denies any chest pain/pressures, dizziness, lightheadedness, worsening shortness of breath, leg/ankle swelling, PND, orthopnea, palpitations, or syncopal symptoms. Presenting 12 lead ECG shows likely SR however significant artifact Vent Rate 65 bpm,  ms, QTc 438 ms. Current cardiac medications include: Toprol XL, Lasix, Lipitor, and Spironolactone.      PAST MEDICAL HISTORY:  Past Medical History:   Diagnosis Date     Atrial fibrillation (H)      Atrial flutter (H)      Basal cell carcinoma of skin of trunk      BPH (benign prostatic hypertrophy) with urinary obstruction      CAD (coronary artery disease)     s/p CABG 1/2013; RUSSEL in 2/2013     Depression      H/O CT scan of head 1/11/2013 7/20/2006    Result Impression     CT of the Head without contrast 7/20/2006  History: Ataxia, incontinence, and NPH  Comparison Study: MR obtained to 4/19/2004  Technique: Axial CT images of the head were obtained at 2 intervals from the skull base to the vertex without intravenous contrast. The images were reviewed in brain, subdural and bone windows.  Findings: Patient has had a moderate promin     Hyperlipidemia      Lumbar spinal stenosis      Mitral regurgitation     s/p bioprosthesis 1/2013     Palpitations      Paroxysmal supraventricular tachycardia (H)      Squamous cell carcinoma      Tachycardia     baroreflex injury dut to surgery      (ventriculoperitoneal) shunt status     for Normal pressure hydrocephalus       CURRENT MEDICATIONS:  Current Outpatient Prescriptions   Medication Sig Dispense Refill     albuterol (PROAIR HFA, PROVENTIL HFA, VENTOLIN HFA) 108 (90 BASE) MCG/ACT inhaler Inhale 2 puffs into the lungs every 4 hours as needed  for shortness of breath / dyspnea or wheezing 1 Inhaler 1     atorvastatin (LIPITOR) 10 MG tablet Take 1 tablet (10 mg) by mouth daily 90 tablet 0     Calcium 600 MG tablet Take 1 tablet by mouth 2 times daily. 100 tablet 3     Cholecalciferol (VITAMIN D) 1000 UNITS capsule Take 1 capsule by mouth daily.       citalopram (CELEXA) 20 MG tablet Take 1 tablet (20 mg) by mouth daily 90 tablet 1     clindamycin (CLEOCIN) 300 MG capsule Take 2 capsules by mouth as needed for 1 dose. 1 hour prior to dental work. 6 capsule 0     coenzyme Q-10 75 MG CAPS Take  by mouth.       fluticasone-salmeterol (ADVAIR DISKUS) 250-50 MCG/DOSE diskus inhaler Inhale 1 puff into the lungs every 12 hours 120 Inhaler 1     furosemide (LASIX) 40 MG tablet Take 0.5 tablets (20 mg) by mouth daily 90 tablet 0     LANsoprazole (PREVACID) 15 MG CR capsule Take 2 capsules (30 mg) by mouth daily 30 capsule 1     levothyroxine (SYNTHROID/LEVOTHROID) 50 MCG tablet Take 1.5 tablets (75 mcg) by mouth daily 90 tablet 3     melatonin 3 MG tablet Take 1 tablet (3 mg) by mouth nightly as needed for sleep 100 tablet 3     metoprolol succinate (TOPROL-XL) 25 MG 24 hr tablet Take 0.5 tablets (12.5 mg) by mouth daily 45 tablet 1     Multiple Vitamins-Minerals (OCUVITE PO) Take  by mouth.  0     order for DME Equipment being ordered: Safe step tub / walk-in tub 1 Device 0     order for DME Walker 1 each 0     senna-docusate (SENOKOT-S;PERICOLACE) 8.6-50 MG per tablet Take 1 tablet by mouth 2 times daily. 60 tablet      spironolactone (ALDACTONE) 25 MG tablet Take 1 tablet (25 mg) by mouth daily 90 tablet 3     tamsulosin (FLOMAX) 0.4 MG capsule Take 1 capsule (0.4 mg) by mouth daily 90 capsule 3     traMADol (ULTRAM) 50 MG tablet Take 1 tablet (50 mg) by mouth daily as needed for severe pain 10 tablet 0     traMADol (ULTRAM) 50 MG tablet Take 0.5 tablets (25 mg) by mouth 2 times daily as needed for severe pain 15 tablet 0     triamcinolone (KENALOG) 0.1 % cream  Apply sparingly to affected area three times daily as needed 80 g 1     Ammonium Lactate 10 % CREA Apply thin layer to skin on the body at least once daily. (Patient not taking: Reported on 9/5/2018) 120 mL 11     MAGNESIUM OXIDE PO Take 400 mg by mouth       meclizine (ANTIVERT) 25 MG tablet Take 1 tablet (25 mg) by mouth 3 times daily as needed for dizziness (Patient not taking: Reported on 9/5/2018) 30 tablet 3       PAST SURGICAL HISTORY:  Past Surgical History:   Procedure Laterality Date     ANESTHESIA CARDIOVERSION  4/11/2013    Procedure: ANESTHESIA CARDIOVERSION;  Cardioversion;  Surgeon: Provider, Generic Anesthesia;  Location: [facility] OR     BYPASS GRAFT ARTERY CORONARY, REPAIR VALVE MITRAL, COMBINED  1/14/2013    Procedure: COMBINED BYPASS GRAFT ARTERY CORONARY, REPAIR VALVE MITRAL;  Median sternotomy, coronary artery bypass graft X3 using left internal mammary artery & right saphenous vein , mitral valve Replacement and on pump oxygenator, flexible cystoscopy, urethral dilation and insertion of stuart catheter.;  Surgeon: Chan Peres MD;  Location: [facility] OR     CORONARY ANGIOGRAPHY ADULT ORDER       CORONARY ARTERY BYPASS  1/2013    mitral valve tissue      INSERT CATHETER BLADDER  1/14/2013    Procedure: INSERT CATHETER BLADDER;  Flexible cystoscopy, urethral dilation,& insertion of stuart catheter;  Surgeon: Pete Bedoya MD;  Location: [facility] OR     MITRAL VALVE REPLACEMENT  2013    29 mm Epic porcine valve     MOHS MICROGRAPHIC PROCEDURE       REPLACE VALVE MITRAL  1/2013    tissue valve       ALLERGIES:     Allergies   Allergen Reactions     Penicillins Rash       FAMILY HISTORY:  Family History   Problem Relation Age of Onset     Cancer Father      KIDNEY DISEASE Mother      Macular Degeneration No family hx of      Glaucoma No family hx of        SOCIAL HISTORY:  Social History   Substance Use Topics     Smoking status: Former Smoker     Types: Cigars, Pipe     Quit date: 1/1/1990      "Smokeless tobacco: Former User     Alcohol use No       ROS:   A comprehensive 10 point review of systems negative other than as mentioned in HPI.  Exam:  /66 (BP Location: Left arm, Patient Position: Chair, Cuff Size: Adult Regular)  Pulse 65  Ht 1.778 m (5' 10\")  Wt 91 kg (200 lb 11.2 oz)  SpO2 94%  BMI 28.8 kg/m2  GENERAL APPEARANCE: alert and no distress  HEENT: no icterus, no xanthelasmas, normal pupil size and reaction, normal palate, mucosa moist, no central cyanosis  NECK: no adenopathy, no asymmetry, masses, or scars, thyroid normal to palpation and no bruits, JVP not elevated  RESPIRATORY: lungs clear to auscultation - no rales, rhonchi or wheezes, no use of accessory muscles, no retractions, respirations are unlabored, normal respiratory rate  CARDIOVASCULAR: RRR, S1/S2, III/VI systolic murmur.   ABDOMEN: soft, non tender, bowel sounds normal, non-distended  EXTREMITIES: peripheral pulses normal, no edema  NEURO: alert and oriented to person/place/time, normal speech, gait and affect  SKIN: no ecchymoses, no rashes  PSYCH: normal affect, cooperative    Labs:  Reviewed.     Testing/Procedures:  PULMONARY FUNCTION TESTS:   PFT Latest Ref Rng & Units 10/9/2017   FVC L 2.01   FEV1 L 1.60   FVC% % 59   FEV1% % 63         6/2018 ECHOCARDIOGRAM:   Interpretation Summary  Technically difficult study.  Global and regional left ventricular function is normal with an EF of 55-60%.  The right ventricle is normal size.RV is not well seen but based on TAPSE (19  mm) probably normal.  A bioprosthetic mitral valve is present. The mean gradient across the mitral  valve is 6-10 mmHg at HR of 75 bpm.  Mild aortic stenosis is present. Valve appears significantly calcified.  No pericardial effusion is present.     This study was compared with the study from 4/6/2017 .  Mitral valve mean gradients appear higher in this study however the HR is also  higher in this study which is the likely the cause. Otherwise no " change.      Assessment and Plan:   Mr. Corona is an 87 year old male who has a past medical history significant for CAD s/p CABG X4 (LIMA-LAD, SVG-PDA, SVG-D1, SVG-OM) 2013  and PCI LM into OM due to occluded SVG, s/p porcine MVR 2013, aortic stenosis, SVT, HLD, BPH, normal pressure hydrocephalus s/p  shunt, and depression. He presents today for follow up. He has a history of SVT with spontaneous conversion in 2014. He has declined any invasive procedures for delineation of mechanism previously. No AF has ever been documented. He was treated with metoprolol and then amiodarone after recurrent palpitations.  He has been maintained on amiodarone 100 mg daily. In 6/2018, low dose metoprolol was added to help HR due to increased mitral valve gradient noted on echo with higher HR. He was then admitted from 8/17/18-8/18/18 with bradycardia and dizziness. He was brought to [facility] by his wife after they noted his pulse laura 40 bpm. He also was reporting dizziness, mostly positional. In [facility] his ECG showed SR 50's. He was admitted to observation. Cardiology was consulted and recommended stopping amiodarone and continuation of metoprolol only. He presents today for follow up after hospitalization. He reports feeling well. His symptoms have improved. His wife reports that she is almost certain that he accidentally took an extra dose of some medications which resulted in the lower pulse rate and dizziness prompting his recent ER visit. He is now feeling at baseline. They monitor his BP and pulse rate at home which has been normal again.     1. Palpitations, hx. Of SVT:  - Amiodarone stopped 8/2018 due to bradycardia. OK to stay off amiodarone at this time given no recent episodes of palpitations.   - Continue Toprol XL   2. CAD s/p CABG and RUSSEL  - Continue ASA  - Continue Toprol XL  - Continue Statin.   3. Valvular disease s/p tissue MVR 2013, aortic stenosis  - MV gradient was noted to be a little increased on last echo in  6/2018 but HR was also higher. He was started on Metoprolol to help keep lower heart rates. Continue Toprol XL, can up titrate as possible now off amiodarone.   - has been seen by TAVR team for evaluation of need and/or candidacy for TAVR. He is awaiting their recommended next steps. Will touch base with TAVR team.     Follow up in 1 year or sooner if need arises.   The patient states understanding and is agreeable with plan.   ZAID Nathan CNP  Pager: [phone]  CC  ESTABLISHED PATIENT

## 2018-09-05 NOTE — MR AVS SNAPSHOT
"              After Visit Summary   9/5/2018    Gorge Corona    MRN: [id number]           Patient Information     Date Of Birth          11/27/1930        Visit Information        Provider Department      9/5/2018 9:30 AM Kendal Caba APRN [facility]        Today's Diagnoses     Atrial fibrillation, unspecified type (H)    -  1       Follow-ups after your visit        Your next 10 appointments already scheduled     Sep 10, 2018 10:30 AM CDT   L/Vision Treatment with Keerthi Ignacio, OT   [facility] [place], Occupational Therapy, Vision - Outpatie ([facility], [facility])    [address]  9th Floor, Clinic [address]  [facility] [address]   [phone]            Sep 13, 2018  9:00 AM CDT   (Arrive by 8:45 AM)   MA DIAGNOSTIC DIGITAL BILATERAL with [id number]   [facility] ([facility])    [address] [address] [phone]           Do not use any powder, lotion or deodorant under your arms or on your breast. If you do, we will ask you to remove it before your exam.  Wear comfortable, two-piece clothing.  If you have any allergies, tell your care team.  Bring any previous mammograms from other facilities or have them mailed to the breast center.  Three-dimensional (3D) mammograms are available at [place] locations in [facility], [facility], [facility], [place], [place], and [place]. [facility] locations include [place] [facility] in [place]. Benefits of 3D mammograms include: - Improved rate of cancer detection - Decreases your chance of having to go back for more tests, which means fewer: - \"False-positive\" results (This means that there is an abnormal area but it isn't cancer.) - Invasive testing procedures, such as a biopsy or surgery - Can provide clearer images of the breast if you have dense breast tissue. 3D mammography is an optional exam that anyone " can have with a 2D mammogram. It doesn't replace or take the place of a 2D mammogram. 2D mammograms remain an effective screening test for all women.  Not all insurance companies cover the cost of a 3D mammogram. Check with your insurance.            Sep 13, 2018  9:30 AM CDT   US BREAST LEFT CMPL 4 QUAD with [email or web address]   [facility] [place] [facility] ([facility])    [address] [address] [phone]           Please bring a list of your medicines (including vitamins, minerals and over-the-counter drugs). Also, tell your doctor about any allergies you may have. Wear comfortable clothes and leave your valuables at home.  You do not need to do anything special to prepare for your exam.  Please call the Imaging Department at your exam site with any questions.            Dec 14, 2018 11:30 AM CST   (Arrive by 11:15 AM)   Return Visit with Barry Zimmerman MD   [facility] ([facility])    [address] [place] [address]  3rd Floor  [facility] [address] [phone]            Feb 21, 2019  5:00 PM CST   (Arrive by 4:45 PM)   Return Visit with Morris Jj MD   [facility] ([facility])    [address] [place] [address]  [address] [place] [address] [phone]              Who to contact     If you have questions or need follow up information about today's clinic visit or your schedule please contact [facility] directly at [phone].  Normal or non-critical lab and imaging results will be communicated to you by MyChart, letter or phone within 4 business days after the clinic has received the results. If you do not hear from us within 7 days, please contact the clinic through [employer] or phone. If you have a critical or abnormal lab result, we will notify you by phone as soon as possible.  Submit refill requests through [employer] or call your pharmacy and they will forward the refill request to us. Please allow  "3 business days for your refill to be completed.          Additional Information About Your Visit        Care EveryWhere ID     This is your Care EveryWhere ID. This could be used by other organizations to access your [facility] medical records  [id number]        Your Vitals Were     Pulse Height Pulse Oximetry BMI (Body Mass Index)          65 1.778 m (5' 10\") 94% 28.8 kg/m2         Blood Pressure from Last 3 Encounters:   09/05/18 107/66   08/31/18 97/59   08/18/18 120/68    Weight from Last 3 Encounters:   09/05/18 91 kg (200 lb 11.2 oz)   08/31/18 89.9 kg (198 lb 4.8 oz)   06/27/18 88.9 kg (196 lb)              We Performed the Following     EKG 12-lead, tracing only (Same Day)        Primary Care Provider Office Phone # Fax #    Monisha Jenna Olea [phone] [address] [address]        Equal Access to Services     [facility] : Hadii aad ku hadasho Soomaali, waaxda luqadaha, qaybta kaalmada adeegyada, waxay idiin haybrooklynn coral griffin . So [facility] [phone].    ATENCIÓN: Si habla español, tiene a ferrari disposición servicios gratuitos de asistencia lingüística. [facility] [phone].    We comply with applicable federal civil rights laws and [place] laws. We do not discriminate on the basis of race, color, national origin, age, disability, sex, sexual orientation, or gender identity.            Thank you!     Thank you for choosing [facility]  for your care. Our goal is always to provide you with excellent care. Hearing back from our patients is one way we can continue to improve our services. Please take a few minutes to complete the written survey that you may receive in the mail after your visit with us. Thank you!             Your Updated Medication List - Protect others around you: Learn how to safely use, store and throw away your medicines at www.disposemymeds.org.          This list is accurate as of 9/5/18 11:59 PM.  Always use your " most recent med list.                   Brand Name Dispense Instructions for use Diagnosis    albuterol 108 (90 Base) MCG/ACT inhaler    PROAIR HFA/PROVENTIL HFA/VENTOLIN HFA    1 Inhaler    Inhale 2 puffs into the lungs every 4 hours as needed for shortness of breath / dyspnea or wheezing    Cough       Ammonium Lactate 10 % Crea     120 mL    Apply thin layer to skin on the body at least once daily.    Xerosis cutis, Nummular eczema       atorvastatin 10 MG tablet    LIPITOR    90 tablet    Take 1 tablet (10 mg) by mouth daily    Major depressive disorder, recurrent episode, moderate (H), Essential hypertension, benign, Shortness of breath, Dyslipidemia       calcium 600 MG tablet     100 tablet    Take 1 tablet by mouth 2 times daily.        citalopram 20 MG tablet    celeXA    90 tablet    Take 1 tablet (20 mg) by mouth daily    Major depressive disorder, recurrent episode, moderate (H)       clindamycin 300 MG capsule    CLEOCIN    6 capsule    Take 2 capsules by mouth as needed for 1 dose. 1 hour prior to dental work.    Prophylactic antibiotic       coenzyme Q-10 75 MG Caps      Take  by mouth.        fluticasone-salmeterol 250-50 MCG/DOSE diskus inhaler    ADVAIR DISKUS    120 Inhaler    Inhale 1 puff into the lungs every 12 hours    Acute bronchitis, Cough       furosemide 40 MG tablet    LASIX    90 tablet    Take 0.5 tablets (20 mg) by mouth daily    Chronic diastolic heart failure (H), CAD S/P percutaneous coronary angioplasty       LANsoprazole 15 MG CR capsule    PREVACID    30 capsule    Take 2 capsules (30 mg) by mouth daily    Gastroesophageal reflux disease without esophagitis       levothyroxine 50 MCG tablet    SYNTHROID/LEVOTHROID    90 tablet    Take 1.5 tablets (75 mcg) by mouth daily    Other specified hypothyroidism       MAGNESIUM OXIDE PO      Take 400 mg by mouth        meclizine 25 MG tablet    ANTIVERT    30 tablet    Take 1 tablet (25 mg) by mouth 3 times daily as needed for dizziness     BPPV (benign paroxysmal positional vertigo), unspecified laterality       melatonin 3 MG tablet     100 tablet    Take 1 tablet (3 mg) by mouth nightly as needed for sleep    Insomnia, unspecified type       metoprolol succinate 25 MG 24 hr tablet    TOPROL-XL    45 tablet    Take 0.5 tablets (12.5 mg) by mouth daily    Coronary artery disease involving native coronary artery of native heart without angina pectoris       OCUVITE PO      Take  by mouth.        * order for DME     1 each    Walker    Spinal stenosis, lumbar region, without neurogenic claudication       * order for DME     1 Device    Equipment being ordered: Safe step tub / walk-in tub    Generalized muscle weakness, Personal history of fall, CHF (congestive heart failure) (H)       senna-docusate 8.6-50 MG per tablet    SENOKOT-S;PERICOLACE    60 tablet    Take 1 tablet by mouth 2 times daily.    S/P CABG x 3       spironolactone 25 MG tablet    ALDACTONE    90 tablet    Take 1 tablet (25 mg) by mouth daily    Hypokalemia       tamsulosin 0.4 MG capsule    FLOMAX    90 capsule    Take 1 capsule (0.4 mg) by mouth daily    Urinary retention       * traMADol 50 MG tablet    ULTRAM    15 tablet    Take 0.5 tablets (25 mg) by mouth 2 times daily as needed for severe pain    Pain in joint       * traMADol 50 MG tablet    ULTRAM    10 tablet    Take 1 tablet (50 mg) by mouth daily as needed for severe pain    Hip pain, left       triamcinolone 0.1 % cream    KENALOG    80 g    Apply sparingly to affected area three times daily as needed    Nummular eczema       vitamin D 1000 units capsule      Take 1 capsule by mouth daily.        * Notice:  This list has 4 medication(s) that are the same as other medications prescribed for you. Read the directions carefully, and ask your doctor or other care provider to review them with you.

## 2018-09-05 NOTE — PROGRESS NOTES
Electrophysiology Clinic Note  HPI:   Mr. Corona is an 87 year old male who has a past medical history significant for CAD s/p CABG X4 (LIMA-LAD, SVG-PDA, SVG-D1, SVG-OM) 2013  and PCI LM into OM due to occluded SVG, s/p porcine MVR 2013, aortic stenosis, SVT, HLD, BPH, normal pressure hydrocephalus s/p  shunt, and depression. He presents today for follow up.     He has a known history of CAD and MS for which he had CABG X4 and porcine MVR in 2013. He then later had PCI to LM into OM due to occluded SVG graft. His echos have shown normal LV function. Most recent echo on 6/7/18 showed normal LVEF, slightly higher mitral valve gradient (however HR was higher), and mild aortic stenosis with significant aortic valve calcification. He also has a history of SVT with spontaneous conversion in 2014. He has declined any invasive procedures for delineation of mechanism previously. No AF has ever been documented. He was treated with metoprolol and then amiodarone after recurrent palpitations.  He has been maintained on amiodarone 100 mg daily. In 6/2018, low dose metoprolol was added to help HR due to mitral valve gradient. He was then admitted from 8/17/18-8/18/18 with bradycardia and dizziness. He was brought to [facility] by his wife after they noted his pulse laura 40 bpm. He also was reporting dizziness, mostly positional. In [facility] his ECG showed SR 50's. He was admitted to observation. Cardiology was consulted and recommended stopping amiodarone and continuation of metoprolol only.     He presents today for follow up after hospitalization. He reports feeling well. His symptoms have improved. His wife reports that she is almost certain that he accidentally took an extra dose of some medications which resulted in the lower pulse rate and dizziness prompting his recent ER visit. He is now feeling at baseline. They monitor his BP and pulse rate at home which has been normal again. He denies any chest pain/pressures, dizziness,  lightheadedness, worsening shortness of breath, leg/ankle swelling, PND, orthopnea, palpitations, or syncopal symptoms. Presenting 12 lead ECG shows likely SR however significant artifact Vent Rate 65 bpm,  ms, QTc 438 ms. Current cardiac medications include: Toprol XL, Lasix, Lipitor, and Spironolactone.      PAST MEDICAL HISTORY:  Past Medical History:   Diagnosis Date     Atrial fibrillation (H)      Atrial flutter (H)      Basal cell carcinoma of skin of trunk      BPH (benign prostatic hypertrophy) with urinary obstruction      CAD (coronary artery disease)     s/p CABG 1/2013; RUSSEL in 2/2013     Depression      H/O CT scan of head 1/11/2013 7/20/2006    Result Impression     CT of the Head without contrast 7/20/2006  History: Ataxia, incontinence, and NPH  Comparison Study: MR obtained to 4/19/2004  Technique: Axial CT images of the head were obtained at 2 intervals from the skull base to the vertex without intravenous contrast. The images were reviewed in brain, subdural and bone windows.  Findings: Patient has had a moderate promin     Hyperlipidemia      Lumbar spinal stenosis      Mitral regurgitation     s/p bioprosthesis 1/2013     Palpitations      Paroxysmal supraventricular tachycardia (H)      Squamous cell carcinoma      Tachycardia     baroreflex injury dut to surgery      (ventriculoperitoneal) shunt status     for Normal pressure hydrocephalus       CURRENT MEDICATIONS:  Current Outpatient Prescriptions   Medication Sig Dispense Refill     albuterol (PROAIR HFA, PROVENTIL HFA, VENTOLIN HFA) 108 (90 BASE) MCG/ACT inhaler Inhale 2 puffs into the lungs every 4 hours as needed for shortness of breath / dyspnea or wheezing 1 Inhaler 1     atorvastatin (LIPITOR) 10 MG tablet Take 1 tablet (10 mg) by mouth daily 90 tablet 0     Calcium 600 MG tablet Take 1 tablet by mouth 2 times daily. 100 tablet 3     Cholecalciferol (VITAMIN D) 1000 UNITS capsule Take 1 capsule by mouth daily.        citalopram (CELEXA) 20 MG tablet Take 1 tablet (20 mg) by mouth daily 90 tablet 1     clindamycin (CLEOCIN) 300 MG capsule Take 2 capsules by mouth as needed for 1 dose. 1 hour prior to dental work. 6 capsule 0     coenzyme Q-10 75 MG CAPS Take  by mouth.       fluticasone-salmeterol (ADVAIR DISKUS) 250-50 MCG/DOSE diskus inhaler Inhale 1 puff into the lungs every 12 hours 120 Inhaler 1     furosemide (LASIX) 40 MG tablet Take 0.5 tablets (20 mg) by mouth daily 90 tablet 0     LANsoprazole (PREVACID) 15 MG CR capsule Take 2 capsules (30 mg) by mouth daily 30 capsule 1     levothyroxine (SYNTHROID/LEVOTHROID) 50 MCG tablet Take 1.5 tablets (75 mcg) by mouth daily 90 tablet 3     melatonin 3 MG tablet Take 1 tablet (3 mg) by mouth nightly as needed for sleep 100 tablet 3     metoprolol succinate (TOPROL-XL) 25 MG 24 hr tablet Take 0.5 tablets (12.5 mg) by mouth daily 45 tablet 1     Multiple Vitamins-Minerals (OCUVITE PO) Take  by mouth.  0     order for DME Equipment being ordered: Safe step tub / walk-in tub 1 Device 0     order for DME Walker 1 each 0     senna-docusate (SENOKOT-S;PERICOLACE) 8.6-50 MG per tablet Take 1 tablet by mouth 2 times daily. 60 tablet      spironolactone (ALDACTONE) 25 MG tablet Take 1 tablet (25 mg) by mouth daily 90 tablet 3     tamsulosin (FLOMAX) 0.4 MG capsule Take 1 capsule (0.4 mg) by mouth daily 90 capsule 3     traMADol (ULTRAM) 50 MG tablet Take 1 tablet (50 mg) by mouth daily as needed for severe pain 10 tablet 0     traMADol (ULTRAM) 50 MG tablet Take 0.5 tablets (25 mg) by mouth 2 times daily as needed for severe pain 15 tablet 0     triamcinolone (KENALOG) 0.1 % cream Apply sparingly to affected area three times daily as needed 80 g 1     Ammonium Lactate 10 % CREA Apply thin layer to skin on the body at least once daily. (Patient not taking: Reported on 9/5/2018) 120 mL 11     MAGNESIUM OXIDE PO Take 400 mg by mouth       meclizine (ANTIVERT) 25 MG tablet Take 1 tablet (25  "mg) by mouth 3 times daily as needed for dizziness (Patient not taking: Reported on 9/5/2018) 30 tablet 3       PAST SURGICAL HISTORY:  Past Surgical History:   Procedure Laterality Date     ANESTHESIA CARDIOVERSION  4/11/2013    Procedure: ANESTHESIA CARDIOVERSION;  Cardioversion;  Surgeon: Provider, Generic Anesthesia;  Location: [facility] OR     BYPASS GRAFT ARTERY CORONARY, REPAIR VALVE MITRAL, COMBINED  1/14/2013    Procedure: COMBINED BYPASS GRAFT ARTERY CORONARY, REPAIR VALVE MITRAL;  Median sternotomy, coronary artery bypass graft X3 using left internal mammary artery & right saphenous vein , mitral valve Replacement and on pump oxygenator, flexible cystoscopy, urethral dilation and insertion of stuart catheter.;  Surgeon: Chan Peres MD;  Location: [facility] OR     CORONARY ANGIOGRAPHY ADULT ORDER       CORONARY ARTERY BYPASS  1/2013    mitral valve tissue      INSERT CATHETER BLADDER  1/14/2013    Procedure: INSERT CATHETER BLADDER;  Flexible cystoscopy, urethral dilation,& insertion of stuart catheter;  Surgeon: Pete Bedoya MD;  Location: [facility] OR     MITRAL VALVE REPLACEMENT  2013    29 mm Epic porcine valve     MOHS MICROGRAPHIC PROCEDURE       REPLACE VALVE MITRAL  1/2013    tissue valve       ALLERGIES:     Allergies   Allergen Reactions     Penicillins Rash       FAMILY HISTORY:  Family History   Problem Relation Age of Onset     Cancer Father      KIDNEY DISEASE Mother      Macular Degeneration No family hx of      Glaucoma No family hx of        SOCIAL HISTORY:  Social History   Substance Use Topics     Smoking status: Former Smoker     Types: Cigars, Pipe     Quit date: 1/1/1990     Smokeless tobacco: Former User     Alcohol use No       ROS:   A comprehensive 10 point review of systems negative other than as mentioned in HPI.  Exam:  /66 (BP Location: Left arm, Patient Position: Chair, Cuff Size: Adult Regular)  Pulse 65  Ht 1.778 m (5' 10\")  Wt 91 kg (200 lb 11.2 oz)  SpO2 94% "  BMI 28.8 kg/m2  GENERAL APPEARANCE: alert and no distress  HEENT: no icterus, no xanthelasmas, normal pupil size and reaction, normal palate, mucosa moist, no central cyanosis  NECK: no adenopathy, no asymmetry, masses, or scars, thyroid normal to palpation and no bruits, JVP not elevated  RESPIRATORY: lungs clear to auscultation - no rales, rhonchi or wheezes, no use of accessory muscles, no retractions, respirations are unlabored, normal respiratory rate  CARDIOVASCULAR: RRR, S1/S2, III/VI systolic murmur.   ABDOMEN: soft, non tender, bowel sounds normal, non-distended  EXTREMITIES: peripheral pulses normal, no edema  NEURO: alert and oriented to person/place/time, normal speech, gait and affect  SKIN: no ecchymoses, no rashes  PSYCH: normal affect, cooperative    Labs:  Reviewed.     Testing/Procedures:  PULMONARY FUNCTION TESTS:   PFT Latest Ref Rng & Units 10/9/2017   FVC L 2.01   FEV1 L 1.60   FVC% % 59   FEV1% % 63         6/2018 ECHOCARDIOGRAM:   Interpretation Summary  Technically difficult study.  Global and regional left ventricular function is normal with an EF of 55-60%.  The right ventricle is normal size.RV is not well seen but based on TAPSE (19  mm) probably normal.  A bioprosthetic mitral valve is present. The mean gradient across the mitral  valve is 6-10 mmHg at HR of 75 bpm.  Mild aortic stenosis is present. Valve appears significantly calcified.  No pericardial effusion is present.     This study was compared with the study from 4/6/2017 .  Mitral valve mean gradients appear higher in this study however the HR is also  higher in this study which is the likely the cause. Otherwise no change.      Assessment and Plan:   Mr. Corona is an 87 year old male who has a past medical history significant for CAD s/p CABG X4 (LIMA-LAD, SVG-PDA, SVG-D1, SVG-OM) 2013  and PCI LM into OM due to occluded SVG, s/p porcine MVR 2013, aortic stenosis, SVT, HLD, BPH, normal pressure hydrocephalus s/p  shunt,  and depression. He presents today for follow up. He has a history of SVT with spontaneous conversion in 2014. He has declined any invasive procedures for delineation of mechanism previously. No AF has ever been documented. He was treated with metoprolol and then amiodarone after recurrent palpitations.  He has been maintained on amiodarone 100 mg daily. In 6/2018, low dose metoprolol was added to help HR due to increased mitral valve gradient noted on echo with higher HR. He was then admitted from 8/17/18-8/18/18 with bradycardia and dizziness. He was brought to [facility] by his wife after they noted his pulse laura 40 bpm. He also was reporting dizziness, mostly positional. In [facility] his ECG showed SR 50's. He was admitted to observation. Cardiology was consulted and recommended stopping amiodarone and continuation of metoprolol only. He presents today for follow up after hospitalization. He reports feeling well. His symptoms have improved. His wife reports that she is almost certain that he accidentally took an extra dose of some medications which resulted in the lower pulse rate and dizziness prompting his recent ER visit. He is now feeling at baseline. They monitor his BP and pulse rate at home which has been normal again.     1. Palpitations, hx. Of SVT:  - Amiodarone stopped 8/2018 due to bradycardia. OK to stay off amiodarone at this time given no recent episodes of palpitations.   - Continue Toprol XL   2. CAD s/p CABG and RUSSEL  - Continue ASA  - Continue Toprol XL  - Continue Statin.   3. Valvular disease s/p tissue MVR 2013, aortic stenosis  - MV gradient was noted to be a little increased on last echo in 6/2018 but HR was also higher. He was started on Metoprolol to help keep lower heart rates. Continue Toprol XL, can up titrate as possible now off amiodarone.   - has been seen by TAVR team for evaluation of need and/or candidacy for TAVR. He is awaiting their recommended next steps. Will touch base with TAVR team.      Follow up in 1 year or sooner if need arises.   The patient states understanding and is agreeable with plan.   ZIAD Nathan CNP  Pager: [phone]  CC  ESTABLISHED PATIENT

## 2018-09-06 LAB — INTERPRETATION ECG - MUSE: NORMAL

## 2018-09-06 NOTE — PATIENT INSTRUCTIONS
Pharmacy Tube Feeding Consult    Medication reviewed for administration by feeding tube and for potential food/drug interactions.    Recommendation: No changes are needed at this time. Do not crush duloxetine EC caps or pantoprazole EC tabs.     Pharmacy will continue to follow as new medications are ordered.     [facility]: [phone]     [facility] Medication Refill Request Information:  * Please contact your pharmacy regarding ANY request for medication refills.  ** [facility] Prescription Fax = [phone]  * Please allow 3 business days for routine medication refills.  * Please allow 5 business days for controlled substance medication refills.     [facility] Test Result notification information:  *You will be notified with in 7-10 days of your appointment day regarding the results of your test.  If you are on MyChart you will be notified as soon as the provider has reviewed the results and signed off on them.

## 2018-09-10 ENCOUNTER — HOSPITAL ENCOUNTER (OUTPATIENT)
Dept: OCCUPATIONAL THERAPY | Facility: CLINIC | Age: 83
Setting detail: THERAPIES SERIES
End: 2018-09-10
Attending: OPTOMETRIST
Payer: MEDICARE

## 2018-09-10 PROCEDURE — 97535 SELF CARE MNGMENT TRAINING: CPT | Mod: GO | Performed by: OCCUPATIONAL THERAPIST

## 2018-09-10 PROCEDURE — 40000249 ZZH STATISTIC VISIT LOW VISION CLINIC: Performed by: OCCUPATIONAL THERAPIST

## 2018-09-10 NOTE — DISCHARGE INSTRUCTIONS
Visual Rehabilitation Summary  1. The technology we tried were two models of Closed Circuit TV (CCTV)  a. The Traveller was the lap top sized device that sits on the tabletop. It retails for $2495.  b. The Charito was the smaller device and it retailed for $1395.  c. The lighted magnifier was a 4X extrabright stand magnifier.  d. The magnifier you liked was the extra-large dome magnifier.  e. You may want to order a lap desk to set reading material on page 44H.  f. We completed an application for audio books from [employer].`    Keerthi Ignacio, OTR/L  [phone]

## 2018-09-13 ENCOUNTER — HOSPITAL ENCOUNTER (OUTPATIENT)
Dept: MAMMOGRAPHY | Facility: CLINIC | Age: 83
Discharge: HOME | End: 2018-09-13
Attending: INTERNAL MEDICINE | Admitting: INTERNAL MEDICINE
Payer: MEDICARE

## 2018-09-13 ENCOUNTER — HOSPITAL ENCOUNTER (OUTPATIENT)
Dept: PHYSICAL THERAPY | Facility: CLINIC | Age: 83
Setting detail: THERAPIES SERIES
End: 2018-09-13
Attending: INTERNAL MEDICINE
Payer: MEDICARE

## 2018-09-13 DIAGNOSIS — N63.20 LEFT BREAST MASS: ICD-10-CM

## 2018-09-13 DIAGNOSIS — R92.8 OTHER ABNORMAL AND INCONCLUSIVE FINDINGS ON DIAGNOSTIC IMAGING OF BREAST: ICD-10-CM

## 2018-09-13 PROCEDURE — 40000718 ZZHC STATISTIC PT DEPARTMENT ORTHO VISIT: Performed by: PHYSICAL THERAPIST

## 2018-09-13 PROCEDURE — G8979 MOBILITY GOAL STATUS: HCPCS | Mod: GP,CJ | Performed by: PHYSICAL THERAPIST

## 2018-09-13 PROCEDURE — 97110 THERAPEUTIC EXERCISES: CPT | Mod: GP | Performed by: PHYSICAL THERAPIST

## 2018-09-13 PROCEDURE — G8978 MOBILITY CURRENT STATUS: HCPCS | Mod: GP,CK | Performed by: PHYSICAL THERAPIST

## 2018-09-13 PROCEDURE — 97161 PT EVAL LOW COMPLEX 20 MIN: CPT | Mod: GP | Performed by: PHYSICAL THERAPIST

## 2018-09-13 PROCEDURE — 77066 DX MAMMO INCL CAD BI: CPT

## 2018-09-13 NOTE — PROGRESS NOTES
Gorge Corona   PHYSICAL THERAPY EVALUATION    09/13/18 1000   General Information   Type of Visit Initial OP Ortho PT Evaluation   Start of Care Date 09/13/18   Referring Physician Dr. Monisha Olea   Patient/Family Goals Statement walk, prevent falls   Orders Evaluate and Treat   Date of Order 08/31/18   Insurance Type Medicare   Medical Diagnosis generalized muscle weakness   Surgical/Medical history reviewed Yes  (2 lumbar laminectomy, normal pressure hydrocephalus/shunt 10)   Body Part(s)   Body Part(s) Hip   Presentation and Etiology   Pertinent history of current problem (include personal factors and/or comorbidities that impact the POC) Was getting out of car, fell in garage.  L hip fx and ORIF 4/8/18, fell and broke hip.  Ended up in hospital for one month with other medical issues.  Was not able to walk yet when went to [facility], stayed there 6 wks,  Has been having home therapy since DC from [facility].  Pain 3/10 in hip.  Needs to get stronger, walk better..  Has stationary bike, but home PT told him not to get on it because seat did not swivel. Per wife it is not safe.  Per wife assiting with history, michael her  has had balance trouble at least 10 years, discovered normal pressure hydrocephalus and had shunt put in.  Needed to have ramp put in prior home because of wlaking. NOw stillin a one level home, has [employer] service.  States pt has not been active for a long time.  Could use cane once in awhile, walker in the home and used scooter in grocery store prior to hip fx.  Used scooter due to shortness of breath., endurance   Onset date of current episode/exacerbation 04/08/18   Prior Level of Function   Functional Level Prior Comment yardwork, typically walks with cane   Current Level of Function   Patient role/employment history F. Retired   Fall Risk Screen   Fall screen completed by PT   Have you fallen 2 or more times in the past year? Yes   Have you fallen and had an injury in the past  year? Yes   Is patient a fall risk? Yes;Department fall risk interventions implemented   Fall screen comments very weak, using RW   Hip Objective Findings   Side (if bilateral, select both right and left) Left   Posture fwd bent   Gait/Locomotion using RW, + trendelenberg L   Hip ROM Comments has knee flexion contractures of approx 15*   Hip/Knee Strength Comments DF R 4+, L 5   Left Hip Flexion PROM 95*   Left Hip ER PROM 30*   Left Hip Ext PROM 0* or less   Left Hip Flexion Strength 3   Left Hip Abduction Strength 2/5   Left Knee Flexion Strength 5   Left Knee Extension Strength 5   Lumbar Spine/SI Objective Findings   Lumbar/SI Special Tests Comments easier time rolling R in bed than L, once had difficult time sit to stand and other attempts went just fine   Planned Therapy Interventions   Planned Therapy Interventions strengthening;balance training;gait training;stretching   Clinical Impression   Criteria for Skilled Therapeutic Interventions Met yes, treatment indicated   PT Diagnosis L hip weakness, dependent gait, falls   Functional limitations due to impairments walking, bed mobility   Clinical Presentation Stable/Uncomplicated   Clinical Decision Making (Complexity) Low complexity   Therapy Frequency 2 times/Week   Predicted Duration of Therapy Intervention (days/wks) x4wks, then 1x/wk x4wks   Risk & Benefits of therapy have been explained Yes   Patient, Family & other staff in agreement with plan of care Yes   Education Assessment   Preferred Learning Style Pictures/video;Demonstration   Barriers to Learning No barriers   ORTHO GOALS   PT Ortho Eval Goals 1;2;3   Ortho Goal 1   Goal Description pt will have increased L hip strength one grade for stable pelvis during gait in 6wks   Target Date 10/25/18   Ortho Goal 2   Goal Description pt will be able to walk into [employer] from parked car using RW in 8wk   Target Date 11/13/18   Ortho Goal 3   Goal Description pt polo hvae no falls in 6-8wks   Target Date  11/13/18   Total Evaluation Time   Total Evaluation Time 20   Therapy Certification   Certification date from 09/13/18   Certification date to 11/13/18   Medical Diagnosis deconditioned, L hip ORIF   Kris Hoenk, PT #[id number]  [facility]

## 2018-09-13 NOTE — PROGRESS NOTES
[facility]          OUTPATIENT PHYSICAL THERAPY ORTHOPEDIC EVALUATION  PLAN OF TREATMENT FOR OUTPATIENT REHABILITATION  (COMPLETE FOR INITIAL CLAIMS ONLY)  Patient's Last Name, First Name, M.I.  YOB: 1930  CjGorge  J    Provider s Name:  [facility]   Medical Record No.  [id number]   Start of Care Date:  09/13/18   Onset Date:  04/08/18   Type:     _X__PT   ___OT   ___SLP Medical Diagnosis:  deconditioned, L hip ORIF     PT Diagnosis:  L hip weakness, dependent gait, falls   Visits from SOC:  1      _________________________________________________________________________________  Plan of Treatment/Functional Goals:  strengthening, balance training, gait training, stretching           Goals     Goal Description: pt will have increased L hip strength one grade for stable pelvis during gait in 6wks  Target Date: 10/25/18       Goal Description[facility]  Target Date: 11/13/18       Goal Description: pt polo hvae no falls in 6-8wks  Target Date: 11/13/18               Therapy Frequency:  2 times/Week  Predicted Duration of Therapy Intervention:  x4wks, then 1x/wk x4wks    Kris Hoenk, PT                 I CERTIFY THE NEED FOR THESE SERVICES FURNISHED UNDER        THIS PLAN OF TREATMENT AND WHILE UNDER MY CARE     (Physician co-signature of this document indicates review and certification of the therapy plan).                       Certification Date From:  09/13/18   Certification Date To:  11/13/18    Referring Provider:  Dr. Monisha Olea    Initial Assessment        See Epic Evaluation Start of Care Date: 09/13/18

## 2018-09-14 ENCOUNTER — TELEPHONE (OUTPATIENT)
Dept: INTERNAL MEDICINE | Facility: CLINIC | Age: 83
End: 2018-09-14

## 2018-09-14 NOTE — TELEPHONE ENCOUNTER
[facility] Call Center    Phone Message    May a detailed message be left on voicemail: yes    Reason for Call: Other: Spouse would like to know if an appt. is needed to discuss the mamogram     Action Taken: Message routed to:  [facility] ([facility]): see above

## 2018-09-20 ENCOUNTER — HOSPITAL ENCOUNTER (OUTPATIENT)
Dept: PHYSICAL THERAPY | Facility: CLINIC | Age: 83
Setting detail: THERAPIES SERIES
End: 2018-09-20
Attending: INTERNAL MEDICINE
Payer: MEDICARE

## 2018-09-20 ENCOUNTER — MEDICAL CORRESPONDENCE (OUTPATIENT)
Dept: HEALTH INFORMATION MANAGEMENT | Facility: CLINIC | Age: 83
End: 2018-09-20

## 2018-09-20 PROCEDURE — 40000718 ZZHC STATISTIC PT DEPARTMENT ORTHO VISIT: Performed by: PHYSICAL THERAPIST

## 2018-09-20 PROCEDURE — 97110 THERAPEUTIC EXERCISES: CPT | Mod: GP | Performed by: PHYSICAL THERAPIST

## 2018-09-25 ENCOUNTER — HOSPITAL ENCOUNTER (OUTPATIENT)
Dept: PHYSICAL THERAPY | Facility: CLINIC | Age: 83
Setting detail: THERAPIES SERIES
End: 2018-09-25
Attending: INTERNAL MEDICINE
Payer: MEDICARE

## 2018-09-25 PROCEDURE — 97110 THERAPEUTIC EXERCISES: CPT | Mod: GP | Performed by: PHYSICAL THERAPIST

## 2018-09-25 PROCEDURE — 40000718 ZZHC STATISTIC PT DEPARTMENT ORTHO VISIT: Performed by: PHYSICAL THERAPIST

## 2018-09-27 ENCOUNTER — HOSPITAL ENCOUNTER (OUTPATIENT)
Dept: PHYSICAL THERAPY | Facility: CLINIC | Age: 83
Setting detail: THERAPIES SERIES
End: 2018-09-27
Attending: INTERNAL MEDICINE
Payer: MEDICARE

## 2018-09-27 PROCEDURE — 40000718 ZZHC STATISTIC PT DEPARTMENT ORTHO VISIT: Performed by: PHYSICAL THERAPIST

## 2018-09-27 PROCEDURE — 97110 THERAPEUTIC EXERCISES: CPT | Mod: GP | Performed by: PHYSICAL THERAPIST

## 2018-10-02 ENCOUNTER — HOSPITAL ENCOUNTER (OUTPATIENT)
Dept: PHYSICAL THERAPY | Facility: CLINIC | Age: 83
Setting detail: THERAPIES SERIES
End: 2018-10-02
Attending: INTERNAL MEDICINE
Payer: MEDICARE

## 2018-10-02 PROCEDURE — 97110 THERAPEUTIC EXERCISES: CPT | Mod: GP | Performed by: PHYSICAL THERAPIST

## 2018-10-02 PROCEDURE — 40000718 ZZHC STATISTIC PT DEPARTMENT ORTHO VISIT: Performed by: PHYSICAL THERAPIST

## 2018-10-04 ENCOUNTER — HOSPITAL ENCOUNTER (OUTPATIENT)
Dept: PHYSICAL THERAPY | Facility: CLINIC | Age: 83
Setting detail: THERAPIES SERIES
End: 2018-10-04
Attending: INTERNAL MEDICINE
Payer: MEDICARE

## 2018-10-04 PROCEDURE — 97110 THERAPEUTIC EXERCISES: CPT | Mod: GP | Performed by: PHYSICAL THERAPIST

## 2018-10-04 PROCEDURE — 40000718 ZZHC STATISTIC PT DEPARTMENT ORTHO VISIT: Performed by: PHYSICAL THERAPIST

## 2018-10-05 ENCOUNTER — OFFICE VISIT (OUTPATIENT)
Dept: INTERNAL MEDICINE | Facility: CLINIC | Age: 83
End: 2018-10-05
Payer: MEDICARE

## 2018-10-05 VITALS
BODY MASS INDEX: 28.66 KG/M2 | DIASTOLIC BLOOD PRESSURE: 52 MMHG | OXYGEN SATURATION: 94 % | WEIGHT: 200.2 LBS | HEIGHT: 70 IN | SYSTOLIC BLOOD PRESSURE: 94 MMHG | HEART RATE: 69 BPM

## 2018-10-05 DIAGNOSIS — M65.342 TRIGGER FINGER, LEFT RING FINGER: Primary | ICD-10-CM

## 2018-10-05 DIAGNOSIS — Z23 ENCOUNTER FOR IMMUNIZATION: ICD-10-CM

## 2018-10-05 DIAGNOSIS — Z91.89 DRIVING SAFETY ISSUE: ICD-10-CM

## 2018-10-05 ASSESSMENT — PAIN SCALES - GENERAL: PAINLEVEL: NO PAIN (0)

## 2018-10-05 NOTE — MR AVS SNAPSHOT
After Visit Summary   10/5/2018    Gorge Corona    MRN: [id number]           Patient Information     Date Of Birth          11/27/1930        Visit Information        Provider Department      10/5/2018 11:30 AM Monisha Medina MD [facility]        Today's Diagnoses     Trigger finger, left ring finger    -  1    Driving safety issue        Encounter for immunization           Follow-ups after your visit        Additional Services     OCCUPATIONAL THERAPY REFERRAL       If you have not heard from the scheduling office within 2 business days, please call [phone] for all locations, with the exception of [place], please call [phone] and [place], please call [phone].            ORTHOPEDICS ADULT REFERRAL       Your provider has referred you to: ORTHOPEDICS ADULT    Please be aware that coverage of these services is subject to the terms and limitations of your health insurance plan.  Call member services at your health plan with any benefit or coverage questions.      Please bring the following to your appointment:    >>   Any x-rays, CTs or MRIs which have been performed.  Contact the facility where they were done to arrange for  prior to your scheduled appointment.    >>   List of current medications   >>   This referral request   >>   Any documents/labs given to you for this referral                  Follow-up notes from your care team     Return in about 6 months (around 4/5/2019) for Physical Exam.      Your next 10 appointments already scheduled     Oct 09, 2018 11:30 AM CDT   Ortho Treatment with Kris Hoenk, PT   [facility] ([facility])    [address]  [address]  [facility] [address]   [phone]            Oct 11, 2018 10:30 AM CDT   Ortho Treatment with Kris Hoenk, PT   [facility] ([facility])    [address]  [address]  [facility] [place] [facility] [address]    [phone]            Oct 15, 2018  1:45 PM CDT   (Arrive by 1:30 PM)   NEW HAND with Abhilash Aleman MD   [facility] ([facility])    [address]  [address] [facility] [place] [address]   [phone]            Oct 16, 2018 11:30 AM CDT   Ortho Treatment with Kris Hoenk, PT   [facility] ([facility])    [address]  [address]  [place] [facility] [address]   [phone]            Oct 18, 2018 10:00 AM CDT   Ortho Treatment with Jean-Pierreis Hoenk, PT   [facility] ([facility])    [address]  [address]  [facility] [address]   [phone]            Dec 07, 2018 11:30 AM CST   (Arrive by 11:15 AM)   Return Visit with Barry Zimmerman MD   [facility] ([facility])    [address] [place] [address]  [address] [facility] [place] [address] [phone]            Feb 21, 2019  5:00 PM CST   (Arrive by 4:45 PM)   Return Visit with Morris Jj MD   [facility] ([facility])    [address]  [address] [place] [address] [phone]            Mar 26, 2019 11:00 AM CDT   (Arrive by 10:45 AM)   PHYSICAL with Monisha Olea MD   [facility] ([facility])    [address] [place] [address]  [address] [facility] [place] [address] [phone]              Future tests that were ordered for you today     Open Future Orders        Priority Expected Expires Ordered    OCCUPATIONAL THERAPY REFERRAL Routine  10/5/2019 10/5/2018            Who to contact     Please call your clinic at [phone] to:    Ask questions about your health    Make or cancel appointments    Discuss your medicines    Learn about your test results    Speak to your doctor            Additional Information About Your Visit        Care EveryWhere ID     This is your Care EveryWhere ID. This could be used by  "other organizations to access your [facility] medical records  [id number]        Your Vitals Were     Pulse Height Pulse Oximetry BMI (Body Mass Index)          69 1.778 m (5' 10\") 94% 28.73 kg/m2         Blood Pressure from Last 3 Encounters:   10/05/18 94/52   09/05/18 107/66   08/31/18 97/59    Weight from Last 3 Encounters:   10/05/18 90.8 kg (200 lb 3.2 oz)   09/05/18 91 kg (200 lb 11.2 oz)   08/31/18 89.9 kg (198 lb 4.8 oz)              We Performed the Following     FLU VACCINE HIGH DOSE PRESERVATIVE FREE, AGE =>65 YR     ORTHOPEDICS ADULT REFERRAL        Primary Care Provider Office Phone # Fax #    Monisha Jenna Olea [phone] [address] [address]        Equal Access to Services     [facility] : Hadii aad ku hadasho Soomaali, waaxda luqadaha, qaybta kaalmada adeegyada, waxay idiin hayaan adecarmelo kharaisis laelysen . So [facility] [phone].    ATENCIÓN: Si habla español, tiene a ferrari disposición servicios gratuitos de asistencia lingüística. Nuris al [phone].    We comply with applicable federal civil rights laws and [place] laws. We do not discriminate on the basis of race, color, national origin, age, disability, sex, sexual orientation, or gender identity.            Thank you!     Thank you for choosing [facility]  for your care. Our goal is always to provide you with excellent care. Hearing back from our patients is one way we can continue to improve our services. Please take a few minutes to complete the written survey that you may receive in the mail after your visit with us. Thank you!             Your Updated Medication List - Protect others around you: Learn how to safely use, store and throw away your medicines at www.disposemymeds.org.          This list is accurate as of 10/5/18 12:04 PM.  Always use your most recent med list.                   Brand Name Dispense Instructions for use Diagnosis    albuterol 108 (90 Base) " MCG/ACT inhaler    PROAIR HFA/PROVENTIL HFA/VENTOLIN HFA    1 Inhaler    Inhale 2 puffs into the lungs every 4 hours as needed for shortness of breath / dyspnea or wheezing    Cough       Ammonium Lactate 10 % Crea     120 mL    Apply thin layer to skin on the body at least once daily.    Xerosis cutis, Nummular eczema       atorvastatin 10 MG tablet    LIPITOR    90 tablet    Take 1 tablet (10 mg) by mouth daily    Major depressive disorder, recurrent episode, moderate (H), Essential hypertension, benign, Shortness of breath, Dyslipidemia       calcium 600 MG tablet     100 tablet    Take 1 tablet by mouth 2 times daily.        citalopram 20 MG tablet    celeXA    90 tablet    Take 1 tablet (20 mg) by mouth daily    Major depressive disorder, recurrent episode, moderate (H)       clindamycin 300 MG capsule    CLEOCIN    6 capsule    Take 2 capsules by mouth as needed for 1 dose. 1 hour prior to dental work.    Prophylactic antibiotic       coenzyme Q-10 75 MG Caps      Take  by mouth.        fluticasone-salmeterol 250-50 MCG/DOSE diskus inhaler    ADVAIR DISKUS    120 Inhaler    Inhale 1 puff into the lungs every 12 hours    Acute bronchitis, Cough       furosemide 40 MG tablet    LASIX    90 tablet    Take 0.5 tablets (20 mg) by mouth daily    Chronic diastolic heart failure (H), CAD S/P percutaneous coronary angioplasty       LANsoprazole 15 MG CR capsule    PREVACID    30 capsule    Take 2 capsules (30 mg) by mouth daily    Gastroesophageal reflux disease without esophagitis       levothyroxine 50 MCG tablet    SYNTHROID/LEVOTHROID    90 tablet    Take 1.5 tablets (75 mcg) by mouth daily    Other specified hypothyroidism       MAGNESIUM OXIDE PO      Take 400 mg by mouth        meclizine 25 MG tablet    ANTIVERT    30 tablet    Take 1 tablet (25 mg) by mouth 3 times daily as needed for dizziness    BPPV (benign paroxysmal positional vertigo), unspecified laterality       melatonin 3 MG tablet     100 tablet     Take 1 tablet (3 mg) by mouth nightly as needed for sleep    Insomnia, unspecified type       metoprolol succinate 25 MG 24 hr tablet    TOPROL-XL    45 tablet    Take 0.5 tablets (12.5 mg) by mouth daily    Coronary artery disease involving native coronary artery of native heart without angina pectoris       OCUVITE PO      Take  by mouth.        * order for DME     1 each    Walker    Spinal stenosis, lumbar region, without neurogenic claudication       * order for DME     1 Device    Equipment being ordered: Safe step tub / walk-in tub    Generalized muscle weakness, Personal history of fall, CHF (congestive heart failure) (H)       senna-docusate 8.6-50 MG per tablet    SENOKOT-S;PERICOLACE    60 tablet    Take 1 tablet by mouth 2 times daily.    S/P CABG x 3       spironolactone 25 MG tablet    ALDACTONE    90 tablet    Take 1 tablet (25 mg) by mouth daily    Hypokalemia       tamsulosin 0.4 MG capsule    FLOMAX    90 capsule    Take 1 capsule (0.4 mg) by mouth daily    Urinary retention       * traMADol 50 MG tablet    ULTRAM    15 tablet    Take 0.5 tablets (25 mg) by mouth 2 times daily as needed for severe pain    Pain in joint       * traMADol 50 MG tablet    ULTRAM    10 tablet    Take 1 tablet (50 mg) by mouth daily as needed for severe pain    Hip pain, left       triamcinolone 0.1 % cream    KENALOG    80 g    Apply sparingly to affected area three times daily as needed    Nummular eczema       vitamin D 1000 units capsule      Take 1 capsule by mouth daily.        * Notice:  This list has 4 medication(s) that are the same as other medications prescribed for you. Read the directions carefully, and ask your doctor or other care provider to review them with you.

## 2018-10-05 NOTE — NURSING NOTE
Chief Complaint   Patient presents with     Breast Problem     Pt is here for a mammogram f/u. Left breast is swollen      Finger     Pt also is having problems with his left ring finger and wants the flu shot.                  Gorge Corona      1.  Has the patient received the information for the influenza vaccine? YES    2.  Does the patient have any of the following contraindications?     Allergy to eggs? No     Allergic reaction to previous influenza vaccines? No     Any other problems to previous influenza vaccines? No     Paralyzed by Guillain-Norfolk syndrome? No     Currently pregnant? NO     Current moderate or severe illness? No     Allergy to contact lens solution? No    3.  The vaccine has been administered in the usual fashion and the patient was instructed to wait 20 minutes before leaving the building in the event of an allergic reaction: YES      Recorded by Sergei Ferrera CMA (AAMA)    Patient received Influenza vaccine.  See immunization list for administration details.  Patient tolerated injection well.       Sowmya Almanza at 11:53 AM on 10/5/2018.

## 2018-10-09 ENCOUNTER — HOSPITAL ENCOUNTER (OUTPATIENT)
Dept: PHYSICAL THERAPY | Facility: CLINIC | Age: 83
Setting detail: THERAPIES SERIES
End: 2018-10-09
Attending: INTERNAL MEDICINE
Payer: MEDICARE

## 2018-10-09 PROCEDURE — 97110 THERAPEUTIC EXERCISES: CPT | Mod: GP | Performed by: PHYSICAL THERAPIST

## 2018-10-09 PROCEDURE — 40000718 ZZHC STATISTIC PT DEPARTMENT ORTHO VISIT: Performed by: PHYSICAL THERAPIST

## 2018-10-09 PROCEDURE — 97112 NEUROMUSCULAR REEDUCATION: CPT | Mod: GP | Performed by: PHYSICAL THERAPIST

## 2018-10-10 NOTE — ADDENDUM NOTE
Encounter addended by: Keerthi Ignacio OT on: 10/10/2018 11:12 AM<BR>     Actions taken: Flowsheet accepted

## 2018-10-11 ENCOUNTER — PRE VISIT (OUTPATIENT)
Dept: ORTHOPEDICS | Facility: CLINIC | Age: 83
End: 2018-10-11

## 2018-10-11 ENCOUNTER — HOSPITAL ENCOUNTER (OUTPATIENT)
Dept: PHYSICAL THERAPY | Facility: CLINIC | Age: 83
Setting detail: THERAPIES SERIES
End: 2018-10-11
Attending: INTERNAL MEDICINE
Payer: MEDICARE

## 2018-10-11 PROCEDURE — 97116 GAIT TRAINING THERAPY: CPT | Mod: GP | Performed by: PHYSICAL THERAPIST

## 2018-10-11 PROCEDURE — 97110 THERAPEUTIC EXERCISES: CPT | Mod: GP | Performed by: PHYSICAL THERAPIST

## 2018-10-11 PROCEDURE — 40000718 ZZHC STATISTIC PT DEPARTMENT ORTHO VISIT: Performed by: PHYSICAL THERAPIST

## 2018-10-11 NOTE — TELEPHONE ENCOUNTER
FUTURE VISIT INFORMATION      FUTURE VISIT INFORMATION:    Date: 10/15    Time: 1:45    [place] [facility] [place]:    Referring provider:  Mahesh Hester    Referring providers clinic:  [facility]    Reason for visit/diagnosis  Trigger finger, left ring finger    RECORDS REQUESTED FROM:       Clinic name Comments Records Status Imaging Status                                         RECORDS STATUS        All records internal. Spoke to pt's wife who stated that pt has only seen his [facility] dr about this issue.

## 2018-10-15 ENCOUNTER — RADIANT APPOINTMENT (OUTPATIENT)
Dept: GENERAL RADIOLOGY | Facility: CLINIC | Age: 83
End: 2018-10-15
Attending: ORTHOPAEDIC SURGERY
Payer: MEDICARE

## 2018-10-15 ENCOUNTER — OFFICE VISIT (OUTPATIENT)
Dept: ORTHOPEDICS | Facility: CLINIC | Age: 83
End: 2018-10-15
Payer: MEDICARE

## 2018-10-15 VITALS — BODY MASS INDEX: 28.63 KG/M2 | HEIGHT: 70 IN | WEIGHT: 200 LBS

## 2018-10-15 DIAGNOSIS — M79.641 PAIN OF RIGHT HAND: ICD-10-CM

## 2018-10-15 DIAGNOSIS — M79.642 PAIN OF LEFT HAND: ICD-10-CM

## 2018-10-15 DIAGNOSIS — M79.642 PAIN IN LEFT HAND: ICD-10-CM

## 2018-10-15 DIAGNOSIS — M79.641 PAIN OF RIGHT HAND: Primary | ICD-10-CM

## 2018-10-15 NOTE — NURSING NOTE
"Reason For Visit:   Chief Complaint   Patient presents with     Consult     The patient is here today with a left ring finger trigger finger. He reports having the triggering for about a year       Primary MD: Monisha Medina. MD: PCP    ?  No    Age: 87 year old  .  Date of injury: about a year ago  Type of injury: unknown.  Smoker: No  Request smoking cessation information: No      Ht 1.778 m (5' 10\")  Wt 90.7 kg (200 lb)  BMI 28.7 kg/m2      Pain Assessment  Patient Currently in Pain: Denies    Hand Dominance Evaluation  Hand Dominance: Right          QuickDASH Assessment  No flowsheet data found.       Allergies   Allergen Reactions     Penicillins Rash       Radha Lora, ATC    "

## 2018-10-15 NOTE — PROGRESS NOTES
[facility]  Orthopedics  Abhilash Aleman MD  10/15/2018     Name: Gorge Corona  MRN: [id number]  Age: 87 year old  : 1930  Referring provider: Monisha CRAWFORD     Chief Complaint: Left ring finger flexion deformity    History of Present Illness:   Gorge Corona is a 87 year old, right handed male who presents today for evaluation of left ring finger flexion deformity. He reports that about a year ago, he developed difficulty with extension of his left ring finger. No injuries. No lacerations. No associated pain. He has also since noted this in the left small finger. He denies recent worsening in the left index finger but believes that onset was gradual. . He denies any locking, clicking, or popping. He denies history of stroke and is not anticoagulated. He is able to extend the digit passively, just not actively. He has no numbness or tingling. He has no other complaints today.     Review of Systems:   A 10-point review of systems was obtained and is negative except for as noted in the HPI.     Medications:     Current Outpatient Prescriptions:      albuterol (PROAIR HFA, PROVENTIL HFA, VENTOLIN HFA) 108 (90 BASE) MCG/ACT inhaler, Inhale 2 puffs into the lungs every 4 hours as needed for shortness of breath / dyspnea or wheezing, Disp: 1 Inhaler, Rfl: 1     Ammonium Lactate 10 % CREA, Apply thin layer to skin on the body at least once daily. (Patient not taking: Reported on 2018), Disp: 120 mL, Rfl: 11     atorvastatin (LIPITOR) 10 MG tablet, Take 1 tablet (10 mg) by mouth daily, Disp: 90 tablet, Rfl: 0     Calcium 600 MG tablet, Take 1 tablet by mouth 2 times daily., Disp: 100 tablet, Rfl: 3     Cholecalciferol (VITAMIN D) 1000 UNITS capsule, Take 1 capsule by mouth daily., Disp: , Rfl:      citalopram (CELEXA) 20 MG tablet, Take 1 tablet (20 mg) by mouth daily, Disp: 90 tablet, Rfl: 1     clindamycin (CLEOCIN) 300 MG capsule, Take 2 capsules by mouth as needed for 1 dose. 1 hour prior  to dental work., Disp: 6 capsule, Rfl: 0     coenzyme Q-10 75 MG CAPS, Take  by mouth., Disp: , Rfl:      fluticasone-salmeterol (ADVAIR DISKUS) 250-50 MCG/DOSE diskus inhaler, Inhale 1 puff into the lungs every 12 hours, Disp: 120 Inhaler, Rfl: 1     furosemide (LASIX) 40 MG tablet, Take 0.5 tablets (20 mg) by mouth daily, Disp: 90 tablet, Rfl: 0     LANsoprazole (PREVACID) 15 MG CR capsule, Take 2 capsules (30 mg) by mouth daily, Disp: 30 capsule, Rfl: 1     levothyroxine (SYNTHROID/LEVOTHROID) 50 MCG tablet, Take 1.5 tablets (75 mcg) by mouth daily, Disp: 90 tablet, Rfl: 3     MAGNESIUM OXIDE PO, Take 400 mg by mouth, Disp: , Rfl:      meclizine (ANTIVERT) 25 MG tablet, Take 1 tablet (25 mg) by mouth 3 times daily as needed for dizziness (Patient not taking: Reported on 9/5/2018), Disp: 30 tablet, Rfl: 3     melatonin 3 MG tablet, Take 1 tablet (3 mg) by mouth nightly as needed for sleep, Disp: 100 tablet, Rfl: 3     metoprolol succinate (TOPROL-XL) 25 MG 24 hr tablet, Take 0.5 tablets (12.5 mg) by mouth daily, Disp: 45 tablet, Rfl: 1     Multiple Vitamins-Minerals (OCUVITE PO), Take  by mouth., Disp: , Rfl: 0     order for DME, Equipment being ordered: Safe step tub / walk-in tub, Disp: 1 Device, Rfl: 0     order for DME, Walker, Disp: 1 each, Rfl: 0     senna-docusate (SENOKOT-S;PERICOLACE) 8.6-50 MG per tablet, Take 1 tablet by mouth 2 times daily., Disp: 60 tablet, Rfl:      spironolactone (ALDACTONE) 25 MG tablet, Take 1 tablet (25 mg) by mouth daily, Disp: 90 tablet, Rfl: 3     tamsulosin (FLOMAX) 0.4 MG capsule, Take 1 capsule (0.4 mg) by mouth daily, Disp: 90 capsule, Rfl: 3     traMADol (ULTRAM) 50 MG tablet, Take 1 tablet (50 mg) by mouth daily as needed for severe pain, Disp: 10 tablet, Rfl: 0     traMADol (ULTRAM) 50 MG tablet, Take 0.5 tablets (25 mg) by mouth 2 times daily as needed for severe pain, Disp: 15 tablet, Rfl: 0     triamcinolone (KENALOG) 0.1 % cream, Apply sparingly to affected area  "three times daily as needed, Disp: 80 g, Rfl: 1    Allergies:  Penicillins     Past Medical History:  Atrial fibrillation   Basal cell carcinoma   Benign prostatic hypertrophy  Coronary artery disease s/p CABG 1/2013; RUSSEL in 2/2013  Depression   Hyperlipidemia   Lumbar spinal stenosis   Mitral regurgitation   Paroxysmal supraventricular tachycardia  Ventriculoperitoneal shunt status     Past Surgical History:  Cardioversion   Median sternotomy, coronary artery bypass graft X3 using left internal mammary artery & right saphenous vein , mitral valve Replacement and on pump oxygenator, flexible cystoscopy, urethral dilation and insertion of stuart catheter. 1/14/2013   Flexible cystoscopy, urethral dilation,& insertion of stuart catheter 1/14/2013   Mohs procedure     Social History:  Patient is . He is a former smoker, quit date 1/1/1990. He denies alcohol use.     Family History:  Positive for cancer and kidney disease.     Physical Examination:  Height 1.778 m (5' 10\"), weight 90.7 kg (200 lb).   Well-developed, well-nourished,and no acute distress.  Left hand: The patient is unable to actively extend the small or ring finger MP joints. Passively I am able to extend them fully. There is also a flexion lag of the ring and small finger PIP joints. Passively, with left ring finger held in MP joint extension, I cannot extend the PIP joint fully (30  short of full extension). With left ring finger held flexed, I can extend the PIP joint fully. Actively with his MP flexed, he can extend it to about 15  short of full extension. Alfredo test is positive. The DIP joint of the ring finger is held in hyperextension and is difficult to flex, consistent with a boutonniere type deformity. The extensor tendons do not subluxed. When fingers are held in full extension and I then let go, he is unable to hold them up on his own. There is marked first dorsal interosseous atrophy and some thenar atrophy. Good strength of wrist " extension. EPL, EDC-2/EIP and 3 fire normally.  SILT m/r/u  Fingers wwp    Imaging:   Radiographs of the left hand - 2 views (10/15/2018)  Impression:  1. No acute osseous abnormality.  2. Mild degenerative changes of the first carpometacarpal joint.   Additional scattered degenerative change in the interphalangeal  joints, particularly distally.  Per radiology    I have independently reviewed the above imaging studies; the results were discussed with the patient.     Assessment:   87 year old, right handed male with insidious onset of ring and small finger extension lag. Radial nerve appears intact. Concern for possible injury to extrinsic extensors.    Plan:   The patient will follow up with hand therapy for a relative motion splint as he is very interested in splint options that will help keep the fingers out of the way. I would like to obtain an ultrasound of the left hand to evaluated for tendon function. He will follow up with me after the ultrasound.    Scribe Disclosure:   ICher, am serving as a scribe to document services personally performed by Abhilash Aleman MD at this visit, based upon the provider's statements to me. All documentation has been reviewed by the aforementioned provider prior to being entered into the official medical record.

## 2018-10-15 NOTE — LETTER
10/15/2018       RE: Gorge Corona  [address] [facility] [place] [facility] [address]     Dear Colleague,    Thank you for referring your patient, Gorge Corona, to the [facility]. Please see a copy of my visit note below.    [facility]  Orthopedics  Abhilash Aleman MD  10/15/2018     Name: Gorge Corona  MRN: [id number]  Age: 87 year old  : 1930  Referring provider: Monisha CRAWFORD     Chief Complaint: Left ring finger flexion deformity    History of Present Illness:   Gorge Corona is a 87 year old, right handed male who presents today for evaluation of left ring finger flexion deformity. He reports that about a year ago, he developed difficulty with extension of his left ring finger. No injuries. No lacerations. No associated pain. He has also since noted this in the left small finger. He denies recent worsening in the left index finger but believes that onset was gradual. . He denies any locking, clicking, or popping. He denies history of stroke and is not anticoagulated. He is able to extend the digit passively, just not actively. He has no numbness or tingling. He has no other complaints today.     Review of Systems:   A 10-point review of systems was obtained and is negative except for as noted in the HPI.     Medications:     Current Outpatient Prescriptions:      albuterol (PROAIR HFA, PROVENTIL HFA, VENTOLIN HFA) 108 (90 BASE) MCG/ACT inhaler, Inhale 2 puffs into the lungs every 4 hours as needed for shortness of breath / dyspnea or wheezing, Disp: 1 Inhaler, Rfl: 1     Ammonium Lactate 10 % CREA, Apply thin layer to skin on the body at least once daily. (Patient not taking: Reported on 2018), Disp: 120 mL, Rfl: 11     atorvastatin (LIPITOR) 10 MG tablet, Take 1 tablet (10 mg) by mouth daily, Disp: 90 tablet, Rfl: 0     Calcium 600 MG tablet, Take 1 tablet by mouth 2 times daily., Disp: 100 tablet, Rfl: 3      Cholecalciferol (VITAMIN D) 1000 UNITS capsule, Take 1 capsule by mouth daily., Disp: , Rfl:      citalopram (CELEXA) 20 MG tablet, Take 1 tablet (20 mg) by mouth daily, Disp: 90 tablet, Rfl: 1     clindamycin (CLEOCIN) 300 MG capsule, Take 2 capsules by mouth as needed for 1 dose. 1 hour prior to dental work., Disp: 6 capsule, Rfl: 0     coenzyme Q-10 75 MG CAPS, Take  by mouth., Disp: , Rfl:      fluticasone-salmeterol (ADVAIR DISKUS) 250-50 MCG/DOSE diskus inhaler, Inhale 1 puff into the lungs every 12 hours, Disp: 120 Inhaler, Rfl: 1     furosemide (LASIX) 40 MG tablet, Take 0.5 tablets (20 mg) by mouth daily, Disp: 90 tablet, Rfl: 0     LANsoprazole (PREVACID) 15 MG CR capsule, Take 2 capsules (30 mg) by mouth daily, Disp: 30 capsule, Rfl: 1     levothyroxine (SYNTHROID/LEVOTHROID) 50 MCG tablet, Take 1.5 tablets (75 mcg) by mouth daily, Disp: 90 tablet, Rfl: 3     MAGNESIUM OXIDE PO, Take 400 mg by mouth, Disp: , Rfl:      meclizine (ANTIVERT) 25 MG tablet, Take 1 tablet (25 mg) by mouth 3 times daily as needed for dizziness (Patient not taking: Reported on 9/5/2018), Disp: 30 tablet, Rfl: 3     melatonin 3 MG tablet, Take 1 tablet (3 mg) by mouth nightly as needed for sleep, Disp: 100 tablet, Rfl: 3     metoprolol succinate (TOPROL-XL) 25 MG 24 hr tablet, Take 0.5 tablets (12.5 mg) by mouth daily, Disp: 45 tablet, Rfl: 1     Multiple Vitamins-Minerals (OCUVITE PO), Take  by mouth., Disp: , Rfl: 0     order for DME, Equipment being ordered: Safe step tub / walk-in tub, Disp: 1 Device, Rfl: 0     order for DME, Walker, Disp: 1 each, Rfl: 0     senna-docusate (SENOKOT-S;PERICOLACE) 8.6-50 MG per tablet, Take 1 tablet by mouth 2 times daily., Disp: 60 tablet, Rfl:      spironolactone (ALDACTONE) 25 MG tablet, Take 1 tablet (25 mg) by mouth daily, Disp: 90 tablet, Rfl: 3     tamsulosin (FLOMAX) 0.4 MG capsule, Take 1 capsule (0.4 mg) by mouth daily, Disp: 90 capsule, Rfl: 3     traMADol (ULTRAM) 50 MG tablet, Take  "1 tablet (50 mg) by mouth daily as needed for severe pain, Disp: 10 tablet, Rfl: 0     traMADol (ULTRAM) 50 MG tablet, Take 0.5 tablets (25 mg) by mouth 2 times daily as needed for severe pain, Disp: 15 tablet, Rfl: 0     triamcinolone (KENALOG) 0.1 % cream, Apply sparingly to affected area three times daily as needed, Disp: 80 g, Rfl: 1    Allergies:  Penicillins     Past Medical History:  Atrial fibrillation   Basal cell carcinoma   Benign prostatic hypertrophy  Coronary artery disease s/p CABG 1/2013; RUSSEL in 2/2013  Depression   Hyperlipidemia   Lumbar spinal stenosis   Mitral regurgitation   Paroxysmal supraventricular tachycardia  Ventriculoperitoneal shunt status     Past Surgical History:  Cardioversion   Median sternotomy, coronary artery bypass graft X3 using left internal mammary artery & right saphenous vein , mitral valve Replacement and on pump oxygenator, flexible cystoscopy, urethral dilation and insertion of stuart catheter. 1/14/2013   Flexible cystoscopy, urethral dilation,& insertion of stuart catheter 1/14/2013   Mohs procedure     Social History:  Patient is . He is a former smoker, quit date 1/1/1990. He denies alcohol use.     Family History:  Positive for cancer and kidney disease.     Physical Examination:  Height 1.778 m (5' 10\"), weight 90.7 kg (200 lb).   Well-developed, well-nourished,and no acute distress.  Left hand: The patient is unable to actively extend the small or ring finger MP joints. Passively I am able to extend them fully. There is also a flexion lag of the ring and small finger PIP joints. Passively, with left ring finger held in MP joint extension, I cannot extend the PIP joint fully (30   short of full extension). With left ring finger held flexed, I can extend the PIP joint fully. Actively with his MP flexed, he can extend it to about 15  short of full extension. Alfredo test is positive. The DIP joint of the ring finger is held in hyperextension and is difficult to " flex, consistent with a boutonniere type deformity. The extensor tendons do not subluxed. When fingers are held in full extension and I then let go, he is unable to hold them up on his own. There is marked first dorsal interosseous atrophy and some thenar atrophy. Good strength of wrist extension. EPL, EDC-2/EIP and 3 fire normally.  SILT m/r/u  Fingers wwp    Imaging:   Radiographs of the left hand - 2 views (10/15/2018)  Impression:  1. No acute osseous abnormality.  2. Mild degenerative changes of the first carpometacarpal joint.   Additional scattered degenerative change in the interphalangeal  joints, particularly distally.  Per radiology    I have independently reviewed the above imaging studies; the results were discussed with the patient.     Assessment:   87 year old, right handed male with insidious onset of ring and small finger extension lag. Radial nerve appears intact. Concern for possible injury to extrinsic extensors.    Plan:   The patient will follow up with hand therapy for a relative motion splint as he is very interested in splint options that will help keep the fingers out of the way. I would like to obtain an ultrasound of the left hand to evaluated for tendon function. He will follow up with me after the ultrasound.    Scribe Disclosure:   I, Cher Avilez, am serving as a scribe to document services personally performed by Abhilash Aleman MD at this visit, based upon the provider's statements to me. All documentation has been reviewed by the aforementioned provider prior to being entered into the official medical record.    Abhilash Aleman MD

## 2018-10-15 NOTE — MR AVS SNAPSHOT
After Visit Summary   10/15/2018    Gorge Corona    MRN: [id number]           Patient Information     Date Of Birth          11/27/1930        Visit Information        Provider Department      10/15/2018 1:45 PM Abhilash Aleman MD [facility]        Today's Diagnoses     Pain of right hand    -  1    Pain in left hand           Follow-ups after your visit        Additional Services     HAND THERAPY Occupational Therapy or Physical Therapy       Hand Therapy Referral relative motion splint for MP extension lag of the left ring and small fingers                  Your next 10 appointments already scheduled     Oct 18, 2018 10:00 AM CDT   Ortho Treatment with Kris Hoenk, ALEJANDRO   [facility] Physical Therapy ([facility])    [address]  [address]  [facility] [address]   [phone]            Oct 22, 2018 11:30 AM CDT   Treatment 60 with Keerthi Ignacio OT   [facility], Occupational Therapy, Vision - Outpatie ([facility])    [address]  9th Floor, Clinic [address]  [facility] [address] [phone]            Nov 05, 2018  9:00 AM CST   US EXTREMITY NON VASCULAR LEFT with UCUS3   [facility] ([facility])    [address]  1st Floor  [facility] [address] [phone]           How do I prepare for my exam? (Food and drink instructions) No Food and Drink Restrictions.  How do I prepare for my exam? (Other instructions) IF YOUR DOCTOR ALSO PRESCRIBED SEDATION DURING THE EXAM (medicine to help you relax): You will receive separate instructions about driving, eating, and additional tests that may be necessary prior to your exam day.  What should I wear: Wear comfortable clothes.  How long does the exam take: Aspiration (no sedation treatment takes about an hour.  For paracentesis, thoracentesis or sedation plan to spend at least three hours at the  hospital.  What should I bring: Bring a list of your medicines, including vitamins, minerals and over-the-counter drugs. It is safest to leave personal items at home.  Do I need a :  No  is needed.  What do I need to tell my doctor: Tell your doctor in advance: * If you are or may be pregnant. * If you are taking Coumadin (or any other blood thinners) 5 days prior to the exam for any special instructions. * If you are diabetic to determine if your insulin needs have to be adjusted for the exam.  What should I do after the exam: Take it easy the rest of the day. You can return to normal activities the next day.  What is this test: This test uses a long, thin needle or tube to remove tissue, fluid, or other cells from your body. Pictures from an ultrasound will guide the needle to the right place. (Ultrasound uses sound waves to create pictures of the body on a video screen. You will not feel the sound waves.)  * A biopsy removes tissue or other cells from the body; we send the tissue or cells to a lab for testing. * Paracentesis removes fluid from the belly (abdomen) to relieve pressure, to test the fluid or both. * Thoracentesis removes fluid from the sac around the lungs to relieve pressure, to test the fluid or both. * Aspiration removes fluid from any part of the body, then the fluid is tested for disease or infection.  Who should I call with questions: If you have any questions, please call the Imaging Department where you will have your exam. Directions, parking instructions, and other information is available on our website, [email or web address].            Nov 05, 2018 12:00 PM CST   (Arrive by 11:45 AM)   RETURN HAND with Abhilash Aleman MD   [facility] ([facility])    [address] [place] [address]   [phone]            Dec 07, 2018 11:30 AM CST   (Arrive by 11:15 AM)   Return Visit with Barry Zimmerman MD   [facility]  "Dermatology ([facility])    [address]  3rd Floor  [facility] [address] [phone]            Feb 21, 2019  5:00 PM CST   (Arrive by 4:45 PM)   Return Visit with Morris Jj MD   [facility] ([facility])    [address]  [address]  [facility] [address] [phone]            Mar 26, 2019 11:00 AM CDT   (Arrive by 10:45 AM)   PHYSICAL with Monisha Olea MD   [facility] ([facility])    [address]  4th Floor  [facility] [id number] [phone]              Future tests that were ordered for you today     Open Future Orders        Priority Expected Expires Ordered    US Extremity non-vascular LT Routine  10/15/2019 10/15/2018    HAND THERAPY Occupational Therapy or Physical Therapy Routine  10/15/2019 10/15/2018            Who to contact     Please call your clinic at [phone] to:    Ask questions about your health    Make or cancel appointments    Discuss your medicines    Learn about your test results    Speak to your doctor            Additional Information About Your Visit        Care EveryWhere ID     This is your Care EveryWhere ID. This could be used by other organizations to access your [place] medical records  [id number]        Your Vitals Were     Height BMI (Body Mass Index)                1.778 m (5' 10\") 28.7 kg/m2           Blood Pressure from Last 3 Encounters:   10/05/18 94/52   09/05/18 107/66   08/31/18 97/59    Weight from Last 3 Encounters:   10/15/18 90.7 kg (200 lb)   10/05/18 90.8 kg (200 lb 3.2 oz)   09/05/18 91 kg (200 lb 11.2 oz)               Primary Care Provider Office Phone # Fax #    Monisha Olea [phone]       [address] [address]        Equal Access to Services     NILDA DIAZ AH: Hadii aad ku hadasho Soomaali, waaxda luqadaha, qaybta kaalmada mitchell, " tejas huang patt day'aan ah. So [facility] [phone].    ATENCIÓN: Si devla momo, tiene a ferrari disposición servicios gratuitos de asistencia lingüística. Nuris huynh [phone].    We comply with applicable federal civil rights laws and [place] laws. We do not discriminate on the basis of race, color, national origin, age, disability, sex, sexual orientation, or gender identity.            Thank you!     Thank you for choosing [facility]  for your care. Our goal is always to provide you with excellent care. Hearing back from our patients is one way we can continue to improve our services. Please take a few minutes to complete the written survey that you may receive in the mail after your visit with us. Thank you!             Your Updated Medication List - Protect others around you: Learn how to safely use, store and throw away your medicines at www.disposemymeds.org.          This list is accurate as of 10/15/18  3:04 PM.  Always use your most recent med list.                   Brand Name Dispense Instructions for use Diagnosis    albuterol 108 (90 Base) MCG/ACT inhaler    PROAIR HFA/PROVENTIL HFA/VENTOLIN HFA    1 Inhaler    Inhale 2 puffs into the lungs every 4 hours as needed for shortness of breath / dyspnea or wheezing    Cough       Ammonium Lactate 10 % Crea     120 mL    Apply thin layer to skin on the body at least once daily.    Xerosis cutis, Nummular eczema       atorvastatin 10 MG tablet    LIPITOR    90 tablet    Take 1 tablet (10 mg) by mouth daily    Major depressive disorder, recurrent episode, moderate (H), Essential hypertension, benign, Shortness of breath, Dyslipidemia       calcium 600 MG tablet     100 tablet    Take 1 tablet by mouth 2 times daily.        citalopram 20 MG tablet    celeXA    90 tablet    Take 1 tablet (20 mg) by mouth daily    Major depressive disorder, recurrent episode, moderate (H)       clindamycin 300 MG capsule    CLEOCIN    6 capsule    Take  2 capsules by mouth as needed for 1 dose. 1 hour prior to dental work.    Prophylactic antibiotic       coenzyme Q-10 75 MG Caps      Take  by mouth.        fluticasone-salmeterol 250-50 MCG/DOSE diskus inhaler    ADVAIR DISKUS    120 Inhaler    Inhale 1 puff into the lungs every 12 hours    Acute bronchitis, Cough       furosemide 40 MG tablet    LASIX    90 tablet    Take 0.5 tablets (20 mg) by mouth daily    Chronic diastolic heart failure (H), CAD S/P percutaneous coronary angioplasty       LANsoprazole 15 MG CR capsule    PREVACID    30 capsule    Take 2 capsules (30 mg) by mouth daily    Gastroesophageal reflux disease without esophagitis       levothyroxine 50 MCG tablet    SYNTHROID/LEVOTHROID    90 tablet    Take 1.5 tablets (75 mcg) by mouth daily    Other specified hypothyroidism       MAGNESIUM OXIDE PO      Take 400 mg by mouth        meclizine 25 MG tablet    ANTIVERT    30 tablet    Take 1 tablet (25 mg) by mouth 3 times daily as needed for dizziness    BPPV (benign paroxysmal positional vertigo), unspecified laterality       melatonin 3 MG tablet     100 tablet    Take 1 tablet (3 mg) by mouth nightly as needed for sleep    Insomnia, unspecified type       metoprolol succinate 25 MG 24 hr tablet    TOPROL-XL    45 tablet    Take 0.5 tablets (12.5 mg) by mouth daily    Coronary artery disease involving native coronary artery of native heart without angina pectoris       OCUVITE PO      Take  by mouth.        * order for DME     1 each    Walker    Spinal stenosis, lumbar region, without neurogenic claudication       * order for DME     1 Device    Equipment being ordered: Safe step tub / walk-in tub    Generalized muscle weakness, Personal history of fall, CHF (congestive heart failure) (H)       senna-docusate 8.6-50 MG per tablet    SENOKOT-S;PERICOLACE    60 tablet    Take 1 tablet by mouth 2 times daily.    S/P CABG x 3       spironolactone 25 MG tablet    ALDACTONE    90 tablet    Take 1 tablet  (25 mg) by mouth daily    Hypokalemia       tamsulosin 0.4 MG capsule    FLOMAX    90 capsule    Take 1 capsule (0.4 mg) by mouth daily    Urinary retention       * traMADol 50 MG tablet    ULTRAM    15 tablet    Take 0.5 tablets (25 mg) by mouth 2 times daily as needed for severe pain    Pain in joint       * traMADol 50 MG tablet    ULTRAM    10 tablet    Take 1 tablet (50 mg) by mouth daily as needed for severe pain    Hip pain, left       triamcinolone 0.1 % cream    KENALOG    80 g    Apply sparingly to affected area three times daily as needed    Nummular eczema       vitamin D 1000 units capsule      Take 1 capsule by mouth daily.        * Notice:  This list has 4 medication(s) that are the same as other medications prescribed for you. Read the directions carefully, and ask your doctor or other care provider to review them with you.

## 2018-10-18 ENCOUNTER — HOSPITAL ENCOUNTER (OUTPATIENT)
Dept: PHYSICAL THERAPY | Facility: CLINIC | Age: 83
Setting detail: THERAPIES SERIES
End: 2018-10-18
Attending: INTERNAL MEDICINE
Payer: MEDICARE

## 2018-10-18 PROCEDURE — 97112 NEUROMUSCULAR REEDUCATION: CPT | Mod: GP | Performed by: PHYSICAL THERAPIST

## 2018-10-18 PROCEDURE — 97110 THERAPEUTIC EXERCISES: CPT | Mod: GP | Performed by: PHYSICAL THERAPIST

## 2018-10-18 PROCEDURE — 40000718 ZZHC STATISTIC PT DEPARTMENT ORTHO VISIT: Performed by: PHYSICAL THERAPIST

## 2018-10-19 NOTE — PROGRESS NOTES
Gorge was seen today for follow up of Atrial fibrillation (H); Atrial flutter (H); Basal cell carcinoma of skin of trunk; BPH (benign prostatic hypertrophy) with urinary obstruction; CAD (coronary artery disease); Depression; H/O CT scan of head (1/11/2013); Hyperlipidemia; Lumbar spinal stenosis; Mitral regurgitation; Palpitations; Paroxysmal supraventricular tachycardia (H); Squamous cell carcinoma; Tachycardia; and  (ventriculoperitoneal) shunt status.     He is also here for breast mass follow up, and new concern of trigger finger.    Total time spent 25 minutes.  More than 50% of the time spent with Mr. Corona on counseling / coordinating his care.    At last visit, he endorsed tenderness and swelling of both breasts, more on the left side.  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  We discussed treatment options, such as checking testosterone levels and replacing if low, vs starting tamoxifen.  He feels the swelling and pain is diminishing somewhat and does not want to pursue further evaluation or treatment.      Meanwhile he has difficulties with a left fourth finger catching and seizing up from time to time; causing problems with .    His dyspnea on exertion is currently stable; denies heart palpitations, syncope or near syncope.  He remains off amiodorone because of bradycardia induced by that medication.    Lastly, his wife accompanies him to the visit and has some concerns about his ability to drive.      On exam  B/P: 94/52,, P: 69  Cor irregular, II/IV systolic murmur  Lungs CTA anteriorly  LE trace ankle edema      A/P    Trigger finger, left ring finger  -     ORTHOPEDICS ADULT REFERRAL    Driving safety issue  -     OCCUPATIONAL THERAPY REFERRAL; Future    Encounter for immunization  -     FLU VACCINE HIGH DOSE PRESERVATIVE FREE, AGE =>65 YR    RTC 6  Months    Lefty Hobbs MD

## 2018-10-23 DIAGNOSIS — E03.8 OTHER SPECIFIED HYPOTHYROIDISM: ICD-10-CM

## 2018-10-24 ENCOUNTER — TELEPHONE (OUTPATIENT)
Dept: DERMATOLOGY | Facility: CLINIC | Age: 83
End: 2018-10-24

## 2018-10-24 ENCOUNTER — HOSPITAL ENCOUNTER (OUTPATIENT)
Dept: PHYSICAL THERAPY | Facility: CLINIC | Age: 83
Setting detail: THERAPIES SERIES
End: 2018-10-24
Attending: INTERNAL MEDICINE
Payer: MEDICARE

## 2018-10-24 DIAGNOSIS — L85.3 XEROSIS CUTIS: ICD-10-CM

## 2018-10-24 DIAGNOSIS — L30.0 NUMMULAR ECZEMA: ICD-10-CM

## 2018-10-24 PROCEDURE — G8978 MOBILITY CURRENT STATUS: HCPCS | Mod: GP,CJ | Performed by: PHYSICAL THERAPIST

## 2018-10-24 PROCEDURE — 97110 THERAPEUTIC EXERCISES: CPT | Mod: GP | Performed by: PHYSICAL THERAPIST

## 2018-10-24 PROCEDURE — 97112 NEUROMUSCULAR REEDUCATION: CPT | Mod: GP | Performed by: PHYSICAL THERAPIST

## 2018-10-24 PROCEDURE — G8979 MOBILITY GOAL STATUS: HCPCS | Mod: GP,CJ | Performed by: PHYSICAL THERAPIST

## 2018-10-24 PROCEDURE — 40000718 ZZHC STATISTIC PT DEPARTMENT ORTHO VISIT: Performed by: PHYSICAL THERAPIST

## 2018-10-24 RX ORDER — LEVOTHYROXINE SODIUM 50 UG/1
75 TABLET ORAL DAILY
Qty: 135 TABLET | Refills: 3 | Status: SHIPPED | OUTPATIENT
Start: 2018-10-24 | End: 2019-10-03

## 2018-10-24 NOTE — PROGRESS NOTES
Gorge Corona   PHYSICAL THERAPY PROGRESS NOTE  10/24/18 0900   Signing Clinician's Name / Credentials   Signing clinician's name / credentials Kris Hoenk, PT   Session Number   Session Number 10 , seen from 9/13/18-10/24/18   Progress Note/Recertification   Progress Note Due Date 10/24/18   Progress Note Completed Date 10/24/18   Recertification Due Date 11/13/18   PT Medicare Only G-code   G-code Mobility: Walking & Moving Around   Mobility: Walking & Moving Around   Mobility Current Status,  (eval/re-eval & every progress note) CJ: 20-39% impairment   Current Mobility Modifier Rationale RW, able to go farther 400ft, can walk with cane now short distances   Mobility Goal,  (eval/re-eval, every progress note, & discharge) CJ: 20-39% impairment   Ortho Goal 1   Goal Description pt will have increased L hip strength one grade for stable pelvis during gait in 6wks  (see MMT, 1/2 grade increase)   Target Date 10/25/18   Ortho Goal 2   Goal Description pt will be able to walk into [facility] from parked car using RW in 8wk  (walks into clinic here from car, amb 400ft 4WW)   Target Date 11/13/18   Date Met 10/18/18   Ortho Goal 3   Goal Description pt will have no falls in 6-8wks  (no falls since starting PT)   Target Date 11/13/18   Subjective Report   Subjective Report using a device called back to life, lies on the ground, can get up and down from floor, push off chair.  was getting ready /washed up and realized his wlaker was about 10 ft away from him, can wlak along counter, furniture some, states he did use a cane before he broke his hip for his LB   Objective Measure 1   Details MMT L hip abd 2+/5, can do SL glut med/clam = 3/5 strength, amb with 4WW 400ft in 4minutes, amb w/ cane 100ft , stable pelvis for approx 75 ft, walked without assistve device w/ CGA 40ft - + trendelenberg L, first 20 ft indep, second 20ft more waddling gait, no LOB   Therapeutic Procedure/exercise   Minutes 10   Skilled  Intervention LE strength ex , progressing toward more stable gait with less assist   Patient Response clams w/o asst   Treatment Detail SL clam x15, bridge x15, sit to stnad from tall mat x5, squat at counter x10   Neuromuscular Re-education   Minutes 10   Skilled Intervention balance drills for fall prevention   Patient Response ex as listed, cuing listed   Treatment Detail standing balance with trunk rots x5B, side step  along counter stepping over cones 10ft R/L x2, toe tapping cone holding counter on one side and cane x10 B   Gait Training   Minutes 5   Skilled Intervention gait train with no asst device   Patient Response states he did not feel like he would fall at all, better than last time we tried   Treatment Detail practice gait without asst device 40ft with CGA   Plan   Plan for next session cont 2x/wk x4wk, pt is improvin gin strngth, more stable gait, can go short distances with cane, wsa indep with cane all distance prior   Total Session Time   Timed Code Treatment Minutes 25   Total Treatment Time (sum of timed and untimed services) 25   Kris Hoenk, PT #[id number]  [facility]

## 2018-10-25 NOTE — TELEPHONE ENCOUNTER
Ammonium Lactate 10 % CREA  Last Written Prescription Date:  12/7/16  Last Fill Quantity: 120ml,   # refills: 11  Last Office Visit : 12/14/17  Future Office visit: 12/8/18    Process 1.

## 2018-10-25 NOTE — TELEPHONE ENCOUNTER
Received refill request for Amlactin as the resident on call. Reviewed patient's chart and attached communication. Patient last seen 12/2017 for skin check. RTC 1 year, scheduled for 12/7/18. After reviewing the medication list and assessment and plan from last visit, the refill request was accepted.    Routed as DANIEL.    Suri Lyle MD  Medicine-Dermatology PGY-5  [phone]

## 2018-10-26 ENCOUNTER — HOSPITAL ENCOUNTER (OUTPATIENT)
Dept: PHYSICAL THERAPY | Facility: CLINIC | Age: 83
Setting detail: THERAPIES SERIES
End: 2018-10-26
Attending: INTERNAL MEDICINE
Payer: MEDICARE

## 2018-10-26 PROCEDURE — 97110 THERAPEUTIC EXERCISES: CPT | Mod: GP | Performed by: PHYSICAL THERAPIST

## 2018-10-26 PROCEDURE — 40000718 ZZHC STATISTIC PT DEPARTMENT ORTHO VISIT: Performed by: PHYSICAL THERAPIST

## 2018-10-29 RX ORDER — AMMONIUM LACTATE 12 G/100G
LOTION TOPICAL 2 TIMES DAILY PRN
Qty: 360 G | Refills: 1 | Status: SHIPPED | OUTPATIENT
Start: 2018-10-29 | End: 2020-08-05

## 2018-10-30 ENCOUNTER — HOSPITAL ENCOUNTER (OUTPATIENT)
Dept: PHYSICAL THERAPY | Facility: CLINIC | Age: 83
Setting detail: THERAPIES SERIES
End: 2018-10-30
Attending: INTERNAL MEDICINE
Payer: MEDICARE

## 2018-10-30 PROCEDURE — 97110 THERAPEUTIC EXERCISES: CPT | Mod: GP | Performed by: PHYSICAL THERAPIST

## 2018-10-30 PROCEDURE — 40000718 ZZHC STATISTIC PT DEPARTMENT ORTHO VISIT: Performed by: PHYSICAL THERAPIST

## 2018-11-01 ENCOUNTER — TELEPHONE (OUTPATIENT)
Dept: ORTHOPEDICS | Facility: CLINIC | Age: 83
End: 2018-11-01

## 2018-11-01 ENCOUNTER — TELEPHONE (OUTPATIENT)
Dept: INTERNAL MEDICINE | Facility: CLINIC | Age: 83
End: 2018-11-01

## 2018-11-01 DIAGNOSIS — Z98.61 CAD S/P PERCUTANEOUS CORONARY ANGIOPLASTY: ICD-10-CM

## 2018-11-01 DIAGNOSIS — I25.10 CAD S/P PERCUTANEOUS CORONARY ANGIOPLASTY: ICD-10-CM

## 2018-11-01 DIAGNOSIS — I50.32 CHRONIC DIASTOLIC HEART FAILURE (H): ICD-10-CM

## 2018-11-01 NOTE — TELEPHONE ENCOUNTER
Health Call Center    Phone Message    May a detailed message be left on voicemail: yes    Reason for Call: Medication Question or concern regarding medication   Prescription Clarification  Name of Medication;furosemide (LASIX) 40 MG tablet  Prescribing Provider:Dr Carvalho   Pharmacy:[facility] [address] [address] Phone [phone] and Fax [phone]     What on the order needs clarification? Per Per pt wife pt has been taking 1 tablet daily and it was changed by Dr Carvalho to 0.5 tablet per day. She states that they are looking to get a refill but wanted to verify the direction because she states it's change from 1 to 0.5 tablet and it should be 1 tablet daily by mouth. Please contact pt.            Action Taken: Message routed to:  [facility]

## 2018-11-01 NOTE — TELEPHONE ENCOUNTER
Dr. Carvalho    Please see the message below regarding lasix dosage.  Pt has been on lasix 40 mg and it was decreased to 20 mg on 6/27/18. I do not see any indication of dosage decrease from your note on 6/27/18. BMP was normal, too.   Could you look into this and advise ? Thanks.    Rafael-Mi

## 2018-11-01 NOTE — TELEPHONE ENCOUNTER
Returned call to patient's wife, Kassi.  Notified them that Dr. Mccullough, the radiologist that normally does these ultrasounds, is out on an unexpected leave.  The radiology department is currently trying to find a back up plan during this time.  Radiology scheduling will call patient and wife to reschedule once they have a plan.  I have asked that they update me with the plan as well.  Wife was understanding and has our phone number for any further questions or concerns.

## 2018-11-01 NOTE — TELEPHONE ENCOUNTER
[facility] Call Center    Phone Message    May a detailed message be left on voicemail: no    Reason for Call: Other: Pt's wife Kassi called to figure out how to reschedule the Pt's ultrasound/x-ray exam scheduled for 11/5 prior to his f/u with Dr. Aleman. She needs to results to complete his follow-up visit. The radiologist here at the [facility] however, is currently out on leave and there is no expected date of return. Radiology informed there is no way to perform the exam at [facility] or . The Pt will need to have orders sent out to an external facility to complete it. For now both appts are canceled. Kassi would like a call back to follow-up on what to do next.      Action Taken: Message routed to:  [facility]

## 2018-11-02 RX ORDER — FUROSEMIDE 40 MG
40 TABLET ORAL DAILY
Qty: 90 TABLET | Refills: 1 | Status: SHIPPED | OUTPATIENT
Start: 2018-11-02 | End: 2019-07-03

## 2018-11-02 NOTE — TELEPHONE ENCOUNTER
Spoke with pt, states that pt has been taking lasix 40 mg for years and tolerates well, wants to stay with the same dose. I will send the refill of lasix 40 mg/day to the pharmacy.

## 2018-11-02 NOTE — TELEPHONE ENCOUNTER
Left a message to wifeKassi to call me back.    Soon-Mi    -------------------------------------------------------------------------    Ja Carvalho MD Joo, Soon-Mi, ADALGISA      I guess it would depend on how he is doing sine Jessa Bearden has seen him most recently    if he is tolerating the 20 mg and not developing edema or dyspnea I would leave it alone  But he is senior with a bum heart so it is hard to prescribe from this distance

## 2018-11-06 ENCOUNTER — HOSPITAL ENCOUNTER (OUTPATIENT)
Dept: PHYSICAL THERAPY | Facility: CLINIC | Age: 83
Setting detail: THERAPIES SERIES
End: 2018-11-06
Attending: INTERNAL MEDICINE
Payer: MEDICARE

## 2018-11-06 PROCEDURE — 40000718 ZZHC STATISTIC PT DEPARTMENT ORTHO VISIT: Performed by: PHYSICAL THERAPIST

## 2018-11-06 PROCEDURE — 97110 THERAPEUTIC EXERCISES: CPT | Mod: GP | Performed by: PHYSICAL THERAPIST

## 2018-11-09 ENCOUNTER — TELEPHONE (OUTPATIENT)
Dept: ORTHOPEDICS | Facility: CLINIC | Age: 83
End: 2018-11-09

## 2018-11-09 DIAGNOSIS — F33.1 MAJOR DEPRESSIVE DISORDER, RECURRENT EPISODE, MODERATE (H): ICD-10-CM

## 2018-11-09 NOTE — TELEPHONE ENCOUNTER
Called wife to explain that Dr. Mccullough is still on a leave from our facility. They will need to get the ultrasound done at [facility] instead.  Wife will speak with Gorge about this and they were given the imaging scheduling phone number.  If they have questions they have the ortho clinic phone number as well.

## 2018-11-12 RX ORDER — CITALOPRAM HYDROBROMIDE 20 MG/1
TABLET ORAL
Qty: 90 TABLET | Refills: 0 | Status: SHIPPED | OUTPATIENT
Start: 2018-11-12 | End: 2019-05-20

## 2018-11-12 NOTE — TELEPHONE ENCOUNTER
Last Clinic Visit: 10/5/2018  [facility]  Failed protocol.  Over due for PHQ-9, 90 day shukri authorized. Message sent to provider's RN.

## 2018-11-15 ENCOUNTER — MEDICAL CORRESPONDENCE (OUTPATIENT)
Dept: HEALTH INFORMATION MANAGEMENT | Facility: CLINIC | Age: 83
End: 2018-11-15

## 2018-11-15 ENCOUNTER — HOSPITAL ENCOUNTER (OUTPATIENT)
Dept: PHYSICAL THERAPY | Facility: CLINIC | Age: 83
Setting detail: THERAPIES SERIES
End: 2018-11-15
Attending: INTERNAL MEDICINE
Payer: MEDICARE

## 2018-11-15 PROCEDURE — G8978 MOBILITY CURRENT STATUS: HCPCS | Mod: GP,CJ | Performed by: PHYSICAL THERAPIST

## 2018-11-15 PROCEDURE — 97110 THERAPEUTIC EXERCISES: CPT | Mod: GP | Performed by: PHYSICAL THERAPIST

## 2018-11-15 PROCEDURE — G8979 MOBILITY GOAL STATUS: HCPCS | Mod: GP,CJ | Performed by: PHYSICAL THERAPIST

## 2018-11-15 PROCEDURE — 40000718 ZZHC STATISTIC PT DEPARTMENT ORTHO VISIT: Performed by: PHYSICAL THERAPIST

## 2018-11-29 ENCOUNTER — HOSPITAL ENCOUNTER (OUTPATIENT)
Dept: PHYSICAL THERAPY | Facility: CLINIC | Age: 83
Setting detail: THERAPIES SERIES
End: 2018-11-29
Attending: INTERNAL MEDICINE
Payer: MEDICARE

## 2018-11-29 PROCEDURE — 97116 GAIT TRAINING THERAPY: CPT | Mod: GP | Performed by: PHYSICAL THERAPIST

## 2018-11-29 PROCEDURE — 40000718 ZZHC STATISTIC PT DEPARTMENT ORTHO VISIT: Performed by: PHYSICAL THERAPIST

## 2018-11-29 PROCEDURE — 97110 THERAPEUTIC EXERCISES: CPT | Mod: GP | Performed by: PHYSICAL THERAPIST

## 2018-12-07 DIAGNOSIS — F33.1 MAJOR DEPRESSIVE DISORDER, RECURRENT EPISODE, MODERATE (H): ICD-10-CM

## 2018-12-07 DIAGNOSIS — R06.02 SHORTNESS OF BREATH: ICD-10-CM

## 2018-12-07 DIAGNOSIS — I10 ESSENTIAL HYPERTENSION, BENIGN: ICD-10-CM

## 2018-12-07 DIAGNOSIS — E78.5 DYSLIPIDEMIA: ICD-10-CM

## 2018-12-11 RX ORDER — ATORVASTATIN CALCIUM 10 MG/1
10 TABLET, FILM COATED ORAL DAILY
Qty: 90 TABLET | Refills: 0 | Status: SHIPPED | OUTPATIENT
Start: 2018-12-11 | End: 2019-05-20

## 2018-12-14 DIAGNOSIS — J44.1 COPD EXACERBATION (H): ICD-10-CM

## 2018-12-17 RX ORDER — AZITHROMYCIN 250 MG/1
TABLET, FILM COATED ORAL
Qty: 6 TABLET | Refills: 0 | OUTPATIENT
Start: 2018-12-17

## 2019-01-08 NOTE — ADDENDUM NOTE
Encounter addended by: Hoenk, Kris, PT on: 1/8/2019 8:35 AM   Actions taken: Sign clinical note, Flowsheet accepted, Episode resolved

## 2019-01-08 NOTE — PROGRESS NOTES
"  Gorge Corona\ PHYSICAL THERAPY DISCHARGE  11/29/18 0900   Signing Clinician's Name / Credentials   Signing clinician's name / credentials Kris Hoenk, PT   Session Number   Session Number 15 [facility], seen from 9/13/18 to 11/29/18   Progress Note/Recertification   Recertification Due Date 12/31/18   PT G-Codes   G-code Mobility: Walking and moving around  Medicare G-code:  Patient did not attend a final scheduled session prior to discharge.  Unable to determine discharge functional status.     Subjective Report   Subjective Report on bike every morning, uses his back to life daily.     Objective Measure 1 - see last progress note 11/15/18 for full obj data   Details able to do clam with good form today   Therapeutic Procedure/exercise   Skilled Intervention hip strength review   Treatment Detail SL clam x15 L, SL hip abd x5 tactile cues for form, standing HIP ABD RX20, L X15   Neuromuscular Re-education   Skilled Intervention balance drills to improve safety, prevent falls   Patient Response standing balance better   Treatment Detail standing balance FA 10\" withh shld flex x10, abd x10, horiz head turns x10, marching cane and HHA 50ft   Gait Training   Skilled Intervention gait instruction and practice, back to using mostly walker at home   Treatment Detail amb with cane 100ft, amb HHA x1 25ft x2, side stepping x8 B, using cane and HHA   Plan   Plan for next session cont at 1x/wk, good strength gains, more stable gait  Patient was only seen one time past his last progress note, plan had been to continue with strengthening.  Pt has not been seen in over 30 days and will discharge to his home program at this time   Total Session Time   Timed Code Treatment Minutes 27   Total Treatment Time (sum of timed and untimed services) 27   Kris Hoenk, PT #[id number]  [facility]    "

## 2019-01-11 ENCOUNTER — OFFICE VISIT (OUTPATIENT)
Dept: DERMATOLOGY | Facility: CLINIC | Age: 84
End: 2019-01-11
Payer: MEDICARE

## 2019-01-11 DIAGNOSIS — L57.0 ACTINIC KERATOSIS: Primary | ICD-10-CM

## 2019-01-11 ASSESSMENT — PAIN SCALES - GENERAL: PAINLEVEL: NO PAIN (0)

## 2019-01-11 NOTE — LETTER
1/11/2019       RE: Gorge Corona  [id number] [facility] [address]     Dear Colleague,    Thank you for referring your patient, Gorge Corona, to the [facility] DERMATOLOGY at [facility]. Please see a copy of my visit note below.    [facility] Dermatology Note      Dermatology Problem List:    Specialty Problems        Dermatology Diagnoses    Actinic keratosis        Benign neoplasm of skin of trunk, except scrotum        Other malignant neoplasm of skin, site unspecified        Other seborrheic keratosis        BCC (basal cell carcinoma of skin)        SCC (squamous cell carcinoma)              CC:   Skin Check (Gorge is here for skin check.Scalp check)    Encounter Date: Jan 11, 2019    History of Present Illness:  Mr. Gorge Corona is a 88 year old male who presents as a referral from Self.    Past Medical History:   Patient Active Problem List   Diagnosis     Lumbar spinal stenosis     Lumbar spondylolysis     Major depressive disorder, recurrent episode, moderate (H)     Actinic keratosis     Other malignant neoplasm of skin, site unspecified     Benign neoplasm of skin of trunk, except scrotum     Other seborrheic keratosis     History of SCC (squamous cell carcinoma) of skin     H/O colonoscopy     Normal pressure hydrocephalus     ASCVD (arteriosclerotic cardiovascular disease)     Coronary artery disease, occlusive     H/O CT scan of head     Mitral valve regurgitation     CAD (coronary artery disease)     Atrial fibrillation (H)     Dyslipidemia     Preventative health care     CAD S/P percutaneous coronary angioplasty     BCC (basal cell carcinoma of skin)     Hypothyroidism     CHF (congestive heart failure) (H)     SCC (squamous cell carcinoma)     SVT (supraventricular tachycardia) (H)     Status post coronary angiogram     Near syncope     Bradycardia     Past Medical History:   Diagnosis Date     Atrial fibrillation (H)       Atrial flutter (H)      Basal cell carcinoma of skin of trunk      BPH (benign prostatic hypertrophy) with urinary obstruction      CAD (coronary artery disease)     s/p CABG 1/2013; RUSSEL in 2/2013     Depression      H/O CT scan of head 1/11/2013 7/20/2006    Result Impression     CT of the Head without contrast 7/20/2006  History: Ataxia, incontinence, and NPH  Comparison Study: MR obtained to 4/19/2004  Technique: Axial CT images of the head were obtained at 2 intervals from the skull base to the vertex without intravenous contrast. The images were reviewed in brain, subdural and bone windows.  Findings: Patient has had a moderate promin     Hyperlipidemia      Lumbar spinal stenosis      Mitral regurgitation     s/p bioprosthesis 1/2013     Palpitations      Paroxysmal supraventricular tachycardia (H)      Squamous cell carcinoma      Tachycardia     baroreflex injury dut to surgery      (ventriculoperitoneal) shunt status     for Normal pressure hydrocephalus       Allergy History:     Allergies   Allergen Reactions     Penicillins Rash       Social History:     reports that he quit smoking about 29 years ago. His smoking use included cigars and pipe. He has quit using smokeless tobacco. He reports that he does not drink alcohol or use drugs.      Family History:  Family History   Problem Relation Age of Onset     Cancer Father      Kidney Disease Mother      Macular Degeneration No family hx of      Glaucoma No family hx of        Medications:  Current Outpatient Medications   Medication Sig Dispense Refill     albuterol (PROAIR HFA, PROVENTIL HFA, VENTOLIN HFA) 108 (90 BASE) MCG/ACT inhaler Inhale 2 puffs into the lungs every 4 hours as needed for shortness of breath / dyspnea or wheezing 1 Inhaler 1     ammonium lactate (LAC-HYDRIN) 12 % lotion Apply topically 2 times daily as needed for dry skin 360 g 1     atorvastatin (LIPITOR) 10 MG tablet Take 1 tablet (10 mg) by mouth daily * LAB/LDL DUE FOR  FURTHER REFILLS 90 tablet 0     Calcium 600 MG tablet Take 1 tablet by mouth 2 times daily. 100 tablet 3     Cholecalciferol (VITAMIN D) 1000 UNITS capsule Take 1 capsule by mouth daily.       citalopram (CELEXA) 20 MG tablet TAKE 1 TABLET BY MOUTH ONCE DAILY 90 tablet 0     clindamycin (CLEOCIN) 300 MG capsule Take 2 capsules by mouth as needed for 1 dose. 1 hour prior to dental work. 6 capsule 0     coenzyme Q-10 75 MG CAPS Take  by mouth.       fluticasone-salmeterol (ADVAIR DISKUS) 250-50 MCG/DOSE diskus inhaler Inhale 1 puff into the lungs every 12 hours 120 Inhaler 1     furosemide (LASIX) 40 MG tablet Take 1 tablet (40 mg) by mouth daily 90 tablet 1     LANsoprazole (PREVACID) 15 MG CR capsule Take 2 capsules (30 mg) by mouth daily 30 capsule 1     levothyroxine (SYNTHROID/LEVOTHROID) 50 MCG tablet Take 1.5 tablets (75 mcg) by mouth daily 135 tablet 3     MAGNESIUM OXIDE PO Take 400 mg by mouth       melatonin 3 MG tablet Take 1 tablet (3 mg) by mouth nightly as needed for sleep 100 tablet 3     metoprolol succinate (TOPROL-XL) 25 MG 24 hr tablet Take 0.5 tablets (12.5 mg) by mouth daily 45 tablet 1     Multiple Vitamins-Minerals (OCUVITE PO) Take  by mouth.  0     order for DME Equipment being ordered: Safe step tub / walk-in tub 1 Device 0     order for DME Walker 1 each 0     senna-docusate (SENOKOT-S;PERICOLACE) 8.6-50 MG per tablet Take 1 tablet by mouth 2 times daily. 60 tablet      spironolactone (ALDACTONE) 25 MG tablet Take 1 tablet (25 mg) by mouth daily 90 tablet 3     tamsulosin (FLOMAX) 0.4 MG capsule Take 1 capsule (0.4 mg) by mouth daily 90 capsule 3     traMADol (ULTRAM) 50 MG tablet Take 1 tablet (50 mg) by mouth daily as needed for severe pain 10 tablet 0     traMADol (ULTRAM) 50 MG tablet Take 0.5 tablets (25 mg) by mouth 2 times daily as needed for severe pain 15 tablet 0     triamcinolone (KENALOG) 0.1 % cream Apply sparingly to affected area three times daily as needed 80 g 1      albuterol (2.5 MG/3ML) 0.083% nebulizer solution Take 1 vial (2.5 mg) by nebulization every 4 hours as needed for shortness of breath / dyspnea 1 Box 0     meclizine (ANTIVERT) 25 MG tablet Take 1 tablet (25 mg) by mouth 3 times daily as needed for dizziness (Patient not taking: Reported on 9/5/2018) 30 tablet 3           Review of Systems:  -As per HPI  -Constitutional: The patient denies fatigue, fevers, chills, unintended weight loss, and night sweats.  -HEENT: Patient denies nonhealing oral sores.  -Skin: As above in HPI. No additional skin concerns.    Physical exam:  Vitals: There were no vitals taken for this visit.  GEN: This is a well developed, well-nourished male in no acute distress, in a pleasant mood.    SKIN: Total skin excluding the undergarment areas was performed. The exam included the head/face, neck, both arms, chest, back, abdomen, both legs, digits and/or nails.   On the left upper arm a small hyperkeratotic lesion = AK  Similar lesions on the back of right hand and 2more small lesions on the forearm right  Otherwise skin on face, scalp and trunk without major lesion. No signs for active cancer   Scars in face without signs for recurrences    -No other lesions of concern on areas examined.     Impression/Plan:    >> actinic keratoses on arms, hands  Cryotherapy procedure note: After verbal consent and discussion of risks and benefits including but no limited to dyspigmentation/scar, blister, and pain, 5 AKs on arms and hands were treated with 1-2mm freeze border for 2 cycles with liquid nitrogen. Post cryotherapy instructions were provided.       Follow-up in 1 year      Again, thank you for allowing me to participate in the care of your patient.      Sincerely,    Barry Zimmerman MD

## 2019-01-11 NOTE — PROGRESS NOTES
[facility] Dermatology Note      Dermatology Problem List:    Specialty Problems        Dermatology Diagnoses    Actinic keratosis        Benign neoplasm of skin of trunk, except scrotum        Other malignant neoplasm of skin, site unspecified        Other seborrheic keratosis        BCC (basal cell carcinoma of skin)        SCC (squamous cell carcinoma)              CC:   Skin Check (Gorge is here for skin check.Scalp check)        Encounter Date: Jan 11, 2019    History of Present Illness:  Mr. Gorge Corona is a 88 year old male who presents as a referral from Self.    Past Medical History:   Patient Active Problem List   Diagnosis     Lumbar spinal stenosis     Lumbar spondylolysis     Major depressive disorder, recurrent episode, moderate (H)     Actinic keratosis     Other malignant neoplasm of skin, site unspecified     Benign neoplasm of skin of trunk, except scrotum     Other seborrheic keratosis     History of SCC (squamous cell carcinoma) of skin     H/O colonoscopy     Normal pressure hydrocephalus     ASCVD (arteriosclerotic cardiovascular disease)     Coronary artery disease, occlusive     H/O CT scan of head     Mitral valve regurgitation     CAD (coronary artery disease)     Atrial fibrillation (H)     Dyslipidemia     Preventative health care     CAD S/P percutaneous coronary angioplasty     BCC (basal cell carcinoma of skin)     Hypothyroidism     CHF (congestive heart failure) (H)     SCC (squamous cell carcinoma)     SVT (supraventricular tachycardia) (H)     Status post coronary angiogram     Near syncope     Bradycardia     Past Medical History:   Diagnosis Date     Atrial fibrillation (H)      Atrial flutter (H)      Basal cell carcinoma of skin of trunk      BPH (benign prostatic hypertrophy) with urinary obstruction      CAD (coronary artery disease)     s/p CABG 1/2013; RUSSEL in 2/2013     Depression      H/O CT scan of head 1/11/2013 7/20/2006    Result Impression      CT of the Head without contrast 7/20/2006  History: Ataxia, incontinence, and NPH  Comparison Study: MR obtained to 4/19/2004  Technique: Axial CT images of the head were obtained at 2 intervals from the skull base to the vertex without intravenous contrast. The images were reviewed in brain, subdural and bone windows.  Findings: Patient has had a moderate promin     Hyperlipidemia      Lumbar spinal stenosis      Mitral regurgitation     s/p bioprosthesis 1/2013     Palpitations      Paroxysmal supraventricular tachycardia (H)      Squamous cell carcinoma      Tachycardia     baroreflex injury dut to surgery      (ventriculoperitoneal) shunt status     for Normal pressure hydrocephalus       Allergy History:     Allergies   Allergen Reactions     Penicillins Rash       Social History:     reports that he quit smoking about 29 years ago. His smoking use included cigars and pipe. He has quit using smokeless tobacco. He reports that he does not drink alcohol or use drugs.      Family History:  Family History   Problem Relation Age of Onset     Cancer Father      Kidney Disease Mother      Macular Degeneration No family hx of      Glaucoma No family hx of        Medications:  Current Outpatient Medications   Medication Sig Dispense Refill     albuterol (PROAIR HFA, PROVENTIL HFA, VENTOLIN HFA) 108 (90 BASE) MCG/ACT inhaler Inhale 2 puffs into the lungs every 4 hours as needed for shortness of breath / dyspnea or wheezing 1 Inhaler 1     ammonium lactate (LAC-HYDRIN) 12 % lotion Apply topically 2 times daily as needed for dry skin 360 g 1     atorvastatin (LIPITOR) 10 MG tablet Take 1 tablet (10 mg) by mouth daily * LAB/LDL DUE FOR FURTHER REFILLS 90 tablet 0     Calcium 600 MG tablet Take 1 tablet by mouth 2 times daily. 100 tablet 3     Cholecalciferol (VITAMIN D) 1000 UNITS capsule Take 1 capsule by mouth daily.       citalopram (CELEXA) 20 MG tablet TAKE 1 TABLET BY MOUTH ONCE DAILY 90 tablet 0     clindamycin  (CLEOCIN) 300 MG capsule Take 2 capsules by mouth as needed for 1 dose. 1 hour prior to dental work. 6 capsule 0     coenzyme Q-10 75 MG CAPS Take  by mouth.       fluticasone-salmeterol (ADVAIR DISKUS) 250-50 MCG/DOSE diskus inhaler Inhale 1 puff into the lungs every 12 hours 120 Inhaler 1     furosemide (LASIX) 40 MG tablet Take 1 tablet (40 mg) by mouth daily 90 tablet 1     LANsoprazole (PREVACID) 15 MG CR capsule Take 2 capsules (30 mg) by mouth daily 30 capsule 1     levothyroxine (SYNTHROID/LEVOTHROID) 50 MCG tablet Take 1.5 tablets (75 mcg) by mouth daily 135 tablet 3     MAGNESIUM OXIDE PO Take 400 mg by mouth       melatonin 3 MG tablet Take 1 tablet (3 mg) by mouth nightly as needed for sleep 100 tablet 3     metoprolol succinate (TOPROL-XL) 25 MG 24 hr tablet Take 0.5 tablets (12.5 mg) by mouth daily 45 tablet 1     Multiple Vitamins-Minerals (OCUVITE PO) Take  by mouth.  0     order for DME Equipment being ordered: Safe step tub / walk-in tub 1 Device 0     order for DME Walker 1 each 0     senna-docusate (SENOKOT-S;PERICOLACE) 8.6-50 MG per tablet Take 1 tablet by mouth 2 times daily. 60 tablet      spironolactone (ALDACTONE) 25 MG tablet Take 1 tablet (25 mg) by mouth daily 90 tablet 3     tamsulosin (FLOMAX) 0.4 MG capsule Take 1 capsule (0.4 mg) by mouth daily 90 capsule 3     traMADol (ULTRAM) 50 MG tablet Take 1 tablet (50 mg) by mouth daily as needed for severe pain 10 tablet 0     traMADol (ULTRAM) 50 MG tablet Take 0.5 tablets (25 mg) by mouth 2 times daily as needed for severe pain 15 tablet 0     triamcinolone (KENALOG) 0.1 % cream Apply sparingly to affected area three times daily as needed 80 g 1     albuterol (2.5 MG/3ML) 0.083% nebulizer solution Take 1 vial (2.5 mg) by nebulization every 4 hours as needed for shortness of breath / dyspnea 1 Box 0     meclizine (ANTIVERT) 25 MG tablet Take 1 tablet (25 mg) by mouth 3 times daily as needed for dizziness (Patient not taking: Reported on  9/5/2018) 30 tablet 3           Review of Systems:  -As per HPI  -Constitutional: The patient denies fatigue, fevers, chills, unintended weight loss, and night sweats.  -HEENT: Patient denies nonhealing oral sores.  -Skin: As above in HPI. No additional skin concerns.    Physical exam:  Vitals: There were no vitals taken for this visit.  GEN: This is a well developed, well-nourished male in no acute distress, in a pleasant mood.    SKIN: Total skin excluding the undergarment areas was performed. The exam included the head/face, neck, both arms, chest, back, abdomen, both legs, digits and/or nails.   On the left upper arm a small hyperkeratotic lesion = AK  Similar lesions on the back of right hand and 2more small lesions on the forearm right  Otherwise skin on face, scalp and trunk without major lesion. No signs for active cancer   Scars in face without signs for recurrences    -No other lesions of concern on areas examined.     Impression/Plan:    >> actinic keratoses on arms, hands  Cryotherapy procedure note: After verbal consent and discussion of risks and benefits including but no limited to dyspigmentation/scar, blister, and pain, 5 AKs on arms and hands were treated with 1-2mm freeze border for 2 cycles with liquid nitrogen. Post cryotherapy instructions were provided.         Follow-up in 1 year

## 2019-01-11 NOTE — NURSING NOTE
Dermatology Rooming Note    Gorge Corona's goals for this visit include:   Chief Complaint   Patient presents with     Skin Check     Gorge is here for skin check.Scalp check     Lana Nails, CMA

## 2019-03-05 ENCOUNTER — TELEPHONE (OUTPATIENT)
Dept: INTERNAL MEDICINE | Facility: CLINIC | Age: 84
End: 2019-03-05

## 2019-03-05 DIAGNOSIS — I25.10 CORONARY ARTERY DISEASE INVOLVING NATIVE CORONARY ARTERY OF NATIVE HEART WITHOUT ANGINA PECTORIS: ICD-10-CM

## 2019-03-05 RX ORDER — METOPROLOL SUCCINATE 25 MG/1
12.5 TABLET, EXTENDED RELEASE ORAL DAILY
Qty: 45 TABLET | Refills: 3 | Status: SHIPPED | OUTPATIENT
Start: 2019-03-05 | End: 2020-05-04

## 2019-03-05 NOTE — TELEPHONE ENCOUNTER
metoprolol succinate (TOPROL-XL) 25 MG 24 hr tablet  *U6D    Last Written Prescription Date:  8/18/18  Last Fill Quantity: 45,   # refills: 1  Last Office Visit : 10/5/18  Future Office visit:  3/26/19    Routing refill request to provider for review/approval because:  Ordered inpatient.

## 2019-03-14 NOTE — MR AVS SNAPSHOT
After Visit Summary   9/18/2017    Gorge Corona    MRN: [id number]           Patient Information     Date Of Birth          11/27/1930        Visit Information        Provider Department      9/18/2017 10:30 AM Nurse, Jose Pcc [facility]        Today's Diagnoses     Need for prophylactic vaccination and inoculation against influenza    -  1       Follow-ups after your visit        Your next 10 appointments already scheduled     Oct 09, 2017  4:00 PM CDT   Lab with  LAB   [facility] Lab ([facility])    [address] [place] [address]  1st [facility] [id number]   [phone]            Oct 09, 2017  4:30 PM CDT   PFT VISIT with  PFL D   [facility] Pulmonary Function Testing ([facility])    [address] [place] [address]  3rd [facility] [id number] [phone]            Oct 09, 2017  5:00 PM CDT   (Arrive by 4:45 PM)   RETURN ARRHYTHMIA with Santi Ocasio MD   [facility] Heart Care ([facility])    [address] [place] [address]  3rd [facility] [id number] [phone]            Dec 27, 2017  9:00 AM CST   (Arrive by 8:45 AM)   PHYSICAL with Clovis Adames MD   [facility] ([facility])    [address] [place] [address]  4th [facility] [id number] [phone]              Who to contact     Please call your clinic at [phone] to:    Ask questions about your health    Make or cancel appointments    Discuss your medicines    Learn about your test results    Speak to your doctor   If you have compliments or concerns about an experience at your clinic, or if you wish to file a complaint, please contact [facility] Patient Relations at [phone] or email us at [email or web address]         Additional Information About Your Visit        MyChart Information     [employer] is an electronic gateway that provides easy,   Endoscopy Discharge Summary      Admit Date: 3/14/2019    Discharge Date and Time:  3/14/2019 4:05 PM    Attending Physician: Martin Nichole MD     Discharge Physician: Martin Nichole MD     Principal Admitting Diagnoses: Colon cancer screening         Discharge Diagnosis: The primary encounter diagnosis was Colon cancer screening. Diagnoses of Special screening for malignant neoplasms, colon and External hemorrhoid were also pertinent to this visit.     Discharged Condition: Good    Indication for Admission: Colon cancer screening     Hospital Course: Patient was admitted for an inpatient procedure and tolerated the procedure well with no complications.    Significant Diagnostic Studies: Colonoscopy    Pathology (if any):  Specimen (12h ago, onward)    None          Estimated Blood Loss: 0 ml.    Discussed with: patient and family.    Disposition: Home.    Follow Up/Patient Instructions:   Current Discharge Medication List      CONTINUE these medications which have NOT CHANGED    Details   ascorbic acid (VITAMIN C) 1000 MG tablet Take 1,000 mg by mouth once daily.      glucosamine-chondroitin 500-400 mg tablet Take 1 tablet by mouth 2 (two) times daily.       medroxyPROGESTERone (PROVERA) 10 MG tablet Take 1 tablet (10 mg total) by mouth once daily. Daily for 2wks every 3months  Qty: 30 tablet, Refills: 1      metoprolol succinate (TOPROL-XL) 25 MG 24 hr tablet Take 1 tablet (25 mg total) by mouth once daily. Takes one- half tablet by mouth daily  Qty: 90 tablet, Refills: 3      rosuvastatin (CRESTOR) 10 MG tablet Take 10 mg by mouth once daily.      testosterone (AXIRON) 30 mg/actuation (1.5 mL) SlPm Place 30 mg onto the skin once a week.  Qty: 90 mL, Refills: 1      tretinoin microspheres (RETIN-A MICRO) 0.1 % gel       BOOSTRIX TDAP 2.5-8-5 Lf-mcg-Lf/0.5mL Syrg injection TO BE ADMINISTERED BY PHARMACIST FOR IMMUNIZATION  Refills: 0      estradiol (ESTRACE) 0.01 % (0.1 mg/gram) vaginal cream Place 0.5 g  online access to your medical records. With [employer], you can request a clinic appointment, read your test results, renew a prescription or communicate with your care team.     To sign up for [employer] visit the website at [email or web address]   You will be asked to enter the access code listed below, as well as some personal information. Please follow the directions to create your username and password.     Your access code is: [email or web address]  Expires: 2017  3:48 PM     Your access code will  in 90 days. If you need help or a new code, please contact your [facility] or call [phone] for assistance.        Care EveryWhere ID     This is your Care EveryWhere ID. This could be used by other organizations to access your [facility] medical records  [id number]         Blood Pressure from Last 3 Encounters:   17 120/63   17 114/70   17 113/56    Weight from Last 3 Encounters:   17 93.7 kg (206 lb 8 oz)   17 93.1 kg (205 lb 4.8 oz)   16 91.9 kg (202 lb 11.2 oz)              We Performed the Following     FLU VACCINE, INCREASED ANTIGEN, PRESV FREE, AGE 65+ [09523]        Primary Care Provider Office Phone # Fax #    Monisha Jenna Olea [phone]        [facility] [address] [place] [address]        Equal Access to Services     NILDA DIAZ : Hadii aad ku hadasho Soomaali, waaxda luqadaha, qaybta kaalmada adeegyada, tejas griffin . So [facility] [phone].    ATENCIÓN: Si habla momo, tiene a ferrari disposición servicios gratuitos de asistencia lingüística. Llame al [phone].    We comply with applicable federal civil rights laws and [place] laws. We do not discriminate on the basis of race, color, national origin, age, disability sex, sexual orientation or gender identity.            Thank you!     Thank you for choosing [facility]  for your care. Our goal is  vaginally once daily.  Qty: 42.5 g, Refills: 1             Discharge Procedure Orders   Diet general     Call MD for:  temperature >100.4     Call MD for:  persistent nausea and vomiting     Call MD for:  severe uncontrolled pain     Call MD for:  difficulty breathing, headache or visual disturbances     Call MD for:  redness, tenderness, or signs of infection (pain, swelling, redness, odor or green/yellow discharge around incision site)     Call MD for:  hives     Call MD for:  persistent dizziness or light-headedness     No dressing needed       Follow-up Information     Go to Shila Wayne MD.    Specialties:  Gynecology, Obstetrics and Gynecology, Obstetrics  Why:  As needed  Contact information:  [address]  [place] [address] [phone]                          always to provide you with excellent care. Hearing back from our patients is one way we can continue to improve our services. Please take a few minutes to complete the written survey that you may receive in the mail after your visit with us. Thank you!             Your Updated Medication List - Protect others around you: Learn how to safely use, store and throw away your medicines at www.disposemymeds.org.          This list is accurate as of: 9/18/17  2:06 PM.  Always use your most recent med list.                   Brand Name Dispense Instructions for use Diagnosis    albuterol 108 (90 BASE) MCG/ACT Inhaler    PROAIR HFA/PROVENTIL HFA/VENTOLIN HFA    1 Inhaler    Inhale 2 puffs into the lungs every 4 hours as needed for shortness of breath / dyspnea or wheezing    Cough       amiodarone 200 MG tablet    PACERONE/CODARONE    90 tablet    Take 1 tablet (200 mg) by mouth daily    Atrial fibrillation, unspecified type (H)       Ammonium Lactate 10 % Crea     120 mL    Apply thin layer to skin on the body at least once daily.    Xerosis cutis, Nummular eczema       aspirin 81 MG tablet      Take 81 mg by mouth daily        atorvastatin 10 MG tablet    LIPITOR    90 tablet    Take 1 tablet (10 mg) by mouth daily    Major depressive disorder, recurrent episode, moderate (H), Essential hypertension, benign, Shortness of breath, Dyslipidemia       calcium 600 MG tablet     100 tablet    Take 1 tablet by mouth 2 times daily.        citalopram 20 MG tablet    celeXA    90 tablet    Take 1 tablet (20 mg) by mouth daily    Major depressive disorder, recurrent episode, moderate (H)       clindamycin 300 MG capsule    CLEOCIN    6 capsule    Take 2 capsules by mouth as needed for 1 dose. 1 hour prior to dental work.    Prophylactic antibiotic       coenzyme Q-10 75 MG Caps      Take  by mouth.        fluticasone-salmeterol 250-50 MCG/DOSE diskus inhaler    ADVAIR DISKUS    120 Inhaler    Inhale 1 puff into the lungs every 12  hours    Acute bronchitis, Cough       furosemide 40 MG tablet    LASIX    90 tablet    Take 1 tablet (40 mg) by mouth daily    Chronic diastolic heart failure (H), CAD S/P percutaneous coronary angioplasty       HYDROcodone-acetaminophen 5-325 MG per tablet    NORCO    30 tablet    Take 1 tablet by mouth every 6 hours as needed for moderate to severe pain    Hip pain, left       levothyroxine 50 MCG tablet    SYNTHROID/LEVOTHROID    90 tablet    Take 1 tablet (50 mcg) by mouth daily    Other specified hypothyroidism       meclizine 25 MG tablet    ANTIVERT    30 tablet    Take 1 tablet (25 mg) by mouth 3 times daily as needed for dizziness    BPPV (benign paroxysmal positional vertigo), unspecified laterality       metoprolol 25 MG tablet    LOPRESSOR    30 tablet    Take 1 tablet (25 mg) by mouth as needed    Chronic diastolic heart failure (H), Atrial fibrillation (H)       morphine 15 MG 12 hr tablet    MS CONTIN    30 tablet    Take 1 tablet (15 mg) by mouth every 12 hours    Hip pain, left       OCUVITE PO      Take  by mouth.        order for DME     1 each    Walker    Spinal stenosis, lumbar region, without neurogenic claudication       senna-docusate 8.6-50 MG per tablet    SENOKOT-S;PERICOLACE    60 tablet    Take 1 tablet by mouth 2 times daily.    S/P CABG x 3       spironolactone 25 MG tablet    ALDACTONE    90 tablet    Take 1 tablet (25 mg) by mouth daily    Hypokalemia       tamsulosin 0.4 MG capsule    FLOMAX    90 capsule    Take 1 capsule (0.4 mg) by mouth daily    Urinary retention       triamcinolone 0.1 % cream    KENALOG    80 g    Apply sparingly to affected area three times daily as needed    Nummular eczema       vitamin D 1000 UNITS capsule      Take 1 capsule by mouth daily.

## 2019-03-25 ENCOUNTER — TELEPHONE (OUTPATIENT)
Dept: INTERNAL MEDICINE | Facility: CLINIC | Age: 84
End: 2019-03-25

## 2019-03-25 NOTE — TELEPHONE ENCOUNTER
[facility] Call Center    Phone Message    May a detailed message be left on voicemail: no     Reason for Call: Other: pt wife called to give a heads up that pt (husband0 is going to request to be able to drive. 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 She does not want pt to know she made this call.      Action Taken: Message routed to:  [facility] ([facility]): ANA

## 2019-03-26 ENCOUNTER — OFFICE VISIT (OUTPATIENT)
Dept: INTERNAL MEDICINE | Facility: CLINIC | Age: 84
End: 2019-03-26
Payer: MEDICARE

## 2019-03-26 VITALS
DIASTOLIC BLOOD PRESSURE: 80 MMHG | OXYGEN SATURATION: 96 % | SYSTOLIC BLOOD PRESSURE: 123 MMHG | HEART RATE: 63 BPM | WEIGHT: 200.7 LBS | BODY MASS INDEX: 28.8 KG/M2
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
## 2019-03-26 DIAGNOSIS — R06.02 SHORTNESS OF BREATH: Primary | ICD-10-CM

## 2019-03-26 DIAGNOSIS — J44.1 COPD EXACERBATION (H): ICD-10-CM

## 2019-03-26 DIAGNOSIS — R06.02 SHORTNESS OF BREATH: ICD-10-CM

## 2019-03-26 LAB
ALBUMIN SERPL-MCNC: 3.5 G/DL (ref 3.4–5)
ALP SERPL-CCNC: 95 U/L (ref 40–150)
ALT SERPL W P-5'-P-CCNC: 16 U/L (ref 0–70)
ANION GAP SERPL CALCULATED.3IONS-SCNC: 6 MMOL/L (ref 3–14)
AST SERPL W P-5'-P-CCNC: 14 U/L (ref 0–45)
BASOPHILS # BLD AUTO: 0 10E9/L (ref 0–0.2)
BASOPHILS NFR BLD AUTO: 0.5 %
BILIRUB SERPL-MCNC: 0.5 MG/DL (ref 0.2–1.3)
BUN SERPL-MCNC: 22 MG/DL (ref 7–30)
CALCIUM SERPL-MCNC: 8.9 MG/DL (ref 8.5–10.1)
CHLORIDE SERPL-SCNC: 105 MMOL/L (ref 94–109)
CO2 SERPL-SCNC: 27 MMOL/L (ref 20–32)
CREAT SERPL-MCNC: 1.03 MG/DL (ref 0.66–1.25)
DIFFERENTIAL METHOD BLD: ABNORMAL
EOSINOPHIL # BLD AUTO: 0.2 10E9/L (ref 0–0.7)
EOSINOPHIL NFR BLD AUTO: 2.7 %
ERYTHROCYTE [DISTWIDTH] IN BLOOD BY AUTOMATED COUNT: 13.8 % (ref 10–15)
GFR SERPL CREATININE-BSD FRML MDRD: 64 ML/MIN/{1.73_M2}
GLUCOSE SERPL-MCNC: 91 MG/DL (ref 70–99)
HCT VFR BLD AUTO: 38.6 % (ref 40–53)
HGB BLD-MCNC: 12.9 G/DL (ref 13.3–17.7)
IMM GRANULOCYTES # BLD: 0 10E9/L (ref 0–0.4)
IMM GRANULOCYTES NFR BLD: 0.7 %
LYMPHOCYTES # BLD AUTO: 1.6 10E9/L (ref 0.8–5.3)
LYMPHOCYTES NFR BLD AUTO: 27.4 %
MCH RBC QN AUTO: 31.4 PG (ref 26.5–33)
MCHC RBC AUTO-ENTMCNC: 33.4 G/DL (ref 31.5–36.5)
MCV RBC AUTO: 94 FL (ref 78–100)
MONOCYTES # BLD AUTO: 0.7 10E9/L (ref 0–1.3)
MONOCYTES NFR BLD AUTO: 12.4 %
NEUTROPHILS # BLD AUTO: 3.4 10E9/L (ref 1.6–8.3)
NEUTROPHILS NFR BLD AUTO: 56.3 %
NRBC # BLD AUTO: 0 10*3/UL
NRBC BLD AUTO-RTO: 0 /100
NT-PROBNP SERPL-MCNC: 268 PG/ML (ref 0–450)
PLATELET # BLD AUTO: 114 10E9/L (ref 150–450)
POTASSIUM SERPL-SCNC: 4.2 MMOL/L (ref 3.4–5.3)
PROT SERPL-MCNC: 6.8 G/DL (ref 6.8–8.8)
RBC # BLD AUTO: 4.11 10E12/L (ref 4.4–5.9)
SODIUM SERPL-SCNC: 138 MMOL/L (ref 133–144)
WBC # BLD AUTO: 6 10E9/L (ref 4–11)

## 2019-03-26 RX ORDER — AZITHROMYCIN 250 MG/1
TABLET, FILM COATED ORAL
Qty: 6 TABLET | Refills: 1 | Status: SHIPPED | OUTPATIENT
Start: 2019-03-26 | End: 2020-06-24

## 2019-03-26 ASSESSMENT — ENCOUNTER SYMPTOMS
LOSS OF CONSCIOUSNESS: 0
HEADACHES: 0
DIZZINESS: 1
MUSCLE CRAMPS: 0
TINGLING: 0
MEMORY LOSS: 0
ARTHRALGIAS: 1
PARALYSIS: 0
NUMBNESS: 0
SEIZURES: 0
NECK PAIN: 0
SPEECH CHANGE: 0
BACK PAIN: 1
STIFFNESS: 1
JOINT SWELLING: 0
MUSCLE WEAKNESS: 0
MYALGIAS: 0
WEAKNESS: 1
TREMORS: 1
DISTURBANCES IN COORDINATION: 1

## 2019-03-26 ASSESSMENT — ACTIVITIES OF DAILY LIVING (ADL)
IN_THE_PAST_7_DAYS,_DID_YOU_NEED_HELP_FROM_OTHERS_TO_PERFORM_EVERYDAY_ACTIVITIES_SUCH_AS_EATING,_GETTING_DRESSED,_GROOMING,_BATHING,_WALKING,_OR_USING_THE_TOILET: N
IN_THE_PAST_7_DAYS,_DID_YOU_NEED_HELP_FROM_OTHERS_TO_TAKE_CARE_OF_THINGS_SUCH_AS_LAUNDRY_AND_HOUSEKEEPING,_BANKING,_SHOPPING,_USING_THE_TELEPHONE,_FOOD_PREPARATION,_TRANSPORTATION,_OR_TAKING_YOUR_OWN_MEDICATIONS?: Y

## 2019-03-26 ASSESSMENT — PAIN SCALES - GENERAL: PAINLEVEL: SEVERE PAIN (6)

## 2019-03-26 NOTE — NURSING NOTE
Chief Complaint   Patient presents with     COPD     Routine general follow up.     Refill Request     Requesting standing fill of z-silvia.        Valeria Villar LPN at 11:03 AM on March 26, 2019

## 2019-03-26 NOTE — PATIENT INSTRUCTIONS
[facility] [phone] ([facility], 4th Floor N)     OCCUPATIONAL THERAPY [phone]     Please go to the lab on the first floor before you leave today.

## 2019-04-04 ENCOUNTER — OFFICE VISIT (OUTPATIENT)
Dept: CARDIOLOGY | Facility: CLINIC | Age: 84
End: 2019-04-04
Attending: INTERNAL MEDICINE
Payer: MEDICARE

## 2019-04-04 VITALS
DIASTOLIC BLOOD PRESSURE: 65 MMHG | OXYGEN SATURATION: 95 % | HEIGHT: 70 IN | HEART RATE: 67 BPM | BODY MASS INDEX: 28.92 KG/M2 | WEIGHT: 202 LBS | SYSTOLIC BLOOD PRESSURE: 116 MMHG

## 2019-04-04 DIAGNOSIS — I35.0 NONRHEUMATIC AORTIC VALVE STENOSIS: ICD-10-CM

## 2019-04-04 DIAGNOSIS — I25.10 CORONARY ARTERY DISEASE INVOLVING NATIVE CORONARY ARTERY OF NATIVE HEART WITHOUT ANGINA PECTORIS: Primary | ICD-10-CM

## 2019-04-04 DIAGNOSIS — I34.0 NON-RHEUMATIC MITRAL REGURGITATION: ICD-10-CM

## 2019-04-04 PROCEDURE — G0463 HOSPITAL OUTPT CLINIC VISIT: HCPCS | Mod: ZF

## 2019-04-04 PROCEDURE — 99215 OFFICE O/P EST HI 40 MIN: CPT | Mod: GC | Performed by: INTERNAL MEDICINE

## 2019-04-04 RX ORDER — ASPIRIN 81 MG/1
81 TABLET ORAL DAILY
Qty: 90 TABLET | Refills: 3 | Status: SHIPPED | OUTPATIENT
Start: 2019-04-04 | End: 2020-10-30

## 2019-04-04 ASSESSMENT — PAIN SCALES - GENERAL: PAINLEVEL: NO PAIN (0)

## 2019-04-04 ASSESSMENT — ENCOUNTER SYMPTOMS
COUGH: 0
SNORES LOUDLY: 0
WHEEZING: 0
POSTURAL DYSPNEA: 0
SPUTUM PRODUCTION: 0
HEMOPTYSIS: 0
DYSPNEA ON EXERTION: 0
COUGH DISTURBING SLEEP: 0
SHORTNESS OF BREATH: 0

## 2019-04-04 ASSESSMENT — MIFFLIN-ST. JEOR: SCORE: 1592.52

## 2019-04-04 NOTE — NURSING NOTE
Chief Complaint   Patient presents with     Follow Up     RAY images in PACs from outside system      Vitals were taken and medications were reconciled.    Jeanne Burris CMA    5:42 PM

## 2019-04-04 NOTE — PATIENT INSTRUCTIONS
You were seen today in the Cardiovascular Clinic at the [facility].      Cardiology Providers you saw during your visit:  Dr. Jj    Recommendations: 1) Have and ECHO. We will call you with the appointment. 2) Please start Aspirin 81 mg daily by mouth.    Follow-up: Follow based upon results of ECHO.    Thank you for your visit today!     Jennifer Hugo RN  Structural Heart Care Coordinator   [facility], [employer] and [facility] Programs  [facility]  Office: [phone]    [facility]  [address] [place] [address]  Cardiology [facility] [address]  [place] [address]

## 2019-04-04 NOTE — LETTER
4/4/2019      RE: Gorge Corona  [address] [facility] [id number]       Dear Colleague,    Thank you for the opportunity to participate in the care of your patient, Gorge Corona, at the [facility] HEART [facility]. Please see a copy of my visit note below.    Cardiology Clinic    CC: f/u valve disease    HPI: Gorge Corona is an 88 year old male with past medical history significant for CAD s/p CABG X4 (LIMA-LAD, SVG-PDA, SVG-D1, SVG-OM) 2013  and PCI LM into OM due to occluded SVG, s/p porcine MVR 2013, aortic stenosis, SVT, HLD, BPH, normal pressure hydrocephalus s/p  shunt, and depression who presents today for follow up.     Mr. Corona was last seen in valve clinic 6/7/2018. At that time he had moderate aortic stenosis and moderate mitral stenosis. Plan at that time was for a RAY which never happened. Today he reports doing very well overall. He is not very active but states that at his age he is very happy and is not looking to do more. He lives in a house with his wife and his able to walk around his home without limitations. He no longer does yard work or climb stairs. He was feeling more short of breath over the last few weeks and was very wheezy per his report and the report of his son. He was started on a fluticasone inhaler for possible COPD exacerbation and reports feeling much better with almost complete resolution of his wheezing. He denies chest pain, lower extremity edema, orthopnea, and PND.      PAST MEDICAL HISTORY:  Past Medical History:   Diagnosis Date     Atrial fibrillation (H)      Atrial flutter (H)      Basal cell carcinoma of skin of trunk      BPH (benign prostatic hypertrophy) with urinary obstruction      CAD (coronary artery disease)     s/p CABG 1/2013; RUSSEL in 2/2013     Depression      H/O CT scan of head 1/11/2013 7/20/2006    Result Impression     CT of the Head without contrast 7/20/2006  History: Ataxia,  incontinence, and NPH  Comparison Study: MR obtained to 2004  Technique: Axial CT images of the head were obtained at 2 intervals from the skull base to the vertex without intravenous contrast. The images were reviewed in brain, subdural and bone windows.  Findings: Patient has had a moderate promin     Hyperlipidemia      Lumbar spinal stenosis      Mitral regurgitation     s/p bioprosthesis 2013     Palpitations      Paroxysmal supraventricular tachycardia (H)      Squamous cell carcinoma      Tachycardia     baroreflex injury dut to surgery      (ventriculoperitoneal) shunt status     for Normal pressure hydrocephalus       FAMILY HISTORY:  Family History   Problem Relation Age of Onset     Cancer Father      Kidney Disease Mother      Macular Degeneration No family hx of      Glaucoma No family hx of        SOCIAL HISTORY:  Social History     Socioeconomic History     Marital status:      Spouse name: Not on file     Number of children: Not on file     Years of education: Not on file     Highest education level: Not on file   Occupational History     Not on file   Social Needs     Financial resource strain: Not on file     Food insecurity:     Worry: Not on file     Inability: Not on file     Transportation needs:     Medical: Not on file     Non-medical: Not on file   Tobacco Use     Smoking status: Former Smoker     Types: Cigars, Pipe     Last attempt to quit: 1990     Years since quittin.2     Smokeless tobacco: Former User   Substance and Sexual Activity     Alcohol use: No     Alcohol/week: 0.0 oz     Drug use: No     Sexual activity: Not on file   Lifestyle     Physical activity:     Days per week: Not on file     Minutes per session: Not on file     Stress: Not on file   Relationships     Social connections:     Talks on phone: Not on file     Gets together: Not on file     Attends [employer] service: Not on file     Active member of club or organization: Not on file     Attends  meetings of clubs or organizations: Not on file     Relationship status: Not on file     Intimate partner violence:     Fear of current or ex partner: Not on file     Emotionally abused: Not on file     Physically abused: Not on file     Forced sexual activity: Not on file   Other Topics Concern     Parent/sibling w/ CABG, MI or angioplasty before 65F 55M? Not Asked   Social History Narrative     Not on file       CURRENT MEDICATIONS:  Current Outpatient Medications   Medication Sig Dispense Refill     ammonium lactate (LAC-HYDRIN) 12 % lotion Apply topically 2 times daily as needed for dry skin 360 g 1     atorvastatin (LIPITOR) 10 MG tablet Take 1 tablet (10 mg) by mouth daily * LAB/LDL DUE FOR FURTHER REFILLS 90 tablet 0     azithromycin (ZITHROMAX) 250 MG tablet Two tablets first day, then one tablet daily for four days. 6 tablet 1     Calcium 600 MG tablet Take 1 tablet by mouth 2 times daily. 100 tablet 3     Cholecalciferol (VITAMIN D) 1000 UNITS capsule Take 1 capsule by mouth daily.       citalopram (CELEXA) 20 MG tablet TAKE 1 TABLET BY MOUTH ONCE DAILY 90 tablet 0     clindamycin (CLEOCIN) 300 MG capsule Take 2 capsules by mouth as needed for 1 dose. 1 hour prior to dental work. 6 capsule 0     coenzyme Q-10 75 MG CAPS Take  by mouth.       fluticasone-salmeterol (ADVAIR DISKUS) 250-50 MCG/DOSE diskus inhaler Inhale 1 puff into the lungs every 12 hours 120 Inhaler 1     fluticasone-vilanterol (BREO ELLIPTA) 100-25 MCG/INH inhaler Inhale 1 puff into the lungs daily 1 Inhaler 11     furosemide (LASIX) 40 MG tablet Take 1 tablet (40 mg) by mouth daily 90 tablet 1     LANsoprazole (PREVACID) 15 MG CR capsule Take 2 capsules (30 mg) by mouth daily 30 capsule 1     levothyroxine (SYNTHROID/LEVOTHROID) 50 MCG tablet Take 1.5 tablets (75 mcg) by mouth daily 135 tablet 3     melatonin 3 MG tablet Take 1 tablet (3 mg) by mouth nightly as needed for sleep 100 tablet 3     metoprolol succinate ER (TOPROL-XL) 25 MG 24  hr tablet Take 0.5 tablets (12.5 mg) by mouth daily 45 tablet 3     Multiple Vitamins-Minerals (OCUVITE PO) Take  by mouth.  0     order for DME Equipment being ordered: Safe step tub / walk-in tub 1 Device 0     order for DME Walker 1 each 0     senna-docusate (SENOKOT-S;PERICOLACE) 8.6-50 MG per tablet Take 1 tablet by mouth 2 times daily. 60 tablet      spironolactone (ALDACTONE) 25 MG tablet Take 1 tablet (25 mg) by mouth daily 90 tablet 3     tamsulosin (FLOMAX) 0.4 MG capsule Take 1 capsule (0.4 mg) by mouth daily 90 capsule 3     traMADol (ULTRAM) 50 MG tablet Take 1 tablet (50 mg) by mouth daily as needed for severe pain 10 tablet 0     traMADol (ULTRAM) 50 MG tablet Take 0.5 tablets (25 mg) by mouth 2 times daily as needed for severe pain 15 tablet 0     triamcinolone (KENALOG) 0.1 % cream Apply sparingly to affected area three times daily as needed 80 g 1     albuterol (2.5 MG/3ML) 0.083% nebulizer solution Take 1 vial (2.5 mg) by nebulization every 4 hours as needed for shortness of breath / dyspnea 1 Box 0     albuterol (PROAIR HFA, PROVENTIL HFA, VENTOLIN HFA) 108 (90 BASE) MCG/ACT inhaler Inhale 2 puffs into the lungs every 4 hours as needed for shortness of breath / dyspnea or wheezing (Patient not taking: Reported on 4/4/2019) 1 Inhaler 1     MAGNESIUM OXIDE PO Take 400 mg by mouth       meclizine (ANTIVERT) 25 MG tablet Take 1 tablet (25 mg) by mouth 3 times daily as needed for dizziness (Patient not taking: Reported on 3/26/2019) 30 tablet 3       ROS:   Constitutional: No fever, chills, or sweats. No weight gain/loss.   ENT: No visual disturbance, ear ache, epistaxis, sore throat.   Respiratory: No dyspnea, cough, hemoptysis.   Cardiovascular: As per HPI.   GI: No nausea, vomiting, hematemesis, melena, or hematochezia.   : No urinary frequency, dysuria, or hematuria.   Integument: No rash, pururitis.   Psychiatric: No depression, anxiety, insomnia.   Neuro: No weakness, tingling, numbness,  "dysphasia.   Endocrinology: No heat/cold intolerance, polyuria, polydipsia.   Musculoskeletal: No muscle aches, arthritis, joint swelling.    EXAM:  /65 (BP Location: Right arm, Patient Position: Chair, Cuff Size: Adult Regular)   Pulse 67   Ht 1.778 m (5' 10\")   Wt 91.6 kg (202 lb)   SpO2 95%   BMI 28.98 kg/m     General: appears comfortable, alert and articulate  HEENT: NC/AT.  PERRLA.  EOMI.  Sclerae white, not injected.  Nares clear.  Pharynx without erythema or exudate.  Dentition intact.    Neck: No adenopathy.  No thyromegaly. Carotids +4/4 bilaterally without bruits.  No jugular venous distension.   Heart: RRR. Normal S1, S2. 3/6 crescendo-decrescendo systolic murmur at LSB.  Lungs: CTA.  No ronchi, wheezes, rales.  No dullness to percussion.   Abdomen: Soft, nontender, nondistended. No organomegaly.  No bruits.   Extremities: No clubbing, cyanosis, or edema.  The pulses are +4/4 at the radial, brachial, femoral, popliteal, DP, and PT sites bilaterally.  No bruits are noted.  Neurologic: Alert and oriented to person/place/time, normal speech, gait and affect  Skin: No petechiae, purpura or rash.    Labs:  CBC RESULTS:  Lab Results   Component Value Date    WBC 6.0 03/26/2019    RBC 4.11 (L) 03/26/2019    HGB 12.9 (L) 03/26/2019    HCT 38.6 (L) 03/26/2019    MCV 94 03/26/2019    MCH 31.4 03/26/2019    MCHC 33.4 03/26/2019    RDW 13.8 03/26/2019     (L) 03/26/2019       CMP RESULTS:  Lab Results   Component Value Date     03/26/2019    POTASSIUM 4.2 03/26/2019    CHLORIDE 105 03/26/2019    CO2 27 03/26/2019    ANIONGAP 6 03/26/2019    GLC 91 03/26/2019    BUN 22 03/26/2019    CR 1.03 03/26/2019    GFRESTIMATED 64 03/26/2019    GFRESTBLACK 75 03/26/2019    HE 8.9 03/26/2019    BILITOTAL 0.5 03/26/2019    ALBUMIN 3.5 03/26/2019    ALKPHOS 95 03/26/2019    ALT 16 03/26/2019    AST 14 03/26/2019        INR RESULTS:  Lab Results   Component Value Date    INR 1.12 08/17/2018       No " components found for: CK  Lab Results   Component Value Date    MAG 2.3 08/18/2018     Lab Results   Component Value Date    NTBNP 268 03/26/2019       Cardiac Studies:  Echo 6/7/2018  Interpretation Summary  Technically difficult study.  Global and regional left ventricular function is normal with an EF of 55-60%.  The right ventricle is normal size.RV is not well seen but based on TAPSE (19  mm) probably normal.  A bioprosthetic mitral valve is present. The mean gradient across the mitral  valve is 6-10 mmHg at HR of 75 bpm.  Mild aortic stenosis is present. Valve appears significantly calcified.  No pericardial effusion is present.     This study was compared with the study from 4/6/2017 .  Mitral valve mean gradients appear higher in this study however the HR is also  higher in this study which is the likely the cause. Otherwise no change.    Assessment and Plan: Gorge Corona is an 88 year old male with past medical history significant for CAD s/p CABG X4 (LIMA-LAD, SVG-PDA, SVG-D1, SVG-OM) 2013  and PCI LM into OM due to occluded SVG, s/p porcine MVR 2013, aortic stenosis, SVT, HLD, BPH, normal pressure hydrocephalus s/p  shunt, and depression who presents today for follow up.     1. Mild aortic stenosis  2. Mild bioprosthetic mitral stenosis  Exam consistent with mild to, at worst, moderate aortic stenosis. Mitral valve gradients were elevated on his last echo but in the setting of elevated heart rate. Suspect his recent dyspnea was unrelated to his mild valvular disease but we will repeat an echo to ensure he has not had substantial progression. No evidence of heart failure by history or on exam.    3. Coronary artery disease: s/p CABG and PCI. Doing well without angina. On statin and beta blocker but not on aspirin despite no prior adverse reactions. Will resume aspirin 81 mg daily. Blood pressure at goal.      RTC pending results of echocardioram    Patient seen and discussed with Dr. Morris Jj,  attending physician, who agrees with the assessment and plan above.   Lars Almaraz MD  Interventional Cardiology Fellow  [phone]    Patient seen and examined with Cardiovascular fellow and agree with the assessment and plan described above.     Morris Jj M.D.  Interventional Cardiology  [facility]       Patient Care Team:  Monisha Medina MD as PCP - General (Internal Medicine)  Anastacia, Yara KAUR RN as Nurse Coordinator  Barry Zimmerman MD as MD (Dermatology)  Abhilash Aleman MD as MD (Orthopedics)  Keerthi Ignacio OT as Occupational Therapist (Occupational Therapy)    Answers for HPI/ROS submitted by the patient on 4/4/2019   General Symptoms: No  Skin Symptoms: No  HENT Symptoms: No  EYE SYMPTOMS: No  HEART SYMPTOMS: No  LUNG SYMPTOMS: Yes  INTESTINAL SYMPTOMS: No  URINARY SYMPTOMS: No  REPRODUCTIVE SYMPTOMS: No  SKELETAL SYMPTOMS: No  BLOOD SYMPTOMS: No  NERVOUS SYSTEM SYMPTOMS: No  MENTAL HEALTH SYMPTOMS: No  Cough: No  Sputum or phlegm: No  Coughing up blood: No  Difficulty breating or shortness of breath: No  Snoring: No  Wheezing: No  Difficulty breathing on exertion: No  Nighttime Cough: No  Difficulty breathing when lying flat: No

## 2019-04-04 NOTE — PROGRESS NOTES
Cardiology Clinic    CC: f/u valve disease    HPI: Gorge Corona is an 88 year old male with past medical history significant for CAD s/p CABG X4 (LIMA-LAD, SVG-PDA, SVG-D1, SVG-OM) 2013  and PCI LM into OM due to occluded SVG, s/p porcine MVR 2013, aortic stenosis, SVT, HLD, BPH, normal pressure hydrocephalus s/p  shunt, and depression who presents today for follow up.     Mr. Corona was last seen in valve clinic 6/7/2018. At that time he had moderate aortic stenosis and moderate mitral stenosis. Plan at that time was for a RAY which never happened. Today he reports doing very well overall. He is not very active but states that at his age he is very happy and is not looking to do more. He lives in a house with his wife and his able to walk around his home without limitations. He no longer does yard work or climb stairs. He was feeling more short of breath over the last few weeks and was very wheezy per his report and the report of his son. He was started on a fluticasone inhaler for possible COPD exacerbation and reports feeling much better with almost complete resolution of his wheezing. He denies chest pain, lower extremity edema, orthopnea, and PND.      PAST MEDICAL HISTORY:  Past Medical History:   Diagnosis Date     Atrial fibrillation (H)      Atrial flutter (H)      Basal cell carcinoma of skin of trunk      BPH (benign prostatic hypertrophy) with urinary obstruction      CAD (coronary artery disease)     s/p CABG 1/2013; RUSSEL in 2/2013     Depression      H/O CT scan of head 1/11/2013 7/20/2006    Result Impression     CT of the Head without contrast 7/20/2006  History: Ataxia, incontinence, and NPH  Comparison Study: MR obtained to 4/19/2004  Technique: Axial CT images of the head were obtained at 2 intervals from the skull base to the vertex without intravenous contrast. The images were reviewed in brain, subdural and bone windows.  Findings: Patient has had a moderate promin     Hyperlipidemia       Lumbar spinal stenosis      Mitral regurgitation     s/p bioprosthesis 2013     Palpitations      Paroxysmal supraventricular tachycardia (H)      Squamous cell carcinoma      Tachycardia     baroreflex injury dut to surgery      (ventriculoperitoneal) shunt status     for Normal pressure hydrocephalus       FAMILY HISTORY:  Family History   Problem Relation Age of Onset     Cancer Father      Kidney Disease Mother      Macular Degeneration No family hx of      Glaucoma No family hx of        SOCIAL HISTORY:  Social History     Socioeconomic History     Marital status:      Spouse name: Not on file     Number of children: Not on file     Years of education: Not on file     Highest education level: Not on file   Occupational History     Not on file   Social Needs     Financial resource strain: Not on file     Food insecurity:     Worry: Not on file     Inability: Not on file     Transportation needs:     Medical: Not on file     Non-medical: Not on file   Tobacco Use     Smoking status: Former Smoker     Types: Cigars, Pipe     Last attempt to quit: 1990     Years since quittin.2     Smokeless tobacco: Former User   Substance and Sexual Activity     Alcohol use: No     Alcohol/week: 0.0 oz     Drug use: No     Sexual activity: Not on file   Lifestyle     Physical activity:     Days per week: Not on file     Minutes per session: Not on file     Stress: Not on file   Relationships     Social connections:     Talks on phone: Not on file     Gets together: Not on file     Attends [employer] service: Not on file     Active member of club or organization: Not on file     Attends meetings of clubs or organizations: Not on file     Relationship status: Not on file     Intimate partner violence:     Fear of current or ex partner: Not on file     Emotionally abused: Not on file     Physically abused: Not on file     Forced sexual activity: Not on file   Other Topics Concern     Parent/sibling w/ CABG, MI or  angioplasty before 65F 55M? Not Asked   Social History Narrative     Not on file       CURRENT MEDICATIONS:  Current Outpatient Medications   Medication Sig Dispense Refill     ammonium lactate (LAC-HYDRIN) 12 % lotion Apply topically 2 times daily as needed for dry skin 360 g 1     atorvastatin (LIPITOR) 10 MG tablet Take 1 tablet (10 mg) by mouth daily * LAB/LDL DUE FOR FURTHER REFILLS 90 tablet 0     azithromycin (ZITHROMAX) 250 MG tablet Two tablets first day, then one tablet daily for four days. 6 tablet 1     Calcium 600 MG tablet Take 1 tablet by mouth 2 times daily. 100 tablet 3     Cholecalciferol (VITAMIN D) 1000 UNITS capsule Take 1 capsule by mouth daily.       citalopram (CELEXA) 20 MG tablet TAKE 1 TABLET BY MOUTH ONCE DAILY 90 tablet 0     clindamycin (CLEOCIN) 300 MG capsule Take 2 capsules by mouth as needed for 1 dose. 1 hour prior to dental work. 6 capsule 0     coenzyme Q-10 75 MG CAPS Take  by mouth.       fluticasone-salmeterol (ADVAIR DISKUS) 250-50 MCG/DOSE diskus inhaler Inhale 1 puff into the lungs every 12 hours 120 Inhaler 1     fluticasone-vilanterol (BREO ELLIPTA) 100-25 MCG/INH inhaler Inhale 1 puff into the lungs daily 1 Inhaler 11     furosemide (LASIX) 40 MG tablet Take 1 tablet (40 mg) by mouth daily 90 tablet 1     LANsoprazole (PREVACID) 15 MG CR capsule Take 2 capsules (30 mg) by mouth daily 30 capsule 1     levothyroxine (SYNTHROID/LEVOTHROID) 50 MCG tablet Take 1.5 tablets (75 mcg) by mouth daily 135 tablet 3     melatonin 3 MG tablet Take 1 tablet (3 mg) by mouth nightly as needed for sleep 100 tablet 3     metoprolol succinate ER (TOPROL-XL) 25 MG 24 hr tablet Take 0.5 tablets (12.5 mg) by mouth daily 45 tablet 3     Multiple Vitamins-Minerals (OCUVITE PO) Take  by mouth.  0     order for DME Equipment being ordered: Safe step tub / walk-in tub 1 Device 0     order for DME Walker 1 each 0     senna-docusate (SENOKOT-S;PERICOLACE) 8.6-50 MG per tablet Take 1 tablet by mouth 2  "times daily. 60 tablet      spironolactone (ALDACTONE) 25 MG tablet Take 1 tablet (25 mg) by mouth daily 90 tablet 3     tamsulosin (FLOMAX) 0.4 MG capsule Take 1 capsule (0.4 mg) by mouth daily 90 capsule 3     traMADol (ULTRAM) 50 MG tablet Take 1 tablet (50 mg) by mouth daily as needed for severe pain 10 tablet 0     traMADol (ULTRAM) 50 MG tablet Take 0.5 tablets (25 mg) by mouth 2 times daily as needed for severe pain 15 tablet 0     triamcinolone (KENALOG) 0.1 % cream Apply sparingly to affected area three times daily as needed 80 g 1     albuterol (2.5 MG/3ML) 0.083% nebulizer solution Take 1 vial (2.5 mg) by nebulization every 4 hours as needed for shortness of breath / dyspnea 1 Box 0     albuterol (PROAIR HFA, PROVENTIL HFA, VENTOLIN HFA) 108 (90 BASE) MCG/ACT inhaler Inhale 2 puffs into the lungs every 4 hours as needed for shortness of breath / dyspnea or wheezing (Patient not taking: Reported on 4/4/2019) 1 Inhaler 1     MAGNESIUM OXIDE PO Take 400 mg by mouth       meclizine (ANTIVERT) 25 MG tablet Take 1 tablet (25 mg) by mouth 3 times daily as needed for dizziness (Patient not taking: Reported on 3/26/2019) 30 tablet 3       ROS:   Constitutional: No fever, chills, or sweats. No weight gain/loss.   ENT: No visual disturbance, ear ache, epistaxis, sore throat.   Respiratory: No dyspnea, cough, hemoptysis.   Cardiovascular: As per HPI.   GI: No nausea, vomiting, hematemesis, melena, or hematochezia.   : No urinary frequency, dysuria, or hematuria.   Integument: No rash, pururitis.   Psychiatric: No depression, anxiety, insomnia.   Neuro: No weakness, tingling, numbness, dysphasia.   Endocrinology: No heat/cold intolerance, polyuria, polydipsia.   Musculoskeletal: No muscle aches, arthritis, joint swelling.    EXAM:  /65 (BP Location: Right arm, Patient Position: Chair, Cuff Size: Adult Regular)   Pulse 67   Ht 1.778 m (5' 10\")   Wt 91.6 kg (202 lb)   SpO2 95%   BMI 28.98 kg/m    General: " appears comfortable, alert and articulate  HEENT: NC/AT.  PERRLA.  EOMI.  Sclerae white, not injected.  Nares clear.  Pharynx without erythema or exudate.  Dentition intact.    Neck: No adenopathy.  No thyromegaly. Carotids +4/4 bilaterally without bruits.  No jugular venous distension.   Heart: RRR. Normal S1, S2. 3/6 crescendo-decrescendo systolic murmur at LSB.  Lungs: CTA.  No ronchi, wheezes, rales.  No dullness to percussion.   Abdomen: Soft, nontender, nondistended. No organomegaly.  No bruits.   Extremities: No clubbing, cyanosis, or edema.  The pulses are +4/4 at the radial, brachial, femoral, popliteal, DP, and PT sites bilaterally.  No bruits are noted.  Neurologic: Alert and oriented to person/place/time, normal speech, gait and affect  Skin: No petechiae, purpura or rash.    Labs:  CBC RESULTS:  Lab Results   Component Value Date    WBC 6.0 03/26/2019    RBC 4.11 (L) 03/26/2019    HGB 12.9 (L) 03/26/2019    HCT 38.6 (L) 03/26/2019    MCV 94 03/26/2019    MCH 31.4 03/26/2019    MCHC 33.4 03/26/2019    RDW 13.8 03/26/2019     (L) 03/26/2019       CMP RESULTS:  Lab Results   Component Value Date     03/26/2019    POTASSIUM 4.2 03/26/2019    CHLORIDE 105 03/26/2019    CO2 27 03/26/2019    ANIONGAP 6 03/26/2019    GLC 91 03/26/2019    BUN 22 03/26/2019    CR 1.03 03/26/2019    GFRESTIMATED 64 03/26/2019    GFRESTBLACK 75 03/26/2019    HE 8.9 03/26/2019    BILITOTAL 0.5 03/26/2019    ALBUMIN 3.5 03/26/2019    ALKPHOS 95 03/26/2019    ALT 16 03/26/2019    AST 14 03/26/2019        INR RESULTS:  Lab Results   Component Value Date    INR 1.12 08/17/2018       No components found for: CK  Lab Results   Component Value Date    MAG 2.3 08/18/2018     Lab Results   Component Value Date    NTBNP 268 03/26/2019       Cardiac Studies:  Echo 6/7/2018  Interpretation Summary  Technically difficult study.  Global and regional left ventricular function is normal with an EF of 55-60%.  The right ventricle is  normal size.RV is not well seen but based on TAPSE (19  mm) probably normal.  A bioprosthetic mitral valve is present. The mean gradient across the mitral  valve is 6-10 mmHg at HR of 75 bpm.  Mild aortic stenosis is present. Valve appears significantly calcified.  No pericardial effusion is present.     This study was compared with the study from 4/6/2017 .  Mitral valve mean gradients appear higher in this study however the HR is also  higher in this study which is the likely the cause. Otherwise no change.    Assessment and Plan: Gorge Corona is an 88 year old male with past medical history significant for CAD s/p CABG X4 (LIMA-LAD, SVG-PDA, SVG-D1, SVG-OM) 2013  and PCI LM into OM due to occluded SVG, s/p porcine MVR 2013, aortic stenosis, SVT, HLD, BPH, normal pressure hydrocephalus s/p  shunt, and depression who presents today for follow up.     1. Mild aortic stenosis  2. Mild bioprosthetic mitral stenosis  Exam consistent with mild to, at worst, moderate aortic stenosis. Mitral valve gradients were elevated on his last echo but in the setting of elevated heart rate. Suspect his recent dyspnea was unrelated to his mild valvular disease but we will repeat an echo to ensure he has not had substantial progression. No evidence of heart failure by history or on exam.    3. Coronary artery disease: s/p CABG and PCI. Doing well without angina. On statin and beta blocker but not on aspirin despite no prior adverse reactions. Will resume aspirin 81 mg daily. Blood pressure at goal.      RTC pending results of echocardioram    Patient seen and discussed with Dr. Morris Jj, attending physician, who agrees with the assessment and plan above.   Lars Almaraz MD  Interventional Cardiology Fellow  [phone]    Patient Care Team:  Monisha Medina MD as PCP - General (Internal Medicine)  Yara Galaviz, RN as Nurse Coordinator  Barry Zimmerman MD as MD (Dermatology)  Abhilash Aleman MD  as MD (Orthopedics)  Keerthi Ignacio OT as Occupational Therapist (Occupational Therapy)    Answers for HPI/ROS submitted by the patient on 4/4/2019   General Symptoms: No  Skin Symptoms: No  HENT Symptoms: No  EYE SYMPTOMS: No  HEART SYMPTOMS: No  LUNG SYMPTOMS: Yes  INTESTINAL SYMPTOMS: No  URINARY SYMPTOMS: No  REPRODUCTIVE SYMPTOMS: No  SKELETAL SYMPTOMS: No  BLOOD SYMPTOMS: No  NERVOUS SYSTEM SYMPTOMS: No  MENTAL HEALTH SYMPTOMS: No  Cough: No  Sputum or phlegm: No  Coughing up blood: No  Difficulty breating or shortness of breath: No  Snoring: No  Wheezing: No  Difficulty breathing on exertion: No  Nighttime Cough: No  Difficulty breathing when lying flat: No      Patient seen and examined with Cardiovascular fellow and agree with the assessment and plan described above.     Morris Jj M.D.  Interventional Cardiology  [facility]
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
## 2019-04-05 NOTE — NURSING NOTE
Cardiac Testing: Patient given instructions regarding  echocardiogram . Discussed purpose, preparation, procedure and when to expect results reported back to the patient. Patient demonstrated understanding of this information and agreed to call with further questions or concerns.  New Medication: Patient was educated regarding newly prescribed medication, including discussion of  the indication, administration, side effects, and when to report to MD or RN. Patient demonstrated understanding of this information and agreed to call with further questions or concerns.  Return Appointment: Patient given instructions regarding scheduling next clinic visit. Patient demonstrated understanding of this information and agreed to call with further questions or concerns.  Patient stated he understood all health information given and agreed to call with further questions or concerns.    Cardiology Providers you saw during your visit:  Dr. Jj    Recommendations: 1) Have and ECHO. We will call you with the appointment. 2) Please start Aspirin 81 mg daily by mouth.    Follow-up: Follow based upon results of ECHO.

## 2019-04-10 ENCOUNTER — HOSPITAL ENCOUNTER (OUTPATIENT)
Dept: CARDIOLOGY | Facility: CLINIC | Age: 84
Discharge: HOME | End: 2019-04-10
Attending: INTERNAL MEDICINE | Admitting: INTERNAL MEDICINE
Payer: MEDICARE

## 2019-04-10 DIAGNOSIS — I34.0 MITRAL REGURGITATION: ICD-10-CM

## 2019-04-10 DIAGNOSIS — I35.0 AORTIC STENOSIS: ICD-10-CM

## 2019-04-10 PROCEDURE — 93306 TTE W/DOPPLER COMPLETE: CPT

## 2019-04-10 PROCEDURE — 93306 TTE W/DOPPLER COMPLETE: CPT | Mod: 26 | Performed by: INTERNAL MEDICINE

## 2019-04-25 ENCOUNTER — PRE VISIT (OUTPATIENT)
Dept: UROLOGY | Facility: CLINIC | Age: 84
End: 2019-04-25

## 2019-04-25 NOTE — TELEPHONE ENCOUNTER
MEDICAL RECORDS REQUEST   [facility]  Urology Clinic  [address] [place] [address]  PHONE: [phone]  Fax: [phone]        FUTURE VISIT INFORMATION                                                   Gorge Corona : 1930 scheduled for future visit at [facility]    APPOINTMENT INFORMATION:    Date: 19 11AM    Provider:  Dali Cheek RN    Reason for Visit/Diagnosis: Prostate pain    REFERRAL INFORMATION:    Referring provider:  Self    Specialty: N/A    Referring providers clinic:  N/A    Clinic contact number:  N/A    RECORDS REQUESTED FOR VISIT                                                     NOTES  STATUS/DETAILS   OFFICE NOTE from referring provider  yes   OFFICE NOTE from other specialist  no   DISCHARGE SUMMARY from hospital  yes   DISCHARGE REPORT from the ER  yes   OPERATIVE REPORT  no   MEDICATION LIST  yes       PRE-VISIT CHECKLIST      Record collection complete Yes- Recs from 2013 in Epic   Appointment appropriately scheduled           (right time/right provider) Yes   MyChart activation No   Questionnaire complete If no, please explain: In process      Completed by: Faith Ferrera

## 2019-04-26 ENCOUNTER — PRE VISIT (OUTPATIENT)
Dept: UROLOGY | Facility: CLINIC | Age: 84
End: 2019-04-26

## 2019-04-26 NOTE — TELEPHONE ENCOUNTER
Reason for Visit: Consult    Diagnosis: pelvic pain    Orders/Procedures/Records: n/a    Contact Patient: n/a    Rooming Requirements: normal      Namita Munoz LPN  04/26/19  4:19 PM

## 2019-05-20 DIAGNOSIS — E78.5 DYSLIPIDEMIA: ICD-10-CM

## 2019-05-20 DIAGNOSIS — R06.02 SHORTNESS OF BREATH: ICD-10-CM

## 2019-05-20 DIAGNOSIS — F33.1 MAJOR DEPRESSIVE DISORDER, RECURRENT EPISODE, MODERATE (H): ICD-10-CM

## 2019-05-20 DIAGNOSIS — I10 ESSENTIAL HYPERTENSION, BENIGN: ICD-10-CM

## 2019-05-21 NOTE — TELEPHONE ENCOUNTER
Has already had 90 day shukri, if these tests are not needed, please refill to future appointment.  Plus, does not appear to have hospitalized here, but refills for these meds should not have lasted this long.     atorvastatin (LIPITOR) 10 MG tablet        Last Written Prescription Date:  12/11/18  Last Fill Quantity: 90,   # refills: 0  Last Office Visit : 3/26/19  Future Office visit:  10/1/19    Routing refill request to provider for review/approval because:  Failed protocol for LDL lab, last done (12/27/17) .   Recent Labs   Lab Test 12/27/17  1012   LDL 74     Has already had 90 day shukri.  (I do not see pending orders.)        citalopram (CELEXA) 20 MG tablet    Last Written Prescription Date:  11/12/18  Last Fill Quantity: 90,   # refills: 0  Last Office Visit : 3/26/19  Future Office visit:  10/1/*19    Routing refill request to provider for review/approval because:  Failed protocol for PHQ-9.  Last done 6/3/16.  Has already had 90 day shukri and note to clinic RN.    PHQ-9 and GAD7 Scores  6/3/2016    PHQ-9 Total Score 11    6/3/2016   Review Flowsheet

## 2019-05-22 RX ORDER — ATORVASTATIN CALCIUM 10 MG/1
10 TABLET, FILM COATED ORAL DAILY
Qty: 90 TABLET | Refills: 1 | Status: SHIPPED | OUTPATIENT
Start: 2019-05-22 | End: 2019-12-30

## 2019-05-22 RX ORDER — CITALOPRAM HYDROBROMIDE 20 MG/1
TABLET ORAL
Qty: 90 TABLET | Refills: 1 | Status: SHIPPED | OUTPATIENT
Start: 2019-05-22 | End: 2020-01-07

## 2019-06-18 ENCOUNTER — TELEPHONE (OUTPATIENT)
Dept: INTERNAL MEDICINE | Facility: CLINIC | Age: 84
End: 2019-06-18
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
## 2019-06-19 ENCOUNTER — OFFICE VISIT (OUTPATIENT)
Dept: INTERNAL MEDICINE | Facility: CLINIC | Age: 84
End: 2019-06-19
Payer: MEDICARE

## 2019-06-19 VITALS
HEART RATE: 61 BPM | WEIGHT: 203 LBS | DIASTOLIC BLOOD PRESSURE: 80 MMHG | BODY MASS INDEX: 29.13 KG/M2 | SYSTOLIC BLOOD PRESSURE: 127 MMHG | OXYGEN SATURATION: 95 %

## 2019-06-19 DIAGNOSIS — F52.8 HYPERSEXUALITY: Primary | ICD-10-CM

## 2019-06-19 DIAGNOSIS — M24.541 CONTRACTURE OF HAND JOINT, RIGHT: ICD-10-CM

## 2019-06-19 DIAGNOSIS — Z63.0 MARITAL RELATIONSHIP PROBLEM: ICD-10-CM

## 2019-06-19 DIAGNOSIS — M79.672 LEFT FOOT PAIN: ICD-10-CM

## 2019-06-19 SDOH — SOCIAL STABILITY - SOCIAL INSECURITY: PROBLEMS IN RELATIONSHIP WITH SPOUSE OR PARTNER: Z63.0

## 2019-06-19 ASSESSMENT — PAIN SCALES - GENERAL: PAINLEVEL: MILD PAIN (2)

## 2019-06-19 NOTE — PATIENT INSTRUCTIONS
[facility] Medication Refill Request Information:  * Please contact your pharmacy regarding ANY request for medication refills.  ** [facility] Prescription Fax = [phone]  * Please allow 3 business days for routine medication refills.  * Please allow 5 business days for controlled substance medication refills.     [facility] Test Result notification information:  *You will be notified with in 7-10 days of your appointment day regarding the results of your test.  If you are on MyChart you will be notified as soon as the provider has reviewed the results and signed off on them.    [facility]: [phone]

## 2019-06-19 NOTE — NURSING NOTE
Chief Complaint   Patient presents with     Pain     Pt is here to discuss right foot pain.      Aggressive Behavior     Excessive sexual thoughts     Agnes Verdugo LPN at 1:59 PM on 6/19/2019.

## 2019-06-19 NOTE — PROGRESS NOTES
"ASSESSMENT/PLAN:  Gorge was seen today for pain and aggressive behavior.    Diagnoses and all orders for this visit:    Hypersexuality  Marital relationship problem  He is oriented today in clinic and his decision making seems to be appropriate, so I have very low concern for a dementia process.  He reports that his wife is quite worried about his \"grabbing\" behavior.  I reassured him that old age does not mean he cannot have sexual feelings, he just needs to control them, voice them, and find appropriate boundaries with his wife.  He is requesting mental health referral and I think this is appropriate.  It would be beneficial if he could discuss his concerns with a therapist privately and work up to couples counseling.  -     PSYCHOLOGY REFERRAL    Contracture of hand joint, right  Left foot pain  The left foot pain may be from improper set/healing of an old fracture.  It does not appear that he has an acute fracture.  His pain seems minimal.  I recommended Tylenol or topical therapy such as Capsaicin or IcyHot.  He was given an orthopedic referral for the contracture of his left hand ring finger.  With the tight palpable tendons in his middle through pinkie fingers, I am not sure what could be done to improve this without cutting the tendon to provide relief.  This contracture is not painful for him and he would rather not cut the tendon if it comes to that.  -     ORTHOPEDICS ADULT REFERRAL    Both referrals make for [facility] per request and patient was provided with the appointment numbers for both of these services.    Bert Saucedo RN, AGNP student, reviewed with Dr. Bryon VILLARREAL, Peng Slater, was present with the NP student who participated in the service and in the documentation of the note.  I have verified the history and personally performed the physical exam and medical decision making.  I agree with the assessment and plan of care as documented in the note.      Peng Slater MD, " "FACP    Chief complaint:  Gorge Corona is a 88 year old male, seen alone, presenting for   Chief Complaint   Patient presents with     Pain     Pt is here to discuss right foot pain.      Aggressive Behavior     Excessive sexual thoughts   He declined having his wife present for this conversation today.    SUBJECTIVE:  Excessive sexual thoughts  He reports that he can't stop thinking about sex.  He reports that he is concerned and that his wife is very concerned about his sexual thoughts.  He states that he has been \"grabbing\" at his wife, which she takes offense to.  He doesn't feel that he should be having these feelings because \"I'm 88 and a half.\"  He and his wife were very sexually active until she had issues with her \"female parts\" about 10 years ago.  He is not sexually active in any capacity, he desires to be active, but she doesn't want him to touch her.  He would like a psych consult.    Left hand fourth finger contraction  He describes his left hand fourth finger as \"annoying\" as he has limited movement.  He demonstrates how he cannot actively flex the finger and must manually move it, which makes it difficult to drive.  This has been going on for about a year.  He did not experience any trauma to cause it.  He would like to see someone at [facility] in [place], [place] to correct this issue if possible.    Right foot pain  He broke his right foot when he fell off a dock about a decade ago.  He gets achy on the top of the foot.  His friend Dr. Christianson set it when it broke while the patient was living in [place], [place] while on the phone with another doctor in [place] who was walking the provider through the process.  He describes the pain as achy, not entirely bothersome.  He has not done anything to address the pain.    He is having more difficulty dressing his left foot/lower extremity, but he uses an adaptive device to pull his sock on.  He has been using this since he was in a TCU after breaking his left " femur.  He feels he is otherwise able to meet his ADL's.    Medications and allergies were reviewed by me today.     Review Of Systems  Cardiac: no chest pain, palpitations, peripheral edema  Musculoskeletal: POSITIVE for right dorsal foot pain, right hand ring finger contracture  Integumentary: no concerning lesions or moles   Neuro: no loss of strength or sensation, no numbness or tingling, no tremor, no dizziness, no headache   Psych: POSITIVE for feelings of excess sexual desire    Patient Active Problem List    Diagnosis Date Noted     Near syncope 08/17/2018     Priority: Medium     Bradycardia 08/17/2018     Priority: Medium     Status post coronary angiogram 02/25/2015     Priority: Medium     SVT (supraventricular tachycardia) (H) 11/12/2014     Priority: Medium     SCC (squamous cell carcinoma) 09/03/2013     Priority: Medium     CHF (congestive heart failure) (H) 04/08/2013     Priority: Medium     BCC (basal cell carcinoma of skin) 04/01/2013     Priority: Medium     Hypothyroidism 04/01/2013     Priority: Medium     Problem list name updated by automated process. Provider to review       CAD S/P percutaneous coronary angioplasty 02/22/2013     Priority: Medium     Dyslipidemia 01/30/2013     Priority: Medium     Preventative health care 01/30/2013     Priority: Medium     Atrial fibrillation (H) 01/23/2013     Priority: Medium     CAD (coronary artery disease) 01/14/2013     Priority: Medium     H/O CT scan of head 01/11/2013     Priority: Medium     7/20/2006          Result Impression         CT of the Head without contrast 7/20/2006    History: Ataxia, incontinence, and NPH    Comparison Study: MR obtained to 4/19/2004    Technique: Axial CT images of the head were obtained at 2 intervals  from the skull base to the vertex without intravenous contrast. The  images were reviewed in brain, subdural and bone windows.    Findings: Patient has had a moderate prominence of ventricles are  compared to mild  prominence of the sulci compatible with diagnosis of  NPH. This is seen on the previous study without change. No abnormal  densities in brain parenchyma. Extra-axial spaces unremarkable. The  bone settings demonstrated these all base, paranasal sinuses, and  calvarium to be unremarkable.    Impression:   1. A normal-pressure hydrocephalus unchanged previous study.  2. No hemorrhage.                 Mitral valve regurgitation 01/11/2013     Priority: Medium     ASCVD (arteriosclerotic cardiovascular disease) 01/10/2013     Priority: Medium     Coronary artery disease, occlusive 01/10/2013     Priority: Medium     H/O colonoscopy 11/28/2012     Priority: Medium     Last colonoscopy 8/6/2010, normal; next in 2015 (h/o adenomatous polyps)       Normal pressure hydrocephalus 11/28/2012     Priority: Medium     S/P  shunt       History of SCC (squamous cell carcinoma) of skin 11/27/2012     Priority: Medium     Actinic keratosis 08/01/2011     Priority: Medium     Other malignant neoplasm of skin, site unspecified 08/01/2011     Priority: Medium     Problem list name updated by automated process. Provider to review and confirm       Benign neoplasm of skin of trunk, except scrotum 08/01/2011     Priority: Medium     Other seborrheic keratosis 08/01/2011     Priority: Medium     Major depressive disorder, recurrent episode, moderate (H) 07/31/2011     Priority: Medium     Lumbar spinal stenosis 07/27/2011     Priority: Medium     Lumbar spondylolysis 07/27/2011     Priority: Medium     L5-S1         PHYSICAL EXAM:  /80   Pulse 61   Wt 92.1 kg (203 lb)   SpO2 95%   BMI 29.13 kg/m    Constitutional: no distress, comfortable, pleasant   Cardiovascular: regular rate and rhythm, mechanical S1 and S2, no murmurs, rubs or gallops, peripheral pulses full and symmetric   Musculoskeletal: no edema, limited extension on left hand middle, ring, and pinkie finger.  Ring finger is stuck in a flexion position.  Tendons tight  on palpation.  Skin: no concerning lesions, no jaundice   Neurological: cranial nerves intact, normal strength and sensation,normal gait with walker, no tremor   Psychological: appropriate mood, oriented, decision making appropriate

## 2019-06-20 NOTE — TELEPHONE ENCOUNTER
Spoke to patient to relay that the provider coded her visit based off of the need for the patient, per the provider and that if the insurance company does not agree, then they will need to contact the clinic. Melissa Rey LPN 6/20/2019 1:49 PM

## 2019-06-30 DIAGNOSIS — I25.10 CAD S/P PERCUTANEOUS CORONARY ANGIOPLASTY: ICD-10-CM

## 2019-06-30 DIAGNOSIS — I50.32 CHRONIC DIASTOLIC HEART FAILURE (H): ICD-10-CM

## 2019-06-30 DIAGNOSIS — Z98.61 CAD S/P PERCUTANEOUS CORONARY ANGIOPLASTY: ICD-10-CM

## 2019-07-03 RX ORDER — FUROSEMIDE 40 MG
40 TABLET ORAL DAILY
Qty: 90 TABLET | Refills: 1 | Status: SHIPPED | OUTPATIENT
Start: 2019-07-03 | End: 2020-01-07

## 2019-07-03 NOTE — TELEPHONE ENCOUNTER
furosemide (LASIX) 40 MG tablet      Last Written Prescription Date:  11/2/18  Last Fill Quantity: 90,   # refills: 1  Last Office Visit : 3/26/19  Future Office visit:  10/1/19    90 day to pharmacy.

## 2019-07-16 ENCOUNTER — TELEPHONE (OUTPATIENT)
Dept: INTERNAL MEDICINE | Facility: CLINIC | Age: 84
End: 2019-07-16

## 2019-07-16 NOTE — TELEPHONE ENCOUNTER
I did not realize that Monisha, Mr Cj's PCP, is out until October.  I wanted him to have follow up but not to wait that long.  Could you reach out to him and have him see me sometime in the next month or so to talk about his mood and relationship issues?   August 8th at 3PM with Dr Slater. Pt aware.  Ro Ward RN 8:36 AM on 7/16/2019.

## 2019-09-26 ENCOUNTER — OFFICE VISIT (OUTPATIENT)
Dept: INTERNAL MEDICINE | Facility: CLINIC | Age: 84
End: 2019-09-26
Payer: MEDICARE

## 2019-09-26 VITALS
SYSTOLIC BLOOD PRESSURE: 135 MMHG | OXYGEN SATURATION: 97 % | DIASTOLIC BLOOD PRESSURE: 79 MMHG | BODY MASS INDEX: 28.98 KG/M2 | WEIGHT: 202 LBS | HEART RATE: 62 BPM

## 2019-09-26 DIAGNOSIS — N40.0 BENIGN PROSTATIC HYPERPLASIA, UNSPECIFIED WHETHER LOWER URINARY TRACT SYMPTOMS PRESENT: Primary | ICD-10-CM

## 2019-09-26 DIAGNOSIS — R09.82 POST-NASAL DRIP: ICD-10-CM

## 2019-09-26 DIAGNOSIS — R19.7 DIARRHEA, UNSPECIFIED TYPE: ICD-10-CM

## 2019-09-26 RX ORDER — TAMSULOSIN HYDROCHLORIDE 0.4 MG/1
0.4 CAPSULE ORAL DAILY
Qty: 60 CAPSULE | Refills: 3 | Status: CANCELLED | OUTPATIENT
Start: 2019-09-26

## 2019-09-26 RX ORDER — IPRATROPIUM BROMIDE 42 UG/1
2 SPRAY, METERED NASAL 4 TIMES DAILY
Qty: 1 BOX | Refills: 3 | Status: SHIPPED | OUTPATIENT
Start: 2019-09-26 | End: 2020-03-27

## 2019-09-26 RX ORDER — FINASTERIDE 5 MG/1
5 TABLET, FILM COATED ORAL DAILY
Qty: 60 TABLET | Refills: 2 | Status: SHIPPED | OUTPATIENT
Start: 2019-09-26 | End: 2020-05-05

## 2019-09-26 ASSESSMENT — PAIN SCALES - GENERAL: PAINLEVEL: NO PAIN (0)

## 2019-09-26 NOTE — PROGRESS NOTES
PRIMARY CARE CENTER       SUBJECTIVE:  Gorge Corona is a [age] year old male with a past medical history of Atrial fibrillation (H), Atrial flutter (H), Basal cell carcinoma of skin of trunk, BPH (benign prostatic hypertrophy) with urinary obstruction, CAD (coronary artery disease), Depression, H/O CT scan of head (1/11/2013), Hyperlipidemia, Lumbar spinal stenosis, Mitral regurgitation, Palpitations, Paroxysmal supraventricular tachycardia (H), Squamous cell carcinoma, Tachycardia, and  (ventriculoperitoneal) shunt status. here for follow up    Severe bout of diarrhea and incontinence x2 weeks. Getting better, now having having formed stools. No abdominal pain, fever, chills. No recent antibiotics use or sick contacts.     Runny nose for a year all the time. No headaches or head trauma    Problem urinating. Has to strain. His taking his flomax regularly. No dysuria.     Medications and allergies reviewed by me today.     ROS:   Constitutional, HEENT, cardiovascular, pulmonary, gi and gu systems are negative, except as otherwise noted.      OBJECTIVE:    /79   Pulse 62   Wt 91.6 kg (202 lb)   SpO2 97%   BMI 28.98 kg/m     Wt Readings from Last 1 Encounters:   09/26/19 91.6 kg (202 lb)     General: NAD, elderly, not in zay tress, alert and conversant, answering questions properly.   HEENT: MMM, no ulcers, no exudate, no cobble stoning   Resp: CTAB  CV: irregular, regular  Extremities: no edema      ASSESSMENT/PLAN:    Gorge was seen today for diarrhea and flu shot.    Diagnoses and all orders for this visit:    Benign prostatic hyperplasia, unspecified whether lower urinary tract symptoms present  -     finasteride (PROSCAR) 5 MG tablet; Take 1 tablet (5 mg) by mouth daily    Post-nasal drip  -     ipratropium (ATROVENT) 0.06 % nasal spray; Spray 2 sprays into both nostrils 4 times daily    Other orders  -     FLU VACCINE HIGH DOSE PRESERVATIVE FREE, AGE =>65 YR         Pt should  return to clinic for f/u with Dr. Hobbs in 6 months     Aviva Jewell MD  Sep 26, 2019    Pt was seen and plan of care discussed with Dr. Mohan.     Attending Addendum:  Patient seen and examined with resident in clinic today.  Pertinent portions of history and exam were independently verified by myself.  I agree with the exam and plan as outlined above with the following modifications: none.  Keerthi Mohan MD  Internal Medicine

## 2019-09-26 NOTE — PATIENT INSTRUCTIONS
[facility] Medication Refill Request Information:  * Please contact your pharmacy regarding ANY request for medication refills.  ** [facility] Prescription Fax = [phone]  * Please allow 3 business days for routine medication refills.  * Please allow 5 business days for controlled substance medication refills.     [facility] Test Result notification information:  *You will be notified with in 7-10 days of your appointment day regarding the results of your test.  If you are on MyChart you will be notified as soon as the provider has reviewed the results and signed off on them.    [facility] Care Center: [phone]          [facility]         Internal Medicine Resident                   Continuity Clinic    Who We Are    Resident Continuity Clinic is a part of the [facility] Primary Care Clinic.  Resident physicians see patients independently and establish a relationship with them over the course of their three-year residency program.  As with the Primary Care Clinic, our Resident Continuity Clinic models a group practice.  If your doctor is not available, you will be able to see another resident physician.  At the end of a resident s training, patients will be transitioned to a new resident physician for ongoing care.     We treat patients with a wide array of medical needs from routine physicals, to acute illnesses, to diabetes and blood pressure management, to complex medical illness.  What is a Resident Physician?    Resident physicians hold medical degrees and are doctors. They are training to become specialists in Internal Medicine. They work under the supervision of board-certified faculty physicians.  Expectations for Your Care    We strive to provide accessible, quality care at all times.    In order to provide this care, it is best to see your primary care resident doctor consistently rather switching between providers.  In the event you do see another physician, you should schedule  a follow-up visit with your usual primary care doctor.    If you are transitioning your care from another clinic, it is helpful to have your records available for your doctor to review.    We do not prescribe controlled substances, such as ADD medications or narcotic pain medications, on your first visit.  We will review your health records and concerns prior to devising a treatment plan with you in order to provide the best care.      Clinic Services     Extended clinic hours; patient  to help navigate your visit;  parking; laboratory and imaging services with evening and weekend hours    Multiple medical and surgical specialties in one building    Complementary services, including Nutrition, Integrative Medicine, Pharmacy consultations, Mental and Behavioral Health, Sports Medicine and Physical Therapy    Thank You    We would like to thank you for choosing the [facility] Internal Medicine Resident Continuity Clinic for your primary care. You are making a priceless contribution to the training of the next generation of health care practitioners.     Contact us at [phone] for appointments or questions.    Resident Clinic Hours are Tuesdays and Thursdays, 7:30am-5:00pm    Residents   Teddy Kaufman MD  (Male)   Cedric Paiz MD   (Male)   Renee Barrow MD  (Female)  Namita Garcia MD   (Female)   America López MD   (Female)    Deniz Lr MD    (Female)   Rudi Mcclendon MD  (Male)   Brian Rocha MD  (Male)    Sravani Feldman MD  (Female)   Delfina Flores MD  (Female)   Clair Vargas MD    (Female)   Aristeo Duncan MD  (Male)   Jw Moran MD  (Male)   Mir Neal MD  (Male)   JENNIFER Zelaya MD   (Male)   Logan Shelley MD  (Male)    Gauri Sahni MD (Female)   Emiliano Mcclain MD  (Male)   Jessica Garcia MD  (Male)   Elba Jewell MD  (Male)   Jordyn Griggs MD    (Female)   Teodora Rios MD  (Female)     Supervising Physicians   MD Eliceo Dooley,  MD Paloma Saucedo, MD Panfilo Ramirez, MD Clovis Adames, MD Keerthi Mohan, MD Emmie Montes, MD Ja Carvalho, MD Monisha Olea MD

## 2019-09-26 NOTE — NURSING NOTE
Chief Complaint   Patient presents with     Diarrhea     Pt is here to discuss diarrhea. duratin 2 weeks.      Flu Shot     Agnes Verdugo LPN at 1:05 PM on 9/26/2019.                Gorge Corona      1.  Has the patient received the information for the influenza vaccine? YES    2.  Does the patient have any of the following contraindications?     Allergy to eggs? No     Allergic reaction to previous influenza vaccines? No     Any other problems to previous influenza vaccines? No     Paralyzed by Guillain-Madison syndrome? No     Currently pregnant? NO     Current moderate or severe illness? No     Allergy to contact lens solution? No    3.  The vaccine has been administered in the usual fashion and the patient was instructed to wait 20 minutes before leaving the building in the event of an allergic reaction: YES      Recorded by Agnes Verdugo LPN

## 2019-09-30 DIAGNOSIS — E03.8 OTHER SPECIFIED HYPOTHYROIDISM: ICD-10-CM

## 2019-09-30 DIAGNOSIS — H81.10 BPPV (BENIGN PAROXYSMAL POSITIONAL VERTIGO), UNSPECIFIED LATERALITY: ICD-10-CM

## 2019-10-02 NOTE — TELEPHONE ENCOUNTER
MECLIZINE 25MG      TAB   Last Written Prescription Date:  6/10/2017  Last Fill Quantity: 30,   # refills: 3  Last Office Visit : 9/26/2019  Future Office visit:  None    Routing refill request to provider for review/approval because:  Gap in refill.  Medication has not been ordered in over 2 years.     Please advise for refills for patient care.  Last fill 6/10/2017.    Henna Alvarez RN  Central Triage Red Flags/Med Refills

## 2019-10-03 RX ORDER — LEVOTHYROXINE SODIUM 50 UG/1
75 TABLET ORAL DAILY
Qty: 135 TABLET | Refills: 0 | Status: SHIPPED | OUTPATIENT
Start: 2019-10-03 | End: 2020-01-16

## 2019-10-03 RX ORDER — MECLIZINE HYDROCHLORIDE 25 MG/1
25 TABLET ORAL 3 TIMES DAILY PRN
Qty: 30 TABLET | Refills: 0 | Status: SHIPPED | OUTPATIENT
Start: 2019-10-03 | End: 2020-02-19

## 2019-10-03 NOTE — TELEPHONE ENCOUNTER
I spoke to Kassi (Gorge's wife) today regarding the medication request as she sets up all of his meds. I asked if Gorge had felt dizzy, had episodes of vertigo lately. She denied any new symptoms. She did not request the medication. She did state that thyroid medication was needed.    I called the pharmacy to update regarding the meclizine. I requested that this be placed on hold and only filled if requested by the patient. I informed staff that I believe this was requested in error, and the family meant to request the levothyroxine. Staff stated there is a one number difference in order number for the meclizine and levothyroxine, and since they do not have a request for levothyroxine, this was likely the error.    Levothyroxine filled. The pharmacy placed the meclizine on hold.    Alexandra Connolly RN

## 2019-10-18 DIAGNOSIS — R33.9 URINARY RETENTION: ICD-10-CM

## 2019-10-18 DIAGNOSIS — I50.9 CHF (CONGESTIVE HEART FAILURE) (H): Primary | ICD-10-CM

## 2019-10-18 DIAGNOSIS — E87.6 HYPOKALEMIA: ICD-10-CM

## 2019-10-21 NOTE — TELEPHONE ENCOUNTER
TAMSULOSIN 0.4MG    CAP     Last Written Prescription Date:  7/20/2018  Last Fill Quantity: 90,   # refills: 3  Last Office Visit : 9/26/2019  Future Office visit:  None    Routing refill request to provider for review/approval because:  Would the doctor like the Pt to continue with this medication regiment for Pt care.   Last order July 2018??  Also Per 9/26/2019 Note, Pt was prescribed Proscar for hyperplasia.   Would the doctor like Flomax? Or Proscar??   Sending to clinic for further review.    Dr. Jewell's Note 9/26/2019  Benign prostatic hyperplasia, unspecified whether lower urinary tract symptoms present  -     finasteride (PROSCAR) 5 MG tablet; Take 1 tablet (5 mg) by mouth daily      SPIRONOLACTONE 25MG    TAB    Last Written Prescription Date:  7/10/2018  Last Fill Quantity: 90,   # refills: 3  Last Office Visit : 9/26/2019  Future Office visit:  None    Routing refill request to provider for review/approval because:  Continue with this medication regimen for Pt care.  Last ordered 7/10/2018   Send to clinic for review.        Henna Alvarez RN  Central Triage Red Flags/Med Refills

## 2019-10-23 RX ORDER — TAMSULOSIN HYDROCHLORIDE 0.4 MG/1
CAPSULE ORAL
Qty: 90 CAPSULE | Refills: 3 | Status: SHIPPED | OUTPATIENT
Start: 2019-10-23 | End: 2021-01-06

## 2019-10-23 RX ORDER — SPIRONOLACTONE 25 MG/1
TABLET ORAL
Qty: 90 TABLET | Refills: 3 | Status: SHIPPED | OUTPATIENT
Start: 2019-10-23 | End: 2020-10-30

## 2019-11-26 ENCOUNTER — TRANSFERRED RECORDS (OUTPATIENT)
Dept: HEALTH INFORMATION MANAGEMENT | Facility: CLINIC | Age: 84
End: 2019-11-26

## 2019-12-05 ENCOUNTER — OFFICE VISIT (OUTPATIENT)
Dept: OPHTHALMOLOGY | Facility: CLINIC | Age: 84
End: 2019-12-05
Payer: MEDICARE

## 2019-12-05 DIAGNOSIS — E87.6 HYPOKALEMIA: ICD-10-CM

## 2019-12-05 DIAGNOSIS — I50.9 CHF (CONGESTIVE HEART FAILURE) (H): ICD-10-CM

## 2019-12-05 DIAGNOSIS — E03.8 OTHER SPECIFIED HYPOTHYROIDISM: ICD-10-CM

## 2019-12-05 DIAGNOSIS — H54.3 LOW VISION, BOTH EYES: ICD-10-CM

## 2019-12-05 DIAGNOSIS — H35.30 AMD (AGE-RELATED MACULAR DEGENERATION), BILATERAL: Primary | ICD-10-CM

## 2019-12-05 DIAGNOSIS — E78.5 DYSLIPIDEMIA: ICD-10-CM

## 2019-12-05 LAB
ANION GAP SERPL CALCULATED.3IONS-SCNC: 4 MMOL/L (ref 3–14)
BUN SERPL-MCNC: 18 MG/DL (ref 7–30)
CALCIUM SERPL-MCNC: 8.6 MG/DL (ref 8.5–10.1)
CHLORIDE SERPL-SCNC: 106 MMOL/L (ref 94–109)
CHOLEST SERPL-MCNC: 154 MG/DL
CO2 SERPL-SCNC: 27 MMOL/L (ref 20–32)
CREAT SERPL-MCNC: 1.08 MG/DL (ref 0.66–1.25)
GFR SERPL CREATININE-BSD FRML MDRD: 61 ML/MIN/{1.73_M2}
GLUCOSE SERPL-MCNC: 92 MG/DL (ref 70–99)
HDLC SERPL-MCNC: 60 MG/DL
LDLC SERPL CALC-MCNC: 82 MG/DL
NONHDLC SERPL-MCNC: 94 MG/DL
POTASSIUM SERPL-SCNC: 4 MMOL/L (ref 3.4–5.3)
SODIUM SERPL-SCNC: 138 MMOL/L (ref 133–144)
TRIGL SERPL-MCNC: 61 MG/DL
TSH SERPL DL<=0.005 MIU/L-ACNC: 1.67 MU/L (ref 0.4–4)

## 2019-12-05 ASSESSMENT — CONF VISUAL FIELD
OD_NORMAL: 1
OS_SUPERIOR_NASAL_RESTRICTION: 2
OS_INFERIOR_NASAL_RESTRICTION: 2
OS_SUPERIOR_TEMPORAL_RESTRICTION: 2
OS_INFERIOR_TEMPORAL_RESTRICTION: 2

## 2019-12-05 ASSESSMENT — REFRACTION_MANIFEST
OD_SPHERE: PLANO
OD_AXIS: 180
OD_CYLINDER: +0.50
OD_ADD: +3.25

## 2019-12-05 ASSESSMENT — VISUAL ACUITY
METHOD: SNELLEN - LINEAR
METHOD: SNELLEN - LINEAR
OD_SC: 20/60-1
OS_SC: CF @ 2'
OD_SC: 20/70

## 2019-12-05 ASSESSMENT — TONOMETRY
OD_IOP_MMHG: 14
IOP_METHOD: ICARE
OS_IOP_MMHG: 12

## 2019-12-05 ASSESSMENT — REFRACTION_WEARINGRX
SPECS_TYPE: READING GLASSES
OS_SPHERE: +3.00
OD_SPHERE: +3.00

## 2019-12-05 NOTE — PROGRESS NOTES
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 Monitor here 1 year    I have confirmed the patient's CC, HPI and reviewed Past Medical History, Past Surgical History, Social History, Family History, Problem List, Medication List and agree with Tech note.     Zuleyka Ortiz, LUCITA ONOFREO FSLS

## 2019-12-05 NOTE — NURSING NOTE
Chief Complaints and History of Present Illnesses   Patient presents with     Annual Eye Exam     Chief Complaint(s) and History of Present Illness(es)     Annual Eye Exam     Laterality: both eyes    Onset: gradual    Onset: years ago    Quality: blurred vision    Severity: moderate    Frequency: constantly    Course: stable    Associated symptoms: Negative for eye pain    Treatments tried: artificial tears    Pain scale: 0/10              Comments     Seeing someone for macular degeneration. Getting shots in the eye. Did not pass drivers test. Using ATs [employer] [place] C[employer] 11:37 AM December 5, 2019

## 2019-12-12 NOTE — PROGRESS NOTES
Group Topic:  Symptom Management    Date: 12/12/2019  Start Time: 11:00 AM  End Time: 12:00 PM  Facilitators: Asuncion Floyd LPC    Focus: Art Therapy   Number in attendance: 12    Patients were educated on the use of visual journals to process daily mood, experiences, and thoughts. They participated in art therapy by making a visual journal entry with a focus of healthy self-expression through art making.     Method: Group  Attendance: Present  Patient Response: Attentive and Good eye contact       Patient presented as self-focused while working on art. They shared their work with the group. Patient explained their art represents how he feels today. He marleny a large box that took up most of his page with things that feel overwhelming right now such as his family, and work. In the hailey of his drawing he represented feeling \"relief\" after receiving his new diagnosis. He indicated that he is working on accepting his diagnosis and not feeling \"broken\" as a result.       Asuncion Floyd LPC-IT         RECERTIFICATION    Gorge Corona  11/27/1930    Session Number: 14 MC since start of care.    Reasons for Continuing Treatment:   See below    Frequency/Duration  1 times per week for 6 weeks for a total of 6 visits.    Recertification Period  11/13/18 - 12/31/18    Physician Signature:    Date:    X_______________________________________________________    Physician Name:  Dr. Monisha Olea,     I certify the need for these services furnished under this plan of treatment and while under my care. Physician co-signature of this document indicates review and certification of the therapy plan.  This signature may be written on paper, or electronically signed within [facility].        PHYSICAL THERAPY PROGRESS NOTE  11/15/18 1100   Signing Clinician's Name / Credentials   Signing clinician's name / credentials Kris Hoenk, PT   Session Number   Session Number 14 MC, seen from 9/13/18 to 11/15/18   Progress Note/Recertification   Recertification Due Date 11/13/18   Recertification Completed Date  11/15/18   PT Medicare Only G-code   G-code Mobility: Walking & Moving Around   Mobility: Walking & Moving Around   Mobility Current Status,  (eval/re-eval & every progress note) CJ: 20-39% impairment   Current Mobility Modifier Rationale can use cane mostly indoors, son and sife don't like it, RW more stable   Mobility Goal,  (eval/re-eval, every progress note, & discharge) CJ: 20-39% impairment   Adult Goals   PT Ortho Eval Goals 4;5   Ortho Goal 1   Goal Description pt will have increased L hip strength one grade for stable pelvis during gait in 6wks  (see MMT, hip abd 3/5)   Target Date 10/25/18   Date Met 11/15/18   Ortho Goal 2   Goal Description pt will be able to walk into [employer] from parked car using RW in 8wk  (walks into clinic here from car, amb 400ft 4WW)   Target Date 11/13/18   Date Met 10/18/18   Ortho Goal 3   Goal Description pt will have no falls in 6-8wks  (no falls since starting PT)   Target  "Date 11/13/18   Date Met 11/15/18   Ortho Goal 4   Goal Identifier new goal   Goal Description pt will be safe going down his one flight of stairs to basement at home   Target Date 12/31/18   Ortho Goal 5   Goal Identifier new goal   Goal Description pt will be able to walk 200-250ft with cane maintaining stable pelvis, no foot catches on carpet   Target Date 12/31/18   Subjective Report   Subjective Report riding his recumb bike at home, son doesn't like him not using walker at home, I had him trying to use cane, before he broke his hip he used a cane for hip pain   Objective Measures   Objective Measures Objective Measure 2   Objective Measure 1   Details MMT L hip abd 3/5, can do SL glut med/clam = 3+/5 strength, amb w/ cane 100ft , stable pelvis for approx 75 ft, antalgic gait appearance, has a SB L when he steps L - likely partial habit form L hip pain prior to fx, walked without assistve device w/ CGA 40ft    Objective Measure 2   Details TUG 18.8, sit to stnad in 30sec = 8, did 10 but twice legs hit table to push himself to upright   Treatment Interventions   Interventions Therapeutic Procedure/Exercise   Therapeutic Procedure/exercise   Minutes 30   Skilled Intervention amb for endurance,  increase independence level, strength ex progression, more stairs   Patient Response hip strength is better, walking better   Treatment Detail amb 100ft x2 w/ SEC, SBA, no LOB, may occas make a short step but catches self, SL hip abd x10, SL clam x15, bridge x15, up/down 4 steps with Rail on R cane on L x3 sets, 6\" fwd step up L x10 using 2 rails, sit to stand from mat 22\" x10   Progress pt stating he wlaked with cane prior to hip fx, will have to check on cane status prior to fx, pre wife on eval, stated mostly 4WW prior to hip fx   Plan   Plan for next session pt is showing progress in strength, more stable gait, better endurance, he was very deconditionied at eval for prolonged bed rest, lwo activity, has taken longer " to regain strength than initially planned, he would benefit from cont PT , needing cueing for exercise alignment, cues for stable pelvis in gait, 1x/wk x6more weeks   Total Session Time   Timed Code Treatment Minutes 30   Total Treatment Time (sum of timed and untimed services) 30   Kris Hoenk, PT #[id number]  [facility]

## 2019-12-17 NOTE — TELEPHONE ENCOUNTER
DIAGNOSIS: ingrown toe nail. no surgery, per wife, self referred    APPOINTMENT DATE: Feb 14, 2020    NOTES STATUS DETAILS   OFFICE NOTE from referring provider N/A    OFFICE NOTE from other specialist Internal 6/19/19 Dr. Slater   DISCHARGE SUMMARY from hospital N/A    DISCHARGE REPORT from the ER N/A    OPERATIVE REPORT N/A    MEDICATION LIST Internal    IMPLANT RECORD/STICKER N/A    LABS     CBC/DIFF N/A    CULTURES N/A    INJECTIONS DONE IN RADIOLOGY N/A    MRI N/A    CT SCAN N/A    XRAYS (IMAGES & REPORTS) N/A    TUMOR     PATHOLOGY  Slides & report N/A

## 2019-12-26 DIAGNOSIS — R06.02 SHORTNESS OF BREATH: ICD-10-CM

## 2019-12-26 DIAGNOSIS — F33.1 MAJOR DEPRESSIVE DISORDER, RECURRENT EPISODE, MODERATE (H): ICD-10-CM

## 2019-12-26 DIAGNOSIS — E78.5 DYSLIPIDEMIA: Primary | ICD-10-CM

## 2019-12-26 DIAGNOSIS — I10 ESSENTIAL HYPERTENSION, BENIGN: ICD-10-CM

## 2019-12-30 RX ORDER — ATORVASTATIN CALCIUM 10 MG/1
10 TABLET, FILM COATED ORAL DAILY
Qty: 90 TABLET | Refills: 2 | Status: SHIPPED | OUTPATIENT
Start: 2019-12-30 | End: 2021-01-06

## 2020-01-13 DIAGNOSIS — E03.8 OTHER SPECIFIED HYPOTHYROIDISM: ICD-10-CM

## 2020-01-15 NOTE — TELEPHONE ENCOUNTER
Euthyrox 50 mcg tablet  Last Written Prescription Date:  10/3/19  Last Fill Quantity: 135,   # refills: 0  Last Office Visit : 9/26/19  Future Office visit:  3/4/20    Routing refill request to provider for review/approval because:  Not sure if provider wants euthyrox used interchangeably for levothyroxine.

## 2020-01-16 RX ORDER — LEVOTHYROXINE SODIUM 50 UG/1
75 TABLET ORAL DAILY
Qty: 135 TABLET | Refills: 3 | Status: SHIPPED | OUTPATIENT
Start: 2020-01-16 | End: 2021-01-08

## 2020-01-16 NOTE — TELEPHONE ENCOUNTER
Euthrox is a trade name for levothyroxine. TSH was WNL on 12/05/19. Medication refilled for 75 mcg daily.    Alexandra Brannon RN ([facility])

## 2020-01-27 ENCOUNTER — DOCUMENTATION ONLY (OUTPATIENT)
Dept: CARE COORDINATION | Facility: CLINIC | Age: 85
End: 2020-01-27

## 2020-02-14 ENCOUNTER — PRE VISIT (OUTPATIENT)
Dept: ORTHOPEDICS | Facility: CLINIC | Age: 85
End: 2020-02-14

## 2020-02-18 NOTE — PROGRESS NOTES
Cardiology Clinic    CC: f/u CAD and valve disease    HPI: Gorge Corona is an 89 year old male with past medical history significant for CAD s/p CABG X4 (LIMA-LAD, SVG-PDA, SVG-D1, SVG-OM) 2013 and PCI LM into OM due to occluded SVG, s/p porcine MVR 2013, aortic stenosis, paroxysmal SVT, HLD, BPH, normal pressure hydrocephalus s/p  shunt, and depression. His last echocardiogram 4/2019 showed low flow, low gradient severe aortic stenosis and normal bioprosthetic mitral valve function with mean PG of 4.5 mmHg. He presents to clinic today for routine follow-up.    Patient presents to clinic accompanied by his wife. Recently they have noticed that the patient is breathless when he speaking, to the point where he is unable to finish his sentence. This is new over the past few months. He also notes shortness of breath with minimal activity such as walking around the house and getting dressed. His symptoms are similar to what he experienced prior to his mitral valve replacement. He does use an inhaler which helps his wheezing but does not improve his dyspnea. He denies any recent chest pain. He is able to lie flat in bed without orthopnea or PND. He denies leg swelling or weight gain. His weight has been stable at 200-202 lbs. He denies palpitations, lightheadedness or syncope. He is compliant with his medication including atorvastatin 10 mg daily, metoprolol 12.5 mg daily, lasix 40 mg daily. He is not taking aspirin 81 mg daily as he states he was advised to discontinue it last year.       PAST MEDICAL HISTORY:  Past Medical History:   Diagnosis Date     Atrial fibrillation (H)      Atrial flutter (H)      Basal cell carcinoma of skin of trunk      BPH (benign prostatic hypertrophy) with urinary obstruction      CAD (coronary artery disease)     s/p CABG 1/2013; RUSSEL in 2/2013     Depression      H/O CT scan of head 1/11/2013 7/20/2006    Result Impression     CT of the Head without contrast 7/20/2006  History:  Ataxia, incontinence, and NPH  Comparison Study: MR obtained to 2004  Technique: Axial CT images of the head were obtained at 2 intervals from the skull base to the vertex without intravenous contrast. The images were reviewed in brain, subdural and bone windows.  Findings: Patient has had a moderate promin     Hyperlipidemia      Lumbar spinal stenosis      Mitral regurgitation     s/p bioprosthesis 2013     Palpitations      Paroxysmal supraventricular tachycardia (H)      Squamous cell carcinoma      Tachycardia     baroreflex injury due to surgery      (ventriculoperitoneal) shunt status     for Normal pressure hydrocephalus     FAMILY HISTORY:  Family History   Problem Relation Age of Onset     Cancer Father      Kidney Disease Mother      Macular Degeneration No family hx of      Glaucoma No family hx of        SOCIAL HISTORY:  Social History     Socioeconomic History     Marital status:      Spouse name: Not on file     Number of children: Not on file     Years of education: Not on file     Highest education level: Not on file   Occupational History     Not on file   Social Needs     Financial resource strain: Not on file     Food insecurity:     Worry: Not on file     Inability: Not on file     Transportation needs:     Medical: Not on file     Non-medical: Not on file   Tobacco Use     Smoking status: Former Smoker     Types: Cigars, Pipe     Last attempt to quit: 1990     Years since quittin.2     Smokeless tobacco: Former User   Substance and Sexual Activity     Alcohol use: No     Alcohol/week: 0.0 oz     Drug use: No     Sexual activity: Not on file   Lifestyle     Physical activity:     Days per week: Not on file     Minutes per session: Not on file     Stress: Not on file   Relationships     Social connections:     Talks on phone: Not on file     Gets together: Not on file     Attends [employer] service: Not on file     Active member of club or organization: Not on file      Attends meetings of clubs or organizations: Not on file     Relationship status: Not on file     Intimate partner violence:     Fear of current or ex partner: Not on file     Emotionally abused: Not on file     Physically abused: Not on file     Forced sexual activity: Not on file   Other Topics Concern     Parent/sibling w/ CABG, MI or angioplasty before 65F 55M? Not Asked   Social History Narrative     Not on file       CURRENT MEDICATIONS:  Current Outpatient Medications   Medication Sig Dispense Refill     albuterol (2.5 MG/3ML) 0.083% nebulizer solution Take 1 vial (2.5 mg) by nebulization every 4 hours as needed for shortness of breath / dyspnea 1 Box 0     albuterol (PROAIR HFA, PROVENTIL HFA, VENTOLIN HFA) 108 (90 BASE) MCG/ACT inhaler Inhale 2 puffs into the lungs every 4 hours as needed for shortness of breath / dyspnea or wheezing (Patient not taking: Reported on 4/4/2019) 1 Inhaler 1     ammonium lactate (LAC-HYDRIN) 12 % lotion Apply topically 2 times daily as needed for dry skin 360 g 1     aspirin 81 MG EC tablet Take 1 tablet (81 mg) by mouth daily 90 tablet 3     atorvastatin (LIPITOR) 10 MG tablet Take 1 tablet (10 mg) by mouth daily 90 tablet 2     azithromycin (ZITHROMAX) 250 MG tablet Two tablets first day, then one tablet daily for four days. (Patient not taking: Reported on 9/26/2019) 6 tablet 1     Calcium 600 MG tablet Take 1 tablet by mouth 2 times daily. 100 tablet 3     Cholecalciferol (VITAMIN D) 1000 UNITS capsule Take 1 capsule by mouth daily.       citalopram (CELEXA) 20 MG tablet Take 1 tablet (20 mg) by mouth daily 90 tablet 0     clindamycin (CLEOCIN) 300 MG capsule Take 2 capsules by mouth as needed for 1 dose. 1 hour prior to dental work. 6 capsule 0     coenzyme Q-10 75 MG CAPS Take  by mouth.       finasteride (PROSCAR) 5 MG tablet Take 1 tablet (5 mg) by mouth daily 60 tablet 2     fluticasone-salmeterol (ADVAIR DISKUS) 250-50 MCG/DOSE diskus inhaler Inhale 1 puff into the  lungs every 12 hours 120 Inhaler 1     fluticasone-vilanterol (BREO ELLIPTA) 100-25 MCG/INH inhaler Inhale 1 puff into the lungs daily 1 Inhaler 11     furosemide (LASIX) 40 MG tablet Take 1 tablet (40 mg) by mouth daily 90 tablet 2     ipratropium (ATROVENT) 0.06 % nasal spray Spray 2 sprays into both nostrils 4 times daily 1 Box 3     LANsoprazole (PREVACID) 15 MG CR capsule Take 2 capsules (30 mg) by mouth daily 30 capsule 1     levothyroxine (EUTHYROX) 50 MCG tablet Take 1.5 tablets (75 mcg) by mouth daily 135 tablet 3     MAGNESIUM OXIDE PO Take 400 mg by mouth       meclizine (ANTIVERT) 25 MG tablet Take 1 tablet (25 mg) by mouth 3 times daily as needed for dizziness 30 tablet 0     melatonin 3 MG tablet Take 1 tablet (3 mg) by mouth nightly as needed for sleep 100 tablet 3     metoprolol succinate ER (TOPROL-XL) 25 MG 24 hr tablet Take 0.5 tablets (12.5 mg) by mouth daily 45 tablet 3     Multiple Vitamins-Minerals (OCUVITE PO) Take  by mouth.  0     order for DME Equipment being ordered: Safe step tub / walk-in tub 1 Device 0     order for DME Walker 1 each 0     senna-docusate (SENOKOT-S;PERICOLACE) 8.6-50 MG per tablet Take 1 tablet by mouth 2 times daily. 60 tablet      spironolactone (ALDACTONE) 25 MG tablet TAKE 1 TABLET BY MOUTH ONCE DAILY 90 tablet 3     tamsulosin (FLOMAX) 0.4 MG capsule TAKE 1 CAPSULE BY MOUTH ONCE DAILY 90 capsule 3     traMADol (ULTRAM) 50 MG tablet Take 1 tablet (50 mg) by mouth daily as needed for severe pain 10 tablet 0     traMADol (ULTRAM) 50 MG tablet Take 0.5 tablets (25 mg) by mouth 2 times daily as needed for severe pain 15 tablet 0     triamcinolone (KENALOG) 0.1 % cream Apply sparingly to affected area three times daily as needed 80 g 1     ROS:   Constitutional: No fever, chills, or sweats. No weight gain/loss.   ENT: No visual disturbance, ear ache, epistaxis, sore throat.   Respiratory: No dyspnea, cough, hemoptysis.   Cardiovascular: As per HPI.   GI: No nausea,  "vomiting, hematemesis, melena, or hematochezia.   : No urinary frequency, dysuria, or hematuria.   Integument: No rash, pururitis.   Psychiatric: No depression, anxiety, insomnia.   Neuro: No weakness, tingling, numbness, dysphasia.   Endocrinology: No heat/cold intolerance, polyuria, polydipsia.   Musculoskeletal: No muscle aches, arthritis, joint swelling.    EXAM:  /68 (BP Location: Right arm, Patient Position: Chair, Cuff Size: Adult Regular)   Pulse 66   Ht 1.676 m (5' 6\")   Wt 94.3 kg (207 lb 12.8 oz)   SpO2 95%   BMI 33.54 kg/m    General: appears comfortable, alert and articulate  HEENT: NC/AT.  PERRLA.  EOMI.  Sclerae white, not injected.  Nares clear.  Pharynx without erythema or exudate.  Dentition intact.    Neck: No adenopathy.  No thyromegaly. Carotids +4/4 bilaterally without bruits.  Jugular venous distension 8 cm.  Heart: RRR. Normal S1, S2. 3/6 crescendo-decrescendo systolic murmur at RUSB.  Lungs: CTA.  No ronchi, wheezes, rales.  No dullness to percussion.   Abdomen: Soft, nontender, nondistended. No organomegaly.  No bruits.   Extremities: No clubbing, cyanosis, or edema.  The pulses are +4/4 at the radial, brachial, femoral, popliteal, DP, and PT sites bilaterally.  No bruits are noted.  Neurologic: Alert and oriented to person/place/time, normal speech, gait and affect  Skin: No petechiae, purpura or rash.    Labs:  CBC RESULTS:  Lab Results   Component Value Date    WBC 6.0 03/26/2019    RBC 4.11 (L) 03/26/2019    HGB 12.9 (L) 03/26/2019    HCT 38.6 (L) 03/26/2019    MCV 94 03/26/2019    MCH 31.4 03/26/2019    MCHC 33.4 03/26/2019    RDW 13.8 03/26/2019     (L) 03/26/2019       CMP RESULTS:  Lab Results   Component Value Date     12/05/2019    POTASSIUM 4.0 12/05/2019    CHLORIDE 106 12/05/2019    CO2 27 12/05/2019    ANIONGAP 4 12/05/2019    GLC 92 12/05/2019    BUN 18 12/05/2019    CR 1.08 12/05/2019    GFRESTIMATED 61 12/05/2019    GFRESTBLACK 70 12/05/2019    HE " 8.6 12/05/2019    BILITOTAL 0.5 03/26/2019    ALBUMIN 3.5 03/26/2019    ALKPHOS 95 03/26/2019    ALT 16 03/26/2019    AST 14 03/26/2019        INR RESULTS:  Lab Results   Component Value Date    INR 1.12 08/17/2018       No components found for: CK  Lab Results   Component Value Date    MAG 2.3 08/18/2018     Lab Results   Component Value Date    NTBNP 268 03/26/2019       Cardiac Studies:    EKG today: NSR HR 65 no ischemic changes     ECHO 4/2019:  Normal biventricular size and function. Left ventricular ejection fraction is  55-60%.  Low flow, low gradient severe aortic stenosis with a valve area of 1 cm square  (using LVOT diamtere of 2.4 cms), mean gradient of 26 mmHg and DVI of 0.24.  Stroke volume index is 31 mL/m square.  There is a well-seated, normally functioning, bioprosthetic valve in the  mitral position. The peak and mean gradient across the mitral prosthesis are 9  and 4.5 mmHg at a HR of 64 bpm. There is no mitral regurgitation or  paravalvular leak.  Compared to the previous echocardiogram on 6/7/2018, the aortic valve area  today is significantly lower (1 versus 1.7 cm2). DVI and the mean  transvalvular gradients are also more abnormal on today's study.    Echo 6/7/2018  Interpretation Summary  Technically difficult study.  Global and regional left ventricular function is normal with an EF of 55-60%.  The right ventricle is normal size. RV is not well seen but based on TAPSE (19  mm) probably normal.  A bioprosthetic mitral valve is present. The mean gradient across the mitral  valve is 6-10 mmHg at HR of 75 bpm.  Mild aortic stenosis is present. Valve appears significantly calcified.  No pericardial effusion is present.     This study was compared with the study from 4/6/2017 .  Mitral valve mean gradients appear higher in this study however the HR is also  higher in this study which is the likely the cause. Otherwise no change.    Assessment and Plan: Gorge Corona is an 89 year old male with  past medical history significant for CAD s/p CABG X4 (LIMA-LAD, SVG-PDA, SVG-D1, SVG-OM) 2013  and PCI LM into OM due to occluded SVG, s/p porcine MVR 2013, aortic stenosis, SVT, HLD, BPH, normal pressure hydrocephalus s/p  shunt, and depression who presents today for routine follow up.     1. Low flow, low gradient severe aortic stenosis  2. Bioprosthetic mitral valve with normal function  Last echo 4/2019 notable for normal bioprosthetic mitral valve function with mean gradient of 4.5 mmHg and low flow, low gradient severe aortic stenosis w/BETI 1 cm2, mean PG of 26 mmHg and stroke volume 31 ml at which time patient was asymptomatic. He now reports progressive exertional dyspnea over the past few months - no orthopnea, PND, leg swelling or significant weight gain. Symptoms possibly related to his aortic stenosis. Plan to repeat echo to reevaluate aortic and mitral valve function. Patient would be interested in TAVR if indicated. Will discuss the case with Dr. Jj to determine next steps.      3. Coronary artery disease: s/p CABG and PCI. Doing well without angina. On statin and beta blocker but not on aspirin despite no prior adverse reactions. Resume aspirin 81 mg daily as previously recommended. Blood pressure at goal.    Patient Care Team:  Monisha Medina MD as PCP - General (Internal Medicine)  Yara Galaviz RN as Nurse Coordinator  Barry Zimmerman MD as MD (Dermatology)  Abhilash Aleman MD as MD (Orthopedics)  Keerthi Ignacio OT as Occupational Therapist (Occupational Therapy)  Dali Cheek CNP as Nurse Practitioner (Nurse Practitioner)      A total of 40 minutes spent face to face with patient and > 50% of the time spent counseling and coordinating care.    Addendum 2/24/20:  ECHO shows normal biventricular function, normal bioprosthetic mitral valve function and aortic stenosis is moderate. No etiology to explain his dyspnea. Spoke with patient. He continues to  dyspneic with minimal exertion, though no orthopnea, PND, or leg swelling. No infectious sx. Plan to perform CXR and NT-BNP.to evaluate for heart failure. Will also perform NM MPI to rule-out ischemia as the cause of his dyspnea.     Addendum 3/10/20:  CXR shows no evidence of pulmonary edema and BNP is normal at 291. His lexiscan stress tests shows no inducible ischemia. Currently no cardiac etiology to explain his dyspnea. Recommend follow-up with pulmonology in June as scheduled.     ZAID Hale, CNP  Cardiology Nurse Practitioner  [facility]  Clinic: [phone]  Hospital: [phone]

## 2020-02-19 ENCOUNTER — OFFICE VISIT (OUTPATIENT)
Dept: CARDIOLOGY | Facility: CLINIC | Age: 85
End: 2020-02-19
Attending: NURSE PRACTITIONER
Payer: MEDICARE

## 2020-02-19 VITALS
HEART RATE: 66 BPM | SYSTOLIC BLOOD PRESSURE: 119 MMHG | WEIGHT: 207.8 LBS | DIASTOLIC BLOOD PRESSURE: 68 MMHG | OXYGEN SATURATION: 95 % | BODY MASS INDEX: 33.4 KG/M2 | HEIGHT: 66 IN

## 2020-02-19 DIAGNOSIS — I25.10 CORONARY ARTERY DISEASE INVOLVING NATIVE CORONARY ARTERY OF NATIVE HEART WITHOUT ANGINA PECTORIS: ICD-10-CM

## 2020-02-19 DIAGNOSIS — I35.0 NONRHEUMATIC AORTIC VALVE STENOSIS: Primary | ICD-10-CM

## 2020-02-19 DIAGNOSIS — Z95.3 S/P MITRAL VALVE REPLACEMENT WITH BIOPROSTHETIC VALVE: ICD-10-CM

## 2020-02-19 PROCEDURE — 93005 ELECTROCARDIOGRAM TRACING: CPT | Mod: ZF

## 2020-02-19 PROCEDURE — G0463 HOSPITAL OUTPT CLINIC VISIT: HCPCS

## 2020-02-19 PROCEDURE — 99215 OFFICE O/P EST HI 40 MIN: CPT | Mod: ZP | Performed by: NURSE PRACTITIONER

## 2020-02-19 PROCEDURE — 93010 ELECTROCARDIOGRAM REPORT: CPT | Mod: ZP | Performed by: INTERNAL MEDICINE

## 2020-02-19 ASSESSMENT — PAIN SCALES - GENERAL: PAINLEVEL: NO PAIN (0)

## 2020-02-19 ASSESSMENT — MIFFLIN-ST. JEOR: SCORE: 1550.32

## 2020-02-19 NOTE — NURSING NOTE
Chief Complaint   Patient presents with     Follow Up     Present for follow up per patient.      Vitals were taken and medications were reconciled. EKG was performed.    Jeanne Burris CMA    1:13 PM

## 2020-02-19 NOTE — PATIENT INSTRUCTIONS
You were seen today in the Cardiovascular Clinic at the [facility].    Cardiology provider you saw during your visit Nohemi Torres NP.    1. Schedule echocardiogram within the week.  2. Resume low dose baby aspirin.  3. May increase lasix based on echo results.   6. Please make a follow-up appointment in 6 months with echo and labs prior.     Questions and [phone].   First press #1 for the University and then press #3 for Medical Questions to reach the Cardiology triage nurse.     On Call Cardiologist for after hours or on weekends: [phone], press option #4 and ask to speak to the on-call Cardiologist.

## 2020-02-19 NOTE — LETTER
2/19/2020      RE: Gorge Corona  [id number] [facility] [place] [facility] [address]       Dear Colleague,    Thank you for the opportunity to participate in the care of your patient, Gorge Corona, at the [facility] HE[facility] at [facility]. Please see a copy of my visit note below.    Cardiology Clinic    CC: f/u CAD and valve disease    HPI: Gorge Corona is an 89 year old male with past medical history significant for CAD s/p CABG X4 (LIMA-LAD, SVG-PDA, SVG-D1, SVG-OM) 2013 and PCI LM into OM due to occluded SVG, s/p porcine MVR 2013, aortic stenosis, paroxysmal SVT, HLD, BPH, normal pressure hydrocephalus s/p  shunt, and depression. His last echocardiogram 4/2019 showed low flow, low gradient severe aortic stenosis and normal bioprosthetic mitral valve function with mean PG of 4.5 mmHg. He presents to clinic today for routine follow-up.    Patient presents to clinic accompanied by his wife. Recently they have noticed that the patient is breathless when he speaking, to the point where he is unable to finish his sentence. This is new over the past few months. He also notes shortness of breath with minimal activity such as walking around the house and getting dressed. His symptoms are similar to what he experienced prior to his mitral valve replacement. He does use an inhaler which helps his wheezing but does not improve his dyspnea. He denies any recent chest pain. He is able to lie flat in bed without orthopnea or PND. He denies leg swelling or weight gain. His weight has been stable at 200-202 lbs. He denies palpitations, lightheadedness or syncope. He is compliant with his medication including atorvastatin 10 mg daily, metoprolol 12.5 mg daily, lasix 40 mg daily. He is not taking aspirin 81 mg daily as he states he was advised to discontinue it last year.       PAST MEDICAL HISTORY:  Past Medical History:   Diagnosis Date     Atrial fibrillation (H)      Atrial flutter (H)       Basal cell carcinoma of skin of trunk      BPH (benign prostatic hypertrophy) with urinary obstruction      CAD (coronary artery disease)     s/p CABG 2013; RUSSEL in 2013     Depression      H/O CT scan of head 2013    Result Impression     CT of the Head without contrast 2006  History: Ataxia, incontinence, and NPH  Comparison Study: MR obtained to 2004  Technique: Axial CT images of the head were obtained at 2 intervals from the skull base to the vertex without intravenous contrast. The images were reviewed in brain, subdural and bone windows.  Findings: Patient has had a moderate promin     Hyperlipidemia      Lumbar spinal stenosis      Mitral regurgitation     s/p bioprosthesis 2013     Palpitations      Paroxysmal supraventricular tachycardia (H)      Squamous cell carcinoma      Tachycardia     baroreflex injury due to surgery      (ventriculoperitoneal) shunt status     for Normal pressure hydrocephalus     FAMILY HISTORY:  Family History   Problem Relation Age of Onset     Cancer Father      Kidney Disease Mother      Macular Degeneration No family hx of      Glaucoma No family hx of        SOCIAL HISTORY:  Social History     Socioeconomic History     Marital status:      Spouse name: Not on file     Number of children: Not on file     Years of education: Not on file     Highest education level: Not on file   Occupational History     Not on file   Social Needs     Financial resource strain: Not on file     Food insecurity:     Worry: Not on file     Inability: Not on file     Transportation needs:     Medical: Not on file     Non-medical: Not on file   Tobacco Use     Smoking status: Former Smoker     Types: Cigars, Pipe     Last attempt to quit: 1990     Years since quittin.2     Smokeless tobacco: Former User   Substance and Sexual Activity     Alcohol use: No     Alcohol/week: 0.0 oz     Drug use: No     Sexual activity: Not on file   Lifestyle      Physical activity:     Days per week: Not on file     Minutes per session: Not on file     Stress: Not on file   Relationships     Social connections:     Talks on phone: Not on file     Gets together: Not on file     Attends [employer] service: Not on file     Active member of club or organization: Not on file     Attends meetings of clubs or organizations: Not on file     Relationship status: Not on file     Intimate partner violence:     Fear of current or ex partner: Not on file     Emotionally abused: Not on file     Physically abused: Not on file     Forced sexual activity: Not on file   Other Topics Concern     Parent/sibling w/ CABG, MI or angioplasty before 65F 55M? Not Asked   Social History Narrative     Not on file       CURRENT MEDICATIONS:  Current Outpatient Medications   Medication Sig Dispense Refill     albuterol (2.5 MG/3ML) 0.083% nebulizer solution Take 1 vial (2.5 mg) by nebulization every 4 hours as needed for shortness of breath / dyspnea 1 Box 0     albuterol (PROAIR HFA, PROVENTIL HFA, VENTOLIN HFA) 108 (90 BASE) MCG/ACT inhaler Inhale 2 puffs into the lungs every 4 hours as needed for shortness of breath / dyspnea or wheezing (Patient not taking: Reported on 4/4/2019) 1 Inhaler 1     ammonium lactate (LAC-HYDRIN) 12 % lotion Apply topically 2 times daily as needed for dry skin 360 g 1     aspirin 81 MG EC tablet Take 1 tablet (81 mg) by mouth daily 90 tablet 3     atorvastatin (LIPITOR) 10 MG tablet Take 1 tablet (10 mg) by mouth daily 90 tablet 2     azithromycin (ZITHROMAX) 250 MG tablet Two tablets first day, then one tablet daily for four days. (Patient not taking: Reported on 9/26/2019) 6 tablet 1     Calcium 600 MG tablet Take 1 tablet by mouth 2 times daily. 100 tablet 3     Cholecalciferol (VITAMIN D) 1000 UNITS capsule Take 1 capsule by mouth daily.       citalopram (CELEXA) 20 MG tablet Take 1 tablet (20 mg) by mouth daily 90 tablet 0     clindamycin (CLEOCIN) 300 MG capsule Take  2 capsules by mouth as needed for 1 dose. 1 hour prior to dental work. 6 capsule 0     coenzyme Q-10 75 MG CAPS Take  by mouth.       finasteride (PROSCAR) 5 MG tablet Take 1 tablet (5 mg) by mouth daily 60 tablet 2     fluticasone-salmeterol (ADVAIR DISKUS) 250-50 MCG/DOSE diskus inhaler Inhale 1 puff into the lungs every 12 hours 120 Inhaler 1     fluticasone-vilanterol (BREO ELLIPTA) 100-25 MCG/INH inhaler Inhale 1 puff into the lungs daily 1 Inhaler 11     furosemide (LASIX) 40 MG tablet Take 1 tablet (40 mg) by mouth daily 90 tablet 2     ipratropium (ATROVENT) 0.06 % nasal spray Spray 2 sprays into both nostrils 4 times daily 1 Box 3     LANsoprazole (PREVACID) 15 MG CR capsule Take 2 capsules (30 mg) by mouth daily 30 capsule 1     levothyroxine (EUTHYROX) 50 MCG tablet Take 1.5 tablets (75 mcg) by mouth daily 135 tablet 3     MAGNESIUM OXIDE PO Take 400 mg by mouth       meclizine (ANTIVERT) 25 MG tablet Take 1 tablet (25 mg) by mouth 3 times daily as needed for dizziness 30 tablet 0     melatonin 3 MG tablet Take 1 tablet (3 mg) by mouth nightly as needed for sleep 100 tablet 3     metoprolol succinate ER (TOPROL-XL) 25 MG 24 hr tablet Take 0.5 tablets (12.5 mg) by mouth daily 45 tablet 3     Multiple Vitamins-Minerals (OCUVITE PO) Take  by mouth.  0     order for DME Equipment being ordered: Safe step tub / walk-in tub 1 Device 0     order for DME Walker 1 each 0     senna-docusate (SENOKOT-S;PERICOLACE) 8.6-50 MG per tablet Take 1 tablet by mouth 2 times daily. 60 tablet      spironolactone (ALDACTONE) 25 MG tablet TAKE 1 TABLET BY MOUTH ONCE DAILY 90 tablet 3     tamsulosin (FLOMAX) 0.4 MG capsule TAKE 1 CAPSULE BY MOUTH ONCE DAILY 90 capsule 3     traMADol (ULTRAM) 50 MG tablet Take 1 tablet (50 mg) by mouth daily as needed for severe pain 10 tablet 0     traMADol (ULTRAM) 50 MG tablet Take 0.5 tablets (25 mg) by mouth 2 times daily as needed for severe pain 15 tablet 0     triamcinolone (KENALOG) 0.1 %  "cream Apply sparingly to affected area three times daily as needed 80 g 1     ROS:   Constitutional: No fever, chills, or sweats. No weight gain/loss.   ENT: No visual disturbance, ear ache, epistaxis, sore throat.   Respiratory: No dyspnea, cough, hemoptysis.   Cardiovascular: As per HPI.   GI: No nausea, vomiting, hematemesis, melena, or hematochezia.   : No urinary frequency, dysuria, or hematuria.   Integument: No rash, pururitis.   Psychiatric: No depression, anxiety, insomnia.   Neuro: No weakness, tingling, numbness, dysphasia.   Endocrinology: No heat/cold intolerance, polyuria, polydipsia.   Musculoskeletal: No muscle aches, arthritis, joint swelling.    EXAM:  /68 (BP Location: Right arm, Patient Position: Chair, Cuff Size: Adult Regular)   Pulse 66   Ht 1.676 m (5' 6\")   Wt 94.3 kg (207 lb 12.8 oz)   SpO2 95%   BMI 33.54 kg/m     General: appears comfortable, alert and articulate  HEENT: NC/AT.  PERRLA.  EOMI.  Sclerae white, not injected.  Nares clear.  Pharynx without erythema or exudate.  Dentition intact.    Neck: No adenopathy.  No thyromegaly. Carotids +4/4 bilaterally without bruits.  Jugular venous distension 8 cm.  Heart: RRR. Normal S1, S2. 3/6 crescendo-decrescendo systolic murmur at RUSB.  Lungs: CTA.  No ronchi, wheezes, rales.  No dullness to percussion.   Abdomen: Soft, nontender, nondistended. No organomegaly.  No bruits.   Extremities: No clubbing, cyanosis, or edema.  The pulses are +4/4 at the radial, brachial, femoral, popliteal, DP, and PT sites bilaterally.  No bruits are noted.  Neurologic: Alert and oriented to person/place/time, normal speech, gait and affect  Skin: No petechiae, purpura or rash.    Labs:  CBC RESULTS:  Lab Results   Component Value Date    WBC 6.0 03/26/2019    RBC 4.11 (L) 03/26/2019    HGB 12.9 (L) 03/26/2019    HCT 38.6 (L) 03/26/2019    MCV 94 03/26/2019    MCH 31.4 03/26/2019    MCHC 33.4 03/26/2019    RDW 13.8 03/26/2019     (L) " 03/26/2019       CMP RESULTS:  Lab Results   Component Value Date     12/05/2019    POTASSIUM 4.0 12/05/2019    CHLORIDE 106 12/05/2019    CO2 27 12/05/2019    ANIONGAP 4 12/05/2019    GLC 92 12/05/2019    BUN 18 12/05/2019    CR 1.08 12/05/2019    GFRESTIMATED 61 12/05/2019    GFRESTBLACK 70 12/05/2019    HE 8.6 12/05/2019    BILITOTAL 0.5 03/26/2019    ALBUMIN 3.5 03/26/2019    ALKPHOS 95 03/26/2019    ALT 16 03/26/2019    AST 14 03/26/2019        INR RESULTS:  Lab Results   Component Value Date    INR 1.12 08/17/2018       No components found for: CK  Lab Results   Component Value Date    MAG 2.3 08/18/2018     Lab Results   Component Value Date    NTBNP 268 03/26/2019       Cardiac Studies:    EKG today: NSR HR 65 no ischemic changes     ECHO 4/2019:  Normal biventricular size and function. Left ventricular ejection fraction is  55-60%.  Low flow, low gradient severe aortic stenosis with a valve area of 1 cm square  (using LVOT diamtere of 2.4 cms), mean gradient of 26 mmHg and DVI of 0.24.  Stroke volume index is 31 mL/m square.  There is a well-seated, normally functioning, bioprosthetic valve in the  mitral position. The peak and mean gradient across the mitral prosthesis are 9  and 4.5 mmHg at a HR of 64 bpm. There is no mitral regurgitation or  paravalvular leak.  Compared to the previous echocardiogram on 6/7/2018, the aortic valve area  today is significantly lower (1 versus 1.7 cm2). DVI and the mean  transvalvular gradients are also more abnormal on today's study.    Echo 6/7/2018  Interpretation Summary  Technically difficult study.  Global and regional left ventricular function is normal with an EF of 55-60%.  The right ventricle is normal size. RV is not well seen but based on TAPSE (19  mm) probably normal.  A bioprosthetic mitral valve is present. The mean gradient across the mitral  valve is 6-10 mmHg at HR of 75 bpm.  Mild aortic stenosis is present. Valve appears significantly  calcified.  No pericardial effusion is present.     This study was compared with the study from 4/6/2017 .  Mitral valve mean gradients appear higher in this study however the HR is also  higher in this study which is the likely the cause. Otherwise no change.    Assessment and Plan: Gorge Corona is an 89 year old male with past medical history significant for CAD s/p CABG X4 (LIMA-LAD, SVG-PDA, SVG-D1, SVG-OM) 2013  and PCI LM into OM due to occluded SVG, s/p porcine MVR 2013, aortic stenosis, SVT, HLD, BPH, normal pressure hydrocephalus s/p  shunt, and depression who presents today for routine follow up.     1. Low flow, low gradient severe aortic stenosis  2. Bioprosthetic mitral valve with normal function  Last echo 4/2019 notable for normal bioprosthetic mitral valve function with mean gradient of 4.5 mmHg and low flow, low gradient severe aortic stenosis w/BETI 1 cm2, mean PG of 26 mmHg and stroke volume 31 ml at which time patient was asymptomatic. He now reports progressive exertional dyspnea over the past few months - no orthopnea, PND, leg swelling or significant weight gain. Symptoms possibly related to his aortic stenosis. Plan to repeat echo to reevaluate aortic and mitral valve function. Patient would be interested in TAVR if indicated. Will discuss the case with Dr. Jj to determine next steps.      3. Coronary artery disease: s/p CABG and PCI. Doing well without angina. On statin and beta blocker but not on aspirin despite no prior adverse reactions. Resume aspirin 81 mg daily as previously recommended. Blood pressure at goal.    Patient Care Team:  Monisha Medina MD as PCP - General (Internal Medicine)  Yara Galaviz RN as Nurse Coordinator  Barry Zimmerman MD as MD (Dermatology)  Abhilash Aleman MD as MD (Orthopedics)  Keerthi Ignacio OT as Occupational Therapist (Occupational Therapy)  Dali Cheek CNP as Nurse Practitioner (Nurse Practitioner)      A  total of  40 minutes spent face to face with patient and > 50% of the time spent counseling and coordinating care.    Please do not hesitate to contact me if you have any questions/concerns.     Sincerely,     Nohemi Torres NP

## 2020-02-20 LAB — INTERPRETATION ECG - MUSE: NORMAL

## 2020-02-21 ENCOUNTER — ANCILLARY PROCEDURE (OUTPATIENT)
Dept: GENERAL RADIOLOGY | Facility: CLINIC | Age: 85
End: 2020-02-21
Attending: PHYSICIAN ASSISTANT
Payer: MEDICARE

## 2020-02-21 ENCOUNTER — ANCILLARY PROCEDURE (OUTPATIENT)
Dept: CARDIOLOGY | Facility: CLINIC | Age: 85
End: 2020-02-21
Attending: NURSE PRACTITIONER
Payer: MEDICARE

## 2020-02-21 ENCOUNTER — OFFICE VISIT (OUTPATIENT)
Dept: ORTHOPEDICS | Facility: CLINIC | Age: 85
End: 2020-02-21
Payer: MEDICARE

## 2020-02-21 VITALS — WEIGHT: 207 LBS | HEIGHT: 69 IN | BODY MASS INDEX: 30.66 KG/M2 | RESPIRATION RATE: 16 BRPM

## 2020-02-21 DIAGNOSIS — S82.025A CLOSED NONDISPLACED LONGITUDINAL FRACTURE OF LEFT PATELLA, INITIAL ENCOUNTER: ICD-10-CM

## 2020-02-21 DIAGNOSIS — S82.092A OTHER CLOSED FRACTURE OF LEFT PATELLA, INITIAL ENCOUNTER: Primary | ICD-10-CM

## 2020-02-21 DIAGNOSIS — M25.562 LEFT KNEE PAIN: ICD-10-CM

## 2020-02-21 DIAGNOSIS — I35.0 NONRHEUMATIC AORTIC VALVE STENOSIS: ICD-10-CM

## 2020-02-21 ASSESSMENT — MIFFLIN-ST. JEOR: SCORE: 1594.33

## 2020-02-21 NOTE — LETTER
2/21/2020     RE: Gorge Corona  [address] [address] [facility] [address]     Dear Colleague,    Thank you for referring your patient, Gorge Corona, to the [facility] SP[facility]TS AND ORTHOPAEDIC WALK IN CLINIC at [facility]. Please see a copy of my visit note below.          SPORTS & ORTHOPEDIC WALK-IN VISIT 2/21/2020    Primary Care Physician: Dr. Anjel Oela     Took a hard fall on 2/14/20.   Two years ago fractured same leg and got a jessica.    Reason for visit:     What part of your body is injured / painful?  left knee    What caused the injury /pain? Fall    How long ago did your injury occur or pain begin? 2/14/2020    What are your most bothersome symptoms? Pain    How would you characterize your symptom?  sharp    What makes your symptoms better? Nothing    What makes your symptoms worse? Walking, Movement and Bending    Have you been previously seen for this problem? No    Medical History:    Any recent changes to your medical history? No    Any new medication prescribed since last visit? No    Have you had surgery on this body part before? No    Social History:    Occupation: Retired     Handedness: Right    Exercise:     Review of Systems:    Do you have fever, chills, weight loss? No    Do you have any vision problems? No    Do you have any chest pain or edema? No    Do you have any shortness of breath or wheezing?  No    Do you have stomach problems? No    Do you have any numbness or focal weakness? No    Do you have diabetes? No    Do you have problems with bleeding or clotting? No    Do you have an rashes or other skin lesions? No         CHIEF COMPLAINT:  Pain of the Left Knee       HISTORY OF PRESENT ILLNESS  Mr. Corona is a pleasant 89 year old year old male who presents to clinic today with left knee pain.  Gorge explains that he was out with his wife on Friday, February 14 when he suddenly slipped on ice and fell to the ground.  He reports that he landed on  his left knee.  He endorsed immediate pain following the fall, and noted that it was difficult to weight-bear after.  Over the last week, he managed his symptoms of dull, 7/10 pain with ibuprofen.  For the most part, this has been taking care of his symptoms, but he notes that he has been experiencing consistent throbbing pain. He reports that it has been increasingly painful to walk and bend his knee. He reports that he has not experienced similar symptoms in the past.    With regard to relevant orthopedic history, the patient underwent a intramedullary nailing of the left femur approximately 2 years prior.  He reports that his recovery from the surgery has been unremarkable to this point.    At baseline, the patient ambulates with a walker and tolerates it well.  He had no complaint of left knee pain preceding his injury.  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  He is relatively independent and lives with his wife.      Additional history: as documented    MEDICAL HISTORY  Patient Active Problem List   Diagnosis     Lumbar spinal stenosis     Lumbar spondylolysis     Major depressive disorder, recurrent episode, moderate (H)     Actinic keratosis     Other malignant neoplasm of skin, site unspecified     Benign neoplasm of skin of trunk, except scrotum     Other seborrheic keratosis     History of SCC (squamous cell carcinoma) of skin     H/O colonoscopy     Normal pressure hydrocephalus (H)     ASCVD (arteriosclerotic cardiovascular disease)     Coronary artery disease, occlusive     H/O CT scan of head     Mitral valve regurgitation     CAD (coronary artery disease)     Atrial fibrillation (H)     Dyslipidemia     Preventative health care     CAD S/P percutaneous coronary angioplasty     BCC (basal cell carcinoma of skin)     Hypothyroidism     CHF (congestive heart failure) (H)     SCC (squamous cell carcinoma)     SVT (supraventricular tachycardia) (H)     Status post coronary angiogram      Near syncope     Bradycardia       Current Outpatient Medications   Medication Sig Dispense Refill     albuterol (2.5 MG/3ML) 0.083% nebulizer solution Take 1 vial (2.5 mg) by nebulization every 4 hours as needed for shortness of breath / dyspnea 1 Box 0     albuterol (PROAIR HFA, PROVENTIL HFA, VENTOLIN HFA) 108 (90 BASE) MCG/ACT inhaler Inhale 2 puffs into the lungs every 4 hours as needed for shortness of breath / dyspnea or wheezing 1 Inhaler 1     ammonium lactate (LAC-HYDRIN) 12 % lotion Apply topically 2 times daily as needed for dry skin 360 g 1     aspirin 81 MG EC tablet Take 1 tablet (81 mg) by mouth daily (Patient not taking: Reported on 2/19/2020) 90 tablet 3     atorvastatin (LIPITOR) 10 MG tablet Take 1 tablet (10 mg) by mouth daily 90 tablet 2     azithromycin (ZITHROMAX) 250 MG tablet Two tablets first day, then one tablet daily for four days. 6 tablet 1     Calcium 600 MG tablet Take 1 tablet by mouth 2 times daily. 100 tablet 3     Cholecalciferol (VITAMIN D) 1000 UNITS capsule Take 1 capsule by mouth daily.       citalopram (CELEXA) 20 MG tablet Take 1 tablet (20 mg) by mouth daily 90 tablet 0     clindamycin (CLEOCIN) 300 MG capsule Take 2 capsules by mouth as needed for 1 dose. 1 hour prior to dental work. 6 capsule 0     coenzyme Q-10 75 MG CAPS Take  by mouth.       finasteride (PROSCAR) 5 MG tablet Take 1 tablet (5 mg) by mouth daily 60 tablet 2     fluticasone-salmeterol (ADVAIR DISKUS) 250-50 MCG/DOSE diskus inhaler Inhale 1 puff into the lungs every 12 hours (Patient not taking: Reported on 2/19/2020) 120 Inhaler 1     fluticasone-vilanterol (BREO ELLIPTA) 100-25 MCG/INH inhaler Inhale 1 puff into the lungs daily 1 Inhaler 11     furosemide (LASIX) 40 MG tablet Take 1 tablet (40 mg) by mouth daily 90 tablet 2     ipratropium (ATROVENT) 0.06 % nasal spray Spray 2 sprays into both nostrils 4 times daily 1 Box 3     levothyroxine (EUTHYROX) 50 MCG tablet Take 1.5 tablets (75 mcg) by mouth  "daily 135 tablet 3     MAGNESIUM OXIDE PO Take 400 mg by mouth       melatonin 3 MG tablet Take 1 tablet (3 mg) by mouth nightly as needed for sleep 100 tablet 3     metoprolol succinate ER (TOPROL-XL) 25 MG 24 hr tablet Take 0.5 tablets (12.5 mg) by mouth daily 45 tablet 3     Multiple Vitamins-Minerals (OCUVITE PO) Take  by mouth.  0     order for DME Equipment being ordered: Safe step tub / walk-in tub 1 Device 0     order for DME Walker 1 each 0     senna-docusate (SENOKOT-S;PERICOLACE) 8.6-50 MG per tablet Take 1 tablet by mouth 2 times daily. 60 tablet      spironolactone (ALDACTONE) 25 MG tablet TAKE 1 TABLET BY MOUTH ONCE DAILY (Patient not taking: Reported on 2/19/2020) 90 tablet 3     tamsulosin (FLOMAX) 0.4 MG capsule TAKE 1 CAPSULE BY MOUTH ONCE DAILY 90 capsule 3     traMADol (ULTRAM) 50 MG tablet Take 1 tablet (50 mg) by mouth daily as needed for severe pain (Patient not taking: Reported on 2/19/2020) 10 tablet 0     traMADol (ULTRAM) 50 MG tablet Take 0.5 tablets (25 mg) by mouth 2 times daily as needed for severe pain (Patient not taking: Reported on 2/19/2020) 15 tablet 0     triamcinolone (KENALOG) 0.1 % cream Apply sparingly to affected area three times daily as needed 80 g 1       Allergies   Allergen Reactions     Penicillins Rash       Family History   Problem Relation Age of Onset     Cancer Father      Kidney Disease Mother      Macular Degeneration No family hx of      Glaucoma No family hx of        Additional medical/Social/Surgical histories reviewed in [facility] and updated as appropriate.     REVIEW OF SYSTEMS (2/21/2020)  A 10-point review of systems was obtained and is negative except for as noted in the HPI.      PHYSICAL EXAM  Resp 16   Ht 1.753 m (5' 9\")   Wt 93.9 kg (207 lb)   BMI 30.57 kg/m       General  - normal appearance, in no obvious distress  CV  - normal popliteal pulse  Pulm  - normal respiratory pattern, non-labored  Musculoskeletal - knee  - stance: normal gait with mild " limp, no obvious leg length discrepancy  - inspection: mild effusion without palpable fluid wave; small abrasion over the lateral patella; normal bone and joint alignment, no obvious deformity  - palpation: no joint line tenderness; minimal tenderness to palpation over patella; patellar tendon non-tender  - ROM: 2/2/120 degrees; minimal tenderness; normal PROM   - strength: 5/5 in flexion, 5/5 in extension; straight leg raise with 5 degree extensor lag, isometric activation of the quadricep results in superior translation of the patella  - special tests:  (-) Lachman  (-) anterior drawer  (-) posterior drawer  (-) varus at 0 and 30 degrees flexion  (-) valgus at 0 and 30 degrees flexion  (+) patellar apprehension    Neuro  - no sensory or motor deficit, grossly normal coordination, normal muscle tone  Skin  - no ecchymosis, erythema, warmth, or induration, no obvious rash  Psych  - interactive, appropriate, normal mood and affect      IMAGING : Left AP/lateral imaging of femur, AP/lateral/axial imaging of left knee. Final results and radiologist's interpretation, available in the [facility] health record. Images were reviewed with the patient/family members in the office today. My personal interpretation of the performed imaging is vertical non-displaced fracture of the lateral facet of patella     ASSESSMENT & PLAN  Mr. Corona is a 89 year old year old male who presents to clinic today with a closed, non-displaced fracture of the lateral patella facet.  The patient has good isometric activation of the quadriceps as well as a straight leg raise with a minimal extensor lag.    -The patient presents with reasonable function of his extensor mechanism.  Given that the fracture is a vertical, nondisplaced patella fracture, this likely represents a nonsurgical injury.  I am optimistic that this will heal with nonoperative, conservative management.  -Recommend application of a knee immobilizer, weightbearing as tolerated using a  walker for assistance  -Physical therapy to begin introducing gradual passive range of motion  -Follow-up in 2 to 4 weeks for repeat imaging with 1 of our medical orthopedists      Stan Macias PA-C 2/21/2020 4:51 PM  Orthopaedic Surgery    Please page me at [phone] with any questions/concerns. If there is no response, if it is a weekend, or if it is during evening hours, please page the orthopaedic surgery resident on call.

## 2020-02-21 NOTE — PROGRESS NOTES
SPORTS & ORTHOPEDIC WALK-IN VISIT 2/21/2020    Primary Care Physician: Dr. Anjel Olea     Took a hard fall on 2/14/20.   Two years ago fractured same leg and got a jessica.    Reason for visit:     What part of your body is injured / painful?  left knee    What caused the injury /pain? Fall    How long ago did your injury occur or pain begin? 2/14/2020    What are your most bothersome symptoms? Pain    How would you characterize your symptom?  sharp    What makes your symptoms better? Nothing    What makes your symptoms worse? Walking, Movement and Bending    Have you been previously seen for this problem? No    Medical History:    Any recent changes to your medical history? No    Any new medication prescribed since last visit? No    Have you had surgery on this body part before? No    Social History:    Occupation: Retired     Handedness: Right    Exercise:     Review of Systems:    Do you have fever, chills, weight loss? No    Do you have any vision problems? No    Do you have any chest pain or edema? No    Do you have any shortness of breath or wheezing?  No    Do you have stomach problems? No    Do you have any numbness or focal weakness? No    Do you have diabetes? No    Do you have problems with bleeding or clotting? No    Do you have an rashes or other skin lesions? No         CHIEF COMPLAINT:  Pain of the Left Knee       HISTORY OF PRESENT ILLNESS  Mr. Corona is a pleasant 89 year old year old male who presents to clinic today with left knee pain.  Gorge explains that he was out with his wife on Friday, February 14 when he suddenly slipped on ice and fell to the ground.  He reports that he landed on his left knee.  He endorsed immediate pain following the fall, and noted that it was difficult to weight-bear after.  Over the last week, he managed his symptoms of dull, 7/10 pain with ibuprofen.  For the most part, this has been taking care of his symptoms, but he notes that he has been experiencing  consistent throbbing pain. He reports that it has been increasingly painful to walk and bend his knee. He reports that he has not experienced similar symptoms in the past.    With regard to relevant orthopedic history, the patient underwent a intramedullary nailing of the left femur approximately 2 years prior.  He reports that his recovery from the surgery has been unremarkable to this point.    At baseline, the patient ambulates with a walker and tolerates it well.  He had no complaint of left knee pain preceding his injury.  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  He is relatively independent and lives with his wife.      Additional history: as documented    MEDICAL HISTORY  Patient Active Problem List   Diagnosis     Lumbar spinal stenosis     Lumbar spondylolysis     Major depressive disorder, recurrent episode, moderate (H)     Actinic keratosis     Other malignant neoplasm of skin, site unspecified     Benign neoplasm of skin of trunk, except scrotum     Other seborrheic keratosis     History of SCC (squamous cell carcinoma) of skin     H/O colonoscopy     Normal pressure hydrocephalus (H)     ASCVD (arteriosclerotic cardiovascular disease)     Coronary artery disease, occlusive     H/O CT scan of head     Mitral valve regurgitation     CAD (coronary artery disease)     Atrial fibrillation (H)     Dyslipidemia     Preventative health care     CAD S/P percutaneous coronary angioplasty     BCC (basal cell carcinoma of skin)     Hypothyroidism     CHF (congestive heart failure) (H)     SCC (squamous cell carcinoma)     SVT (supraventricular tachycardia) (H)     Status post coronary angiogram     Near syncope     Bradycardia       Current Outpatient Medications   Medication Sig Dispense Refill     albuterol (2.5 MG/3ML) 0.083% nebulizer solution Take 1 vial (2.5 mg) by nebulization every 4 hours as needed for shortness of breath / dyspnea 1 Box 0     albuterol (PROAIR HFA, PROVENTIL HFA,  VENTOLIN HFA) 108 (90 BASE) MCG/ACT inhaler Inhale 2 puffs into the lungs every 4 hours as needed for shortness of breath / dyspnea or wheezing 1 Inhaler 1     ammonium lactate (LAC-HYDRIN) 12 % lotion Apply topically 2 times daily as needed for dry skin 360 g 1     aspirin 81 MG EC tablet Take 1 tablet (81 mg) by mouth daily (Patient not taking: Reported on 2/19/2020) 90 tablet 3     atorvastatin (LIPITOR) 10 MG tablet Take 1 tablet (10 mg) by mouth daily 90 tablet 2     azithromycin (ZITHROMAX) 250 MG tablet Two tablets first day, then one tablet daily for four days. 6 tablet 1     Calcium 600 MG tablet Take 1 tablet by mouth 2 times daily. 100 tablet 3     Cholecalciferol (VITAMIN D) 1000 UNITS capsule Take 1 capsule by mouth daily.       citalopram (CELEXA) 20 MG tablet Take 1 tablet (20 mg) by mouth daily 90 tablet 0     clindamycin (CLEOCIN) 300 MG capsule Take 2 capsules by mouth as needed for 1 dose. 1 hour prior to dental work. 6 capsule 0     coenzyme Q-10 75 MG CAPS Take  by mouth.       finasteride (PROSCAR) 5 MG tablet Take 1 tablet (5 mg) by mouth daily 60 tablet 2     fluticasone-salmeterol (ADVAIR DISKUS) 250-50 MCG/DOSE diskus inhaler Inhale 1 puff into the lungs every 12 hours (Patient not taking: Reported on 2/19/2020) 120 Inhaler 1     fluticasone-vilanterol (BREO ELLIPTA) 100-25 MCG/INH inhaler Inhale 1 puff into the lungs daily 1 Inhaler 11     furosemide (LASIX) 40 MG tablet Take 1 tablet (40 mg) by mouth daily 90 tablet 2     ipratropium (ATROVENT) 0.06 % nasal spray Spray 2 sprays into both nostrils 4 times daily 1 Box 3     levothyroxine (EUTHYROX) 50 MCG tablet Take 1.5 tablets (75 mcg) by mouth daily 135 tablet 3     MAGNESIUM OXIDE PO Take 400 mg by mouth       melatonin 3 MG tablet Take 1 tablet (3 mg) by mouth nightly as needed for sleep 100 tablet 3     metoprolol succinate ER (TOPROL-XL) 25 MG 24 hr tablet Take 0.5 tablets (12.5 mg) by mouth daily 45 tablet 3     Multiple  "Vitamins-Minerals (OCUVITE PO) Take  by mouth.  0     order for DME Equipment being ordered: Safe step tub / walk-in tub 1 Device 0     order for DME Walker 1 each 0     senna-docusate (SENOKOT-S;PERICOLACE) 8.6-50 MG per tablet Take 1 tablet by mouth 2 times daily. 60 tablet      spironolactone (ALDACTONE) 25 MG tablet TAKE 1 TABLET BY MOUTH ONCE DAILY (Patient not taking: Reported on 2/19/2020) 90 tablet 3     tamsulosin (FLOMAX) 0.4 MG capsule TAKE 1 CAPSULE BY MOUTH ONCE DAILY 90 capsule 3     traMADol (ULTRAM) 50 MG tablet Take 1 tablet (50 mg) by mouth daily as needed for severe pain (Patient not taking: Reported on 2/19/2020) 10 tablet 0     traMADol (ULTRAM) 50 MG tablet Take 0.5 tablets (25 mg) by mouth 2 times daily as needed for severe pain (Patient not taking: Reported on 2/19/2020) 15 tablet 0     triamcinolone (KENALOG) 0.1 % cream Apply sparingly to affected area three times daily as needed 80 g 1       Allergies   Allergen Reactions     Penicillins Rash       Family History   Problem Relation Age of Onset     Cancer Father      Kidney Disease Mother      Macular Degeneration No family hx of      Glaucoma No family hx of        Additional medical/Social/Surgical histories reviewed in [facility] and updated as appropriate.     REVIEW OF SYSTEMS (2/21/2020)  A 10-point review of systems was obtained and is negative except for as noted in the HPI.      PHYSICAL EXAM  Resp 16   Ht 1.753 m (5' 9\")   Wt 93.9 kg (207 lb)   BMI 30.57 kg/m      General  - normal appearance, in no obvious distress  CV  - normal popliteal pulse  Pulm  - normal respiratory pattern, non-labored  Musculoskeletal - knee  - stance: normal gait with mild limp, no obvious leg length discrepancy  - inspection: mild effusion without palpable fluid wave; small abrasion over the lateral patella; normal bone and joint alignment, no obvious deformity  - palpation: no joint line tenderness; minimal tenderness to palpation over patella; patellar " tendon non-tender  - ROM: 2/2/120 degrees; minimal tenderness; normal PROM   - strength: 5/5 in flexion, 5/5 in extension; straight leg raise with 5 degree extensor lag, isometric activation of the quadricep results in superior translation of the patella  - special tests:  (-) Lachman  (-) anterior drawer  (-) posterior drawer  (-) varus at 0 and 30 degrees flexion  (-) valgus at 0 and 30 degrees flexion  (+) patellar apprehension    Neuro  - no sensory or motor deficit, grossly normal coordination, normal muscle tone  Skin  - no ecchymosis, erythema, warmth, or induration, no obvious rash  Psych  - interactive, appropriate, normal mood and affect      IMAGING : Left AP/lateral imaging of femur, AP/lateral/axial imaging of left knee. Final results and radiologist's interpretation, available in the [facility] health record. Images were reviewed with the patient/family members in the office today. My personal interpretation of the performed imaging is vertical non-displaced fracture of the lateral facet of patella     ASSESSMENT & PLAN  Mr. Corona is a 89 year old year old male who presents to clinic today with a closed, non-displaced fracture of the lateral patella facet.  The patient has good isometric activation of the quadriceps as well as a straight leg raise with a minimal extensor lag.    -The patient presents with reasonable function of his extensor mechanism.  Given that the fracture is a vertical, nondisplaced patella fracture, this likely represents a nonsurgical injury.  I am optimistic that this will heal with nonoperative, conservative management.  -Recommend application of a knee immobilizer, weightbearing as tolerated using a walker for assistance  -Physical therapy to begin introducing gradual passive range of motion  -Follow-up in 2 to 4 weeks for repeat imaging with 1 of our medical orthopedists      Stan Macias PA-C 2/21/2020 4:51 PM  Orthopaedic Surgery    Please page me at [phone] with any  questions/concerns. If there is no response, if it is a weekend, or if it is during evening hours, please page the orthopaedic surgery resident on call.

## 2020-02-24 ENCOUNTER — TELEPHONE (OUTPATIENT)
Dept: INTERNAL MEDICINE | Facility: CLINIC | Age: 85
End: 2020-02-24

## 2020-02-24 ENCOUNTER — TELEPHONE (OUTPATIENT)
Dept: CARDIOLOGY | Facility: CLINIC | Age: 85
End: 2020-02-24

## 2020-02-24 DIAGNOSIS — I25.10 CORONARY ARTERY DISEASE INVOLVING NATIVE CORONARY ARTERY OF NATIVE HEART WITHOUT ANGINA PECTORIS: ICD-10-CM

## 2020-02-24 DIAGNOSIS — R06.09 EXERTIONAL DYSPNEA: Primary | ICD-10-CM

## 2020-02-24 NOTE — TELEPHONE ENCOUNTER
Per note from Orthopedic walk in, patient is to follow up with Ortho in 2-4 weeks, see note below:    Follow-up in 2 to 4 weeks for repeat imaging with 1 of our medical orthopedists.    Patient currently is scheduled for follow up with PCP on 3/4/2020. Melissa Rey LPN 2/24/2020 9:02 AM

## 2020-02-24 NOTE — TELEPHONE ENCOUNTER
Spoke to patients wife who states that ortho provider advised that the patient be seen sooner in PCP. Reviewed Ortho providers note and did not see anything mentioned as to why the patient should be seen sooner by PCP. Patient and patients wife are unsure why they would need to be seen sooner. Patient did have ECHO done on 2/21/2020, ECHO was ordered by Cardiology, patients wife has a call into cardiology to get test results of ECHO. Advised patients wife that patient does not need to be seen sooner in [facility] than 3/4/2020 and can keep appointment as is and to follow up with cardiology regarding ECHO and if any recommendations, as she currently is with call into clinic. Wife states verbal understanding. Melissa Rey LPN 2/24/2020 10:46 AM

## 2020-02-24 NOTE — PROGRESS NOTES
Called patient and his wife to review echo results. Echo shows normal biventricular function, normal bioprosthetic mitral valve function and moderate aortic stenosis. No etiology to explain his exertional dyspnea. Plan to perform CXR and NT-BNP to evaluate for heart failure. Will also perform NM MPI to rule-out obstructive CAD as the cause of his dyspnea. Further recommendations to follow.    ZAID Hale, CNP  Cardiology Nurse Practitioner  [facility]  Clinic: [phone]  [facility]: [phone]

## 2020-02-24 NOTE — TELEPHONE ENCOUNTER
[facility] Call Center    Phone Message    May a detailed message be left on voicemail: yes     Reason for Call: Other: pt's wife calling to get the results of the pt ECHO. Please call the pt's wife or the pt to discuss those result as soon as possible.     Action Taken: Message routed to:  [facility] ([facility] cardiology    Travel Screening: Not Applicable

## 2020-02-24 NOTE — TELEPHONE ENCOUNTER
[facility] Call Center    Phone Message    May a detailed message be left on voicemail: yes     Reason for Call: Other: Pt and wife were told upon leaving the Walk in clinic on friday that they need to follow up soon with PCP. Unsure as to why. Please call them to clarify.      Action Taken: Message routed to:  [facility]    Travel Screening: Not Applicable

## 2020-03-02 ENCOUNTER — HOSPITAL ENCOUNTER (OUTPATIENT)
Dept: PHYSICAL THERAPY | Facility: CLINIC | Age: 85
Setting detail: THERAPIES SERIES
End: 2020-03-02
Attending: PHYSICIAN ASSISTANT
Payer: MEDICARE

## 2020-03-02 DIAGNOSIS — S82.092A OTHER CLOSED FRACTURE OF LEFT PATELLA, INITIAL ENCOUNTER: ICD-10-CM

## 2020-03-02 PROCEDURE — 97161 PT EVAL LOW COMPLEX 20 MIN: CPT | Mod: GP | Performed by: PHYSICAL THERAPIST

## 2020-03-02 PROCEDURE — 97110 THERAPEUTIC EXERCISES: CPT | Mod: GP | Performed by: PHYSICAL THERAPIST

## 2020-03-02 NOTE — PROGRESS NOTES
[facility]          OUTPATIENT PHYSICAL THERAPY ORTHOPEDIC EVALUATION  PLAN OF TREATMENT FOR OUTPATIENT REHABILITATION  (COMPLETE FOR INITIAL CLAIMS ONLY)  Patient's Last Name, First Name, M.I.  YOB: 1930  Gorge Corona  ALFONSO    Provider s Name:  [facility]   Medical Record No.  [id number]   Start of Care Date:  03/02/20   Onset Date:  02/14/20   Type:     _X__PT   ___OT   ___SLP Medical Diagnosis:   other closed fracture of left patella, initial encounter     PT Diagnosis:  left patellar fracture; non surgical   Visits from SOC:  1      _________________________________________________________________________________  Plan of Treatment/Functional Goals:  ADL retraining, balance training, gait training, joint mobilization, manual therapy, motor coordination training, neuromuscular re-education, ROM, strengthening, stretching     Cryotherapy     Goals     Goal Description: Patient will have 0-110 degrees of right knee ROM to allow for normalized ADL's.  Target Date: 04/13/20       Goal Description: Patient will be able to walk x 5 min without resting to allow for normalized community mobility (with 4 wheeled walker).  Target Date: 05/11/20       Goal Description: Patient will be able to [facility] to improve [facility] strength and proprioception.  Target Date: 05/11/20              Therapy Frequency:  1 time/week  Predicted Duration of Therapy Intervention:  10 weeks    Jaycee Anderson, PT                 I CERTIFY THE NEED FOR THESE SERVICES FURNISHED UNDER        THIS PLAN OF TREATMENT AND WHILE UNDER MY CARE     (Physician co-signature of this document indicates review and certification of the therapy plan).                       Certification Date From:    3/2/20  Certification Date To:   5/11/20    Referring Provider:  Stan Macias PA-C    Initial Assessment        See Epic Evaluation Start of Care Date: 03/02/20

## 2020-03-02 NOTE — PROGRESS NOTES
03/02/20 1200   General Information   Type of Visit Initial OP Ortho PT Evaluation   Start of Care Date 03/02/20   Referring Physician Stan Macias PA-C   Patient/Family Goals Statement to be able to walk again, to go to the [facility] again, to be able to put on left sock again   Orders Evaluate and Treat   Date of Order 02/21/20   Certification Required? Yes   Medical Diagnosis other closed fracture of left patella, initial encounter   Surgical/Medical history reviewed Yes   Body Part(s)   Body Part(s) Knee   Presentation and Etiology   Pertinent history of current problem (include personal factors and/or comorbidities that impact the POC) Per chart review: patient took a hard fall 2/14/20 and fractured his left patella.  2 years prior fractured ipsilateral LE and underwent femoral ORIF (jessica in place, visible on radiographs).  MD evaluation revealed reasonable function of extensor mechanism as well as fracture location indicating patient is a good candidate to heal conservatively.  Knee immobilizer provided with PT to start introducing gradual PROM.  Per patient: he is no longer wearing the knee immobilizer because he feels like he will fall with it on.  Notes both patella are sore.   Impairments A. Pain;B. Decreased WB tolerance;C. Swelling;D. Decreased ROM;F. Decreased strength and endurance;G. Impaired balance;H. Impaired gait   Functional Limitations perform activities of daily living;perform desired leisure / sports activities   Symptom Location left patella   How/Where did it occur With a fall   Onset date of current episode/exacerbation 02/14/20   Chronicity New   Pain rating (0-10 point scale) Worst (/10);Best (/10)   Best (/10) 6   Worst (/10) 8   Pain quality A. Sharp   Frequency of pain/symptoms A. Constant   Pain/symptoms are: The same all the time   Pain/symptoms exacerbated by B. Walking   Pain/symptoms eased by A. Sitting;C. Rest   Progression of symptoms since onset: Improved   Current / Previous  Interventions   Diagnostic Tests: X-ray   X-ray Results Results   X-ray results 2/21/20 XR femur: IMPRESSION: 1. No additional fracture. 2. Redemonstration vertical patellar fracture   Prior Level of Function   Prior Level of Function-Mobility Independent   Prior Level of Function-ADLs Independent   Current Level of Function   Current Community Support Family/friend caregiver   Patient role/employment history F. Retired   Living environment House/townhome   Current equipment-Gait/Locomotion Walker   Fall Risk Screen   Fall screen completed by PT   Have you fallen 2 or more times in the past year? No   Have you fallen and had an injury in the past year? No   Timed Up and Go score (seconds) 26   Is patient a fall risk? Yes;Department fall risk interventions implemented   Fall screen comments High falls risk; gait belt at all times; patient and wife educated   Abuse Screen (yes response referral indicated)   Feels Threatened by Someone no   Does Anyone Try to Keep You From Having Contact with Others or Doing Things Outside Your Home? no   Physical Signs of Abuse Present no   Knee Objective Findings   Side (if bilateral, select both right and left) Left   Observation noncompliant with knee immobilizer   Integumentary  joint line circumference right=44 cm; left=46 cm   Gait/Locomotion with 4 wheeled walker: increased thoracic kyphosis, wide gait, walker tends to get too far ahead of pt   Balance/Proprioception (Single Leg Stance) 0 sec   Knee Special Test Comments Able to perfom 10 reps SLR without quad lag - excellent extensor mechanism intact   Right Knee Extension AROM sybil=lacking 5 deg   Right Knee Flexion AROM 100 deg   Right Knee Flexion Strength 4/5   Right Knee Extension Strength 4/5 with patellar pain   Right Hip Abduction Strength 3+/5   Right Gastrocnemius Flexibility 0 deg   Planned Therapy Interventions   Planned Therapy Interventions ADL retraining;balance training;gait training;joint mobilization;manual  therapy;motor coordination training;neuromuscular re-education;ROM;strengthening;stretching   Planned Modality Interventions   Planned Modality Interventions Cryotherapy   Clinical Impression   Criteria for Skilled Therapeutic Interventions Met yes, treatment indicated   PT Diagnosis left patellar fracture; non surgical   Influenced by the following impairments Pain; weakness; impaired ROM; impaired gait and proprioception   Functional limitations due to impairments difficulty walking, climbing stairs   Clinical Presentation Stable/Uncomplicated   Clinical Presentation Rationale (+) motivation  (-) hx of previous femur fx and remaining weakness; frequency of falls (daily per wife); age; comorbidities   Clinical Decision Making (Complexity) Low complexity   Therapy Frequency 1 time/week   Predicted Duration of Therapy Intervention (days/wks) 10 weeks   Risk & Benefits of therapy have been explained Yes   Patient, Family & other staff in agreement with plan of care Yes   Clinical Impression Comments Gorge arrives today 3 weeks s/p left patellar fx with quite good extensor mechanism intact, however underlying severe balance impairments and high falls risk.  Patient will benefit from skilled PT to address the above impairments and functional limitations.   Education Assessment   Preferred Learning Style Listening;Demonstration;Pictures/video   Barriers to Learning No barriers   ORTHO GOALS   PT Ortho Eval Goals 1;2;3   Ortho Goal 1   Goal Description Patient will have 0-110 degrees of right knee ROM to allow for normalized ADL's.   Target Date 04/13/20   Ortho Goal 2   Goal Description Patient will be able to walk x 5 min without resting to allow for normalized community mobility (with 4 wheeled walker).   Target Date 05/11/20   Ortho Goal 3   Goal Description Patient will be able to [facility] to improve [facility] strength and proprioception.   Target Date 05/11/20   Total Evaluation Time   PT Milli,  Low Complexity Minutes (52337) 20     Jaycee Anderson, PT, DTP, SCS  Doctor of Physical Therapy #[id number]  [facility]  [phone]  [email or web address]

## 2020-03-04 ENCOUNTER — OFFICE VISIT (OUTPATIENT)
Dept: INTERNAL MEDICINE | Facility: CLINIC | Age: 85
End: 2020-03-04
Payer: MEDICARE

## 2020-03-04 VITALS
BODY MASS INDEX: 30.57 KG/M2 | DIASTOLIC BLOOD PRESSURE: 81 MMHG | HEART RATE: 69 BPM | OXYGEN SATURATION: 97 % | WEIGHT: 207 LBS | SYSTOLIC BLOOD PRESSURE: 134 MMHG

## 2020-03-04 DIAGNOSIS — R06.02 SHORTNESS OF BREATH: Primary | ICD-10-CM

## 2020-03-04 ASSESSMENT — PAIN SCALES - GENERAL: PAINLEVEL: MODERATE PAIN (4)

## 2020-03-04 ASSESSMENT — PATIENT HEALTH QUESTIONNAIRE - PHQ9: SUM OF ALL RESPONSES TO PHQ QUESTIONS 1-9: 14

## 2020-03-04 NOTE — PATIENT INSTRUCTIONS
[facility] Medication Refill Request Information:  * Please contact your pharmacy regarding ANY request for medication refills.  ** [facility] Prescription Fax = [phone]  * Please allow 3 business days for routine medication refills.  * Please allow 5 business days for controlled substance medication refills.     [facility] Test Result notification information:  *You will be notified with in 7-10 days of your appointment day regarding the results of your test.  If you are on MyChart you will be notified as soon as the provider has reviewed the results and signed off on them.    [facility]: [phone]

## 2020-03-04 NOTE — PROGRESS NOTES
[facility]  Primary Care Center   Lefty Hobbs MD  03/04/2020      Chief Complaint:   Medication Follow-up and Fall       History of Present Illness:   Gorge Corona is a 89 year old male with a history of congestive heart failure, atrial fibrillation s/p cardioversion, hyperlipidemia, arteriosclerotic cardiovascular disease, and mitral regurgitation s/p coronary bypass and mitral valve replacement who presents with his wife for follow-up of shortness of breath.    Shortness of breath: He continues to have shortness of breath. He reports it has been stable, though his wife notes that it worsened a few weeks ago. Lately, talking causes shortness of breath and he is unable to finish his sentences. However, walking does not cause shortness of breath. He denies chest tightness, wheezing, or productive cough. His PFTs show a restrictive pattern. He has not seen a pulmonologist since 2014. They indicated that he could have a component of asthma but has no obstruction on PFTs. He had a chest CT in 2015, which was unremarkable. Since then, recent chest x-rays have also been unremarkable. He feels that his Breo inhaler helps stop his wheezing. He has seen cardiology, and they have determined that there is no cardiac component to his dyspnea. An echo on 2/24 was negative for anything to explain dyspnea. He has a stress echo next week. He does have difficulty walking, and uses a walker. He denies tremor. His voice is significantly softer than it used to be. He has fallen many times in the past year. On Valentine's day, he lost his balance in the parking lot. He was holding onto his wife and she fell down as well.      Review of Systems:   Pertinent items are noted in HPI or as in patient entered ROS below, remainder of complete ROS is negative.     Active Medications:     Current Outpatient Medications:      albuterol (PROAIR HFA, PROVENTIL HFA, VENTOLIN HFA) 108 (90 BASE) MCG/ACT inhaler, Inhale 2 puffs into the  lungs every 4 hours as needed for shortness of breath / dyspnea or wheezing, Disp: 1 Inhaler, Rfl: 1     ammonium lactate (LAC-HYDRIN) 12 % lotion, Apply topically 2 times daily as needed for dry skin, Disp: 360 g, Rfl: 1     aspirin 81 MG EC tablet, Take 1 tablet (81 mg) by mouth daily, Disp: 90 tablet, Rfl: 3     atorvastatin (LIPITOR) 10 MG tablet, Take 1 tablet (10 mg) by mouth daily, Disp: 90 tablet, Rfl: 2     azithromycin (ZITHROMAX) 250 MG tablet, Two tablets first day, then one tablet daily for four days., Disp: 6 tablet, Rfl: 1     Calcium 600 MG tablet, Take 1 tablet by mouth 2 times daily., Disp: 100 tablet, Rfl: 3     Cholecalciferol (VITAMIN D) 1000 UNITS capsule, Take 1 capsule by mouth daily., Disp: , Rfl:      citalopram (CELEXA) 20 MG tablet, Take 1 tablet (20 mg) by mouth daily, Disp: 90 tablet, Rfl: 0     clindamycin (CLEOCIN) 300 MG capsule, Take 2 capsules by mouth as needed for 1 dose. 1 hour prior to dental work., Disp: 6 capsule, Rfl: 0     coenzyme Q-10 75 MG CAPS, Take  by mouth., Disp: , Rfl:      finasteride (PROSCAR) 5 MG tablet, Take 1 tablet (5 mg) by mouth daily, Disp: 60 tablet, Rfl: 2     fluticasone-salmeterol (ADVAIR DISKUS) 250-50 MCG/DOSE diskus inhaler, Inhale 1 puff into the lungs every 12 hours, Disp: 120 Inhaler, Rfl: 1     fluticasone-vilanterol (BREO ELLIPTA) 100-25 MCG/INH inhaler, Inhale 1 puff into the lungs daily, Disp: 1 Inhaler, Rfl: 11     furosemide (LASIX) 40 MG tablet, Take 1 tablet (40 mg) by mouth daily, Disp: 90 tablet, Rfl: 2     ipratropium (ATROVENT) 0.06 % nasal spray, Spray 2 sprays into both nostrils 4 times daily, Disp: 1 Box, Rfl: 3     levothyroxine (EUTHYROX) 50 MCG tablet, Take 1.5 tablets (75 mcg) by mouth daily, Disp: 135 tablet, Rfl: 3     MAGNESIUM OXIDE PO, Take 400 mg by mouth, Disp: , Rfl:      melatonin 3 MG tablet, Take 1 tablet (3 mg) by mouth nightly as needed for sleep, Disp: 100 tablet, Rfl: 3     metoprolol succinate ER (TOPROL-XL) 25  MG 24 hr tablet, Take 0.5 tablets (12.5 mg) by mouth daily, Disp: 45 tablet, Rfl: 3     Multiple Vitamins-Minerals (OCUVITE PO), Take  by mouth., Disp: , Rfl: 0     order for DME, Equipment being ordered: Safe step tub / walk-in tub, Disp: 1 Device, Rfl: 0     order for DME, Walker, Disp: 1 each, Rfl: 0     senna-docusate (SENOKOT-S;PERICOLACE) 8.6-50 MG per tablet, Take 1 tablet by mouth 2 times daily., Disp: 60 tablet, Rfl:      spironolactone (ALDACTONE) 25 MG tablet, TAKE 1 TABLET BY MOUTH ONCE DAILY, Disp: 90 tablet, Rfl: 3     tamsulosin (FLOMAX) 0.4 MG capsule, TAKE 1 CAPSULE BY MOUTH ONCE DAILY, Disp: 90 capsule, Rfl: 3     traMADol (ULTRAM) 50 MG tablet, Take 1 tablet (50 mg) by mouth daily as needed for severe pain, Disp: 10 tablet, Rfl: 0     traMADol (ULTRAM) 50 MG tablet, Take 0.5 tablets (25 mg) by mouth 2 times daily as needed for severe pain, Disp: 15 tablet, Rfl: 0     triamcinolone (KENALOG) 0.1 % cream, Apply sparingly to affected area three times daily as needed, Disp: 80 g, Rfl: 1     albuterol (2.5 MG/3ML) 0.083% nebulizer solution, Take 1 vial (2.5 mg) by nebulization every 4 hours as needed for shortness of breath / dyspnea, Disp: 1 Box, Rfl: 0      Allergies:   Penicillins      Past Medical History:  Atrial fibrillation  Atrial flutter  Basal cell carcinoma of skin of trunk  Benign prostatic hypertrophy with urinary obstruction  Coronary artery disease  Depression  Hyperlipidemia  Lumbar spinal stenosis  Mitral regurgitation  Palpitations  Paroxysmal supraventricular tachycardia  Squamous cell carcinoma  Tachycardia    shunt status   Lumbar spondylolysis  Actinic keratosis  Other malignant neoplasm of skin of trunk, except scrotum  Other seborrheic keratosis  Normal pressure hydrocephalus  Arteriosclerotic cardiovascular disease  Coronary artery disease, occlusive  Mitral valve regurgitation  Dyslipidemia  Hypothyroidism  Congestive heart failure   Near syncope  Bradycardia      Past  Surgical History:  Anesthesia cardioversion: 4/2013  Bypass graft coronary artery, repair mitral valve, combined: 1/2013  Cataract IOL, bilateral  Coronary angiography, adult order  Insert catheter bladder: 1/2013  Mohs micrographic procedure     Family History:   Cancer: father  Kidney disease: mother      Social History:   Smoking status: former smoker (cigars, pipe), quit 1990  Smokeless tobacco: former user  Alcohol use: no      Physical Exam:   /81   Pulse 69   Wt 93.9 kg (207 lb)   SpO2 97%   BMI 30.57 kg/m     Constitutional: Alert. In no distress.  Head: Normocephalic.   Cardiovascular: RRR. 3/6 systolic murmur. No clicks, gallops, or rub.  Respiratory: Clear to auscultation bilaterally, no wheezes or crackles.  Musculoskeletal: No gross deformities noted.   Psychiatric: Mentation appears normal. Normal affect.       Assessment and Plan:  Shortness of breath  His cardiology workup has been negative thus far. He has a stress echo next week. I recommended that he see a pulmonologist to continue the evaluation of his shortness of breath. It might be partly due to asthma, since he describes that his Breo inhaler has significantly improved his wheezing. Although unlikely, Parkinson's is also a possibility, since his voice is significantly softer than it used to be and he reports that he has fallen a lot recently. He will see pulmonology and if there is still no clear etiology for his dyspnea, he should see neurology.   - PULMONARY MEDICINE REFERRAL     Follow-up: 6 months      Scribe Disclosure:  Isabella VILLARREAL, am serving as a scribe to document services personally performed by Lefty Hobbs MD at this visit, based upon the provider's statements to me. All documentation has been reviewed by the aforementioned provider prior to being entered into the official medical record.    Scribe Preparation Attestation:  Isabella VILLARREAL, a scribe, prepared the chart for today's encounter.      Portions  of this medical record were completed by a scribe. UPON MY REVIEW AND AUTHENTICATION BY ELECTRONIC SIGNATURE, this confirms (a) I performed the applicable clinical services, and (b) the record is accurate.     -- Monisha Hobbs MD

## 2020-03-04 NOTE — NURSING NOTE
Chief Complaint   Patient presents with     Medication Follow-up     Pt is here to follow up on medications.      Fall     Pt fell in feb. 2020. Pt was seen in [facility] L[facility] at 12:55 PM on 3/4/2020.

## 2020-03-05 ENCOUNTER — ANCILLARY PROCEDURE (OUTPATIENT)
Dept: GENERAL RADIOLOGY | Facility: CLINIC | Age: 85
End: 2020-03-05
Attending: FAMILY MEDICINE
Payer: MEDICARE

## 2020-03-05 ENCOUNTER — OFFICE VISIT (OUTPATIENT)
Dept: ORTHOPEDICS | Facility: CLINIC | Age: 85
End: 2020-03-05
Payer: MEDICARE

## 2020-03-05 VITALS — RESPIRATION RATE: 16 BRPM | WEIGHT: 207 LBS | BODY MASS INDEX: 30.66 KG/M2 | HEIGHT: 69 IN

## 2020-03-05 DIAGNOSIS — R06.00 DYSPNEA: Primary | ICD-10-CM

## 2020-03-05 DIAGNOSIS — S82.092A OTHER CLOSED FRACTURE OF LEFT PATELLA, INITIAL ENCOUNTER: ICD-10-CM

## 2020-03-05 DIAGNOSIS — S82.092A OTHER CLOSED FRACTURE OF LEFT PATELLA, INITIAL ENCOUNTER: Primary | ICD-10-CM

## 2020-03-05 ASSESSMENT — MIFFLIN-ST. JEOR: SCORE: 1594.33

## 2020-03-05 NOTE — PROGRESS NOTES
"[facility]  Orthopedics  Jw Ahumada MD  2020     Name: Gorge Corona  MRN: [id number]  Age: 89 year old  : 1930  Referring provider: Referred Self     Chief Complaint: Follow Up of the Left Knee    Date of Injury: 2020    History of Present Illness:   Gorge Corona is a 89 year old male who presents today for follow-up regarding left knee pain. He was last evaluated by GLENN Macias on 2020, at which time he was diagnosed with a closed, non-displaced fracture of the lateral patella facet and he was placed in a knee immobilizer and was recommended to bear weight using a walker for assistance. Today, he notes that pain is unchanged. He notes that he had been to physical therapy one time and he believes that this has been improving his left knee pain. He has not been doing home exercises at the moment. Has not been wearing the immobilizer because he finds it uncomfortable.     Review of Systems:   A 10-point review of systems was obtained and is negative except for as noted in the HPI.     Physical Examination:  Resp. rate 16, height 1.753 m (5' 9\"), weight 93.9 kg (207 lb).  Patient is alert, No acute distress, pleasant and conversational.    Gait: nonantalgic. Normal heel toe gait.    Patient is able to perform two legged squat without difficulty.    left knee:   Skin intact. No erythema or ecchymosis.  Mild soft tissue swelling over patella.    PROM: Zero to approximately 90  with pain at 90     Palpation: TTP over lateral patella    Special Tests:  none    Full Isometric quad strength, extensor mechanism in place     Neurovascularly intact in the lower extremity    Imaging:  Radiographs of the left knee - 3 views (2020)  Longitudinal fracture of lateral patella again demonstrated. No change in alignment. See emr for formal radiology report.     I have independently reviewed the above imaging studies; the results were discussed with the patient.     Assessment:   89 year old male " presenting for follow up regarding a closed, non-displaced fracture of the lateral patella.    Plan:   - Work on gentle range of motion exercises with PT  - recommended continued bracing to avoid deep flexion. Considered hinged knee brace, but patient declines.   - Follow up with me in three weeks for repeat evaluation.     Jw Ahumada MD      Scribe Disclosure:  Mj VILLARREAL, am serving as a scribe to document services personally performed by Jw Ahumada MD at this visit, based upon the provider's statements to me. All documentation has been reviewed by the aforementioned provider prior to being entered into the official medical record.

## 2020-03-05 NOTE — TELEPHONE ENCOUNTER
RECORDS RECEIVED FROM: internal    DATE RECEIVED: 6.3.20    NOTES STATUS DETAILS   OFFICE NOTE from referring provider Internal 3.4.20 Monisha Medina,    OFFICE NOTE from other specialist N/A    DISCHARGE SUMMARY from hospital N/A    DISCHARGE REPORT from the ER N/A    MEDICATION LIST Internal    IMAGING  (NEED IMAGES AND REPORTS)     CT SCAN Internal    CHEST XRAY (CXR) Internal 8/17/18   TESTS     PULMONARY FUNCTION TESTING (PFT) In process        Action 3.5.20 sv     Action Taken Message sent to nurse to place PFT and CXR order

## 2020-03-05 NOTE — Clinical Note
3/5/2020       RE: Gorge Corona  [id number]  [facility] [address]     Dear Colleague,    Thank you for referring your patient, Gorge Corona, to the [facility] SPORTS AND ORTHOPAEDIC WALK IN CLINIC at [facility]. Please see a copy of my visit note below.          SPORTS & ORTHOPEDIC WALK-IN FOLLOW-UP VISIT 3/5/2020    Interval History:     Follow up reason: Left patella fx 2 week f/u    Date of injury: 20    Date last seen: 20    Following Therapeutic Plan: Yes     Pain: Unchanged    Function: Unchanged    Interval History: He has been to PT which he thinks helped slightly he hasn't done much HEP.     Medical History:    Any recent changes to your medical history? No    Any new medication prescribed since last visit? No    Review of Systems:    Do you have fever, chills, weight loss? No    Do you have any vision problems? No    Do you have any chest pain or edema? No    Do you have any shortness of breath or wheezing?  No    Do you have stomach problems? No    Do you have any numbness or focal weakness? No    Do you have diabetes? No    Do you have problems with bleeding or clotting? No    Do you have an rashes or other skin lesions? No             [facility]  Orthopedics  Jw Ahumada MD  2020     Name: Gorge Corona  MRN: [id number]  Age: 89 year old  : 1930  Referring provider: Referred Self     Chief Complaint: Follow Up of the Left Knee    Date of Injury: 2020    History of Present Illness:   Gorge Corona is a 89 year old male who presents today for follow-up regarding left knee pain. He was last evaluated by GLENN Macias on 2020, at which time he was diagnosed with a closed, non-displaced fracture of the lateral patella facet and he was placed in a knee immobilizer and was recommended to bear weight using a walker for assistance. Today, he notes that pain is unchanged. He notes that he had been to physical therapy one time  "and he believes that this has been improving his left knee pain. He has not been doing home exercises at the moment.     Review of Systems:   A 10-point review of systems was obtained and is negative except for as noted in the HPI.     Physical Examination:  Resp. rate 16, height 1.753 m (5' 9\"), weight 93.9 kg (207 lb).  Patient is alert, No acute distress, pleasant and conversational.    Gait: nonantalgic. Normal heel toe gait.    Patient is able to perform two legged squat without difficulty.    left knee:   Skin intact. No erythema or ecchymosis.  No effusion or soft tissue swelling.    AROM: Zero to approximately 135  without restriction or reported pain.    Palpation: No medial or lateral facet joint tenderness.  No posterior medial or posterior lateral joint line tenderness     Special Tests:  Negative bounce test, negative forced flexion and negative Ilya's.  No ligamentous laxity or pain with valgus or varus stress.  Negative Lachman's, Anterior Drawer and Posterior Drawer     Full Isometric quad strength, extensor mechanism in place     Neurovascularly intact in the lower extremity    Hip and Ankle with full AROM and nontender    Imaging:  Radiographs of the left knee - 3 views (03/05/2020)  ***    I have independently reviewed the above imaging studies; the results were discussed with the patient.     Assessment:   89 year old male presenting for follow up regarding a closed, non-displaced fracture of the lateral patella facet.    Plan:   - Work on gentle range of motion exercises.   - Follow up with me in three weeks for repeat evaluation.     Scribe Disclosure:  I, Mj Valladares, am serving as a scribe to document services personally performed by Jw Ahumada MD at this visit, based upon the provider's statements to me. All documentation has been reviewed by the aforementioned provider prior to being entered into the official medical record.            Again, thank you for allowing me to " participate in the care of your patient.      Sincerely,    Jw Ahumada MD

## 2020-03-05 NOTE — PROGRESS NOTES
SPORTS & ORTHOPEDIC WALK-IN FOLLOW-UP VISIT 3/5/2020    Interval History:     Follow up reason: Left patella fx 2 week f/u    Date of injury: 2/14/20    Date last seen: 2/21/20    Following Therapeutic Plan: Yes     Pain: Unchanged    Function: Unchanged    Interval History: He has been to PT which he thinks helped slightly he hasn't done much HEP.     Medical History:    Any recent changes to your medical history? No    Any new medication prescribed since last visit? No    Review of Systems:    Do you have fever, chills, weight loss? No    Do you have any vision problems? No    Do you have any chest pain or edema? No    Do you have any shortness of breath or wheezing?  No    Do you have stomach problems? No    Do you have any numbness or focal weakness? No    Do you have diabetes? No    Do you have problems with bleeding or clotting? No    Do you have an rashes or other skin lesions? No

## 2020-03-09 ENCOUNTER — HOSPITAL ENCOUNTER (OUTPATIENT)
Dept: GENERAL RADIOLOGY | Facility: CLINIC | Age: 85
Discharge: HOME | End: 2020-03-09
Attending: NURSE PRACTITIONER | Admitting: NURSE PRACTITIONER
Payer: MEDICARE

## 2020-03-09 ENCOUNTER — HOSPITAL ENCOUNTER (OUTPATIENT)
Dept: NUCLEAR MEDICINE | Facility: CLINIC | Age: 85
Setting detail: NUCLEAR MEDICINE
Discharge: HOME | End: 2020-03-09
Attending: NURSE PRACTITIONER | Admitting: NURSE PRACTITIONER
Payer: MEDICARE

## 2020-03-09 VITALS — BODY MASS INDEX: 30.66 KG/M2 | HEIGHT: 69 IN | WEIGHT: 207 LBS

## 2020-03-09 DIAGNOSIS — R06.09 EXERTIONAL DYSPNEA: ICD-10-CM

## 2020-03-09 DIAGNOSIS — I25.10 CORONARY ARTERY DISEASE INVOLVING NATIVE CORONARY ARTERY OF NATIVE HEART WITHOUT ANGINA PECTORIS: ICD-10-CM

## 2020-03-09 LAB
BASOPHILS # BLD AUTO: 0 10E9/L (ref 0–0.2)
BASOPHILS NFR BLD AUTO: 0.5 %
CV STRESS MAX HR HE: 72
DIFFERENTIAL METHOD BLD: ABNORMAL
EOSINOPHIL # BLD AUTO: 0.1 10E9/L (ref 0–0.7)
EOSINOPHIL NFR BLD AUTO: 2.2 %
ERYTHROCYTE [DISTWIDTH] IN BLOOD BY AUTOMATED COUNT: 13 % (ref 10–15)
HCT VFR BLD AUTO: 39.3 % (ref 40–53)
HGB BLD-MCNC: 12.9 G/DL (ref 13.3–17.7)
LYMPHOCYTES # BLD AUTO: 1.5 10E9/L (ref 0.8–5.3)
LYMPHOCYTES NFR BLD AUTO: 24.1 %
MCH RBC QN AUTO: 31.5 PG (ref 26.5–33)
MCHC RBC AUTO-ENTMCNC: 32.8 G/DL (ref 31.5–36.5)
MCV RBC AUTO: 96 FL (ref 78–100)
MONOCYTES # BLD AUTO: 0.8 10E9/L (ref 0–1.3)
MONOCYTES NFR BLD AUTO: 12.8 %
NEUTROPHILS # BLD AUTO: 3.6 10E9/L (ref 1.6–8.3)
NEUTROPHILS NFR BLD AUTO: 60.4 %
NT-PROBNP SERPL-MCNC: 291 PG/ML (ref 0–450)
PLATELET # BLD AUTO: 110 10E9/L (ref 150–450)
RATE PRESSURE PRODUCT: 7776
RBC # BLD AUTO: 4.1 10E12/L (ref 4.4–5.9)
STRESS ECHO BASELINE DIASTOLIC HE: 70
STRESS ECHO BASELINE HR: 64
STRESS ECHO BASELINE SYSTOLIC BP: 120
STRESS ECHO CALCULATED PERCENT HR: 55 %
STRESS ECHO LAST STRESS DIASTOLIC BP: 80
STRESS ECHO LAST STRESS SYSTOLIC BP: 108
STRESS ECHO TARGET HR: 131
WBC # BLD AUTO: 6 10E9/L (ref 4–11)

## 2020-03-09 PROCEDURE — 83880 ASSAY OF NATRIURETIC PEPTIDE: CPT | Performed by: NURSE PRACTITIONER

## 2020-03-09 PROCEDURE — 34300033 ZZH RX 343: Performed by: NURSE PRACTITIONER

## 2020-03-09 PROCEDURE — 25000128 H RX IP 250 OP 636: Performed by: NURSE PRACTITIONER

## 2020-03-09 PROCEDURE — 71046 X-RAY EXAM CHEST 2 VIEWS: CPT

## 2020-03-09 PROCEDURE — 85025 COMPLETE CBC W/AUTO DIFF WBC: CPT | Performed by: NURSE PRACTITIONER

## 2020-03-09 PROCEDURE — 93016 CV STRESS TEST SUPVJ ONLY: CPT | Performed by: FAMILY MEDICINE

## 2020-03-09 PROCEDURE — 93017 CV STRESS TEST TRACING ONLY: CPT

## 2020-03-09 PROCEDURE — A9502 TC99M TETROFOSMIN: HCPCS | Performed by: NURSE PRACTITIONER

## 2020-03-09 PROCEDURE — 36415 COLL VENOUS BLD VENIPUNCTURE: CPT | Performed by: NURSE PRACTITIONER

## 2020-03-09 PROCEDURE — 78452 HT MUSCLE IMAGE SPECT MULT: CPT | Mod: 26 | Performed by: INTERNAL MEDICINE

## 2020-03-09 PROCEDURE — 93018 CV STRESS TEST I&R ONLY: CPT | Performed by: INTERNAL MEDICINE

## 2020-03-09 PROCEDURE — 78452 HT MUSCLE IMAGE SPECT MULT: CPT

## 2020-03-09 RX ORDER — REGADENOSON 0.08 MG/ML
0.4 INJECTION, SOLUTION INTRAVENOUS ONCE
Status: COMPLETED | OUTPATIENT
Start: 2020-03-09 | End: 2020-03-09

## 2020-03-09 RX ADMIN — TETROFOSMIN 10.9 MCI.: 1.38 INJECTION, POWDER, LYOPHILIZED, FOR SOLUTION INTRAVENOUS at 11:55

## 2020-03-09 RX ADMIN — REGADENOSON 0.4 MG: 0.08 INJECTION, SOLUTION INTRAVENOUS at 14:02

## 2020-03-09 RX ADMIN — TETROFOSMIN 30.5 MCI.: 1.38 INJECTION, POWDER, LYOPHILIZED, FOR SOLUTION INTRAVENOUS at 14:06

## 2020-03-09 ASSESSMENT — MIFFLIN-ST. JEOR
SCORE: 1594.33
SCORE: 1594.33

## 2020-03-12 ENCOUNTER — OFFICE VISIT (OUTPATIENT)
Dept: DERMATOLOGY | Facility: CLINIC | Age: 85
End: 2020-03-12
Payer: MEDICARE

## 2020-03-12 DIAGNOSIS — L85.3 XEROSIS OF SKIN: ICD-10-CM

## 2020-03-12 DIAGNOSIS — Z85.828 HISTORY OF NONMELANOMA SKIN CANCER: ICD-10-CM

## 2020-03-12 DIAGNOSIS — Z12.83 SCREENING FOR SKIN CANCER: ICD-10-CM

## 2020-03-12 DIAGNOSIS — L57.0 AK (ACTINIC KERATOSIS): Primary | ICD-10-CM

## 2020-03-12 ASSESSMENT — PAIN SCALES - GENERAL: PAINLEVEL: NO PAIN (0)

## 2020-03-12 NOTE — NURSING NOTE
Dermatology Rooming Note    Gorge Corona's goals for this visit include:   Chief Complaint   Patient presents with     Skin Check     Gorge is here today for a skin check.      DEYSI Borrero

## 2020-03-12 NOTE — LETTER
3/12/2020       RE: Gorge Corona  [id number] [facility] [address]     Dear Colleague,    Thank you for referring your patient, Gorge Corona, to the [facility] DERMATOLOGY at [facility]. Please see a copy of my visit note below.    SUBJECTIVE:  Mr. Corona comes in for evaluation of his skin.  He has a history of skin cancers and precancers and would like a skin exam.  He does not recall seeing any worrisome lesions recently on self exam.      OBJECTIVE:  Examination shows a pleasant gentleman who comes in using a walker and is somewhat slow due to  some musculoskeletal issues.  He, however, is alert and pleasant.  We examined his neck, face, chest, back, arms, legs, hands and feet.      On his face are 5 actinic keratoses scattered about his forehead and the ears.  I found  no basal or squamous cell-like lesions, and  on examination of his trunk and face I did not see any atypical pigmented lesions.  His skin is extremely dry.  He does have some seborrheic keratoses scattered about, most of which are light in color.       ASSESSMENT:  No significant lesions of concern.  Five actinic keratoses on the forehead and ear.      PLAN:  Cryotherapy to the actinic keratoses.  He tolerated it well.  Reassured.  I asked that he recheck again in 6-12 months.     Again, thank you for allowing me to participate in the care of your patient.      Sincerely,    SRIDEVI Parker MD

## 2020-03-12 NOTE — PROGRESS NOTES
SUBJECTIVE:  Mr. Corona comes in for evaluation of his skin.  He has a history of skin cancers and precancers and would like a skin exam.  He does not recall seeing any worrisome lesions recently on self exam.      OBJECTIVE:  Examination shows a pleasant gentleman who comes in using a walker and is somewhat slow due to  some musculoskeletal issues.  He, however, is alert and pleasant.  We examined his neck, face, chest, back, arms, legs, hands and feet.      On his face are 5 actinic keratoses scattered about his forehead and the ears.  I found  no basal or squamous cell-like lesions, and  on examination of his trunk and face I did not see any atypical pigmented lesions.  His skin is extremely dry.  He does have some seborrheic keratoses scattered about, most of which are light in color.       ASSESSMENT:  No significant lesions of concern.  Five actinic keratoses on the forehead and ear.      PLAN:  Cryotherapy to the actinic keratoses.  He tolerated it well.  Reassured.  I asked that he recheck again in 6-12 months.

## 2020-03-26 DIAGNOSIS — R09.82 POST-NASAL DRIP: ICD-10-CM

## 2020-03-27 RX ORDER — IPRATROPIUM BROMIDE 42 UG/1
2 SPRAY, METERED NASAL 4 TIMES DAILY
Qty: 15 ML | Refills: 4 | Status: SHIPPED | OUTPATIENT
Start: 2020-03-27

## 2020-04-22 ENCOUNTER — TELEPHONE (OUTPATIENT)
Dept: ORTHOPEDICS | Facility: CLINIC | Age: 85
End: 2020-04-22

## 2020-04-22 DIAGNOSIS — S82.092D OTHER CLOSED FRACTURE OF LEFT PATELLA WITH ROUTINE HEALING, SUBSEQUENT ENCOUNTER: Primary | ICD-10-CM

## 2020-04-22 NOTE — TELEPHONE ENCOUNTER
Spoke with Gorge's wife and she was under the impression that she was not supposed to come to the clinic. We said that is 100% ok, we will just want Gorge to acquire some XR on his own convenience and we will call them with updates on 4/27 at 12PM.  She was provided with the appropriate number to call and I let her know that the XR orders are in.     - Tim NEELY ATC

## 2020-04-27 ENCOUNTER — VIRTUAL VISIT (OUTPATIENT)
Dept: ORTHOPEDICS | Facility: CLINIC | Age: 85
End: 2020-04-27
Payer: MEDICARE

## 2020-04-27 DIAGNOSIS — Z53.9 ERRONEOUS ENCOUNTER--DISREGARD: Primary | ICD-10-CM

## 2020-04-27 NOTE — PROGRESS NOTES
Patient was called to have a follow up appointment for left knee patellar fracture.His wife answered the phone and said that they didn't get knee xrays and his knee has been feeling better. She also stated that Gorge is hard of hearing so a phone conversation to at least discuss his progress with  wouldn't work. She would like the appointment canceled. They were made aware to follow up by phone or in person when the time comes if able. She voiced understanding. Dr. Ahumada was made aware of the canceled appointment.

## 2020-05-03 DIAGNOSIS — I25.10 CORONARY ARTERY DISEASE INVOLVING NATIVE CORONARY ARTERY OF NATIVE HEART WITHOUT ANGINA PECTORIS: ICD-10-CM

## 2020-05-03 DIAGNOSIS — N40.0 BENIGN PROSTATIC HYPERPLASIA, UNSPECIFIED WHETHER LOWER URINARY TRACT SYMPTOMS PRESENT: ICD-10-CM

## 2020-05-03 DIAGNOSIS — F33.1 MAJOR DEPRESSIVE DISORDER, RECURRENT EPISODE, MODERATE (H): ICD-10-CM

## 2020-05-04 RX ORDER — METOPROLOL SUCCINATE 25 MG/1
12.5 TABLET, EXTENDED RELEASE ORAL DAILY
Qty: 45 TABLET | Refills: 2 | Status: SHIPPED | OUTPATIENT
Start: 2020-05-04

## 2020-05-05 RX ORDER — FINASTERIDE 5 MG/1
1 TABLET, FILM COATED ORAL DAILY
Qty: 90 TABLET | Refills: 3 | Status: SHIPPED | OUTPATIENT
Start: 2020-05-05 | End: 2021-03-14

## 2020-05-05 NOTE — TELEPHONE ENCOUNTER
citalopram (CELEXA) 20 MG tablet      Last Written Prescription Date:  1-7-2020  Last Fill Quantity: 90,   # refills: 0  Last Office Visit : 3-4-2020  Future Office visit:  8-    Routing refill request to provider for review/approval because:  PHQ-9 increased from 11-14 at the last clinic visit.      Kathleen M Doege RN

## 2020-05-06 RX ORDER — CITALOPRAM HYDROBROMIDE 20 MG/1
20 TABLET ORAL DAILY
Qty: 90 TABLET | Refills: 0 | Status: SHIPPED | OUTPATIENT
Start: 2020-05-06 | End: 2020-08-18

## 2020-05-06 NOTE — TELEPHONE ENCOUNTER
Screening was completed during clinic visit. Plan to follow up in 6 months from March. Medication refilled.    Alexandra Brannon RN (Brasch)

## 2020-05-17 DIAGNOSIS — J44.1 COPD EXACERBATION (H): ICD-10-CM

## 2020-05-18 NOTE — PROGRESS NOTES
"   09/10/18 1000   Signing Clinician's Name / Credentials   Signing clinician's name / credentials Keerthi Ignacio OTR/TED   Session Number   Session Number 2   Reporting period 08/21-11/13/2018   Recertification   Recertification Due 11/13/18   Progress Notes   Progress Note Due 11/13/18   GOALS   Goals Near Vision;Environmental Modification;Resource Education   Goals Addressed this Session Near vision;Environmental modification;Resource education   Goal 1 - Near Vision   Goal Description: Near Vision Patient will verbalize awareness of visual field Loss and demonstrate improved use of visual skills/adaptive equipment for increased independence in reading-based activities of daily living, written communication and detail ADL tasks.   Near Vision Goal Comment goal initiated with identification of electronic magnficiation and /or dome magnifier(extra large)  for spot reading and use of audio books for continous text   Target Date 11/13/18   Goal 3 - Environmental modification   Goal Description: Environment modification Patient will demonstrate understanding of the impact of lighting, contrast and glare on ADL activities, in conjunction with environmentally-based ADL modifications   Environmental Modification Goal Comment goal initiated with ID of lap desk to position reading material for improved ergonomics for reading,    Target Date 11/13/18   Goal 4 - Resource education   Goal Description: Resource education Patient will verbalize awareness of community resources for the following:;Audio access to print materials;Access to large print materials;Access to low vision devices   Resource Education Goal Comment goal initiated   Target Date 11/13/18       Present No   Therapy Diagnosis   Therapy  Diagnosis: Impaired ADL/IADL with deficits in Reading based ADL;Written communication  (glare and light managment)   Subjective Report   Subjective Report \"I am enthused about that\"... referring to audio book " player   Visual Rehab Treatment [place]offey   Daily Treatment [place]y Self Care/Home Management   Self Care/Home Management   Skilled Intervention Training in use of magnifiers for reading;Training in use of electronic aids for reading  (environmental modfications, audio book resources/large print)   Patient Response pt identified 4X dome magnifier for spot reading, audio book player for continous text, and lap desk to position reading material as methods to increase ind. and work towards meeting goals   Treatment Detail Spot reading: continued with electronic and handheld magnification.instruction. Pt demonstrated excellent fluency utilizing CCTV technology on reverse contrast settings, but identified that the cost of the devices was a deterent for him.  Trialed dome magnifier vs. illuminated stand, and id'ed 4X dome magnifier as preferred device for spot reading. resource was issued Trialed lap desk for improvied ergonomics for reading, and identfiied that this was effective. Continous text reading: provided training in SSB audio book device. Pt demonstrated good understanding, and verbalized enthusiasm for accessing continous text with audio format. Complted application for program per pt request. Trialed large print books for reading - identified ind. reading large print with reading glasses, and [employer] library identfiied as resource   Progress excellent progress towards goals 1, 3 and 4   MN Read   Smallest print size read all with CCTV. 0.5M with 4X dome   Critical print size all with CCTV. 0.8M with 4X dome   Words per minute at critical print size 150   Equipment/Resources   Written resources provided Magnifier;Audio access to print material;Large print materials;Referral form completed to [facility] for at-home/vocational services   Assessments Completed   Assessments Completed   (MNRead)   Education   Learner Patient;Significant   Readiness Eager;Acceptance   Method  "Booklet/handout;Explanation;Demonstration   Response Verbalizes understanding;Demonstrates understanding;Needs reinforcement   Communication with other professionals   Communication with other professionals per EHR   Plan   Plan for next session pt referred for \"safe driving issues\" per Dr. Monisha Hobbs Busommer glare and light assessement, written communication, contrast game and strategies   Comments   Comments Discharge: pt did not return to complete plan of care. Progress to goals as above   Total Session Time   Timed Code Treatment Minutes 70   Total Treatment Time (sum of timed and untimed services) 70     "

## 2020-05-18 NOTE — ADDENDUM NOTE
Encounter addended by: Keerthi Ignacio, OT on: 5/18/2020 2:43 PM   Actions taken: Clinical Note Signed, Flowsheet accepted, Episode resolved

## 2020-06-02 NOTE — PROGRESS NOTES
"Gorge Corona is a 89 year old male who is being evaluated via a billable telephone visit.      The patient has been notified of following:     \"This telephone visit will be conducted via a call between you and your physician/provider. We have found that certain health care needs can be provided without the need for a physical exam.  This service lets us provide the care you need with a short phone conversation.  If a prescription is necessary we can send it directly to your pharmacy.  If lab work is needed we can place an order for that and you can then stop by our lab to have the test done at a later time.    Telephone visits are billed at different rates depending on your insurance coverage. During this emergency period, for some insurers they may be billed the same as an in-person visit.  Please reach out to your insurance provider with any questions.    If during the course of the call the physician/provider feels a telephone visit is not appropriate, you will not be charged for this service.\"    Patient has given verbal consent for Telephone visit?  Yes    What phone number would you like to be contacted at? [phone]    How would you like to obtain your AVS? [facility]  Initial Clinic Note        Assessment and Plan:  Gorge Corona is a 89 year old male with a history of COPD, elevated left hemidiaphragm, and complex cardiac history including coronary arterial disease, valvular disease, and arrhythmia, who is reestablishing care in pulmonary clinic.    I discussed with the patient's wife that his dyspnea on exertion is likely multifactorial related to restrictive and obstructive lung disease, and that unfortunately, neither of these are reversible.  It is also likely that his cardiac issues are contributing to dyspnea as well, in addition to his advanced age and ongoing deconditioning.  We discussed that in the current pandemic, we are only obtaining lung " "function tests if it is going to .    At this point, I am comfortable continuing the Breo inhaler, with a azithromycin on hand should the patient experience recurrent episodes of bronchitis.  It seems like this is been very well controlled since he last saw pulmonary.    The patient's wife was in favor of having Gorge repeat pulmonary function tests when the clinics return to a more \"business as usual\" setting, in addition to follow-up visit with me at that time.    RTC Post-COVID with PFTs.     Vickie Kincaid MD   of Medicine  Division of Pulmonary, Critical Care, Allergy, and Sleep Medicine  Pager [phone]    CC: ongoing shortness of breath      HPI:    Mr. Gorge Corona is an 89-year-old male with a history of moderate obstructive lung disease, elevated left hemidiaphragm, and complicated cardiac history including CABG in 2006, mitral valve replacement in 2013, and PCI in 2013, atrial flutter, and mild to moderate aortic stenosis.    He is having a phone visit today to follow-up ongoing dyspnea.  I spent most of the call today on the phone with his wife, due to the patient's hearing loss.    The patient has been seen by several of my colleagues, most recently Dr. Agudelo in the year 2016.  At that time he was getting treated for COPD and what sounded to be like a yearly episode of chronic bronchitis.  It was thought at that time that his dyspnea on exertion was multifactorial related to elevated hemidiaphragm, scoliosis, ongoing cardiac issues, and the patient's advanced age.    He last saw cardiology in April 2019.  At that visit, he was felt to have somewhere between mild to moderate aortic stenosis.  An echo in February of this year demonstrated moderate aortic stenosis, but no new explanations for why the patient might have ongoing dyspnea on exertion. He had a stress test on March 9 of this year, which was negative for inducible myocardial ischemia or " infarction.    In terms of respiratory symptoms, his wife tells me that she is continued to see a slow steady decline over the past several years.  She reports that Gorge does not have any cough.  He has occasional wheezing, for which he uses albuterol about once a week, and this is effective.  He has not had an episode of bronchitis in over 1 year.    The patient generally is able to do his own ADLs, including shower, however his wife provides a significant amount of help around the house.  He does get short of breath when he is bathing himself, getting dressed, etc.    He also has some sinus congestion, which is treated with Atrovent nasal spray.    The patient's wife states that they are primarily interested in repeating pulmonary function testing today, to see if he is declined or made progress from the last time he had these tests done.    Exposure History  Tobacco: Cigar and pipe smoking.  Quit decades ago (>30y ago)  Other inhaled substances: worked in the [employer] as a .  No obvious inhalational exposures then.  Worked for the [employer], in an office setting.   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      PMH:  Past Medical History:   Diagnosis Date     Atrial fibrillation (H)      Atrial flutter (H)      Basal cell carcinoma of skin of trunk      BPH (benign prostatic hypertrophy) with urinary obstruction      CAD (coronary artery disease)     s/p CABG 1/2013; RUSSEL in 2/2013     Depression      H/O CT scan of head 1/11/2013 7/20/2006    Result Impression     CT of the Head without contrast 7/20/2006  History: Ataxia, incontinence, and NPH  Comparison Study: MR obtained to 4/19/2004  Technique: Axial CT images of the head were obtained at 2 intervals from the skull base to the vertex without intravenous contrast. The images were reviewed in brain, subdural and bone windows.   Findings: Patient has had a moderate promin     Hyperlipidemia      Lumbar spinal stenosis      Mitral regurgitation     s/p bioprosthesis 2013     Palpitations      Paroxysmal supraventricular tachycardia (H)      Squamous cell carcinoma      Tachycardia     baroreflex injury due to surgery      (ventriculoperitoneal) shunt status     for Normal pressure hydrocephalus       Allergies:  Allergies   Allergen Reactions     Penicillins Rash       Social History:  Social History     Socioeconomic History     Marital status:      Spouse name: Not on file     Number of children: Not on file     Years of education: Not on file     Highest education level: Not on file   Occupational History     Not on file   Social Needs     Financial resource strain: Not on file     Food insecurity     Worry: Not on file     Inability: Not on file     Transportation needs     Medical: Not on file     Non-medical: Not on file   Tobacco Use     Smoking status: Former Smoker     Types: Cigars, Pipe     Last attempt to quit: 1990     Years since quittin.4     Smokeless tobacco: Former User   Substance and Sexual Activity     Alcohol use: No     Alcohol/week: 0.0 standard drinks     Drug use: No     Sexual activity: Not on file   Lifestyle     Physical activity     Days per week: Not on file     Minutes per session: Not on file     Stress: Not on file   Relationships     Social connections     Talks on phone: Not on file     Gets together: Not on file     Attends Mosque service: Not on file     Active member of club or organization: Not on file     Attends meetings of clubs or organizations: Not on file     Relationship status: Not on file     Intimate partner violence     Fear of current or ex partner: Not on file     Emotionally abused: Not on file     Physically abused: Not on file     Forced sexual activity: Not on file   Other Topics Concern     Parent/sibling w/ CABG, MI or angioplasty before 65F 55M? Not Asked    Social History Narrative     Not on file       Medications:  Current Outpatient Medications   Medication Sig Dispense Refill     albuterol (2.5 MG/3ML) 0.083% nebulizer solution Take 1 vial (2.5 mg) by nebulization every 4 hours as needed for shortness of breath / dyspnea 1 Box 0     albuterol (PROAIR HFA, PROVENTIL HFA, VENTOLIN HFA) 108 (90 BASE) MCG/ACT inhaler Inhale 2 puffs into the lungs every 4 hours as needed for shortness of breath / dyspnea or wheezing 1 Inhaler 1     ammonium lactate (LAC-HYDRIN) 12 % lotion Apply topically 2 times daily as needed for dry skin 360 g 1     aspirin 81 MG EC tablet Take 1 tablet (81 mg) by mouth daily 90 tablet 3     atorvastatin (LIPITOR) 10 MG tablet Take 1 tablet (10 mg) by mouth daily 90 tablet 2     azithromycin (ZITHROMAX) 250 MG tablet Two tablets first day, then one tablet daily for four days. 6 tablet 1     Calcium 600 MG tablet Take 1 tablet by mouth 2 times daily. 100 tablet 3     Cholecalciferol (VITAMIN D) 1000 UNITS capsule Take 1 capsule by mouth daily.       citalopram (CELEXA) 20 MG tablet Take 1 tablet (20 mg) by mouth daily 90 tablet 0     clindamycin (CLEOCIN) 300 MG capsule Take 2 capsules by mouth as needed for 1 dose. 1 hour prior to dental work. 6 capsule 0     coenzyme Q-10 75 MG CAPS Take  by mouth.       finasteride (PROSCAR) 5 MG tablet Take 1 tablet (5 mg) by mouth daily 90 tablet 3     fluticasone-vilanterol (BREO ELLIPTA) 100-25 MCG/INH inhaler Inhale 1 puff into the lungs daily 60 Inhaler 9     furosemide (LASIX) 40 MG tablet Take 1 tablet (40 mg) by mouth daily 90 tablet 2     ipratropium (ATROVENT) 0.06 % nasal spray Spray 2 sprays into both nostrils 4 times daily 15 mL 4     levothyroxine (EUTHYROX) 50 MCG tablet Take 1.5 tablets (75 mcg) by mouth daily 135 tablet 3     MAGNESIUM OXIDE PO Take 400 mg by mouth       melatonin 3 MG tablet Take 1 tablet (3 mg) by mouth nightly as needed for sleep 100 tablet 3     metoprolol succinate ER  (TOPROL-XL) 25 MG 24 hr tablet Take 0.5 tablets (12.5 mg) by mouth daily 45 tablet 2     Multiple Vitamins-Minerals (OCUVITE PO) Take  by mouth.  0     order for DME Equipment being ordered: Safe step tub / walk-in tub 1 Device 0     order for DME Walker 1 each 0     senna-docusate (SENOKOT-S;PERICOLACE) 8.6-50 MG per tablet Take 1 tablet by mouth 2 times daily. 60 tablet      spironolactone (ALDACTONE) 25 MG tablet TAKE 1 TABLET BY MOUTH ONCE DAILY (Patient not taking: Reported on 3/12/2020) 90 tablet 3     tamsulosin (FLOMAX) 0.4 MG capsule TAKE 1 CAPSULE BY MOUTH ONCE DAILY 90 capsule 3     traMADol (ULTRAM) 50 MG tablet Take 1 tablet (50 mg) by mouth daily as needed for severe pain 10 tablet 0     traMADol (ULTRAM) 50 MG tablet Take 0.5 tablets (25 mg) by mouth 2 times daily as needed for severe pain (Patient not taking: Reported on 3/12/2020) 15 tablet 0     triamcinolone (KENALOG) 0.1 % cream Apply sparingly to affected area three times daily as needed 80 g 1       Family History:  Family History   Problem Relation Age of Onset     Cancer Father      Kidney Disease Mother      Macular Degeneration No family hx of      Glaucoma No family hx of        ROS: Complete 12 point ROS negative unless mentioned in HPI    Physical Exam:    I was only able to say hello to the patient.  He was able to respond hello back to me, the rest of the visit was with his wife.      Labs and Radiology:    CBC from March 9 of this year reviewed hemoglobin is stable at 12.9.    Several chest x-rays and CT scans reviewed personally.  The most recent chest x-ray obtained on March 9 of 2020 just demonstrates a chronically elevated left hemidiaphragm, I see this dating back to at least 2012.  A CTA of his chest obtained in 2015 was personally reviewed.  The lung parenchyma appears normal.    Stress test 3/9/20:      The nuclear stress test is negative for inducible myocardial ischemia or infarction.     Left ventricular function is  normal.     There is no prior study for comparison    ECHO 2/21/20:     Interpretation Summary  Moderate aortic stenosis (PV 3.5 m/s MG 28 mmHg DVI 0.26 BETI 1.1 cm2 SVI 44.8  mL/m2.) Compared with the prior study dated 4/10/19, reported stroke volume  appears to be underestimated on the prior study (though LVOT pulse wave  Doppler is somewhat suboptimal on both studies), resulting in discrepancy  between reported aortic valve area.    PFT's:  Most Recent [facility] Pulmonary Function Testing    FVC-Pred   Date Value Ref Range Status   10/09/2017 3.39 L      FVC-Pre   Date Value Ref Range Status   10/09/2017 2.01 L      FVC-%Pred-Pre   Date Value Ref Range Status   10/09/2017 59 %      FEV1-Pre   Date Value Ref Range Status   10/09/2017 1.60 L      FEV1-%Pred-Pre   Date Value Ref Range Status   10/09/2017 63 %      FEV1FVC-Pred   Date Value Ref Range Status   10/09/2017 75 %      FEV1FVC-Pre   Date Value Ref Range Status   10/09/2017 80 %      No results found for: 20029  FEFMax-Pred   Date Value Ref Range Status   10/09/2017 6.31 L/sec      FEFMax-Pre   Date Value Ref Range Status   10/09/2017 4.56 L/sec      FEFMax-%Pred-Pre   Date Value Ref Range Status   10/09/2017 72 %      ExpTime-Pre   Date Value Ref Range Status   10/09/2017 7.59 sec      FIFMax-Pre   Date Value Ref Range Status   10/09/2017 3.56 L/sec      FEV1FEV6-Pred   Date Value Ref Range Status   10/09/2017 75 %      FEV1FEV6-Pre   Date Value Ref Range Status   10/09/2017 80 %      No results found for: 20055  Most recent pulmonary function tests were obtained in 2017 and personally reviewed.  There is evidence of moderate restriction based on his known reduced TLC in older PFTs; the diffusing capacity is normal.    Phone call duration: 25 minutes    Vickie Kincaid MD

## 2020-06-03 ENCOUNTER — PRE VISIT (OUTPATIENT)
Dept: PULMONOLOGY | Facility: CLINIC | Age: 85
End: 2020-06-03

## 2020-06-03 ENCOUNTER — VIRTUAL VISIT (OUTPATIENT)
Dept: PULMONOLOGY | Facility: CLINIC | Age: 85
End: 2020-06-03
Attending: INTERNAL MEDICINE
Payer: MEDICARE

## 2020-06-03 DIAGNOSIS — R06.02 SHORTNESS OF BREATH: Primary | ICD-10-CM

## 2020-06-03 NOTE — LETTER
"    6/3/2020         RE: Gorge Corona  [id number] [facility] [address]        Dear Colleague,    Thank you for referring your patient, Gorge Corona, to the [facility]. Please see a copy of my visit note below.    Gorge Corona is a 89 year old male who is being evaluated via a billable telephone visit.      The patient has been notified of following:     \"This telephone visit will be conducted via a call between you and your physician/provider. We have found that certain health care needs can be provided without the need for a physical exam.  This service lets us provide the care you need with a short phone conversation.  If a prescription is necessary we can send it directly to your pharmacy.  If lab work is needed we can place an order for that and you can then stop by our lab to have the test done at a later time.    Telephone visits are billed at different rates depending on your insurance coverage. During this emergency period, for some insurers they may be billed the same as an in-person visit.  Please reach out to your insurance provider with any questions.    If during the course of the call the physician/provider feels a telephone visit is not appropriate, you will not be charged for this service.\"    Patient has given verbal consent for Telephone visit?  Yes    What phone number would you like to be contacted at? [phone]    How would you like to obtain your AVS? [facility]  Initial Clinic Note        Assessment and Plan:  Gorge Corona is a 89 year old male with a history of COPD, elevated left hemidiaphragm, and complex cardiac history including coronary arterial disease, valvular disease, and arrhythmia, who is reestablishing care in pulmonary clinic.    I discussed with the patient's wife that his dyspnea on exertion is likely multifactorial related to restrictive and obstructive lung disease, and " "that unfortunately, neither of these are reversible.  It is also likely that his cardiac issues are contributing to dyspnea as well, in addition to his advanced age and ongoing deconditioning.  We discussed that in the current pandemic, we are only obtaining lung function tests if it is going to .    At this point, I am comfortable continuing the Breo inhaler, with a azithromycin on hand should the patient experience recurrent episodes of bronchitis.  It seems like this is been very well controlled since he last saw pulmonary.    The patient's wife was in favor of having Gorge repeat pulmonary function tests when the clinics return to a more \"business as usual\" setting, in addition to follow-up visit with me at that time.    RTC Post-COVID with PFTs.     Vickie Kincaid MD   of Medicine  Division of Pulmonary, Critical Care, Allergy, and Sleep Medicine  Pager [phone]    CC: ongoing shortness of breath      HPI:    Mr. Gorge Corona is an 89-year-old male with a history of moderate obstructive lung disease, elevated left hemidiaphragm, and complicated cardiac history including CABG in 2006, mitral valve replacement in 2013, and PCI in 2013, atrial flutter, and mild to moderate aortic stenosis.    He is having a phone visit today to follow-up ongoing dyspnea.  I spent most of the call today on the phone with his wife, due to the patient's hearing loss.    The patient has been seen by several of my colleagues, most recently Dr. Agudelo in the year 2016.  At that time he was getting treated for COPD and what sounded to be like a yearly episode of chronic bronchitis.  It was thought at that time that his dyspnea on exertion was multifactorial related to elevated hemidiaphragm, scoliosis, ongoing cardiac issues, and the patient's advanced age.    He last saw cardiology in April 2019.  At that visit, he was felt to have somewhere between mild to moderate aortic stenosis. "  An echo in February of this year demonstrated moderate aortic stenosis, but no new explanations for why the patient might have ongoing dyspnea on exertion. He had a stress test on March 9 of this year, which was negative for inducible myocardial ischemia or infarction.    In terms of respiratory symptoms, his wife tells me that she is continued to see a slow steady decline over the past several years.  She reports that Gorge does not have any cough.  He has occasional wheezing, for which he uses albuterol about once a week, and this is effective.  He has not had an episode of bronchitis in over 1 year.    The patient generally is able to do his own ADLs, including shower, however his wife provides a significant amount of help around the house.  He does get short of breath when he is bathing himself, getting dressed, etc.    He also has some sinus congestion, which is treated with Atrovent nasal spray.    The patient's wife states that they are primarily interested in repeating pulmonary function testing today, to see if he is declined or made progress from the last time he had these tests done.    Exposure History  Tobacco: Cigar and pipe smoking.  Quit decades ago (>30y ago)  Other inhaled substances: worked in the [employer] as a .  No obvious inhalational exposures then.  Worked for the [employer], in an office setting.   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      PMH:  Past Medical History:   Diagnosis Date     Atrial fibrillation (H)      Atrial flutter (H)      Basal cell carcinoma of skin of trunk      BPH (benign prostatic hypertrophy) with urinary obstruction      CAD (coronary artery disease)     s/p CABG 1/2013; RUSSEL in 2/2013     Depression      H/O CT scan of head 1/11/2013 7/20/2006    Result Impression     CT of the Head without contrast 7/20/2006  History: Ataxia,  incontinence, and NPH  Comparison Study: MR obtained to 2004  Technique: Axial CT images of the head were obtained at 2 intervals from the skull base to the vertex without intravenous contrast. The images were reviewed in brain, subdural and bone windows.  Findings: Patient has had a moderate promin     Hyperlipidemia      Lumbar spinal stenosis      Mitral regurgitation     s/p bioprosthesis 2013     Palpitations      Paroxysmal supraventricular tachycardia (H)      Squamous cell carcinoma      Tachycardia     baroreflex injury due to surgery      (ventriculoperitoneal) shunt status     for Normal pressure hydrocephalus       Allergies:  Allergies   Allergen Reactions     Penicillins Rash       Social History:  Social History     Socioeconomic History     Marital status:      Spouse name: Not on file     Number of children: Not on file     Years of education: Not on file     Highest education level: Not on file   Occupational History     Not on file   Social Needs     Financial resource strain: Not on file     Food insecurity     Worry: Not on file     Inability: Not on file     Transportation needs     Medical: Not on file     Non-medical: Not on file   Tobacco Use     Smoking status: Former Smoker     Types: Cigars, Pipe     Last attempt to quit: 1990     Years since quittin.4     Smokeless tobacco: Former User   Substance and Sexual Activity     Alcohol use: No     Alcohol/week: 0.0 standard drinks     Drug use: No     Sexual activity: Not on file   Lifestyle     Physical activity     Days per week: Not on file     Minutes per session: Not on file     Stress: Not on file   Relationships     Social connections     Talks on phone: Not on file     Gets together: Not on file     Attends [employer] service: Not on file     Active member of club or organization: Not on file     Attends meetings of clubs or organizations: Not on file     Relationship status: Not on file     Intimate partner  violence     Fear of current or ex partner: Not on file     Emotionally abused: Not on file     Physically abused: Not on file     Forced sexual activity: Not on file   Other Topics Concern     Parent/sibling w/ CABG, MI or angioplasty before 65F 55M? Not Asked   Social History Narrative     Not on file       Medications:  Current Outpatient Medications   Medication Sig Dispense Refill     albuterol (2.5 MG/3ML) 0.083% nebulizer solution Take 1 vial (2.5 mg) by nebulization every 4 hours as needed for shortness of breath / dyspnea 1 Box 0     albuterol (PROAIR HFA, PROVENTIL HFA, VENTOLIN HFA) 108 (90 BASE) MCG/ACT inhaler Inhale 2 puffs into the lungs every 4 hours as needed for shortness of breath / dyspnea or wheezing 1 Inhaler 1     ammonium lactate (LAC-HYDRIN) 12 % lotion Apply topically 2 times daily as needed for dry skin 360 g 1     aspirin 81 MG EC tablet Take 1 tablet (81 mg) by mouth daily 90 tablet 3     atorvastatin (LIPITOR) 10 MG tablet Take 1 tablet (10 mg) by mouth daily 90 tablet 2     azithromycin (ZITHROMAX) 250 MG tablet Two tablets first day, then one tablet daily for four days. 6 tablet 1     Calcium 600 MG tablet Take 1 tablet by mouth 2 times daily. 100 tablet 3     Cholecalciferol (VITAMIN D) 1000 UNITS capsule Take 1 capsule by mouth daily.       citalopram (CELEXA) 20 MG tablet Take 1 tablet (20 mg) by mouth daily 90 tablet 0     clindamycin (CLEOCIN) 300 MG capsule Take 2 capsules by mouth as needed for 1 dose. 1 hour prior to dental work. 6 capsule 0     coenzyme Q-10 75 MG CAPS Take  by mouth.       finasteride (PROSCAR) 5 MG tablet Take 1 tablet (5 mg) by mouth daily 90 tablet 3     fluticasone-vilanterol (BREO ELLIPTA) 100-25 MCG/INH inhaler Inhale 1 puff into the lungs daily 60 Inhaler 9     furosemide (LASIX) 40 MG tablet Take 1 tablet (40 mg) by mouth daily 90 tablet 2     ipratropium (ATROVENT) 0.06 % nasal spray Spray 2 sprays into both nostrils 4 times daily 15 mL 4      levothyroxine (EUTHYROX) 50 MCG tablet Take 1.5 tablets (75 mcg) by mouth daily 135 tablet 3     MAGNESIUM OXIDE PO Take 400 mg by mouth       melatonin 3 MG tablet Take 1 tablet (3 mg) by mouth nightly as needed for sleep 100 tablet 3     metoprolol succinate ER (TOPROL-XL) 25 MG 24 hr tablet Take 0.5 tablets (12.5 mg) by mouth daily 45 tablet 2     Multiple Vitamins-Minerals (OCUVITE PO) Take  by mouth.  0     order for DME Equipment being ordered: Safe step tub / walk-in tub 1 Device 0     order for DME Walker 1 each 0     senna-docusate (SENOKOT-S;PERICOLACE) 8.6-50 MG per tablet Take 1 tablet by mouth 2 times daily. 60 tablet      spironolactone (ALDACTONE) 25 MG tablet TAKE 1 TABLET BY MOUTH ONCE DAILY (Patient not taking: Reported on 3/12/2020) 90 tablet 3     tamsulosin (FLOMAX) 0.4 MG capsule TAKE 1 CAPSULE BY MOUTH ONCE DAILY 90 capsule 3     traMADol (ULTRAM) 50 MG tablet Take 1 tablet (50 mg) by mouth daily as needed for severe pain 10 tablet 0     traMADol (ULTRAM) 50 MG tablet Take 0.5 tablets (25 mg) by mouth 2 times daily as needed for severe pain (Patient not taking: Reported on 3/12/2020) 15 tablet 0     triamcinolone (KENALOG) 0.1 % cream Apply sparingly to affected area three times daily as needed 80 g 1       Family History:  Family History   Problem Relation Age of Onset     Cancer Father      Kidney Disease Mother      Macular Degeneration No family hx of      Glaucoma No family hx of        ROS: Complete 12 point ROS negative unless mentioned in HPI    Physical Exam:    I was only able to say hello to the patient.  He was able to respond hello back to me, the rest of the visit was with his wife.      Labs and Radiology:    CBC from March 9 of this year reviewed hemoglobin is stable at 12.9.    Several chest x-rays and CT scans reviewed personally.  The most recent chest x-ray obtained on March 9 of 2020 just demonstrates a chronically elevated left hemidiaphragm, I see this dating back to at  least 2012.  A CTA of his chest obtained in 2015 was personally reviewed.  The lung parenchyma appears normal.    Stress test 3/9/20:      The nuclear stress test is negative for inducible myocardial ischemia or infarction.     Left ventricular function is normal.     There is no prior study for comparison    ECHO 2/21/20:     Interpretation Summary  Moderate aortic stenosis (PV 3.5 m/s MG 28 mmHg DVI 0.26 BETI 1.1 cm2 SVI 44.8  mL/m2.) Compared with the prior study dated 4/10/19, reported stroke volume  appears to be underestimated on the prior study (though LVOT pulse wave  Doppler is somewhat suboptimal on both studies), resulting in discrepancy  between reported aortic valve area.    PFT's:  Most Recent [facility] Pulmonary Function Testing    FVC-Pred   Date Value Ref Range Status   10/09/2017 3.39 L      FVC-Pre   Date Value Ref Range Status   10/09/2017 2.01 L      FVC-%Pred-Pre   Date Value Ref Range Status   10/09/2017 59 %      FEV1-Pre   Date Value Ref Range Status   10/09/2017 1.60 L      FEV1-%Pred-Pre   Date Value Ref Range Status   10/09/2017 63 %      FEV1FVC-Pred   Date Value Ref Range Status   10/09/2017 75 %      FEV1FVC-Pre   Date Value Ref Range Status   10/09/2017 80 %      No results found for: 20029  FEFMax-Pred   Date Value Ref Range Status   10/09/2017 6.31 L/sec      FEFMax-Pre   Date Value Ref Range Status   10/09/2017 4.56 L/sec      FEFMax-%Pred-Pre   Date Value Ref Range Status   10/09/2017 72 %      ExpTime-Pre   Date Value Ref Range Status   10/09/2017 7.59 sec      FIFMax-Pre   Date Value Ref Range Status   10/09/2017 3.56 L/sec      FEV1FEV6-Pred   Date Value Ref Range Status   10/09/2017 75 %      FEV1FEV6-Pre   Date Value Ref Range Status   10/09/2017 80 %      No results found for: 20055  Most recent pulmonary function tests were obtained in 2017 and personally reviewed.  There is evidence of moderate restriction based on his known reduced TLC in older PFTs; the diffusing capacity is  normal.    Phone call duration: 25 minutes    Vickie Kincaid MD      Again, thank you for allowing me to participate in the care of your patient.        Sincerely,        Vickie Kincaid MD

## 2020-06-03 NOTE — PATIENT INSTRUCTIONS
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   - for now, I would not make changes to his medications  - we will plan on repeating pulmonary function tests at a safer time, and following up with me in clinic at that point as well.

## 2020-06-24 ENCOUNTER — OFFICE VISIT (OUTPATIENT)
Dept: FAMILY MEDICINE | Facility: CLINIC | Age: 85
End: 2020-06-24
Payer: MEDICARE

## 2020-06-24 VITALS
OXYGEN SATURATION: 94 % | WEIGHT: 206 LBS | DIASTOLIC BLOOD PRESSURE: 68 MMHG | RESPIRATION RATE: 14 BRPM | TEMPERATURE: 97.7 F | SYSTOLIC BLOOD PRESSURE: 110 MMHG | BODY MASS INDEX: 30.42 KG/M2 | HEART RATE: 65 BPM

## 2020-06-24 DIAGNOSIS — H61.21 IMPACTED CERUMEN OF RIGHT EAR: Primary | ICD-10-CM

## 2020-06-24 PROCEDURE — 69209 REMOVE IMPACTED EAR WAX UNI: CPT | Mod: RT | Performed by: FAMILY MEDICINE

## 2020-06-24 NOTE — PROGRESS NOTES
Subjective     Gorge Corona is a 89 year old male who presents to clinic today for the following health issues:    89 yr old male here for ear fullness. Has had this for several months. Feels plugged.no dizziness.    HPI   Patient is here for an ear wash both ears are plugged         Patient Active Problem List   Diagnosis     Lumbar spinal stenosis     Lumbar spondylolysis     Major depressive disorder, recurrent episode, moderate (H)     Actinic keratosis     Other malignant neoplasm of skin, site unspecified     Benign neoplasm of skin of trunk, except scrotum     Other seborrheic keratosis     History of SCC (squamous cell carcinoma) of skin     H/O colonoscopy     Normal pressure hydrocephalus (H)     ASCVD (arteriosclerotic cardiovascular disease)     Coronary artery disease, occlusive     H/O CT scan of head     Mitral valve regurgitation     CAD (coronary artery disease)     Atrial fibrillation (H)     Dyslipidemia     Preventative health care     CAD S/P percutaneous coronary angioplasty     BCC (basal cell carcinoma of skin)     Hypothyroidism     CHF (congestive heart failure) (H)     SCC (squamous cell carcinoma)     SVT (supraventricular tachycardia) (H)     Status post coronary angiogram     Near syncope     Bradycardia     Past Surgical History:   Procedure Laterality Date     ANESTHESIA CARDIOVERSION  4/11/2013    Procedure: ANESTHESIA CARDIOVERSION;  Cardioversion;  Surgeon: Provider, Generic Anesthesia;  Location: [facility] OR     BYPASS GRAFT ARTERY CORONARY, REPAIR VALVE MITRAL, COMBINED  1/14/2013    Procedure: COMBINED BYPASS GRAFT ARTERY CORONARY, REPAIR VALVE MITRAL;  Median sternotomy, coronary artery bypass graft X3 using left internal mammary artery & right saphenous vein , mitral valve Replacement and on pump oxygenator, flexible cystoscopy, urethral dilation and insertion of stuart catheter.;  Surgeon: Chan Peres MD;  Location: [facility] OR     CATARACT IOL, RT/LT Bilateral       CORONARY ANGIOGRAPHY ADULT ORDER       CORONARY ARTERY BYPASS  2013    mitral valve tissue      INSERT CATHETER BLADDER  2013    Procedure: INSERT CATHETER BLADDER;  Flexible cystoscopy, urethral dilation,& insertion of stuart catheter;  Surgeon: Pete Bedoya MD;  Location: [facility] OR     MITRAL VALVE REPLACEMENT      29 mm Epic porcine valve     MOHS MICROGRAPHIC PROCEDURE         Social History     Tobacco Use     Smoking status: Former Smoker     Types: Cigars, Pipe     Last attempt to quit: 1990     Years since quittin.4     Smokeless tobacco: Former User   Substance Use Topics     Alcohol use: No     Alcohol/week: 0.0 standard drinks     Family History   Problem Relation Age of Onset     Cancer Father      Kidney Disease Mother      Macular Degeneration No family hx of      Glaucoma No family hx of          Current Outpatient Medications   Medication Sig Dispense Refill     albuterol (PROAIR HFA, PROVENTIL HFA, VENTOLIN HFA) 108 (90 BASE) MCG/ACT inhaler Inhale 2 puffs into the lungs every 4 hours as needed for shortness of breath / dyspnea or wheezing 1 Inhaler 1     atorvastatin (LIPITOR) 10 MG tablet Take 1 tablet (10 mg) by mouth daily 90 tablet 2     clindamycin (CLEOCIN) 300 MG capsule Take 2 capsules by mouth as needed for 1 dose. 1 hour prior to dental work. 6 capsule 0     coenzyme Q-10 75 MG CAPS Take  by mouth.       fluticasone-vilanterol (BREO ELLIPTA) 100-25 MCG/INH inhaler Inhale 1 puff into the lungs daily 60 Inhaler 9     ipratropium (ATROVENT) 0.06 % nasal spray Spray 2 sprays into both nostrils 4 times daily 15 mL 4     levothyroxine (EUTHYROX) 50 MCG tablet Take 1.5 tablets (75 mcg) by mouth daily 135 tablet 3     metoprolol succinate ER (TOPROL-XL) 25 MG 24 hr tablet Take 0.5 tablets (12.5 mg) by mouth daily 45 tablet 2     Multiple Vitamins-Minerals (OCUVITE PO) Take  by mouth.  0     order for DME Walker 1 each 0     spironolactone (ALDACTONE) 25 MG tablet TAKE  1 TABLET BY MOUTH ONCE DAILY 90 tablet 3     tamsulosin (FLOMAX) 0.4 MG capsule TAKE 1 CAPSULE BY MOUTH ONCE DAILY 90 capsule 3     traMADol (ULTRAM) 50 MG tablet Take 1 tablet (50 mg) by mouth daily as needed for severe pain 10 tablet 0     triamcinolone (KENALOG) 0.1 % cream Apply sparingly to affected area three times daily as needed 80 g 1     albuterol (2.5 MG/3ML) 0.083% nebulizer solution Take 1 vial (2.5 mg) by nebulization every 4 hours as needed for shortness of breath / dyspnea 1 Box 0     ammonium lactate (LAC-HYDRIN) 12 % lotion Apply topically 2 times daily as needed for dry skin 360 g 1     aspirin 81 MG EC tablet Take 1 tablet (81 mg) by mouth daily (Patient not taking: Reported on 6/3/2020) 90 tablet 3     Calcium 600 MG tablet Take 1 tablet by mouth 2 times daily. 100 tablet 3     Cholecalciferol (VITAMIN D) 1000 UNITS capsule Take 1 capsule by mouth daily.       citalopram (CELEXA) 20 MG tablet Take 1 tablet (20 mg) by mouth daily 90 tablet 0     finasteride (PROSCAR) 5 MG tablet Take 1 tablet (5 mg) by mouth daily (Patient not taking: Reported on 6/24/2020) 90 tablet 3     furosemide (LASIX) 40 MG tablet Take 1 tablet (40 mg) by mouth daily (Patient not taking: Reported on 6/24/2020) 90 tablet 2     MAGNESIUM OXIDE PO Take 400 mg by mouth       melatonin 3 MG tablet Take 1 tablet (3 mg) by mouth nightly as needed for sleep (Patient not taking: Reported on 6/24/2020) 100 tablet 3     order for DME Equipment being ordered: Safe step tub / walk-in tub 1 Device 0     senna-docusate (SENOKOT-S;PERICOLACE) 8.6-50 MG per tablet Take 1 tablet by mouth 2 times daily. (Patient not taking: Reported on 6/24/2020) 60 tablet      traMADol (ULTRAM) 50 MG tablet Take 0.5 tablets (25 mg) by mouth 2 times daily as needed for severe pain (Patient not taking: Reported on 6/24/2020) 15 tablet 0     Allergies   Allergen Reactions     Penicillins Rash     BP Readings from Last 3 Encounters:   06/24/20 110/68   03/04/20  134/81   02/19/20 119/68    Wt Readings from Last 3 Encounters:   06/24/20 93.4 kg (206 lb)   03/09/20 93.9 kg (207 lb)   03/05/20 93.9 kg (207 lb)                      Reviewed and updated as needed this visit by Provider         Review of Systems   Constitutional, HEENT, cardiovascular, pulmonary, gi and gu systems are negative, except as otherwise noted.      Objective    /68   Pulse 65   Temp 97.7  F (36.5  C) (Tympanic)   Resp 14   Wt 93.4 kg (206 lb)   SpO2 94%   BMI 30.42 kg/m    Body mass index is 30.42 kg/m .  Physical Exam   GENERAL: healthy, alert and no distress  HENT: normal cephalic/atraumatic, right ear: occluded with wax, left ear: normal: no effusions, no erythema, normal landmarks, nose and mouth without ulcers or lesions, oropharynx clear and oral mucous membranes moist  RESP: lungs clear to auscultation - no rales, rhonchi or wheezes  CV: regular rates and rhythm, normal S1 S2, no S3 or S4 and grade 3/6 holosytolic murmur       Diagnostic Test Results:  none         Assessment & Plan     1. Impacted cerumen of right ear  Ear wax removed, patient tolerated the procedure well.   - REMOVE IMPACTED CERUMEN          FUTURE APPOINTMENTS:       - Follow-up visit in one month or sooner as needed.  Patient Instructions     Patient Education     Earwax Removal    The ear canal makes earwax from the canal s lining. The ears make wax to lubricate and protect the ear canal. The ear canal is the tube that connects the middle ear to the outside of the ear. The wax protects the ear from bacteria, infection, and damage from water or trauma.  The wax that forms in the canal naturally moves toward the outside of the ear and falls out. In some cases, the ear may make too much wax. If the wax causes problems or keeps the healthcare provider from seeing into the ear, the extra wax may be removed.  Too much wax can affect your hearing. It can cause itching. In rare cases, it can be painful. Earwax should  not be removed unless it is causing a problem. You should not stick objects into your ear to remove wax unless told to do so by your healthcare provider.  Healthcare providers can remove earwax safely. It is important to stay still during the procedure to avoid damage to the ear canal. But removing earwax generally doesn t hurt. You will not usually need anesthesia or pain medicine when the provider removes the earwax.  A number of conditions lead to earwax buildup. These include some skin problems, a narrow ear canal, or ears that make too much earwax. Using cotton swabs in the canal pushes earwax deeper into the ear and contributes to the buildup of earwax.  Home care    The healthcare provider may recommend mineral oil or an over-the-counter eardrop to use at home to soften the earwax. Use these products only if the provider recommends them. Carefully follow the instructions given.    Don t use mineral oil or OTC eardrops if you might have an ear infection or a ruptured eardrum. Tell your healthcare provider right away if you have diabetes or an immune disorder.    Don t use cotton swabs in your ears. Cotton swabs may push wax deeper into the ear canal or damage the eardrum. Use cotton gauze or a wet washcloth  to gently remove wax on the outside of the ear and around the opening to the ear canal.    Don't use any probing device or object such as cotton-tipped swabs or horace pins to clean the inside of your ears.    Don t use ear candles to clean your ears. Candling can be dangerous. It can burn the ear canal. It can also make the condition worse instead of better.    Don t use cold water to rinse the ear. This will make you dizzy. If your provider tells you to rinse your ear, use only warm water or follow his or her instructions.    Check the ear for signs of infection or irritation listed below under When to seek medical advice.  Steps for using eardrops  1. Warm the medicine bottle by rubbing it between your  hands for a few minutes.  2. Lie down on your side, with the affected ear up.  3. Place the recommended number of drops in the ear. Wet a cotton ball with the medicine. Gently put the cotton ball into the ear opening.    Follow-up care  Follow up with your healthcare provider, or as directed.  When to seek medical advice  Call the provider right away if you have:    Ear pain that gets worse    Fever of 100.4F F (38 C) or higher, or as directed by your healthcare provider    Worsening wax buildup    Severe pain, dizziness, or nausea    Bleeding from the ear    Hearing problems    Signs of irritation from the eardrops, such as burning, stinging, or swelling and tenderness    Foul-smelling fluid draining from the ear    Swelling, redness, or tenderness of the outer ear    Headache, neck pain, or stiff neck  Date Last Reviewed: 6/1/2017 2000-2019 The [employer]. [address] [place] [address]. All rights reserved. This information is not intended as a substitute for professional medical care. Always follow your healthcare professional's instructions.               No follow-ups on file.    Shiela Ryan MD  [facility]

## 2020-06-24 NOTE — PATIENT INSTRUCTIONS
Patient Education     Earwax Removal    The ear canal makes earwax from the canal s lining. The ears make wax to lubricate and protect the ear canal. The ear canal is the tube that connects the middle ear to the outside of the ear. The wax protects the ear from bacteria, infection, and damage from water or trauma.  The wax that forms in the canal naturally moves toward the outside of the ear and falls out. In some cases, the ear may make too much wax. If the wax causes problems or keeps the healthcare provider from seeing into the ear, the extra wax may be removed.  Too much wax can affect your hearing. It can cause itching. In rare cases, it can be painful. Earwax should not be removed unless it is causing a problem. You should not stick objects into your ear to remove wax unless told to do so by your healthcare provider.  Healthcare providers can remove earwax safely. It is important to stay still during the procedure to avoid damage to the ear canal. But removing earwax generally doesn t hurt. You will not usually need anesthesia or pain medicine when the provider removes the earwax.  A number of conditions lead to earwax buildup. These include some skin problems, a narrow ear canal, or ears that make too much earwax. Using cotton swabs in the canal pushes earwax deeper into the ear and contributes to the buildup of earwax.  Home care    The healthcare provider may recommend mineral oil or an over-the-counter eardrop to use at home to soften the earwax. Use these products only if the provider recommends them. Carefully follow the instructions given.    Don t use mineral oil or OTC eardrops if you might have an ear infection or a ruptured eardrum. Tell your healthcare provider right away if you have diabetes or an immune disorder.    Don t use cotton swabs in your ears. Cotton swabs may push wax deeper into the ear canal or damage the eardrum. Use cotton gauze or a wet washcloth  to gently remove wax on the  outside of the ear and around the opening to the ear canal.    Don't use any probing device or object such as cotton-tipped swabs or horace pins to clean the inside of your ears.    Don t use ear candles to clean your ears. Candling can be dangerous. It can burn the ear canal. It can also make the condition worse instead of better.    Don t use cold water to rinse the ear. This will make you dizzy. If your provider tells you to rinse your ear, use only warm water or follow his or her instructions.    Check the ear for signs of infection or irritation listed below under When to seek medical advice.  Steps for using eardrops  1. Warm the medicine bottle by rubbing it between your hands for a few minutes.  2. Lie down on your side, with the affected ear up.  3. Place the recommended number of drops in the ear. Wet a cotton ball with the medicine. Gently put the cotton ball into the ear opening.    Follow-up care  Follow up with your healthcare provider, or as directed.  When to seek medical advice  Call the provider right away if you have:    Ear pain that gets worse    Fever of 100.4F F (38 C) or higher, or as directed by your healthcare provider    Worsening wax buildup    Severe pain, dizziness, or nausea    Bleeding from the ear    Hearing problems    Signs of irritation from the eardrops, such as burning, stinging, or swelling and tenderness    Foul-smelling fluid draining from the ear    Swelling, redness, or tenderness of the outer ear    Headache, neck pain, or stiff neck  Date Last Reviewed: 6/1/2017 2000-2019 The [employer]. [address] [place] [address], [place] [address]. All rights reserved. This information is not intended as a substitute for professional medical care. Always follow your healthcare professional's instructions.

## 2020-07-20 NOTE — PROGRESS NOTES
Outpatient Physical Therapy Discharge Note     Patient: Gorge Corona  : 1930    Beginning/End Dates of Reporting Period:  3/2/20 to 2020    Referring Provider: Stan Macias PA-C    Therapy Diagnosis: left patellar fracture     Client Self Report: Per chart review: patient took a hard fall 20 and fractured his left patella.  2 years prior fractured ipsilateral LE and underwent femoral ORIF (jessica in place, visible on radiographs).  MD evaluation revealed reasonable function of extensor mechanism as well as fracture location indicating patient is a good candidate to heal conservatively.  Knee immobilizer provided with PT to start introducing gradual PROM.  Per patient: he is no longer wearing the knee immobilizer because he feels like he will fall with it on.  Notes both patella are sore.    Objective Measurements:  Objective Measure: TUG  Details: 26 sec with 4 wheeled walker            Goals:  Goal Identifier     Goal Description Patient will have 0-110 degrees of right knee ROM to allow for normalized ADL's.   Target Date 20   Date Met      Progress:     Goal Identifier     Goal Description Patient will be able to walk x 5 min without resting to allow for normalized community mobility (with 4 wheeled walker).   Target Date 20   Date Met      Progress:     Goal Identifier     Goal Description Patient will be able to participate in silver sneakers at [facility] to improve LE strength and proprioception.   Target Date 20   Date Met      Progress:       Progress Toward Goals:   Progress limited due to COVID pandemic; Gorge was seen only once.          Plan:  Discharge from therapy.    Discharge:    Reason for Discharge: Patient has failed to schedule further appointments.    Equipment Issued: theraband    Discharge Plan: Patient to continue home program.    Jaycee Anderson, PT, DTP, [facility]  Doctor of Physical Therapy #[id number]  [facility]  [facility]  [phone]  [email or web address]

## 2020-07-28 ENCOUNTER — DOCUMENTATION ONLY (OUTPATIENT)
Dept: CARE COORDINATION | Facility: CLINIC | Age: 85
End: 2020-07-28

## 2020-07-31 ENCOUNTER — TELEPHONE (OUTPATIENT)
Dept: UROLOGY | Facility: CLINIC | Age: 85
End: 2020-07-31

## 2020-07-31 NOTE — TELEPHONE ENCOUNTER
[facility] Call Center    Phone Message    May a detailed message be left on voicemail: yes     Reason for Call: Other: Pt wife requested I send a note to staff asking them to consider scheduling her  earlier.  Pt wife couldn't provide much info on his symptoms because he would not share them.  Pt says he can't control when he starts and then he can't stop.  I assume that means he can't hold it for a moment not that he just keeps on urinating.  Sounds like basic urge incontinence from what he would give us.      Either way Pt wife would still like us to consider it and call her.    Action Taken: Message routed to:  [facility] urology    Travel Screening: Not Applicable

## 2020-08-03 NOTE — TELEPHONE ENCOUNTER
MEDICAL RECORDS REQUEST   [facility]  Urology Clinic  [address] [place] [address]  PHONE: [phone]  Fax: [phone]        FUTURE VISIT INFORMATION                                                   Gorge Corona, : 1930 scheduled for future visit at [facility]    APPOINTMENT INFORMATION:    Date: 20 3PM    Provider:  Dali Cheek RN    Reason for Visit/Diagnosis: Incontinence     REFERRAL INFORMATION:    Referring provider:  Self    Specialty: N/A    Referring providers clinic:  N/A    Clinic contact number:  N/A    RECORDS REQUESTED FOR VISIT                                                     NOTES  STATUS/DETAILS   OFFICE NOTE from referring provider  no   OFFICE NOTE from other specialist  no   DISCHARGE SUMMARY from hospital  no   DISCHARGE REPORT from the ER  no   OPERATIVE REPORT  no   MEDICATION LIST  yes   LABS     URINALYSIS (UA)  yes     PRE-VISIT CHECKLIST      Record collection complete Yes- Internal recs in CE  US TESTICULAR - 20    Appointment appropriately scheduled           (right time/right provider) Yes   MyChart activation If no, please explain: In process   Questionnaire complete If no, please explain: In process     Completed by: Faith Ferrera

## 2020-08-05 DIAGNOSIS — L85.3 XEROSIS CUTIS: ICD-10-CM

## 2020-08-05 RX ORDER — AMMONIUM LACTATE 12 G/100G
LOTION TOPICAL 2 TIMES DAILY PRN
Qty: 360 G | Refills: 11 | Status: SHIPPED | OUTPATIENT
Start: 2020-08-05

## 2020-08-05 NOTE — TELEPHONE ENCOUNTER
[facility] Call Center    Phone Message    May a detailed message be left on voicemail: yes     Reason for Call: Medication Refill Request    Has the patient contacted the pharmacy for the refill? Yes   Name of medication being requested: ammonium lactate (LAC-HYDRIN) 12 % lotion       Provider who prescribed the medication: Dr reyes    Pharmacy: [facility] [address] [place] [address] - [address]     Date medication is needed: asap     Action Taken: Message routed to:  [facility] derm    Travel Screening: Not Applicable

## 2020-08-05 NOTE — TELEPHONE ENCOUNTER
ammonium lactate (LAC-HYDRIN) 12 % lotion   Last ordered in 2018 and not addressed in last two clinic notes.    Last clinic visit: 3-        Kathleen M Doege RN

## 2020-08-06 NOTE — TELEPHONE ENCOUNTER
Received refill request for ammonium lactate as the resident on call. Reviewed patient's chart and attached communication. Patient last seen 03/2020 for AKs but follows with dermatology. RTC 6-12 months.     After reviewing the medication list and assessment and plan from last visit, the refill request was accepted.    CC'ing Dr. Parker as DANIEL Landin MD  Internal Medicine - Dermatology PGY 2  [phone]

## 2020-08-13 DIAGNOSIS — F33.1 MAJOR DEPRESSIVE DISORDER, RECURRENT EPISODE, MODERATE (H): ICD-10-CM

## 2020-08-14 ENCOUNTER — PRE VISIT (OUTPATIENT)
Dept: UROLOGY | Facility: CLINIC | Age: 85
End: 2020-08-14

## 2020-08-14 NOTE — TELEPHONE ENCOUNTER
Reason for visit: urinary urgency     Relevant information: history of urethral dilation     Records/imaging/labs/orders: all records available    Pt called: no need for a call    At Rooming: standard

## 2020-08-18 ENCOUNTER — OFFICE VISIT (OUTPATIENT)
Dept: UROLOGY | Facility: CLINIC | Age: 85
End: 2020-08-18
Payer: MEDICARE

## 2020-08-18 ENCOUNTER — PRE VISIT (OUTPATIENT)
Dept: UROLOGY | Facility: CLINIC | Age: 85
End: 2020-08-18

## 2020-08-18 DIAGNOSIS — N39.41 URGE URINARY INCONTINENCE: Primary | ICD-10-CM

## 2020-08-18 RX ORDER — TROSPIUM CHLORIDE 20 MG/1
20 TABLET, FILM COATED ORAL
Qty: 60 TABLET | Refills: 3 | Status: SHIPPED | OUTPATIENT
Start: 2020-08-18 | End: 2020-09-25 | Stop reason: SINTOL

## 2020-08-18 RX ORDER — CITALOPRAM HYDROBROMIDE 20 MG/1
20 TABLET ORAL DAILY
Qty: 90 TABLET | Refills: 0 | Status: SHIPPED | OUTPATIENT
Start: 2020-08-18 | End: 2021-01-06

## 2020-08-18 ASSESSMENT — PAIN SCALES - GENERAL: PAINLEVEL: NO PAIN (0)

## 2020-08-18 NOTE — PROGRESS NOTES
Chief Complaint:   Urinary urgency / incontinence, testicular pain         History of Present Illness:    Gorge Corona is a very pleasant 89 year old male who presents for evaluation of the above. He states that for the past year, he has been experiencing urinary urgency, sometimes met with an inability to void, and sometimes resulting in urinary incontinence. Reports a strong stream, and feels he is able to empty his bladder well when he does void. Denies any dysuria, pyuria, hesitancy, intermittency, or any recent history of urinary tract infections or stones. He currently takes Flomax daily. Of note, he has a history of a bulbar urethral stricture and underwent dilation with Dr. Bedoya in 2013.     Also has a recent history of testicular pain, but states that this is not currently an issue. Ultrasound from 7/21/20 demonstrated bilateral epididymal head cysts and a mild left-sided varicocele.          Past Medical History:     Past Medical History:   Diagnosis Date     Atrial fibrillation (H)      Atrial flutter (H)      Basal cell carcinoma of skin of trunk      BPH (benign prostatic hypertrophy) with urinary obstruction      CAD (coronary artery disease)     s/p CABG 1/2013; RUSSEL in 2/2013     Depression      H/O CT scan of head 1/11/2013 7/20/2006    Result Impression     CT of the Head without contrast 7/20/2006  History: Ataxia, incontinence, and NPH  Comparison Study: MR obtained to 4/19/2004  Technique: Axial CT images of the head were obtained at 2 intervals from the skull base to the vertex without intravenous contrast. The images were reviewed in brain, subdural and bone windows.  Findings: Patient has had a moderate promin     Hyperlipidemia      Lumbar spinal stenosis      Mitral regurgitation     s/p bioprosthesis 1/2013     Palpitations      Paroxysmal supraventricular tachycardia (H)      Squamous cell carcinoma      Tachycardia     baroreflex injury due to surgery       (ventriculoperitoneal) shunt status     for Normal pressure hydrocephalus            Past Surgical History:     Past Surgical History:   Procedure Laterality Date     ANESTHESIA CARDIOVERSION  4/11/2013    Procedure: ANESTHESIA CARDIOVERSION;  Cardioversion;  Surgeon: Provider, Generic Anesthesia;  Location: [facility] OR     BYPASS GRAFT ARTERY CORONARY, REPAIR VALVE MITRAL, COMBINED  1/14/2013    Procedure: COMBINED BYPASS GRAFT ARTERY CORONARY, REPAIR VALVE MITRAL;  Median sternotomy, coronary artery bypass graft X3 using left internal mammary artery & right saphenous vein , mitral valve Replacement and on pump oxygenator, flexible cystoscopy, urethral dilation and insertion of stuart catheter.;  Surgeon: Chan Peres MD;  Location: [facility] OR     CATARACT IOL, RT/LT Bilateral      CORONARY ANGIOGRAPHY ADULT ORDER       CORONARY ARTERY BYPASS  1/2013    mitral valve tissue      INSERT CATHETER BLADDER  1/14/2013    Procedure: INSERT CATHETER BLADDER;  Flexible cystoscopy, urethral dilation,& insertion of stuart catheter;  Surgeon: Pete Bedoya MD;  Location: [facility] OR     MITRAL VALVE REPLACEMENT  2013    29 mm Epic porcine valve     MOHS MICROGRAPHIC PROCEDURE              Medications     Current Outpatient Medications   Medication     albuterol (2.5 MG/3ML) 0.083% nebulizer solution     albuterol (PROAIR HFA, PROVENTIL HFA, VENTOLIN HFA) 108 (90 BASE) MCG/ACT inhaler     ammonium lactate (LAC-HYDRIN) 12 % external lotion     aspirin 81 MG EC tablet     atorvastatin (LIPITOR) 10 MG tablet     Calcium 600 MG tablet     Cholecalciferol (VITAMIN D) 1000 UNITS capsule     citalopram (CELEXA) 20 MG tablet     clindamycin (CLEOCIN) 300 MG capsule     coenzyme Q-10 75 MG CAPS     finasteride (PROSCAR) 5 MG tablet     fluticasone-vilanterol (BREO ELLIPTA) 100-25 MCG/INH inhaler     furosemide (LASIX) 40 MG tablet     ipratropium (ATROVENT) 0.06 % nasal spray     levothyroxine (EUTHYROX) 50 MCG tablet     MAGNESIUM  OXIDE PO     melatonin 3 MG tablet     metoprolol succinate ER (TOPROL-XL) 25 MG 24 hr tablet     Multiple Vitamins-Minerals (OCUVITE PO)     order for DME     order for DME     senna-docusate (SENOKOT-S;PERICOLACE) 8.6-50 MG per tablet     spironolactone (ALDACTONE) 25 MG tablet     tamsulosin (FLOMAX) 0.4 MG capsule     traMADol (ULTRAM) 50 MG tablet     traMADol (ULTRAM) 50 MG tablet     triamcinolone (KENALOG) 0.1 % cream     No current facility-administered medications for this visit.             Family History:     Family History   Problem Relation Age of Onset     Cancer Father      Kidney Disease Mother      Macular Degeneration No family hx of      Glaucoma No family hx of             Social History:     Social History     Socioeconomic History     Marital status:      Spouse name: Not on file     Number of children: Not on file     Years of education: Not on file     Highest education level: Not on file   Occupational History     Not on file   Social Needs     Financial resource strain: Not on file     Food insecurity     Worry: Not on file     Inability: Not on file     Transportation needs     Medical: Not on file     Non-medical: Not on file   Tobacco Use     Smoking status: Former Smoker     Types: Cigars, Pipe     Last attempt to quit: 1990     Years since quittin.6     Smokeless tobacco: Former User   Substance and Sexual Activity     Alcohol use: No     Alcohol/week: 0.0 standard drinks     Drug use: No     Sexual activity: Not on file   Lifestyle     Physical activity     Days per week: Not on file     Minutes per session: Not on file     Stress: Not on file   Relationships     Social connections     Talks on phone: Not on file     Gets together: Not on file     Attends Yarsani service: Not on file     Active member of club or organization: Not on file     Attends meetings of clubs or organizations: Not on file     Relationship status: Not on file     Intimate partner violence      Fear of current or ex partner: Not on file     Emotionally abused: Not on file     Physically abused: Not on file     Forced sexual activity: Not on file   Other Topics Concern     Parent/sibling w/ CABG, MI or angioplasty before 65F 55M? Not Asked   Social History Narrative     Not on file            Allergies:   Penicillins         Review of Systems:  From intake questionnaire   Negative 14 system review except as noted on HPI, nurse's note.         Physical Exam:   Patient is a 89 year old male   Vitals: There were no vitals taken for this visit.  General Appearance Adult: Alert, no acute distress, oriented  Lungs: no respiratory distress, or pursed lip breathing  Heart: No obvious jugular venous distension present  Abdomen: soft, nontender, no organomegaly or masses, There is no height or weight on file to calculate BMI.  : deferred     Uroflow and Post-Void Residual by Bladder Scan     Residual Volume by Ultrasound: 256 mL      Labs and Pathology:    I personally reviewed all applicable laboratory data and went over findings with patient  Significant for:    CBC RESULTS:  Recent Labs   Lab Test 03/09/20  1103 03/26/19  1203 08/17/18  1524 06/27/18  1539   WBC 6.0 6.0 7.0 6.4   HGB 12.9* 12.9* 11.0* 11.3*   * 114* 133* 143*        BMP RESULTS:  Recent Labs   Lab Test 12/05/19  1111 03/26/19  1203 08/18/18  0644 08/17/18  1524    138 141 138   POTASSIUM 4.0 4.2 4.0 3.9   CHLORIDE 106 105 105 103   CO2 27 27 30 30   ANIONGAP 4 6 6 6   GLC 92 91 78 81   BUN 18 22 18 18   CR 1.08 1.03 1.13 1.15   GFRESTIMATED 61 64 61 60*   GFRESTBLACK 70 75 74 73   HE 8.6 8.9 8.6 8.2*       UA RESULTS:   Recent Labs   Lab Test 08/17/18  1525 01/19/13  1000 01/15/13  0620   SG 1.005 1.020 1.024   URINEPH 7.0 6.0 5.5   NITRITE Negative Negative Negative   RBCU 0 14* 175*   WBCU 0 6* 113*       CALCIUM RESULTS  Lab Results   Component Value Date    HE 8.6 12/05/2019    HE 8.9 03/26/2019    HE 8.6 08/18/2018        PSA RESULTS  PSA   Date Value Ref Range Status   08/01/2011 1.19 0 - 4 ug/L Final   07/14/2010 0.86 0 - 4 ug/L Final   07/08/2009 2.88 0 - 4 ug/L Final   07/01/2008 1.17 0 - 4 ug/L Final   06/26/2007 2.68 0 - 4 ug/L Final   03/13/2006 0.74 0 - 4 ug/L Final       INR  Recent Labs   Lab Test 08/17/18  1524 04/18/13  1404 04/14/13  0711 04/13/13  0638   INR 1.12 1.91* 2.26* 2.19*           Imaging:    I personally reviewed all applicable imaging and went over findings with patient.  Significant for:    Results for orders placed or performed during the hospital encounter of 03/09/20   NM Lexiscan stress test     Value    Target     Baseline Systolic     Baseline Diastolic BP 70    Last Stress Systolic     Last Stress Diastolic BP 80    Baseline HR 64    Max HR 72    Calculated Percent HR 55    Rate Pressure Product 7,776.0    Narrative    ?   The nuclear stress test is negative for inducible myocardial ischemia   or infarction.  ?   Left ventricular function is normal.  ?   There is no prior study for comparison.              Assessment and Plan:     Assessment: 89 year old male with a history of bulbar urethral stricture dilation in 2013 who presents with symptoms of urinary urgency and occasional UUI. Also mentions an occasional inability to void, but states that once he is able to void, his stream is strong and he feels he empties completely. Bladder scan today shows residual of 256 mL several hours after his last void. Currently on Flomax, and we discussed the potential benefit of an added medication to help with possible bladder overactivity. Given his age, will trial Sanctura as opposed to alternative anticholinergic medications.     Plan:  -Start Sanctura 20 mg BID. Side effects discussed.   -Follow up with me in 6-8 weeks to for a symptom check, or sooner if needed.       Dali Cheek, CNP  Department of Urology

## 2020-08-18 NOTE — TELEPHONE ENCOUNTER
Last Clinic Visit: 3/4/2020 [facility]    Test- PHQ9 completed with timeframe.  Refill sent for 90 day supply and FYI to provider- result above parameter.

## 2020-08-18 NOTE — NURSING NOTE
Chief Complaint   Patient presents with     Consult     Urinary urgency and incontinence       There were no vitals taken for this visit. There is no height or weight on file to calculate BMI.    Patient Active Problem List   Diagnosis     Lumbar spinal stenosis     Lumbar spondylolysis     Major depressive disorder, recurrent episode, moderate (H)     Actinic keratosis     Other malignant neoplasm of skin, site unspecified     Benign neoplasm of skin of trunk, except scrotum     Other seborrheic keratosis     History of SCC (squamous cell carcinoma) of skin     H/O colonoscopy     Normal pressure hydrocephalus (H)     ASCVD (arteriosclerotic cardiovascular disease)     Coronary artery disease, occlusive     H/O CT scan of head     Mitral valve regurgitation     CAD (coronary artery disease)     Atrial fibrillation (H)     Dyslipidemia     Preventative health care     CAD S/P percutaneous coronary angioplasty     BCC (basal cell carcinoma of skin)     Hypothyroidism     CHF (congestive heart failure) (H)     SCC (squamous cell carcinoma)     SVT (supraventricular tachycardia) (H)     Status post coronary angiogram     Near syncope     Bradycardia       Allergies   Allergen Reactions     Penicillins Rash       Current Outpatient Medications   Medication Sig Dispense Refill     albuterol (PROAIR HFA, PROVENTIL HFA, VENTOLIN HFA) 108 (90 BASE) MCG/ACT inhaler Inhale 2 puffs into the lungs every 4 hours as needed for shortness of breath / dyspnea or wheezing 1 Inhaler 1     ammonium lactate (LAC-HYDRIN) 12 % external lotion Apply topically 2 times daily as needed for dry skin 360 g 11     aspirin 81 MG EC tablet Take 1 tablet (81 mg) by mouth daily 90 tablet 3     atorvastatin (LIPITOR) 10 MG tablet Take 1 tablet (10 mg) by mouth daily 90 tablet 2     Calcium 600 MG tablet Take 1 tablet by mouth 2 times daily. 100 tablet 3     Cholecalciferol (VITAMIN D) 1000 UNITS capsule Take 1 capsule by mouth daily.       citalopram  (CELEXA) 20 MG tablet Take 1 tablet (20 mg) by mouth daily 90 tablet 0     clindamycin (CLEOCIN) 300 MG capsule Take 2 capsules by mouth as needed for 1 dose. 1 hour prior to dental work. 6 capsule 0     coenzyme Q-10 75 MG CAPS Take  by mouth.       finasteride (PROSCAR) 5 MG tablet Take 1 tablet (5 mg) by mouth daily 90 tablet 3     fluticasone-vilanterol (BREO ELLIPTA) 100-25 MCG/INH inhaler Inhale 1 puff into the lungs daily 60 Inhaler 9     furosemide (LASIX) 40 MG tablet Take 1 tablet (40 mg) by mouth daily 90 tablet 2     ipratropium (ATROVENT) 0.06 % nasal spray Spray 2 sprays into both nostrils 4 times daily 15 mL 4     levothyroxine (EUTHYROX) 50 MCG tablet Take 1.5 tablets (75 mcg) by mouth daily 135 tablet 3     MAGNESIUM OXIDE PO Take 400 mg by mouth       melatonin 3 MG tablet Take 1 tablet (3 mg) by mouth nightly as needed for sleep 100 tablet 3     metoprolol succinate ER (TOPROL-XL) 25 MG 24 hr tablet Take 0.5 tablets (12.5 mg) by mouth daily 45 tablet 2     Multiple Vitamins-Minerals (OCUVITE PO) Take  by mouth.  0     order for DME Equipment being ordered: Safe step tub / walk-in tub 1 Device 0     order for DME Walker 1 each 0     senna-docusate (SENOKOT-S;PERICOLACE) 8.6-50 MG per tablet Take 1 tablet by mouth 2 times daily. 60 tablet      spironolactone (ALDACTONE) 25 MG tablet TAKE 1 TABLET BY MOUTH ONCE DAILY 90 tablet 3     tamsulosin (FLOMAX) 0.4 MG capsule TAKE 1 CAPSULE BY MOUTH ONCE DAILY 90 capsule 3     traMADol (ULTRAM) 50 MG tablet Take 1 tablet (50 mg) by mouth daily as needed for severe pain 10 tablet 0     traMADol (ULTRAM) 50 MG tablet Take 0.5 tablets (25 mg) by mouth 2 times daily as needed for severe pain 15 tablet 0     triamcinolone (KENALOG) 0.1 % cream Apply sparingly to affected area three times daily as needed 80 g 1     albuterol (2.5 MG/3ML) 0.083% nebulizer solution Take 1 vial (2.5 mg) by nebulization every 4 hours as needed for shortness of breath / dyspnea 1 Box 0        Social History     Tobacco Use     Smoking status: Former Smoker     Types: Cigars, Pipe     Last attempt to quit: 1990     Years since quittin.6     Smokeless tobacco: Former User   Substance Use Topics     Alcohol use: No     Alcohol/week: 0.0 standard drinks     Drug use: No       Noelle Hamilton CMA  2020  2:55 PM

## 2020-08-18 NOTE — PATIENT INSTRUCTIONS
Santa Carrera  was seen at the request of  Self, Aaareferral for sleep evaluation.    11/06/2018 INITIAL HISTORY OF PRESENT ILLNESS:  Santa Carrera is a 65 y.o. female is here to be evaluated for a sleep disorder.       CHIEF COMPLAINT:      The patient's complaints include excessive daytime sleepiness, excessive daytime fatigue, snoring and interrupted sleep since  8 yeasrs ago.    Santa Carrera denied   witnessed breathing pauses and  gasping for air in sleep.    She has been using CPAP for 8 years.    Initial good response - lately more tired.    Trouble falling > staying asleep for many decades. Poor sleep hygiene. Night owl.  Some computer at night.  TV in  The bedroom.      Using CPAP 12 cm with Eson; verified  7 hrs 6/7 26/30      EPWORTH SLEEPINESS SCALE 11/5/2018   Sitting and reading 1   Watching TV 2   Sitting, inactive in a public place (e.g. a theatre or a meeting) 3   As a passenger in a car for an hour without a break 2   Lying down to rest in the afternoon when circumstances permit 1   Sitting and talking to someone 0   Sitting quietly after a lunch without alcohol 1   In a car, while stopped for a few minutes in traffic 0   Total score 10       PHQ9 11/5/2018   Little interest or pleasure in doing things: Several days   Feeling down, depressed or hopeless: Several days   Trouble falling asleep, staying asleep, or sleeping too much: Several days   Feeling tired or having little energy: Nearly every day   Poor appetite or overeating: Several days   Feeling bad about yourself- or that you are a failure or have let yourself or family down Several days   Trouble concentrating on things, such as reading the newspaper or watching television: Not at all   Moving or speaking so slowly that other people could have noticed. Or the opposite- being so fidgety or restless that you have been moving around a lot more than usual: Not at all   Thoughts that you would be better off dead  UROLOGY CLINIC VISIT PATIENT INSTRUCTIONS    1) Start the trospium 20 mg twice a day. I have sent this to the [facility] in [place].   2) Follow up with me in 6-8 weeks for a telephone visit.     If you have any issues, questions or concerns in the meantime, do not hesitate to contact us at [phone] or via [employer].     It was a pleasure meeting with you today.  Thank you for allowing me and my team the privilege of caring for you today.  YOU are the reason we are here, and I truly hope we provided you with the excellent service you deserve.  Please let us know if there is anything else we can do for you so that we can be sure you are leaving completely satisfied with your care experience.    Dali Cheek, CNP       or hurting yourself in some way: Not at all   If you indicated you have experienced any of the aforementioned problems, how difficult have these problems made it for you to do your work, take care of things at home or get along with other people? Somewhat difficult   Total Score 8     GAD7 11/5/2018   Feelling nervous, anxious, on edge Several days   Not being able to stop or control worrying Several days   Worrying too much about different things Several days   Trouble relaxing Not at all   Being so restless that its hard to sit still Not at all   Becoming easily annoyed or irritable More than half the days   Feeling afraid as if something awful might happen Not at all   If you marked you are experiencing any of the aforementioned problems, how difficult have these made it for you to do your work, take care of things at home, or get along with other people? Not difficult at all   ARRON-7 Score 5         SLEEP ROUTINE AND LIFESTYLE 11/06/2018 :  Sleep Clinic New Patient 11/5/2018   What time do you go to bed on a week day? (Give a range) 12:30 - 2:00   What time do you go to bed on a day off? (Give a range) 12:30 - 2:00   How long does it take you to fall asleep? (Give a range) 10 minutes - 2 hours   On average, how many times per night do you wake up? 1 - 2   How long does it take you to fall back into sleep? (Give a range) 5 minutes - 1 1/2 hrs   What time do you wake up to start your day on a week day? (Give a range) 9 - 11:00   What time do you wake up to start your day on a day off? (Give a range) 9 - 11:00   What time do you get out of bed? (Give a range) 10:00 - 11:30   On average, how many hours do you sleep? Average total around 8   On average, how many naps do you take per day? 1   Rate your sleep quality from 0 to 5 (0-poor, 5-great). 1   Have you experienced:  N/a   How much weight have you lost or gained (in lbs.) in the last year? Gain and lose the same 10 pounds   On average, how many times per night  do you go to the bathroom?  0 - 1   Have you ever had a sleep study/CPAP machine/surgery for sleep apnea? Yes   Have you ever had a CPAP machine for sleep apnea? Yes   Have you ever had surgery for sleep apnea? No       Sleep Clinic ROS  11/5/2018   Difficulty breathing through the nose?  Sometimes   Sore throat or dry mouth in the morning? Sometimes   Irregular or very fast heart beat?  No   Shortness of breath?  Sometimes   Acid reflux? No   Body aches and pains?  Yes   Morning headaches? No   Dizziness? Sometimes   Mood changes?  Sometimes   Do you exercise?  No   Do you feel like moving your legs a lot?  No          Denies symptoms concerning for parasomnia        Social History:      Occupation:  Bed partner: my pt, Mr. Levi  Exercise routine:no  Caffeine:  Coke till lunch and dinner and after. 1/2 galn coke a day     PREVIOUS SLEEP STUDIES:   n/a    DME:       PAST MEDICAL HISTORY:    Active Ambulatory Problems     Diagnosis Date Noted    Blepharoconjunctivitis - Both Eyes 02/01/2012    HTN (hypertension), benign 05/10/2012    Hyperlipidemia 05/10/2012    Morbid obesity with BMI of 50.0-59.9, adult 03/24/2014    PAD (peripheral artery disease) 05/06/2014    Osteoarthritis of both knees 07/07/2014    History of colon polyps 05/06/2015    Status post total knee replacement, right 8/27/2015 09/11/2015    PONV (postoperative nausea and vomiting) 10/17/2016    Edema 10/17/2016    Hernia of abdominal wall 10/17/2016    Status post total left knee replacement using cement 11/28/2016    Gait abnormality 01/11/2017    Low back pain radiating to both legs 04/03/2018    Primary hyperparathyroidism 04/05/2018     Resolved Ambulatory Problems     Diagnosis Date Noted    Body mass index 50.0-59.9, adult 03/24/2014    PMB (postmenopausal bleeding) 06/19/2014    Primary osteoarthritis of right knee 08/27/2015    Long term (current) use of anticoagulants [V58.61] 08/31/2015    Deep vein thrombosis  prophylaxis 2015    Pre-op exam 10/27/2016    Primary osteoarthritis of left knee 10/28/2016    Status post total left knee replacement 2016    S/P knee replacement 2017     Past Medical History:   Diagnosis Date    Abnormal Pap smear     Allergy     Arthritis     Basal cell carcinoma 15 years ago    Cancer     H/O colonoscopy 5-6-15    Hyperlipidemia     Hypertension     VENANCIO (obstructive sleep apnea)     Squamous cell carcinoma                 PAST SURGICAL HISTORY:    Past Surgical History:   Procedure Laterality Date    ABLATION-ENDOMETRIAL-THERMAL N/A 2014    [facility]    CHOLECYSTECTOMY      COLONOSCOPY N/A 2015    [facility] Elizabeth [facility] ENDO    Endometrial thermal ablation      HERNIA REPAIR      twice- abdominal wall     TMKVBRGYUGCY-ZJZVLTHC-GWWTARDKQ N/A 2014    [facility]    KNEE SURGERY Right 2015    TKR    KNEE SURGERY Left 10/27/2016    TKR    PARATHYROIDECTOMY N/A 2018    REPLACEMENT-KNEE-TOTAL Left 10/27/2016    Performed [facility]le [facility] at [facility] OR 2ND FLR    REPLACEMENT-KNEE-TOTAL Right 2015    [facility] 2ND FLR    SKIN BIOPSY      SKIN CANCER EXCISION      basal cell on nose         FAMILY HISTORY:                Family History   Problem Relation Age of Onset    Heart disease Father 57         at 92 Years    Diabetes type II Father     Stroke Father         strokes    Breast cancer Neg Hx     Ovarian cancer Neg Hx     Urolithiasis Neg Hx     Hypertension Neg Hx     Kidney cancer Neg Hx     Prostate cancer Neg Hx     Collagen disease Neg Hx     Melanoma Neg Hx     Psoriasis Neg Hx     Lupus Neg Hx     Eczema Neg Hx        SOCIAL HISTORY:          Tobacco:   Social History     Tobacco Use   Smoking Status Never Smoker   Smokeless Tobacco Never Used   Tobacco Comment    tried  in High school       alcohol use:    Social History     Substance and Sexual Activity   Alcohol Use No                   ALLERGIES:    Review of patient's allergies indicates:   Allergen Reactions    Lipitor [atorvastatin]      Per patient not allergic       CURRENT MEDICATIONS:    Current Outpatient Medications   Medication Sig Dispense Refill    acetaminophen (TYLENOL) 500 MG tablet Take 500 mg by mouth as needed for Pain.      amLODIPine (NORVASC) 10 MG tablet Take 1 tablet (10 mg total) by mouth once daily. 90 tablet 3    crisaborole (EUCRISA) 2 % Oint Thin film to AA eyelids BID PRN flare 60 g 0    cycloSPORINE (RESTASIS) 0.05 % ophthalmic emulsion 1 drop 2 (two) times daily.      eye lubricant combination no.1 2-0.9-1.8 % Drop Instill 1 or 2 drop into affected eye(s) 3 to 4 times daily. 15 mL 11    losartan (COZAAR) 100 MG tablet Take 1 tablet (100 mg total) by mouth once daily. 90 tablet 3    meclizine (ANTIVERT) 25 mg tablet Take 1 tablet (25 mg total) by mouth 3 (three) times daily as needed. tid PRN 30 tablet 3    metoprolol succinate (TOPROL-XL) 50 MG 24 hr tablet TAKE 1 TABLET(50 MG) BY MOUTH EVERY DAY 90 tablet 0    pravastatin (PRAVACHOL) 40 MG tablet Take 1 tablet (40 mg total) by mouth every evening. 90 tablet 3    scopolamine (TRANSDERM-SCOP) 1.3-1.5 mg (1 mg over 3 days) Place 1 patch onto the skin every 72 hours. 10 patch 0    UNKNOWN TO PATIENT as needed. Topical ear allergy ointment      ergocalciferol (ERGOCALCIFEROL) 50,000 unit Cap Take 1 capsule (50,000 Units total) by mouth every 7 days. For 3 months 4 capsule 3     No current facility-administered medications for this visit.                           PHYSICAL EXAM:  BP (!) 141/69   Pulse 72   Ht 5' (1.524 m)   Wt 125.4 kg (276 lb 7.3 oz)   BMI 53.99 kg/m²   GENERAL: Normal development, well groomed.  HEENT:   HEENT:  Conjunctivae are non-erythematous; Pupils equal, round, and reactive to light; Nose is symmetrical; Nasal  "mucosa is pink and moist; Septum is midline; Inferior turbinates are normal; Nasal airflow is normal; Posterior pharynx is pink; Modified Mallampati:III-IV; Posterior palate is low; Tonsils not visualized; Uvula is wide and elongated;Tongue is enlarged; Dentition is fair; No TMJ tenderness; Jaw opening and protrusion without click and without discomfort.  NECK: Supple. Neck circumference is 16 inches. No thyromegaly. No palpable nodes.     SKIN: On face and neck: No abrasions, no rashes, no lesions.  No subcutaneous nodules are palpable.  RESPIRATORY: Chest is clear to auscultation.  Normal chest expansion and non-labored breathing at rest.  CARDIOVASCULAR: Normal S1, S2.  No murmurs, gallops or rubs. No carotid bruits bilaterally.  No edema. No clubbing. No cyanosis.    NEURO: Oriented to time, place and person. Normal attention span and concentration. Gait normal.    PSYCH: Affect is full. Mood is normal  MUSCULOSKELETAL: Moves 4 extremities. Gait normal.         Using [facility]:       ASSESSMENT:    1. Sleep Apnea NEC. The patient symptomatically has  excessive daytime sleepiness, snoring, excessive daytime fatigue, interrupted sleep and nocturia  with exam findings of "a crowded oral airway and elevated body mass index. The patient has medical co-morbidities of hyperlipidemia and hypertension,  which can be worsened by VENANCIO. This warrants treatment.          PLAN:    HST requal  Will reorder supplies with nasal     Melatonin 5 mg - at bedtime or 2-3 hrs before.+ and another 5 mg at bedtime    Try to watch tTV more int he other room  Reduce screens at night  No coke after 3 PM - can transition through "love caffeine free" coke.        More than 15 minutes of this 30 minutes visit was spent in counseling: during our discussion today, we talked about the etiology of  VENANCIO as well as the potential ramifications of untreated sleep apnea, which could include daytime sleepiness, hypertension, heart disease and/or " stroke.  We discussed potential treatment options, which could include weight loss, body positioning, continuous positive airway pressure (CPAP), or referral for surgical consideration. Meanwhile, she  is urged to avoid supine sleep, weight gain and alcoholic beverages since all of these can worsen VENANCIO.     Precautions: The patient was advised to abstain from driving should he feel sleepy or drowsy.    Follow up: MD/NP  after the sleep study has been completed and order CPAP.     Thank you for allowing me the opportunity to participate in the care of your patient.    This visit summary will be sent to referring provider via inbasket

## 2020-08-18 NOTE — LETTER
8/18/2020       RE: Gorge Corona  [id number] [address] [place] [address]     Dear Colleague,    Thank you for referring your patient, Gorge Corona, to the [facility] UROLOGY AND INST FOR PROSTATE AND UROLOGIC CANCERS at [facility]. Please see a copy of my visit note below.            Chief Complaint:   Urinary urgency / incontinence, testicular pain         History of Present Illness:    Gorge Corona is a very pleasant 89 year old male who presents for evaluation of the above. He states that for the past year, he has been experiencing urinary urgency, sometimes met with an inability to void, and sometimes resulting in urinary incontinence. Reports a strong stream, and feels he is able to empty his bladder well when he does void. Denies any dysuria, pyuria, hesitancy, intermittency, or any recent history of urinary tract infections or stones. He currently takes Flomax daily. Of note, he has a history of a bulbar urethral stricture and underwent dilation with Dr. Bedoya in 2013.     Also has a recent history of testicular pain, but states that this is not currently an issue. Ultrasound from 7/21/20 demonstrated bilateral epididymal head cysts and a mild left-sided varicocele.          Past Medical History:     Past Medical History:   Diagnosis Date     Atrial fibrillation (H)      Atrial flutter (H)      Basal cell carcinoma of skin of trunk      BPH (benign prostatic hypertrophy) with urinary obstruction      CAD (coronary artery disease)     s/p CABG 1/2013; RUSSEL in 2/2013     Depression      H/O CT scan of head 1/11/2013 7/20/2006    Result Impression     CT of the Head without contrast 7/20/2006  History: Ataxia, incontinence, and NPH  Comparison Study: MR obtained to 4/19/2004  Technique: Axial CT images of the head were obtained at 2 intervals from the skull base to the vertex without intravenous contrast. The images were reviewed in brain, subdural and bone  windows.  Findings: Patient has had a moderate promin     Hyperlipidemia      Lumbar spinal stenosis      Mitral regurgitation     s/p bioprosthesis 1/2013     Palpitations      Paroxysmal supraventricular tachycardia (H)      Squamous cell carcinoma      Tachycardia     baroreflex injury due to surgery      (ventriculoperitoneal) shunt status     for Normal pressure hydrocephalus            Past Surgical History:     Past Surgical History:   Procedure Laterality Date     ANESTHESIA CARDIOVERSION  4/11/2013    Procedure: ANESTHESIA CARDIOVERSION;  Cardioversion;  Surgeon: Provider, Generic Anesthesia;  Location: [facility] OR     BYPASS GRAFT ARTERY CORONARY, REPAIR VALVE MITRAL, COMBINED  1/14/2013    Procedure: COMBINED BYPASS GRAFT ARTERY CORONARY, REPAIR VALVE MITRAL;  Median sternotomy, coronary artery bypass graft X3 using left internal mammary artery & right saphenous vein , mitral valve Replacement and on pump oxygenator, flexible cystoscopy, urethral dilation and insertion of stuart catheter.;  Surgeon: Chan Peres MD;  Location: [facility] OR     CATARACT IOL, RT/LT Bilateral      CORONARY ANGIOGRAPHY ADULT ORDER       CORONARY ARTERY BYPASS  1/2013    mitral valve tissue      INSERT CATHETER BLADDER  1/14/2013    Procedure: INSERT CATHETER BLADDER;  Flexible cystoscopy, urethral dilation,& insertion of stuart catheter;  Surgeon: Pete Bedoya MD;  Location: [facility] OR     MITRAL VALVE REPLACEMENT  2013    29 mm Epic porcine valve     MOHS MICROGRAPHIC PROCEDURE              Medications     Current Outpatient Medications   Medication     albuterol (2.5 MG/3ML) 0.083% nebulizer solution     albuterol (PROAIR HFA, PROVENTIL HFA, VENTOLIN HFA) 108 (90 BASE) MCG/ACT inhaler     ammonium lactate (LAC-HYDRIN) 12 % external lotion     aspirin 81 MG EC tablet     atorvastatin (LIPITOR) 10 MG tablet     Calcium 600 MG tablet     Cholecalciferol (VITAMIN D) 1000 UNITS capsule     citalopram (CELEXA) 20 MG tablet      clindamycin (CLEOCIN) 300 MG capsule     coenzyme Q-10 75 MG CAPS     finasteride (PROSCAR) 5 MG tablet     fluticasone-vilanterol (BREO ELLIPTA) 100-25 MCG/INH inhaler     furosemide (LASIX) 40 MG tablet     ipratropium (ATROVENT) 0.06 % nasal spray     levothyroxine (EUTHYROX) 50 MCG tablet     MAGNESIUM OXIDE PO     melatonin 3 MG tablet     metoprolol succinate ER (TOPROL-XL) 25 MG 24 hr tablet     Multiple Vitamins-Minerals (OCUVITE PO)     order for DME     order for DME     senna-docusate (SENOKOT-S;PERICOLACE) 8.6-50 MG per tablet     spironolactone (ALDACTONE) 25 MG tablet     tamsulosin (FLOMAX) 0.4 MG capsule     traMADol (ULTRAM) 50 MG tablet     traMADol (ULTRAM) 50 MG tablet     triamcinolone (KENALOG) 0.1 % cream     No current facility-administered medications for this visit.             Family History:     Family History   Problem Relation Age of Onset     Cancer Father      Kidney Disease Mother      Macular Degeneration No family hx of      Glaucoma No family hx of             Social History:     Social History     Socioeconomic History     Marital status:      Spouse name: Not on file     Number of children: Not on file     Years of education: Not on file     Highest education level: Not on file   Occupational History     Not on file   Social Needs     Financial resource strain: Not on file     Food insecurity     Worry: Not on file     Inability: Not on file     Transportation needs     Medical: Not on file     Non-medical: Not on file   Tobacco Use     Smoking status: Former Smoker     Types: Cigars, Pipe     Last attempt to quit: 1990     Years since quittin.6     Smokeless tobacco: Former User   Substance and Sexual Activity     Alcohol use: No     Alcohol/week: 0.0 standard drinks     Drug use: No     Sexual activity: Not on file   Lifestyle     Physical activity     Days per week: Not on file     Minutes per session: Not on file     Stress: Not on file   Relationships      Social connections     Talks on phone: Not on file     Gets together: Not on file     Attends Mosque service: Not on file     Active member of club or organization: Not on file     Attends meetings of clubs or organizations: Not on file     Relationship status: Not on file     Intimate partner violence     Fear of current or ex partner: Not on file     Emotionally abused: Not on file     Physically abused: Not on file     Forced sexual activity: Not on file   Other Topics Concern     Parent/sibling w/ CABG, MI or angioplasty before 65F 55M? Not Asked   Social History Narrative     Not on file            Allergies:   Penicillins         Review of Systems:  From intake questionnaire   Negative 14 system review except as noted on HPI, nurse's note.         Physical Exam:   Patient is a 89 year old male   Vitals: There were no vitals taken for this visit.  General Appearance Adult: Alert, no acute distress, oriented  Lungs: no respiratory distress, or pursed lip breathing  Heart: No obvious jugular venous distension present  Abdomen: soft, nontender, no organomegaly or masses, There is no height or weight on file to calculate BMI.  : deferred     Uroflow and Post-Void Residual by Bladder Scan     Residual Volume by Ultrasound: 256 mL      Labs and Pathology:    I personally reviewed all applicable laboratory data and went over findings with patient  Significant for:    CBC RESULTS:  Recent Labs   Lab Test 03/09/20  1103 03/26/19  1203 08/17/18  1524 06/27/18  1539   WBC 6.0 6.0 7.0 6.4   HGB 12.9* 12.9* 11.0* 11.3*   * 114* 133* 143*        BMP RESULTS:  Recent Labs   Lab Test 12/05/19  1111 03/26/19  1203 08/18/18  0644 08/17/18  1524    138 141 138   POTASSIUM 4.0 4.2 4.0 3.9   CHLORIDE 106 105 105 103   CO2 27 27 30 30   ANIONGAP 4 6 6 6   GLC 92 91 78 81   BUN 18 22 18 18   CR 1.08 1.03 1.13 1.15   GFRESTIMATED 61 64 61 60*   GFRESTBLACK 70 75 74 73   HE 8.6 8.9 8.6 8.2*       UA RESULTS:    Recent Labs   Lab Test 08/17/18  1525 01/19/13  1000 01/15/13  0620   SG 1.005 1.020 1.024   URINEPH 7.0 6.0 5.5   NITRITE Negative Negative Negative   RBCU 0 14* 175*   WBCU 0 6* 113*       CALCIUM RESULTS  Lab Results   Component Value Date    HE 8.6 12/05/2019    HE 8.9 03/26/2019    HE 8.6 08/18/2018       PSA RESULTS  PSA   Date Value Ref Range Status   08/01/2011 1.19 0 - 4 ug/L Final   07/14/2010 0.86 0 - 4 ug/L Final   07/08/2009 2.88 0 - 4 ug/L Final   07/01/2008 1.17 0 - 4 ug/L Final   06/26/2007 2.68 0 - 4 ug/L Final   03/13/2006 0.74 0 - 4 ug/L Final       INR  Recent Labs   Lab Test 08/17/18  1524 04/18/13  1404 04/14/13  0711 04/13/13  0638   INR 1.12 1.91* 2.26* 2.19*           Imaging:    I personally reviewed all applicable imaging and went over findings with patient.  Significant for:    Results for orders placed or performed during the hospital encounter of 03/09/20   NM Lexiscan stress test     Value    Target     Baseline Systolic     Baseline Diastolic BP 70    Last Stress Systolic     Last Stress Diastolic BP 80    Baseline HR 64    Max HR 72    Calculated Percent HR 55    Rate Pressure Product 7,776.0    Narrative    ?   The nuclear stress test is negative for inducible myocardial ischemia   or infarction.  ?   Left ventricular function is normal.  ?   There is no prior study for comparison.              Assessment and Plan:     Assessment: 89 year old male with a history of bulbar urethral stricture dilation in 2013 who presents with symptoms of urinary urgency and occasional UUI. Also mentions an occasional inability to void, but states that once he is able to void, his stream is strong and he feels he empties completely. Bladder scan today shows residual of 256 mL several hours after his last void. Currently on Flomax, and we discussed the potential benefit of an added medication to help with possible bladder overactivity. Given his age, will trial Sanctura as  opposed to alternative anticholinergic medications.     Plan:  -Start Sanctura 20 mg BID. Side effects discussed.   -Follow up with me in 6-8 weeks to for a symptom check, or sooner if needed.       Dali Cheek, CNP  Department of Urology

## 2020-09-18 ENCOUNTER — PRE VISIT (OUTPATIENT)
Dept: UROLOGY | Facility: CLINIC | Age: 85
End: 2020-09-18

## 2020-09-18 NOTE — TELEPHONE ENCOUNTER
Chief Complaint : Follow up - 1 Month    Hx/Sx: Urge incontinence, urgency, h/o bulbar urethral dilation    Records/Orders: Available    Pt Contacted: N/a    At Rooming: Telephone    Luiz Rose EMT

## 2020-09-25 ENCOUNTER — VIRTUAL VISIT (OUTPATIENT)
Dept: UROLOGY | Facility: CLINIC | Age: 85
End: 2020-09-25
Payer: MEDICARE

## 2020-09-25 DIAGNOSIS — N32.81 OVERACTIVE BLADDER: Primary | ICD-10-CM

## 2020-09-25 RX ORDER — MIRABEGRON 25 MG/1
25 TABLET, FILM COATED, EXTENDED RELEASE ORAL DAILY
Qty: 30 TABLET | Refills: 3 | Status: SHIPPED | OUTPATIENT
Start: 2020-09-25 | End: 2020-10-30

## 2020-09-25 ASSESSMENT — PATIENT HEALTH QUESTIONNAIRE - PHQ9: SUM OF ALL RESPONSES TO PHQ QUESTIONS 1-9: 2

## 2020-09-25 NOTE — LETTER
"9/25/2020       RE: Gorge Corona  [address] [facility] [place] [address]     Dear Colleague,    Thank you for referring your patient, Gorge Corona, to the [facility] UROLOGY AND INST FOR PROSTATE AND UROLOGIC CANCERS at [facility]. Please see a copy of my visit note below.    Gorge Corona is a 89 year old male who is being evaluated via a billable telephone visit.      The patient has been notified of following:     \"This telephone visit will be conducted via a call between you and your physician/provider. We have found that certain health care needs can be provided without the need for a physical exam.  This service lets us provide the care you need with a short phone conversation.  If a prescription is necessary we can send it directly to your pharmacy.  If lab work is needed we can place an order for that and you can then stop by our lab to have the test done at a later time.    Telephone visits are billed at different rates depending on your insurance coverage. During this emergency period, for some insurers they may be billed the same as an in-person visit.  Please reach out to your insurance provider with any questions.    If during the course of the call the physician/provider feels a telephone visit is not appropriate, you will not be charged for this service.\"    Patient has given verbal consent for Telephone visit?  Yes    What phone number would you like to be contacted at? [phone]     Gorge Corona complains of   Chief Complaint   Patient presents with     Follow Up     Urge incontinence, urgency       89 year old male who presents for follow up via telephone visit. I recently saw him on 8/18, at which time he reported LUTS for the past year, including urinary urgency, sometimes met with an inability to void, and sometimes resulting in urinary incontinence. He did report a strong stream and felt he was able to empty his bladder completely with each " "void. Was taking Flomax daily. He does have a history of bulbar urethral stricture and underwent dilation with Dr. Bedoya in 2013. Bladder scan showed a residual volume of 256 mL several hours after his last void. His voiding symptoms were suspected to be related to bladder overactivity, and was started on a trial of Sanctura 20 mg BID.     Today, he states that the Sanctura seems to be working very well at controlling his voiding symptoms. However, he did have an episode of confusion last week. States \"I had a mental problem. I thought I was back in the [employer], but that was 50-60 years ago.\"     Assessment/Plan:  Improved LUTS with Sanctura, although concerning for cognitive side effects given his reported episode of confusion/hallucination. We discussed this today and will discontinue. Will try to avoid the anticholinergics moving forward.  -Stop Sanctura.  -Start mirabegron 25 mg daily.   -Follow up with me in 4-6 weeks for a telephone visit to discuss symptoms.       How would you like to obtain your AVS? Mail a copy    Phone call duration: 8 minutes    Dali Cheek, CNP      "

## 2020-09-25 NOTE — PATIENT INSTRUCTIONS
UROLOGY CLINIC VISIT PATIENT INSTRUCTIONS    -Stop Sanctura.  -Start mirabegron 25 mg daily. I have sent this to your pharmacy.   -Follow up with me in 4-6 weeks for a telephone visit to discuss symptoms.     If you have any issues, questions or concerns in the meantime, do not hesitate to contact us at [phone] or via [employer].     It was a pleasure meeting with you today.  Thank you for allowing me and my team the privilege of caring for you today.  YOU are the reason we are here, and I truly hope we provided you with the excellent service you deserve.  Please let us know if there is anything else we can do for you so that we can be sure you are leaving completely satisfied with your care experience.    Dali Cheke, CNP

## 2020-09-25 NOTE — PROGRESS NOTES
"Gorge Corona is a 89 year old male who is being evaluated via a billable telephone visit.      The patient has been notified of following:     \"This telephone visit will be conducted via a call between you and your physician/provider. We have found that certain health care needs can be provided without the need for a physical exam.  This service lets us provide the care you need with a short phone conversation.  If a prescription is necessary we can send it directly to your pharmacy.  If lab work is needed we can place an order for that and you can then stop by our lab to have the test done at a later time.    Telephone visits are billed at different rates depending on your insurance coverage. During this emergency period, for some insurers they may be billed the same as an in-person visit.  Please reach out to your insurance provider with any questions.    If during the course of the call the physician/provider feels a telephone visit is not appropriate, you will not be charged for this service.\"    Patient has given verbal consent for Telephone visit?  Yes    What phone number would you like to be contacted at? [phone]     Gorge Corona complains of   Chief Complaint   Patient presents with     Follow Up     Urge incontinence, urgency       89 year old male who presents for follow up via telephone visit. I recently saw him on 8/18, at which time he reported LUTS for the past year, including urinary urgency, sometimes met with an inability to void, and sometimes resulting in urinary incontinence. He did report a strong stream and felt he was able to empty his bladder completely with each void. Was taking Flomax daily. He does have a history of bulbar urethral stricture and underwent dilation with Dr. Bedoya in 2013. Bladder scan showed a residual volume of 256 mL several hours after his last void. His voiding symptoms were suspected to be related to bladder overactivity, and was started on a trial of " "Sanctura 20 mg BID.     Today, he states that the Sanctura seems to be working very well at controlling his voiding symptoms. However, he did have an episode of confusion last week. States \"I had a mental problem. I thought I was back in the [employer], but that was 50-60 years ago.\"     Assessment/Plan:  Improved LUTS with Sanctura, although concerning for cognitive side effects given his reported episode of confusion/hallucination. We discussed this today and will discontinue. Will try to avoid the anticholinergics moving forward.  -Stop Sanctura.  -Start mirabegron 25 mg daily.   -Follow up with me in 4-6 weeks for a telephone visit to discuss symptoms.       How would you like to obtain your AVS? Mail a copy    Phone call duration: 8 minutes    Dali Cheek, CNP      "

## 2020-10-01 ENCOUNTER — OFFICE VISIT (OUTPATIENT)
Dept: OPHTHALMOLOGY | Facility: CLINIC | Age: 85
End: 2020-10-01
Payer: MEDICARE

## 2020-10-01 DIAGNOSIS — H54.3 LOW VISION, BOTH EYES: ICD-10-CM

## 2020-10-01 DIAGNOSIS — H52.13 MYOPIA OF BOTH EYES WITH ASTIGMATISM AND PRESBYOPIA: ICD-10-CM

## 2020-10-01 DIAGNOSIS — H35.30 AMD (AGE-RELATED MACULAR DEGENERATION), BILATERAL: Primary | ICD-10-CM

## 2020-10-01 DIAGNOSIS — H52.4 MYOPIA OF BOTH EYES WITH ASTIGMATISM AND PRESBYOPIA: ICD-10-CM

## 2020-10-01 DIAGNOSIS — H52.203 MYOPIA OF BOTH EYES WITH ASTIGMATISM AND PRESBYOPIA: ICD-10-CM

## 2020-10-01 PROCEDURE — 92012 INTRM OPH EXAM EST PATIENT: CPT | Performed by: OPTOMETRIST

## 2020-10-01 PROCEDURE — 92015 DETERMINE REFRACTIVE STATE: CPT | Mod: GY | Performed by: OPTOMETRIST

## 2020-10-01 ASSESSMENT — REFRACTION_MANIFEST
OD_AXIS: 180
OD_CYLINDER: +0.50
OD_SPHERE: +2.00
OD_AXIS: 167
OD_SPHERE: -1.00
OD_ADD: +3.50
OD_CYLINDER: +1.75
OD_ADD: +3.00

## 2020-10-01 ASSESSMENT — TONOMETRY
IOP_METHOD: ICARE
OD_IOP_MMHG: 13
OS_IOP_MMHG: 9

## 2020-10-01 ASSESSMENT — CONF VISUAL FIELD
OS_NORMAL: 1
METHOD: COUNTING FINGERS
OD_NORMAL: 1

## 2020-10-01 ASSESSMENT — VISUAL ACUITY
OS_SC: CF @ 2'
OD_SC: 20/125
METHOD: SNELLEN - LINEAR

## 2020-10-01 NOTE — NURSING NOTE
Chief Complaints and History of Present Illnesses   Patient presents with     Follow Up     AMD (age-related macular degeneration), bilateral      Chief Complaint(s) and History of Present Illness(es)     Follow Up     Laterality: both eyes    Onset: gradual    Onset: years ago    Associated symptoms: Negative for eye pain, dryness, tearing, floaters, flashes, glare and haloes    Treatments tried: artificial tears    Response to treatment: mild improvement    Pain scale: 0/10    Comments: AMD (age-related macular degeneration), bilateral               Comments     Pt states vision is worse since last visit.  LE gets tender 1-2 times a week for the past year.  Uses systane BE once a day when he remembers.    DENNYS Ross October 1, 2020 3:18 PM

## 2020-10-01 NOTE — PROGRESS NOTES
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 However, subjective he does not notice as much improvement  -He is mainly bothered by being unable to read the newspaper    Spec Rx updated. I strongly encouraged Mr. Corona to meet with a low vision specialist to help with specific tasks like reading the newspaper. I recommended he meet with Dr. Lombardo but he is interested in trying to establish care closer to home if possible. He should continue to follow-up with his retina doctor and I can see him here as needed for vision rechecks      I have confirmed the patient's CC, HPI and reviewed Past Medical History, Past Surgical History, Social History, Family History, Problem List, Medication List and agree with Tech note.     Zuleyka Ortiz, ULCITA ONOFREO MADELYNS

## 2020-10-01 NOTE — Clinical Note
Do you happen to know of any low vision OD's near [facility]? He would like to meet with one but has a hard time getting back to the [facility]. If not he will make it work to come back to see you. If you do know of any can you reach out to him?  Thanks!

## 2020-10-01 NOTE — PATIENT INSTRUCTIONS
We were able to improve your vision to 20/70 with new glasses. This may continue to change over time if your macular degeneration gets worse.    I would recommend meeting with a low vision/vision rehab optometrist who can help you use certain tools to do your tasks better - specifically reading the newspaper. We have a doctor here who does this but I will see if he has recommendations for a closer one to your house.      Dr. Chris Lombardo, OD  [phone]

## 2020-10-02 ASSESSMENT — REFRACTION_MANIFEST: OS_ADD: +3.50

## 2020-10-14 ENCOUNTER — OFFICE VISIT (OUTPATIENT)
Dept: DERMATOLOGY | Facility: CLINIC | Age: 85
End: 2020-10-14
Payer: MEDICARE

## 2020-10-14 DIAGNOSIS — L30.9 DERMATITIS: ICD-10-CM

## 2020-10-14 DIAGNOSIS — Z85.828 HISTORY OF NONMELANOMA SKIN CANCER: ICD-10-CM

## 2020-10-14 DIAGNOSIS — L57.0 ACTINIC KERATOSIS: ICD-10-CM

## 2020-10-14 DIAGNOSIS — Z23 NEED FOR PROPHYLACTIC VACCINATION AND INOCULATION AGAINST INFLUENZA: ICD-10-CM

## 2020-10-14 DIAGNOSIS — D48.5 NEOPLASM OF UNCERTAIN BEHAVIOR OF SKIN: Primary | ICD-10-CM

## 2020-10-14 PROCEDURE — 11103 TANGNTL BX SKIN EA SEP/ADDL: CPT | Mod: GC | Performed by: DERMATOLOGY

## 2020-10-14 PROCEDURE — 17000 DESTRUCT PREMALG LESION: CPT | Mod: XS | Performed by: DERMATOLOGY

## 2020-10-14 PROCEDURE — 17003 DESTRUCT PREMALG LES 2-14: CPT | Mod: GC | Performed by: DERMATOLOGY

## 2020-10-14 PROCEDURE — 88305 TISSUE EXAM BY PATHOLOGIST: CPT | Performed by: DERMATOLOGY

## 2020-10-14 PROCEDURE — 90662 IIV NO PRSV INCREASED AG IM: CPT | Performed by: DERMATOLOGY

## 2020-10-14 PROCEDURE — 99214 OFFICE O/P EST MOD 30 MIN: CPT | Mod: 25 | Performed by: DERMATOLOGY

## 2020-10-14 PROCEDURE — 11102 TANGNTL BX SKIN SINGLE LES: CPT | Mod: GC | Performed by: DERMATOLOGY

## 2020-10-14 PROCEDURE — 90471 IMMUNIZATION ADMIN: CPT | Performed by: DERMATOLOGY

## 2020-10-14 RX ORDER — TRIAMCINOLONE ACETONIDE 1 MG/G
OINTMENT TOPICAL 2 TIMES DAILY PRN
Qty: 80 G | Refills: 3 | Status: SHIPPED | OUTPATIENT
Start: 2020-10-14

## 2020-10-14 ASSESSMENT — PAIN SCALES - GENERAL: PAINLEVEL: NO PAIN (0)

## 2020-10-14 NOTE — PROGRESS NOTES
[facility] Dermatology Note      Dermatology Problem List:  UBSE 10/14/2020, q6M skin checks  1. Hx NMSC              - nBCC, nasal tip, s/p MMS 8/2015              - nBCC with fibrosis, R dorsal forearms, s/p excision 6/2014              - SCCis, right superior forehead, s/p MMS 7/2012              - nBCC, left mid-back, s/p excision 6/2011  2. AKs. LiqN2.    3. Nummular dermatitis. Triamcinolone 0.1% ointment BID PRN.  4. Neoplasm of uncertain behavior  - R medial forearm, ddx SCC vs iSK s/p shave biopsy 10/14/20  - R malar cheek, ddx AK vs BCC s/p shave biopsy 10/14/20    Encounter Date: Oct 14, 2020    CC:   Chief Complaint   Patient presents with     Skin Check     Gorge is here today for a skin check. He notes a few spots of concern.         History of Present Illness:  Mr. Gorge Corona is a 89 year old male who presents as a follow-up for skin check. The patient was last seen 3/12/2020 when he was treated for AKs.    He has a concern about bumps on the center of his chest, within the scar site of his prior cardiac bypass surgery in 2013. They have been present for the past 7 years. Sometimes they fill with fluid, not painful. He also has some raised rough spots on his arms that are bothersome and intermittently itchy. He's been treated before with cryotherapy and is interested in additional therapies. He had a raised rash on the bilateral upper extremities a couple weeks ago, mildly pruritic. It has resolved now. He's had episodes of this in the past that responded to TAC cream. Otherwise, the patient reports no painful, bleeding, nonhealing, or pruritic lesions, and denies new or changing moles. The patient is otherwise in their usual state of health, with no other acute skin concerns.    Past Medical History:   Patient Active Problem List   Diagnosis     Lumbar spinal stenosis     Lumbar spondylolysis     Major depressive disorder, recurrent episode, moderate (H)     Actinic  keratosis     Other malignant neoplasm of skin, site unspecified     Benign neoplasm of skin of trunk, except scrotum     Other seborrheic keratosis     History of SCC (squamous cell carcinoma) of skin     H/O colonoscopy     Normal pressure hydrocephalus (H)     ASCVD (arteriosclerotic cardiovascular disease)     Coronary artery disease, occlusive     H/O CT scan of head     Mitral valve regurgitation     CAD (coronary artery disease)     Atrial fibrillation (H)     Dyslipidemia     Preventative health care     CAD S/P percutaneous coronary angioplasty     BCC (basal cell carcinoma of skin)     Hypothyroidism     CHF (congestive heart failure) (H)     SCC (squamous cell carcinoma)     SVT (supraventricular tachycardia) (H)     Status post coronary angiogram     Near syncope     Bradycardia     Past Medical History:   Diagnosis Date     Atrial fibrillation (H)      Atrial flutter (H)      Basal cell carcinoma of skin of trunk      BPH (benign prostatic hypertrophy) with urinary obstruction      CAD (coronary artery disease)     s/p CABG 1/2013; RUSSEL in 2/2013     Depression      H/O CT scan of head 1/11/2013 7/20/2006    Result Impression     CT of the Head without contrast 7/20/2006  History: Ataxia, incontinence, and NPH  Comparison Study: MR obtained to 4/19/2004  Technique: Axial CT images of the head were obtained at 2 intervals from the skull base to the vertex without intravenous contrast. The images were reviewed in brain, subdural and bone windows.  Findings: Patient has had a moderate promin     Hyperlipidemia      Lumbar spinal stenosis      Mitral regurgitation     s/p bioprosthesis 1/2013     Palpitations      Paroxysmal supraventricular tachycardia (H)      Squamous cell carcinoma      Tachycardia     baroreflex injury due to surgery      (ventriculoperitoneal) shunt status     for Normal pressure hydrocephalus     Past Surgical History:   Procedure Laterality Date     ANESTHESIA CARDIOVERSION   4/11/2013    Procedure: ANESTHESIA CARDIOVERSION;  Cardioversion;  Surgeon: Provider, Generic Anesthesia;  Location: [facility] OR     BYPASS GRAFT ARTERY CORONARY, REPAIR VALVE MITRAL, COMBINED  1/14/2013    Procedure: COMBINED BYPASS GRAFT ARTERY CORONARY, REPAIR VALVE MITRAL;  Median sternotomy, coronary artery bypass graft X3 using left internal mammary artery & right saphenous vein , mitral valve Replacement and on pump oxygenator, flexible cystoscopy, urethral dilation and insertion of stuart catheter.;  Surgeon: Chan Peres MD;  Location: [facility] OR     CATARACT IOL, RT/LT Bilateral      CORONARY ANGIOGRAPHY ADULT ORDER       CORONARY ARTERY BYPASS  1/2013    mitral valve tissue      INSERT CATHETER BLADDER  1/14/2013    Procedure: INSERT CATHETER BLADDER;  Flexible cystoscopy, urethral dilation,& insertion of stuart catheter;  Surgeon: Pete Bedoya MD;  Location: [facility] OR     MITRAL VALVE REPLACEMENT  2013    29 mm Epic porcine valve     MOHS MICROGRAPHIC PROCEDURE         Social History:  Patient reports that he quit smoking about 30 years ago. His smoking use included cigars and pipe. He has quit using smokeless tobacco. He reports that he does not drink alcohol or use drugs.    Family History:  Family History   Problem Relation Age of Onset     Cancer Father      Kidney Disease Mother      Macular Degeneration No family hx of      Glaucoma No family hx of        Medications:  Current Outpatient Medications   Medication Sig Dispense Refill     albuterol (PROAIR HFA, PROVENTIL HFA, VENTOLIN HFA) 108 (90 BASE) MCG/ACT inhaler Inhale 2 puffs into the lungs every 4 hours as needed for shortness of breath / dyspnea or wheezing 1 Inhaler 1     ammonium lactate (LAC-HYDRIN) 12 % external lotion Apply topically 2 times daily as needed for dry skin 360 g 11     aspirin 81 MG EC tablet Take 1 tablet (81 mg) by mouth daily 90 tablet 3     atorvastatin (LIPITOR) 10 MG tablet Take 1 tablet (10 mg) by mouth  daily 90 tablet 2     Calcium 600 MG tablet Take 1 tablet by mouth 2 times daily. 100 tablet 3     Cholecalciferol (VITAMIN D) 1000 UNITS capsule Take 1 capsule by mouth daily.       citalopram (CELEXA) 20 MG tablet Take 1 tablet (20 mg) by mouth daily 90 tablet 0     clindamycin (CLEOCIN) 300 MG capsule Take 2 capsules by mouth as needed for 1 dose. 1 hour prior to dental work. 6 capsule 0     coenzyme Q-10 75 MG CAPS Take  by mouth.       finasteride (PROSCAR) 5 MG tablet Take 1 tablet (5 mg) by mouth daily 90 tablet 3     fluticasone-vilanterol (BREO ELLIPTA) 100-25 MCG/INH inhaler Inhale 1 puff into the lungs daily 60 Inhaler 9     furosemide (LASIX) 40 MG tablet Take 1 tablet (40 mg) by mouth daily 90 tablet 2     ipratropium (ATROVENT) 0.06 % nasal spray Spray 2 sprays into both nostrils 4 times daily 15 mL 4     levothyroxine (EUTHYROX) 50 MCG tablet Take 1.5 tablets (75 mcg) by mouth daily 135 tablet 3     MAGNESIUM OXIDE PO Take 400 mg by mouth       melatonin 3 MG tablet Take 1 tablet (3 mg) by mouth nightly as needed for sleep 100 tablet 3     metoprolol succinate ER (TOPROL-XL) 25 MG 24 hr tablet Take 0.5 tablets (12.5 mg) by mouth daily 45 tablet 2     mirabegron (MYRBETRIQ) 25 MG 24 hr tablet Take 1 tablet (25 mg) by mouth daily 30 tablet 3     Multiple Vitamins-Minerals (OCUVITE PO) Take  by mouth.  0     order for DME Equipment being ordered: Safe step tub / walk-in tub 1 Device 0     order for DME Walker 1 each 0     senna-docusate (SENOKOT-S;PERICOLACE) 8.6-50 MG per tablet Take 1 tablet by mouth 2 times daily. 60 tablet      spironolactone (ALDACTONE) 25 MG tablet TAKE 1 TABLET BY MOUTH ONCE DAILY 90 tablet 3     tamsulosin (FLOMAX) 0.4 MG capsule TAKE 1 CAPSULE BY MOUTH ONCE DAILY 90 capsule 3     traMADol (ULTRAM) 50 MG tablet Take 1 tablet (50 mg) by mouth daily as needed for severe pain 10 tablet 0     traMADol (ULTRAM) 50 MG tablet Take 0.5 tablets (25 mg) by mouth 2 times daily as needed for  severe pain 15 tablet 0     triamcinolone (KENALOG) 0.1 % cream Apply sparingly to affected area three times daily as needed 80 g 1        Allergies   Allergen Reactions     Penicillins Rash         Review of Systems:  -Skin Establ Pt: The patient denies any new rash, pruritus, or lesions that are symptomatic, changing or bleeding, except as per HPI.  -Constitutional: Otherwise feeling well today, in usual state of health.  -Skin: As above in HPI. No additional skin concerns.    Physical exam:  Vitals: There were no vitals taken for this visit.  GEN: This is a well developed, well-nourished elderly male in no acute distress, in a pleasant mood.    SKIN: Waist-up skin, which includes the head/face, neck, both arms, chest, back, abdomen, digits and/or nails was examined.  -Olivas skin type: I-II  - On the R medial forearm, there is a 1.3 cm raised pink scaly plaque with increased peripheral vascularity and amorphous pink color change  - On the R malar cheek, there is an 8 mm scaly pink papule  - Scattered gritty pink papules on the temples, scalp, L helix, and bilateral arms/dorsal hands  - No dermatitic areas noted on the arms today  - Multiple firm red papules located on the trunk and extremities  - Multiple waxy, stuck on tan to brown papules and plaques located on trunk, neck, and extremities  - No other lesions of concern on areas examined.     Labs:  None    Impression/Plan:    1. Neoplasm of uncertain behavior  - R medial forearm, ddx SCC vs iSK s/p shave biopsy 10/14/20  - R malar cheek, ddx AK vs BCC s/p shave biopsy 10/14/20    Shave biopsy:  After discussion of benefits and risks including but not limited to bleeding/bruising, pain/swelling, infection, scar, incomplete removal, nerve damage/numbness, recurrence, and non-diagnostic biopsy, written consent, verbal consent and photographs were obtained. Time-out was performed. The area was cleaned with isopropyl alcohol. 0.5mL of 1% lidocaine with  epinephrine was injected to obtain adequate anesthesia of the lesion on the R medial forearm. A shave biopsy was performed. Hemostasis was achieved with aluminium chloride. Vaseline and a sterile dressing were applied. The patient tolerated the procedure and no complications were noted. The patient was provided with verbal and written post care instructions.     2. Actinic keratosis  - Cryotherapy procedure note: After verbal consent and discussion of risks and benefits including but no limited to dyspigmentation/scar, blister, and pain, 14 was(were) treated with 1-2mm freeze border for 2 cycles with liquid nitrogen. Post cryotherapy instructions were provided.  - Briefly discussed field therapy, deferred as actinic burden not too high. Also, this patient would be a better candidate for PDT over efudex therapy in the future if indicated    3. Seborrheic keratosis, non irritated  - No further intervention required. Patient to report changes.     4. History of nonmelanoma skin cancer, no clincial evidence of recurrence  - No further intervention required. Patient to report changes.   - We will clinically monitor these areas.    5. Dermatitis NOS - no active rash today, unclear etiology. Reported improvement with triamcinolone cream in the past, will refill medication today for as needed use    Follow-up in 6 months, earlier for new or changing lesions.     Dr. Coyle staffed the patient.    Staff Involved:  Resident(Ana Rosa)/Staff    Shannon Oseguera MD  PGY-4 Medicine-Dermatology  Pager [phone]    Staff Physician Comments:   I saw and evaluated the patient with the resident and I agree with the assessment and plan.  I was present for the entire minor and major procedure and examination.    Jose Coyle MD  Pronouns: he/him/his    Department of Dermatology  [facility]: Phone: [phone], Fax:[phone]  [facility]  [facility]: Phone: [phone] Fax: [phone]

## 2020-10-14 NOTE — PATIENT INSTRUCTIONS
Wound Care After a Biopsy    What is a skin biopsy?  A skin biopsy allows the doctor to examine a very small piece of tissue under the microscope to determine the diagnosis and the best treatment for the skin condition. A local anesthetic (numbing medicine)  is injected with a very small needle into the skin area to be tested. A small piece of skin is taken from the area. Sometimes a suture (stitch) is used.     What are the risks of a skin biopsy?  I will experience scar, bleeding, swelling, pain, crusting and redness. I may experience incomplete removal or recurrence. Risks of this procedure are excessive bleeding, bruising, infection, nerve damage, numbness, thick (hypertrophic or keloidal) scar and non-diagnostic biopsy.    How should I care for my wound for the first 24 hours?    Keep the wound dry and covered for 24 hours    If it bleeds, hold direct pressure on the area for 15 minutes. If bleeding does not stop then go to the emergency room    Avoid strenuous exercise the first 1-2 days or as your doctor instructs you    How should I care for the wound after 24 hours?    After 24 hours, remove the bandage    You may bathe or shower as normal    If you had a scalp biopsy, you can shampoo as usual and can use shower water to clean the biopsy site daily    Clean the wound twice a day with gentle soap and water    Do not scrub, be gentle    Apply white petroleum/Vaseline after cleaning the wound with a cotton swab or a clean finger, and keep the site covered with a Bandaid /bandage. Bandages are not necessary with a scalp biopsy    If you are unable to cover the site with a Bandaid /bandage, re-apply ointment 2-3 times a day to keep the site moist. Moisture will help with healing    Avoid strenuous activity for first 1-2 days    Avoid lakes, rivers, pools, and oceans until the stitches are removed or the site is healed    How do I clean my wound?    Wash hands thoroughly with soap or use hand  before all  wound care    Clean the wound with gentle soap and water    Apply white petroleum/Vaseline  to wound after it is clean    Replace the Bandaid /bandage to keep the wound covered for the first few days or as instructed by your doctor    If you had a scalp biopsy, warm shower water to the area on a daily basis should suffice    What should I use to clean my wound?     Cotton-tipped applicators (Qtips )    White petroleum jelly (Vaseline ). Use a clean new container and use Q-tips to apply.    Bandaids   as needed    Gentle soap     How should I care for my wound long term?    Do not get your wound dirty    Keep up with wound care for one week or until the area is healed.    A small scab will form and fall off by itself when the area is completely healed. The area will be red and will become pink in color as it heals. Sun protection is very important for how your scar will turn out. Sunscreen with an SPF 30 or greater is recommended once the area is healed.    If you have stitches, stitches need to be removed in 14 days. You may return to our clinic for this or you may have it done locally at your doctor s office.    You should have some soreness but it should be mild and slowly go away over several days. Talk to your doctor about using tylenol for pain,    When should I call my doctor?  If you have increased:     Pain or swelling    Pus or drainage (clear or slightly yellow drainage is ok)    Temperature over 100F    Spreading redness or warmth around wound    When will I hear about my results?  The biopsy results can take 2-3 weeks to come back. The clinic will call you with the results, send you a Built [employer] message, or have you schedule a follow-up clinic or phone time to discuss the results. Contact our clinics if you do not hear from us in 3 weeks.     Who should I call with questions?    [facility]: [phone]     [facility]: [phone]    For  urgent needs outside of business hours call the [facility] at [phone] and ask for the dermatology resident on call    Cryotherapy    What is it?    Use of a very cold liquid, such as liquid nitrogen, to freeze and destroy abnormal skin cells that need to be removed    What should I expect?    Tenderness and redness    A small blister that might grow and fill with dark purple blood. There may be crusting.    More than one treatment may be needed if the lesions do not go away.    How do I care for the treated area?    Gently wash the area with your hands when bathing.    Use a thin layer of Vaseline to help with healing. You may use a Band-Aid.     The area should heal within 7-10 days and may leave behind a pink or lighter color.     Do not use an antibiotic or Neosporin ointment.     You may take acetaminophen (Tylenol) for pain.     Call your Doctor if you have:    Severe pain    Signs of infection (warmth, redness, cloudy yellow drainage, and or a bad smell)    Questions or concerns    Who should I call with questions?       [facility]: [phone]       [facility]: [phone]       For urgent needs outside of business hours call the [facility] at [phone]        and ask for the dermatology resident on call

## 2020-10-14 NOTE — NURSING NOTE
Dermatology Rooming Note    Gorge Corona's goals for this visit include:   Chief Complaint   Patient presents with     Skin Check     Gorge is here today for a skin check. He notes a few spots of concern.     Amelia Benavidez, CMA

## 2020-10-14 NOTE — LETTER
10/14/2020       RE: Gorge Corona  [id number]  [place] [address]     Dear Colleague,    Thank you for referring your patient, Gorge Corona, to the [facility] [place] [facility]. Please see a copy of my visit note below.    [facility] Dermatology Note      Dermatology Problem List:  UBSE 10/14/2020, q6M skin checks  1. Hx NMSC              - nBCC, nasal tip, s/p MMS 8/2015              - nBCC with fibrosis, R dorsal forearms, s/p excision 6/2014              - SCCis, right superior forehead, s/p MMS 7/2012              - nBCC, left mid-back, s/p excision 6/2011  2. AKs. LiqN2.    3. Nummular dermatitis. Triamcinolone 0.1% ointment BID PRN.  4. Neoplasm of uncertain behavior  - R medial forearm, ddx SCC vs iSK s/p shave biopsy 10/14/20  - R malar cheek, ddx AK vs BCC s/p shave biopsy 10/14/20    Encounter Date: Oct 14, 2020    CC:   Chief Complaint   Patient presents with     Skin Check     Gorge is here today for a skin check. He notes a few spots of concern.         History of Present Illness:  Mr. Gorge Corona is a 89 year old male who presents as a follow-up for skin check. The patient was last seen 3/12/2020 when he was treated for AKs.    He has a concern about bumps on the center of his chest, within the scar site of his prior cardiac bypass surgery in 2013. They have been present for the past 7 years. Sometimes they fill with fluid, not painful. He also has some raised rough spots on his arms that are bothersome and intermittently itchy. He's been treated before with cryotherapy and is interested in additional therapies. He had a raised rash on the bilateral upper extremities a couple weeks ago, mildly pruritic. It has resolved now. He's had episodes of this in the past that responded to TAC cream. Otherwise, the patient reports no painful, bleeding, nonhealing, or pruritic lesions, and denies new  or changing moles. The patient is otherwise in their usual state of health, with no other acute skin concerns.    Past Medical History:   Patient Active Problem List   Diagnosis     Lumbar spinal stenosis     Lumbar spondylolysis     Major depressive disorder, recurrent episode, moderate (H)     Actinic keratosis     Other malignant neoplasm of skin, site unspecified     Benign neoplasm of skin of trunk, except scrotum     Other seborrheic keratosis     History of SCC (squamous cell carcinoma) of skin     H/O colonoscopy     Normal pressure hydrocephalus (H)     ASCVD (arteriosclerotic cardiovascular disease)     Coronary artery disease, occlusive     H/O CT scan of head     Mitral valve regurgitation     CAD (coronary artery disease)     Atrial fibrillation (H)     Dyslipidemia     Preventative health care     CAD S/P percutaneous coronary angioplasty     BCC (basal cell carcinoma of skin)     Hypothyroidism     CHF (congestive heart failure) (H)     SCC (squamous cell carcinoma)     SVT (supraventricular tachycardia) (H)     Status post coronary angiogram     Near syncope     Bradycardia     Past Medical History:   Diagnosis Date     Atrial fibrillation (H)      Atrial flutter (H)      Basal cell carcinoma of skin of trunk      BPH (benign prostatic hypertrophy) with urinary obstruction      CAD (coronary artery disease)     s/p CABG 1/2013; RUSSEL in 2/2013     Depression      H/O CT scan of head 1/11/2013 7/20/2006    Result Impression     CT of the Head without contrast 7/20/2006  History: Ataxia, incontinence, and NPH  Comparison Study: MR obtained to 4/19/2004  Technique: Axial CT images of the head were obtained at 2 intervals from the skull base to the vertex without intravenous contrast. The images were reviewed in brain, subdural and bone windows.  Findings: Patient has had a moderate promin     Hyperlipidemia      Lumbar spinal stenosis      Mitral regurgitation     s/p bioprosthesis 1/2013      Palpitations      Paroxysmal supraventricular tachycardia (H)      Squamous cell carcinoma      Tachycardia     baroreflex injury due to surgery      (ventriculoperitoneal) shunt status     for Normal pressure hydrocephalus     Past Surgical History:   Procedure Laterality Date     ANESTHESIA CARDIOVERSION  4/11/2013    Procedure: ANESTHESIA CARDIOVERSION;  Cardioversion;  Surgeon: Provider, Generic Anesthesia;  Location: [facility] OR     BYPASS GRAFT ARTERY CORONARY, REPAIR VALVE MITRAL, COMBINED  1/14/2013    Procedure: COMBINED BYPASS GRAFT ARTERY CORONARY, REPAIR VALVE MITRAL;  Median sternotomy, coronary artery bypass graft X3 using left internal mammary artery & right saphenous vein , mitral valve Replacement and on pump oxygenator, flexible cystoscopy, urethral dilation and insertion of stuart catheter.;  Surgeon: Chan Peres MD;  Location: [facility] OR     CATARACT IOL, RT/LT Bilateral      CORONARY ANGIOGRAPHY ADULT ORDER       CORONARY ARTERY BYPASS  1/2013    mitral valve tissue      INSERT CATHETER BLADDER  1/14/2013    Procedure: INSERT CATHETER BLADDER;  Flexible cystoscopy, urethral dilation,& insertion of stuart catheter;  Surgeon: Pete Bedoya MD;  Location: [facility] OR     MITRAL VALVE REPLACEMENT  2013    29 mm Epic porcine valve     MOHS MICROGRAPHIC PROCEDURE         Social History:  Patient reports that he quit smoking about 30 years ago. His smoking use included cigars and pipe. He has quit using smokeless tobacco. He reports that he does not drink alcohol or use drugs.    Family History:  Family History   Problem Relation Age of Onset     Cancer Father      Kidney Disease Mother      Macular Degeneration No family hx of      Glaucoma No family hx of        Medications:  Current Outpatient Medications   Medication Sig Dispense Refill     albuterol (PROAIR HFA, PROVENTIL HFA, VENTOLIN HFA) 108 (90 BASE) MCG/ACT inhaler Inhale 2 puffs into the lungs every 4 hours as needed for shortness of  breath / dyspnea or wheezing 1 Inhaler 1     ammonium lactate (LAC-HYDRIN) 12 % external lotion Apply topically 2 times daily as needed for dry skin 360 g 11     aspirin 81 MG EC tablet Take 1 tablet (81 mg) by mouth daily 90 tablet 3     atorvastatin (LIPITOR) 10 MG tablet Take 1 tablet (10 mg) by mouth daily 90 tablet 2     Calcium 600 MG tablet Take 1 tablet by mouth 2 times daily. 100 tablet 3     Cholecalciferol (VITAMIN D) 1000 UNITS capsule Take 1 capsule by mouth daily.       citalopram (CELEXA) 20 MG tablet Take 1 tablet (20 mg) by mouth daily 90 tablet 0     clindamycin (CLEOCIN) 300 MG capsule Take 2 capsules by mouth as needed for 1 dose. 1 hour prior to dental work. 6 capsule 0     coenzyme Q-10 75 MG CAPS Take  by mouth.       finasteride (PROSCAR) 5 MG tablet Take 1 tablet (5 mg) by mouth daily 90 tablet 3     fluticasone-vilanterol (BREO ELLIPTA) 100-25 MCG/INH inhaler Inhale 1 puff into the lungs daily 60 Inhaler 9     furosemide (LASIX) 40 MG tablet Take 1 tablet (40 mg) by mouth daily 90 tablet 2     ipratropium (ATROVENT) 0.06 % nasal spray Spray 2 sprays into both nostrils 4 times daily 15 mL 4     levothyroxine (EUTHYROX) 50 MCG tablet Take 1.5 tablets (75 mcg) by mouth daily 135 tablet 3     MAGNESIUM OXIDE PO Take 400 mg by mouth       melatonin 3 MG tablet Take 1 tablet (3 mg) by mouth nightly as needed for sleep 100 tablet 3     metoprolol succinate ER (TOPROL-XL) 25 MG 24 hr tablet Take 0.5 tablets (12.5 mg) by mouth daily 45 tablet 2     mirabegron (MYRBETRIQ) 25 MG 24 hr tablet Take 1 tablet (25 mg) by mouth daily 30 tablet 3     Multiple Vitamins-Minerals (OCUVITE PO) Take  by mouth.  0     order for DME Equipment being ordered: Safe step tub / walk-in tub 1 Device 0     order for DME Walker 1 each 0     senna-docusate (SENOKOT-S;PERICOLACE) 8.6-50 MG per tablet Take 1 tablet by mouth 2 times daily. 60 tablet      spironolactone (ALDACTONE) 25 MG tablet TAKE 1 TABLET BY MOUTH ONCE DAILY  90 tablet 3     tamsulosin (FLOMAX) 0.4 MG capsule TAKE 1 CAPSULE BY MOUTH ONCE DAILY 90 capsule 3     traMADol (ULTRAM) 50 MG tablet Take 1 tablet (50 mg) by mouth daily as needed for severe pain 10 tablet 0     traMADol (ULTRAM) 50 MG tablet Take 0.5 tablets (25 mg) by mouth 2 times daily as needed for severe pain 15 tablet 0     triamcinolone (KENALOG) 0.1 % cream Apply sparingly to affected area three times daily as needed 80 g 1        Allergies   Allergen Reactions     Penicillins Rash         Review of Systems:  -Skin Establ Pt: The patient denies any new rash, pruritus, or lesions that are symptomatic, changing or bleeding, except as per HPI.  -Constitutional: Otherwise feeling well today, in usual state of health.  -Skin: As above in HPI. No additional skin concerns.    Physical exam:  Vitals: There were no vitals taken for this visit.  GEN: This is a well developed, well-nourished elderly male in no acute distress, in a pleasant mood.    SKIN: Waist-up skin, which includes the head/face, neck, both arms, chest, back, abdomen, digits and/or nails was examined.  -Olivas skin type: I-II  - On the R medial forearm, there is a 1.3 cm raised pink scaly plaque with increased peripheral vascularity and amorphous pink color change  - On the R malar cheek, there is an 8 mm scaly pink papule  - Scattered gritty pink papules on the temples, scalp, L helix, and bilateral arms/dorsal hands  - No dermatitic areas noted on the arms today  - Multiple firm red papules located on the trunk and extremities  - Multiple waxy, stuck on tan to brown papules and plaques located on trunk, neck, and extremities  - No other lesions of concern on areas examined.     Labs:  None    Impression/Plan:    1. Neoplasm of uncertain behavior  - R medial forearm, ddx SCC vs iSK s/p shave biopsy 10/14/20  - R malar cheek, ddx AK vs BCC s/p shave biopsy 10/14/20    Shave biopsy:  After discussion of benefits and risks including but not  limited to bleeding/bruising, pain/swelling, infection, scar, incomplete removal, nerve damage/numbness, recurrence, and non-diagnostic biopsy, written consent, verbal consent and photographs were obtained. Time-out was performed. The area was cleaned with isopropyl alcohol. 0.5mL of 1% lidocaine with epinephrine was injected to obtain adequate anesthesia of the lesion on the R medial forearm. A shave biopsy was performed. Hemostasis was achieved with aluminium chloride. Vaseline and a sterile dressing were applied. The patient tolerated the procedure and no complications were noted. The patient was provided with verbal and written post care instructions.     2. Actinic keratosis  - Cryotherapy procedure note: After verbal consent and discussion of risks and benefits including but no limited to dyspigmentation/scar, blister, and pain, 14 was(were) treated with 1-2mm freeze border for 2 cycles with liquid nitrogen. Post cryotherapy instructions were provided.  - Briefly discussed field therapy, deferred as actinic burden not too high. Also, this patient would be a better candidate for PDT over efudex therapy in the future if indicated    3. Seborrheic keratosis, non irritated  - No further intervention required. Patient to report changes.     4. History of nonmelanoma skin cancer, no clincial evidence of recurrence  - No further intervention required. Patient to report changes.   - We will clinically monitor these areas.    5. Dermatitis NOS - no active rash today, unclear etiology. Reported improvement with triamcinolone cream in the past, will refill medication today for as needed use    Follow-up in 6 months, earlier for new or changing lesions.     Dr. Coyle staffed the patient.    Staff Involved:  Resident(Ana Rosa)/Staff    Shannon Oseguera MD  PGY-4 Medicine-Dermatology  Pager [phone]    Staff Physician Comments:   I saw and evaluated the patient with the resident and I agree with the assessment and plan.  I  was present for the entire minor and major procedure and examination.    Jose Coyle MD  Pronouns: he/him/his    Department of Dermatology  [facility]: Phone: [phone], Fax:[phone]  [facility]: Phone: [phone] Fax: [phone]

## 2020-10-15 ENCOUNTER — TELEPHONE (OUTPATIENT)
Dept: INTERNAL MEDICINE | Facility: CLINIC | Age: 85
End: 2020-10-15

## 2020-10-18 LAB — COPATH REPORT: NORMAL

## 2020-10-19 ENCOUNTER — PATIENT OUTREACH (OUTPATIENT)
Dept: CARE COORDINATION | Facility: CLINIC | Age: 85
End: 2020-10-19

## 2020-10-19 ENCOUNTER — OFFICE VISIT (OUTPATIENT)
Dept: INTERNAL MEDICINE | Facility: CLINIC | Age: 85
End: 2020-10-19
Payer: MEDICARE

## 2020-10-19 VITALS
SYSTOLIC BLOOD PRESSURE: 108 MMHG | HEART RATE: 70 BPM | BODY MASS INDEX: 30.61 KG/M2 | OXYGEN SATURATION: 95 % | DIASTOLIC BLOOD PRESSURE: 69 MMHG | WEIGHT: 207.3 LBS

## 2020-10-19 DIAGNOSIS — M62.81 GENERALIZED MUSCLE WEAKNESS: ICD-10-CM

## 2020-10-19 DIAGNOSIS — R29.6 RECURRENT FALLS: Primary | ICD-10-CM

## 2020-10-19 DIAGNOSIS — I50.32 CHRONIC DIASTOLIC HEART FAILURE (H): ICD-10-CM

## 2020-10-19 DIAGNOSIS — D04.39 SQUAMOUS CELL CARCINOMA IN SITU (SCCIS) OF SKIN OF RIGHT CHEEK: Primary | ICD-10-CM

## 2020-10-19 PROCEDURE — 99214 OFFICE O/P EST MOD 30 MIN: CPT | Performed by: INTERNAL MEDICINE

## 2020-10-19 ASSESSMENT — PAIN SCALES - GENERAL: PAINLEVEL: NO PAIN (0)

## 2020-10-19 NOTE — NURSING NOTE
Chief Complaint   Patient presents with     Referral     Pt would like referral fro home health care      NORMA Engel at 8:47 AM sign on 10/19/2020

## 2020-10-19 NOTE — PROGRESS NOTES
"Assessment/Plan:    Home care referral  Gorge was seen today for home care referral. He is unable to complete ADLs, specifically bathing, getting dressed, and cleaning the house. He states his wife is reluctant to ask for help but is unable to do everything around the house. When standing with his walker in clinic today, he is notably weak and slow to rise. He and his wife endorse his complete reliance on the walker \"to do everything now\".    Because of his risk of falls and weakness, we recommend home health for an evaluation of home safety and home care needs. Waiting to here from staff here at [facility] if they are able to get contact  [facility] if the [facility] order needs to be [facility] the [facility].    Known Heart murmur and diastolic heart failure  Grade 3 systolic murmur noted in the aortic area, which is longstanding. The patient states he sometimes feels lightheaded when he takes a shower. Otherwise, denies feeling lightheaded. No syncopal episodes per him or his wife. He follows with cardiology and has an appointment on 10/27.  He has a known history of diastolic heart failure.  Reviewed Echo results from 2/2020 showing prosthetic mitral valve and moderate aortic stenosis.    Deidre Garvey, ADRIANO-FNP student  The patient was seen and discussed with Dr. Bryon VILLARREAL, Peng Slater, was present with the NP student who participated in the service and in the documentation of the note.  I have verified the history and personally performed the physical exam and medical decision making.  I agree with the assessment and plan of care as documented in the note.        Chief complaint:   Gorge Corona is an 89 year old male who presents today with his wife for a home care referral. He does not have any further concerns today.    Gorge reports increasing difficulty completes ADLs to include bathing, getting dressed, and house cleaning. His wife states she does \"mostly everything\" but her COPD limits her from cleaning, " "particularly the shower. Gorge is fully dependent on his walker. His wife reports that he is unsteady without it. When he showers, he is currently using shower rails. Denies recent falls, but reports a fall on Ji's Day of this year in which he \"cracked the kneecap.\" The couple states [facility] were [facility] by the [facility] to present here for a home care order, which will be sent to the  to make the arrangements. They have never had home care in the past.    He had his influenza vaccine last week.    Medications and allergies were reviewed by me today.    ROS:   General: No fever, weight changes, recent illness  Cardiac: POSITIVE for lightheadedness (espeically while in shower), no chest pain, palpitations, syncope  Respiratory: POSITIVE for occasional SOB while talking (reported by wife)  Musculoskeletal: POSITIVE for generalized weakness, loss strength   Neuro: POSITIVE for unsteadiness, no loss of sensation, no numbness or tingling, no tremor, no dizziness, no headache     Past Medical History:   Diagnosis Date     Atrial fibrillation (H)      Atrial flutter (H)      Basal cell carcinoma of skin of trunk      BPH (benign prostatic hypertrophy) with urinary obstruction      CAD (coronary artery disease)     s/p CABG 1/2013; RUSSEL in 2/2013     Depression      H/O CT scan of head 1/11/2013 7/20/2006    Result Impression     CT of the Head without contrast 7/20/2006  History: Ataxia, incontinence, and NPH  Comparison Study: MR obtained to 4/19/2004  Technique: Axial CT images of the head were obtained at 2 intervals from the skull base to the vertex without intravenous contrast. The images were reviewed in brain, subdural and bone windows.  Findings: Patient has had a moderate promin     Hyperlipidemia      Lumbar spinal stenosis      Mitral regurgitation     s/p bioprosthesis 1/2013     Palpitations      Paroxysmal supraventricular tachycardia (H)      Squamous cell carcinoma      Tachycardia     baroreflex " injury due to surgery      (ventriculoperitoneal) shunt status     for Normal pressure hydrocephalus     Past Surgical History:   Procedure Laterality Date     ANESTHESIA CARDIOVERSION  4/11/2013    Procedure: ANESTHESIA CARDIOVERSION;  Cardioversion;  Surgeon: Provider, Generic Anesthesia;  Location: [facility] OR     BYPASS GRAFT ARTERY CORONARY, REPAIR VALVE MITRAL, COMBINED  1/14/2013    Procedure: COMBINED BYPASS GRAFT ARTERY CORONARY, REPAIR VALVE MITRAL;  Median sternotomy, coronary artery bypass graft X3 using left internal mammary artery & right saphenous vein , mitral valve Replacement and on pump oxygenator, flexible cystoscopy, urethral dilation and insertion of stuart catheter.;  Surgeon: Chan Peres MD;  Location: [facility] OR     CATARACT IOL, RT/LT Bilateral      CORONARY ANGIOGRAPHY ADULT ORDER       CORONARY ARTERY BYPASS  1/2013    mitral valve tissue      INSERT CATHETER BLADDER  1/14/2013    Procedure: INSERT CATHETER BLADDER;  Flexible cystoscopy, urethral dilation,& insertion of stuart catheter;  Surgeon: Pete Bedoya MD;  Location: [facility] OR     MITRAL VALVE REPLACEMENT  2013    29 mm Epic porcine valve     MOHS MICROGRAPHIC PROCEDURE       Current Outpatient Medications   Medication     albuterol (PROAIR HFA, PROVENTIL HFA, VENTOLIN HFA) 108 (90 BASE) MCG/ACT inhaler     ammonium lactate (LAC-HYDRIN) 12 % external lotion     atorvastatin (LIPITOR) 10 MG tablet     Calcium 600 MG tablet     Cholecalciferol (VITAMIN D) 1000 UNITS capsule     citalopram (CELEXA) 20 MG tablet     clindamycin (CLEOCIN) 300 MG capsule     coenzyme Q-10 75 MG CAPS     finasteride (PROSCAR) 5 MG tablet     fluticasone-vilanterol (BREO ELLIPTA) 100-25 MCG/INH inhaler     furosemide (LASIX) 40 MG tablet     ipratropium (ATROVENT) 0.06 % nasal spray     levothyroxine (EUTHYROX) 50 MCG tablet     MAGNESIUM OXIDE PO     melatonin 3 MG tablet     metoprolol succinate ER (TOPROL-XL) 25 MG 24 hr tablet     mirabegron  (MYRBETRIQ) 25 MG 24 hr tablet     Multiple Vitamins-Minerals (OCUVITE PO)     order for DME     order for DME     senna-docusate (SENOKOT-S;PERICOLACE) 8.6-50 MG per tablet     spironolactone (ALDACTONE) 25 MG tablet     tamsulosin (FLOMAX) 0.4 MG capsule     traMADol (ULTRAM) 50 MG tablet     triamcinolone (KENALOG) 0.1 % cream     triamcinolone (KENALOG) 0.1 % external ointment     aspirin 81 MG EC tablet     traMADol (ULTRAM) 50 MG tablet     No current facility-administered medications for this visit.      Physical Exam  /69 (BP Location: Right arm, Patient Position: Sitting, Cuff Size: Adult Regular)   Pulse 70   Wt 94 kg (207 lb 4.8 oz)   SpO2 95%   BMI 30.61 kg/m    General: no distress, comfortable, pleasant  Cardiovascular, regular rate and rhythm, Grade 3 systolic murmur  Respiratory: lung sounds clearanteriorly. Mild shortness of breath with conversing. No cough or wheezing   Musculoskeletal: bilateral leg weakness while standing with walker   Neurological: unsteady when rising from chair with walker. Normal gait with walker in exam room. No tremor.

## 2020-10-19 NOTE — RESULT ENCOUNTER NOTE
Can you call patient and let him know:    (1) Spot on the right forearm was a pre-skin cancer.   (2) Spot on the right cheek was a squamous cell carcinoma.     He will need MMS surgery for the spot on the right cheek. I placed referral, please schedule.   For spot on the right forearm, our Mohs surgeons should check this spot at his visit and may treat the area with liquid nitrogen.     He should otherwise follow-up in 6 months with me as planned for another skin check. Thanks!    Jose Coyle MD  Pronouns: he/him/his    Department of Dermatology  [facility]: Phone: [phone], Fax:[phone]  [facility]: Phone: [phone] Fax: [phone]

## 2020-10-19 NOTE — TELEPHONE ENCOUNTER
Social Work Intervention  [facility]    Data/Intervention:    Patient Name:  Gorge Corona  /Age:  1930 (89 year old)    Visit Type: telephone  Referral Source: [facility], Dr. Slater  Reason for Referral:  homecare    Collaborated With:    -Patient's wife, Kassi, [phone]  -[facility], [phone]    Patient Concerns/Issues:   Patient's wife, Kassi, reports they have been working with the [facility] office to get homecare for Patient. Kassi was [facility] by the [facility] to have a  send the referral to them. Kassi is unclear what is needed to complete the referral.    Intervention/Education/Resources Provided:   contacted the [facility] office and left a message to be called back with more details of what they need from our clinic.    Assessment/Plan:   will await return call from [facility].    Provided patient/family with contact information and availability.    ROJELIO Schilling  [facility]  Direct Phone: [phone]

## 2020-10-23 ENCOUNTER — PRE VISIT (OUTPATIENT)
Dept: UROLOGY | Facility: CLINIC | Age: 85
End: 2020-10-23

## 2020-10-23 NOTE — TELEPHONE ENCOUNTER
Chief Complaint : Follow up - 4 to 6 Weeks    Hx/Sx: OAB, LUTS    Records/Orders: Available    Pt Contacted: N/a    At Rooming: Telephone    Luiz Rose, EMT

## 2020-10-26 DIAGNOSIS — I25.10 CORONARY ARTERY DISEASE INVOLVING NATIVE CORONARY ARTERY OF NATIVE HEART WITHOUT ANGINA PECTORIS: ICD-10-CM

## 2020-10-26 LAB
ALBUMIN SERPL-MCNC: 3.5 G/DL (ref 3.4–5)
ALP SERPL-CCNC: 84 U/L (ref 40–150)
ALT SERPL W P-5'-P-CCNC: 16 U/L (ref 0–70)
ANION GAP SERPL CALCULATED.3IONS-SCNC: 5 MMOL/L (ref 3–14)
AST SERPL W P-5'-P-CCNC: 13 U/L (ref 0–45)
BILIRUB SERPL-MCNC: 0.6 MG/DL (ref 0.2–1.3)
BUN SERPL-MCNC: 19 MG/DL (ref 7–30)
CALCIUM SERPL-MCNC: 9.3 MG/DL (ref 8.5–10.1)
CHLORIDE SERPL-SCNC: 108 MMOL/L (ref 94–109)
CHOLEST SERPL-MCNC: 129 MG/DL
CO2 SERPL-SCNC: 28 MMOL/L (ref 20–32)
CREAT SERPL-MCNC: 1.12 MG/DL (ref 0.66–1.25)
GFR SERPL CREATININE-BSD FRML MDRD: 58 ML/MIN/{1.73_M2}
GLUCOSE SERPL-MCNC: 92 MG/DL (ref 70–99)
HDLC SERPL-MCNC: 61 MG/DL
LDLC SERPL CALC-MCNC: 53 MG/DL
NONHDLC SERPL-MCNC: 68 MG/DL
POTASSIUM SERPL-SCNC: 4 MMOL/L (ref 3.4–5.3)
PROT SERPL-MCNC: 6.7 G/DL (ref 6.8–8.8)
SODIUM SERPL-SCNC: 141 MMOL/L (ref 133–144)
TRIGL SERPL-MCNC: 73 MG/DL

## 2020-10-26 PROCEDURE — 80061 LIPID PANEL: CPT | Performed by: NURSE PRACTITIONER

## 2020-10-26 PROCEDURE — 36415 COLL VENOUS BLD VENIPUNCTURE: CPT | Performed by: NURSE PRACTITIONER

## 2020-10-26 PROCEDURE — 80053 COMPREHEN METABOLIC PANEL: CPT | Performed by: NURSE PRACTITIONER

## 2020-10-26 NOTE — PROGRESS NOTES
Chief complaint: Follow-up on CAD and valve disease     HPI: Gorge Corona is an 89 year old male with past medical history significant for CAD s/p CABG X4 (LIMA-LAD, SVG-PDA, SVG-D1, SVG-OM) 2013 and PCI LM into OM due to occluded SVG, s/p porcine bioprosthetic MVR 2013, aortic stenosis, paroxysmal SVT, HLD, BPH, normal pressure hydrocephalus s/p  shunt, and depression.    His last echocardiogram 4/2019 showed low flow, low gradient severe aortic stenosis and normal bioprosthetic mitral valve function with mean PG of 4.5 mmHg. He presents to clinic today for routine follow-up.    Cardiac history:  He has a known history of CAD and MS for which he had CABG X4 and porcine MVR in 2013. He then later had PCI to LM into OM due to occluded SVG graft. His echos have shown normal LV function. However, echo on 6/7/18 showed normal LVEF, but slightly higher mitral valve gradient (however HR was higher), and mild aortic stenosis with significant aortic valve calcification. The mean gradient across the mitral valve was 6-10 mmHg at HR of 75 bpm. He also has a history of SVT with spontaneous conversion in 2014. He has declined any invasive procedures for delineation of mechanism previously. No AF has ever been documented. He was treated with metoprolol and then amiodarone after recurrent palpitations.  He has been maintained on amiodarone 100 mg daily. In 6/2018, low dose metoprolol was added to help HR due to mitral valve gradient. He was then admitted from 8/17/18-8/18/18 with bradycardia and dizziness. He was brought to [facility] by his wife after they noted his pulse laura 40 bpm. He also was reporting dizziness, mostly positional. In [facility] his ECG showed SR 50's. He was admitted to observation. Cardiology was consulted and recommended stopping amiodarone and continuation of metoprolol only. At his last cardiology clinic appt in 2/2020, Patient reported increase SOB while speaking over past several months as well as DESAI with daily  "activities around the house. These are similar symptoms to what he had prior to MVR. He was compensated at that time - euvolemic. Weight was 200-202 lbs.     Interval history:  Since his last visit with cardiology he reports no significant change in symptoms. He reports that over past 2 years he has been getting progressively \"weaker\". When asked to elaborate he thinks that his inability to walk more than a few steps and was up stairs is mostly limited by balance issues, but also reports increasing DESAI. No lightheadedness, dizziness. No CP at rest or with exertion. No palpitations or syncope.      PAST MEDICAL HISTORY:  Past Medical History:   Diagnosis Date     Atrial fibrillation (H)      Atrial flutter (H)      Basal cell carcinoma of skin of trunk      BPH (benign prostatic hypertrophy) with urinary obstruction      CAD (coronary artery disease)     s/p CABG 1/2013; RUSSEL in 2/2013     Depression      H/O CT scan of head 1/11/2013 7/20/2006    Result Impression     CT of the Head without contrast 7/20/2006  History: Ataxia, incontinence, and NPH  Comparison Study: MR obtained to 4/19/2004  Technique: Axial CT images of the head were obtained at 2 intervals from the skull base to the vertex without intravenous contrast. The images were reviewed in brain, subdural and bone windows.  Findings: Patient has had a moderate promin     Hyperlipidemia      Lumbar spinal stenosis      Mitral regurgitation     s/p bioprosthesis 1/2013     Palpitations      Paroxysmal supraventricular tachycardia (H)      Squamous cell carcinoma      Tachycardia     baroreflex injury due to surgery      (ventriculoperitoneal) shunt status     for Normal pressure hydrocephalus       CURRENT MEDICATIONS:  Current Outpatient Medications   Medication Sig Dispense Refill     ammonium lactate (LAC-HYDRIN) 12 % external lotion Apply topically 2 times daily as needed for dry skin 360 g 11     atorvastatin (LIPITOR) 10 MG tablet Take 1 tablet (10 " mg) by mouth daily 90 tablet 2     Calcium 600 MG tablet Take 1 tablet by mouth 2 times daily. 100 tablet 3     Cholecalciferol (VITAMIN D) 1000 UNITS capsule Take 1 capsule by mouth daily.       citalopram (CELEXA) 20 MG tablet Take 1 tablet (20 mg) by mouth daily 90 tablet 0     clindamycin (CLEOCIN) 300 MG capsule Take 2 capsules by mouth as needed for 1 dose. 1 hour prior to dental work. 6 capsule 0     coenzyme Q-10 75 MG CAPS Take  by mouth.       finasteride (PROSCAR) 5 MG tablet Take 1 tablet (5 mg) by mouth daily 90 tablet 3     fluticasone-vilanterol (BREO ELLIPTA) 100-25 MCG/INH inhaler Inhale 1 puff into the lungs daily 60 Inhaler 9     furosemide (LASIX) 40 MG tablet Take 1 tablet (40 mg) by mouth daily 90 tablet 2     ipratropium (ATROVENT) 0.06 % nasal spray Spray 2 sprays into both nostrils 4 times daily 15 mL 4     levothyroxine (EUTHYROX) 50 MCG tablet Take 1.5 tablets (75 mcg) by mouth daily 135 tablet 3     MAGNESIUM OXIDE PO Take 400 mg by mouth       melatonin 3 MG tablet Take 1 tablet (3 mg) by mouth nightly as needed for sleep 100 tablet 3     metoprolol succinate ER (TOPROL-XL) 25 MG 24 hr tablet Take 0.5 tablets (12.5 mg) by mouth daily 45 tablet 2     Multiple Vitamins-Minerals (OCUVITE PO) Take  by mouth.  0     order for DME Equipment being ordered: Safe step tub / walk-in tub 1 Device 0     order for DME Walker 1 each 0     senna-docusate (SENOKOT-S;PERICOLACE) 8.6-50 MG per tablet Take 1 tablet by mouth 2 times daily. 60 tablet      spironolactone (ALDACTONE) 25 MG tablet TAKE 1 TABLET BY MOUTH ONCE DAILY 90 tablet 3     tamsulosin (FLOMAX) 0.4 MG capsule TAKE 1 CAPSULE BY MOUTH ONCE DAILY 90 capsule 3     triamcinolone (KENALOG) 0.1 % external ointment Apply topically 2 times daily as needed (rash on the arms) 80 g 3     albuterol (PROAIR HFA, PROVENTIL HFA, VENTOLIN HFA) 108 (90 BASE) MCG/ACT inhaler Inhale 2 puffs into the lungs every 4 hours as needed for shortness of breath /  dyspnea or wheezing (Patient not taking: Reported on 10/27/2020) 1 Inhaler 1     aspirin 81 MG EC tablet Take 1 tablet (81 mg) by mouth daily (Patient not taking: Reported on 10/19/2020) 90 tablet 3     mirabegron (MYRBETRIQ) 25 MG 24 hr tablet Take 1 tablet (25 mg) by mouth daily (Patient not taking: Reported on 10/27/2020) 30 tablet 3     traMADol (ULTRAM) 50 MG tablet Take 1 tablet (50 mg) by mouth daily as needed for severe pain (Patient not taking: Reported on 10/27/2020) 10 tablet 0     traMADol (ULTRAM) 50 MG tablet Take 0.5 tablets (25 mg) by mouth 2 times daily as needed for severe pain (Patient not taking: Reported on 10/19/2020) 15 tablet 0     triamcinolone (KENALOG) 0.1 % cream Apply sparingly to affected area three times daily as needed (Patient not taking: Reported on 10/27/2020) 80 g 1       PAST SURGICAL HISTORY:  Past Surgical History:   Procedure Laterality Date     ANESTHESIA CARDIOVERSION  4/11/2013    Procedure: ANESTHESIA CARDIOVERSION;  Cardioversion;  Surgeon: Provider, Generic Anesthesia;  Location: [facility] OR     BYPASS GRAFT ARTERY CORONARY, REPAIR VALVE MITRAL, COMBINED  1/14/2013    Procedure: COMBINED BYPASS GRAFT ARTERY CORONARY, REPAIR VALVE MITRAL;  Median sternotomy, coronary artery bypass graft X3 using left internal mammary artery & right saphenous vein , mitral valve Replacement and on pump oxygenator, flexible cystoscopy, urethral dilation and insertion of stuart catheter.;  Surgeon: Chan Peres MD;  Location: [facility] OR     CATARACT IOL, RT/LT Bilateral      CORONARY ANGIOGRAPHY ADULT ORDER       CORONARY ARTERY BYPASS  1/2013    mitral valve tissue      INSERT CATHETER BLADDER  1/14/2013    Procedure: INSERT CATHETER BLADDER;  Flexible cystoscopy, urethral dilation,& insertion of stuart catheter;  Surgeon: Pete Bedoya MD;  Location: [facility] OR     MITRAL VALVE REPLACEMENT  2013    29 mm Epic porcine valve     MOHS MICROGRAPHIC PROCEDURE         ALLERGIES:    "  Allergies   Allergen Reactions     Penicillins Rash       FAMILY HISTORY:  Family History   Problem Relation Age of Onset     Cancer Father      Kidney Disease Mother      Macular Degeneration No family hx of      Glaucoma No family hx of        SOCIAL HISTORY:  Social History     Tobacco Use     Smoking status: Former Smoker     Types: Cigars, Pipe     Quit date: 1990     Years since quittin.8     Smokeless tobacco: Former User   Substance Use Topics     Alcohol use: No     Alcohol/week: 0.0 standard drinks     Drug use: No       ROS:   A comprehensive 14 point review of systems is negative other than as mentioned in HPI.    Exam:  /67 (BP Location: Right arm, Patient Position: Chair, Cuff Size: Adult Large)   Pulse 69   Ht 1.803 m (5' 11\")   Wt 94.3 kg (208 lb)   SpO2 94%   BMI 29.01 kg/m    GENERAL APPEARANCE: healthy, alert and no distress  EYES: no icterus, no xanthelasmas  ENT: normal palate, mucosa moist, no central cyanosis  NECK: JVP not elevated  RESPIRATORY: lungs clear to auscultation - no rales, rhonchi or wheezes, no use of accessory muscles, no retractions, respirations are unlabored, normal respiratory rate  CARDIOVASCULAR: regular rhythm, normal S1 with physiologic split S2, 3/6 mid systolic peaking murmur at right upper sternal border, not radiating to carotids  GI: soft, non tender, bowel sounds normal,no abdominal bruits  EXTREMITIES: no edema, no bruits  NEURO: alert and oriented to person/place/time, normal speech, gait and affect  VASC: Radial, dorsalis pedis and posterior tibialis pulses 2+ bilaterally.  SKIN: no ecchymoses, no rashes.  PSYCH: cooperative, affect appropriate.     Labs:  Reviewed.       Testing/Procedures:    NM Lexiscan stress test (3/9/20)  Conclusion         The nuclear stress test is negative for inducible myocardial ischemia or infarction.     Left ventricular function is normal.     There is no prior study for comparison.       I personally " visualized and interpreted:    TTE 4/10/19:  Normal biventricular size and function. Left ventricular ejection fraction is  55-60%.  Low flow, low gradient severe aortic stenosis with a valve area of 1 cm square  (using LVOT diamtere of 2.4 cms), mean gradient of 26 mmHg and DVI of 0.24.  Stroke volume index is 31 mL/m square.  There is a well-seated, normally functioning, bioprosthetic valve in the  mitral position. The peak and mean gradient across the mitral prosthesis are 9  and 4.5 mmHg at a HR of 64 bpm. There is no mitral regurgitation or  paravalvular leak.  Compared to the previous echocardiogram on 6/7/2018, the aortic valve area  today is significantly lower (1 versus 1.7 cm2). DVI and the mean  transvalvular gradients are also more abnormal on today's study.    TTE 2/21/20:  Moderate aortic stenosis (PV 3.5 m/s MG 28 mmHg DVI 0.26 BETI 1.1 cm2 SVI 44.8  mL/m2.) Compared with the prior study dated 4/10/19, reported stroke volume  appears to be underestimated on the prior study (though LVOT pulse wave  Doppler is somewhat suboptimal on both studies), resulting in discrepancy  between reported aortic valve area.      Assessment and Plan:   Gorge Corona is an 89 year old male with past medical history significant for CAD s/p CABG X4 (LIMA-LAD, SVG-PDA, SVG-D1, SVG-OM) 2013 and PCI LM into OM due to occluded SVG, s/p porcine bioprosthetic MVR 2013, severe low flow, low gradient aortic stenosis, paroxysmal SVT presenting for cardiology follow-up.    #Chronic progressive dyspnea:  Impression is that most his symptoms are related to chronic deconditioning as well as a component of restrictive and obstructive lung disease. His peak AV peak velocity of 3.3 m/s is unlikely to be a significant contributer to his symptoms. Recommend being evaluated by PT in order to provide appropriate and safe therapy to work on balance and strength    #Moderate aortic stenosis:  TTE from 4/2019 with AV mPG of 26 mmHg in setting of  low flow state (iSV 31 ml/m^2) and BETI 1.0 cm^2. Most recent TTE from 2/2020 with iSV increased to 41 ml/m^2. Highest Doppler assessment showed mPG of 28 mmHg and peak velocity of 3.3 m/s. DVI was 0.27. Impression is that these hemodynamics do not fully explain patient's symptoms.  -Repeat TTE in 6 months    #s/p bioprosthetic MVR:  Last TTE from 2/2020 showed mPG 4-5 mmHg at 61 BPM. PHT  ms. Impression is that there is no significant pathological stenosis given this echo read.    #CAD s/p 4v CABG, PCIs:  No evidence of ischemia on last MPI from 3/2020.  -Continue ASA 81 mg PO every day, atorvastatin 10 mg PO every day, Toprol-XL 12.5 mg PO  QD    #HTN:  BP at goal.  -Spironolactone 25 mg PO every day    Follow-up in 6 months with TTE before hand    The patient states understanding and is agreeable with plan.     Plan has been discussed with Dr. Atwood, attending cardiologist.    Barry Rock MD  Cardiology Fellow    ATTENDING ATTESTATION     Patient was seen and evaluated with Dr. Rock. History was confirmed personally by me. Labs, imaging studies, EKGs, and telemetry were reviewed. Agree with assessment and plan as outlined above. The note has been edited by me as needed to produce a single, cohesive document.   Total time spent 40 minutes, of which >50% was spent in face-to-face patient evaluation, reviewing data with patient, and coordination of care.     David Atwood MD  Staff Cardiologist

## 2020-10-27 ENCOUNTER — OFFICE VISIT (OUTPATIENT)
Dept: CARDIOLOGY | Facility: CLINIC | Age: 85
End: 2020-10-27
Attending: INTERNAL MEDICINE
Payer: MEDICARE

## 2020-10-27 VITALS
DIASTOLIC BLOOD PRESSURE: 67 MMHG | WEIGHT: 208 LBS | HEIGHT: 71 IN | HEART RATE: 69 BPM | SYSTOLIC BLOOD PRESSURE: 102 MMHG | BODY MASS INDEX: 29.12 KG/M2 | OXYGEN SATURATION: 94 %

## 2020-10-27 DIAGNOSIS — I35.0 NONRHEUMATIC AORTIC VALVE STENOSIS: ICD-10-CM

## 2020-10-27 DIAGNOSIS — I10 BENIGN ESSENTIAL HYPERTENSION: Primary | ICD-10-CM

## 2020-10-27 PROCEDURE — G0463 HOSPITAL OUTPT CLINIC VISIT: HCPCS

## 2020-10-27 PROCEDURE — 99214 OFFICE O/P EST MOD 30 MIN: CPT | Mod: GC | Performed by: INTERNAL MEDICINE

## 2020-10-27 ASSESSMENT — PAIN SCALES - GENERAL: PAINLEVEL: NO PAIN (0)

## 2020-10-27 ASSESSMENT — MIFFLIN-ST. JEOR: SCORE: 1630.61

## 2020-10-27 NOTE — LETTER
10/27/2020      RE: Gorge Corona  [address] [facility] [place] [facility] [address]       Dear Colleague,    Thank you for the opportunity to participate in the care of your patient, Gorge Corona, at the [facility] at [facility]. Please see a copy of my visit note below.    Chief complaint: Follow-up on CAD and valve disease     HPI: Gorge Corona is an 89 year old male with past medical history significant for CAD s/p CABG X4 (LIMA-LAD, SVG-PDA, SVG-D1, SVG-OM) 2013 and PCI LM into OM due to occluded SVG, s/p porcine bioprosthetic MVR 2013, aortic stenosis, paroxysmal SVT, HLD, BPH, normal pressure hydrocephalus s/p  shunt, and depression.    His last echocardiogram 4/2019 showed low flow, low gradient severe aortic stenosis and normal bioprosthetic mitral valve function with mean PG of 4.5 mmHg. He presents to clinic today for routine follow-up.    Cardiac history:  He has a known history of CAD and MS for which he had CABG X4 and porcine MVR in 2013. He then later had PCI to LM into OM due to occluded SVG graft. His echos have shown normal LV function. However, echo on 6/7/18 showed normal LVEF, but slightly higher mitral valve gradient (however HR was higher), and mild aortic stenosis with significant aortic valve calcification. The mean gradient across the mitral valve was 6-10 mmHg at HR of 75 bpm. He also has a history of SVT with spontaneous conversion in 2014. He has declined any invasive procedures for delineation of mechanism previously. No AF has ever been documented. He was treated with metoprolol and then amiodarone after recurrent palpitations.  He has been maintained on amiodarone 100 mg daily. In 6/2018, low dose metoprolol was added to help HR due to mitral valve gradient. He was then admitted from 8/17/18-8/18/18 with bradycardia and dizziness. He was brought to [facility] by his wife after they noted his pulse laura 40 bpm. He also was  "reporting dizziness, mostly positional. In [facility] his ECG showed SR 50's. He was admitted to observation. Cardiology was consulted and recommended stopping amiodarone and continuation of metoprolol only. At his last cardiology clinic appt in 2/2020, Patient reported increase SOB while speaking over past several months as well as DESAI with daily activities around the house. These are similar symptoms to what he had prior to MVR. He was compensated at that time - euvolemic. Weight was 200-202 lbs.     Interval history:  Since his last visit with cardiology he reports no significant change in symptoms. He reports that over past 2 years he has been getting progressively \"weaker\". When asked to elaborate he thinks that his inability to walk more than a few steps and was up stairs is mostly limited by balance issues, but also reports increasing DESAI. No lightheadedness, dizziness. No CP at rest or with exertion. No palpitations or syncope.      PAST MEDICAL HISTORY:  Past Medical History:   Diagnosis Date     Atrial fibrillation (H)      Atrial flutter (H)      Basal cell carcinoma of skin of trunk      BPH (benign prostatic hypertrophy) with urinary obstruction      CAD (coronary artery disease)     s/p CABG 1/2013; RUSSEL in 2/2013     Depression      H/O CT scan of head 1/11/2013 7/20/2006    Result Impression     CT of the Head without contrast 7/20/2006  History: Ataxia, incontinence, and NPH  Comparison Study: MR obtained to 4/19/2004  Technique: Axial CT images of the head were obtained at 2 intervals from the skull base to the vertex without intravenous contrast. The images were reviewed in brain, subdural and bone windows.  Findings: Patient has had a moderate promin     Hyperlipidemia      Lumbar spinal stenosis      Mitral regurgitation     s/p bioprosthesis 1/2013     Palpitations      Paroxysmal supraventricular tachycardia (H)      Squamous cell carcinoma      Tachycardia     baroreflex injury due to surgery     "  (ventriculoperitoneal) shunt status     for Normal pressure hydrocephalus       CURRENT MEDICATIONS:  Current Outpatient Medications   Medication Sig Dispense Refill     ammonium lactate (LAC-HYDRIN) 12 % external lotion Apply topically 2 times daily as needed for dry skin 360 g 11     atorvastatin (LIPITOR) 10 MG tablet Take 1 tablet (10 mg) by mouth daily 90 tablet 2     Calcium 600 MG tablet Take 1 tablet by mouth 2 times daily. 100 tablet 3     Cholecalciferol (VITAMIN D) 1000 UNITS capsule Take 1 capsule by mouth daily.       citalopram (CELEXA) 20 MG tablet Take 1 tablet (20 mg) by mouth daily 90 tablet 0     clindamycin (CLEOCIN) 300 MG capsule Take 2 capsules by mouth as needed for 1 dose. 1 hour prior to dental work. 6 capsule 0     coenzyme Q-10 75 MG CAPS Take  by mouth.       finasteride (PROSCAR) 5 MG tablet Take 1 tablet (5 mg) by mouth daily 90 tablet 3     fluticasone-vilanterol (BREO ELLIPTA) 100-25 MCG/INH inhaler Inhale 1 puff into the lungs daily 60 Inhaler 9     furosemide (LASIX) 40 MG tablet Take 1 tablet (40 mg) by mouth daily 90 tablet 2     ipratropium (ATROVENT) 0.06 % nasal spray Spray 2 sprays into both nostrils 4 times daily 15 mL 4     levothyroxine (EUTHYROX) 50 MCG tablet Take 1.5 tablets (75 mcg) by mouth daily 135 tablet 3     MAGNESIUM OXIDE PO Take 400 mg by mouth       melatonin 3 MG tablet Take 1 tablet (3 mg) by mouth nightly as needed for sleep 100 tablet 3     metoprolol succinate ER (TOPROL-XL) 25 MG 24 hr tablet Take 0.5 tablets (12.5 mg) by mouth daily 45 tablet 2     Multiple Vitamins-Minerals (OCUVITE PO) Take  by mouth.  0     order for DME Equipment being ordered: Safe step tub / walk-in tub 1 Device 0     order for DME Walker 1 each 0     senna-docusate (SENOKOT-S;PERICOLACE) 8.6-50 MG per tablet Take 1 tablet by mouth 2 times daily. 60 tablet      spironolactone (ALDACTONE) 25 MG tablet TAKE 1 TABLET BY MOUTH ONCE DAILY 90 tablet 3     tamsulosin (FLOMAX) 0.4 MG  capsule TAKE 1 CAPSULE BY MOUTH ONCE DAILY 90 capsule 3     triamcinolone (KENALOG) 0.1 % external ointment Apply topically 2 times daily as needed (rash on the arms) 80 g 3     albuterol (PROAIR HFA, PROVENTIL HFA, VENTOLIN HFA) 108 (90 BASE) MCG/ACT inhaler Inhale 2 puffs into the lungs every 4 hours as needed for shortness of breath / dyspnea or wheezing (Patient not taking: Reported on 10/27/2020) 1 Inhaler 1     aspirin 81 MG EC tablet Take 1 tablet (81 mg) by mouth daily (Patient not taking: Reported on 10/19/2020) 90 tablet 3     mirabegron (MYRBETRIQ) 25 MG 24 hr tablet Take 1 tablet (25 mg) by mouth daily (Patient not taking: Reported on 10/27/2020) 30 tablet 3     traMADol (ULTRAM) 50 MG tablet Take 1 tablet (50 mg) by mouth daily as needed for severe pain (Patient not taking: Reported on 10/27/2020) 10 tablet 0     traMADol (ULTRAM) 50 MG tablet Take 0.5 tablets (25 mg) by mouth 2 times daily as needed for severe pain (Patient not taking: Reported on 10/19/2020) 15 tablet 0     triamcinolone (KENALOG) 0.1 % cream Apply sparingly to affected area three times daily as needed (Patient not taking: Reported on 10/27/2020) 80 g 1       PAST SURGICAL HISTORY:  Past Surgical History:   Procedure Laterality Date     ANESTHESIA CARDIOVERSION  4/11/2013    Procedure: ANESTHESIA CARDIOVERSION;  Cardioversion;  Surgeon: Provider, Generic Anesthesia;  Location: [facility] OR     BYPASS GRAFT ARTERY CORONARY, REPAIR VALVE MITRAL, COMBINED  1/14/2013    Procedure: COMBINED BYPASS GRAFT ARTERY CORONARY, REPAIR VALVE MITRAL;  Median sternotomy, coronary artery bypass graft X3 using left internal mammary artery & right saphenous vein , mitral valve Replacement and on pump oxygenator, flexible cystoscopy, urethral dilation and insertion of stuart catheter.;  Surgeon: Chan ePres MD;  Location: [facility] OR     CATARACT IOL, RT/LT Bilateral      CORONARY ANGIOGRAPHY ADULT ORDER       CORONARY ARTERY BYPASS  1/2013    mitral  "valve tissue      INSERT CATHETER BLADDER  2013    Procedure: INSERT CATHETER BLADDER;  Flexible cystoscopy, urethral dilation,& insertion of stuart catheter;  Surgeon: Pete Bedoya MD;  Location: [facility] OR     MITRAL VALVE REPLACEMENT      29 mm Epic porcine valve     MOHS MICROGRAPHIC PROCEDURE         ALLERGIES:     Allergies   Allergen Reactions     Penicillins Rash       FAMILY HISTORY:  Family History   Problem Relation Age of Onset     Cancer Father      Kidney Disease Mother      Macular Degeneration No family hx of      Glaucoma No family hx of        SOCIAL HISTORY:  Social History     Tobacco Use     Smoking status: Former Smoker     Types: Cigars, Pipe     Quit date: 1990     Years since quittin.8     Smokeless tobacco: Former User   Substance Use Topics     Alcohol use: No     Alcohol/week: 0.0 standard drinks     Drug use: No       ROS:   A comprehensive 14 point review of systems is negative other than as mentioned in HPI.    Exam:  /67 (BP Location: Right arm, Patient Position: Chair, Cuff Size: Adult Large)   Pulse 69   Ht 1.803 m (5' 11\")   Wt 94.3 kg (208 lb)   SpO2 94%   BMI 29.01 kg/m    GENERAL APPEARANCE: healthy, alert and no distress  EYES: no icterus, no xanthelasmas  ENT: normal palate, mucosa moist, no central cyanosis  NECK: JVP not elevated  RESPIRATORY: lungs clear to auscultation - no rales, rhonchi or wheezes, no use of accessory muscles, no retractions, respirations are unlabored, normal respiratory rate  CARDIOVASCULAR: regular rhythm, normal S1 with physiologic split S2, 3/6 mid systolic peaking murmur at right upper sternal border, not radiating to carotids  GI: soft, non tender, bowel sounds normal,no abdominal bruits  EXTREMITIES: no edema, no bruits  NEURO: alert and oriented to person/place/time, normal speech, gait and affect  VASC: Radial, dorsalis pedis and posterior tibialis pulses 2+ bilaterally.  SKIN: no ecchymoses, no " flaquita.  PSYCH: cooperative, affect appropriate.     Labs:  Reviewed.       Testing/Procedures:    NM Lexiscan stress test (3/9/20)  Conclusion         The nuclear stress test is negative for inducible myocardial ischemia or infarction.     Left ventricular function is normal.     There is no prior study for comparison.       I personally visualized and interpreted:    TTE 4/10/19:  Normal biventricular size and function. Left ventricular ejection fraction is  55-60%.  Low flow, low gradient severe aortic stenosis with a valve area of 1 cm square  (using LVOT diamtere of 2.4 cms), mean gradient of 26 mmHg and DVI of 0.24.  Stroke volume index is 31 mL/m square.  There is a well-seated, normally functioning, bioprosthetic valve in the  mitral position. The peak and mean gradient across the mitral prosthesis are 9  and 4.5 mmHg at a HR of 64 bpm. There is no mitral regurgitation or  paravalvular leak.  Compared to the previous echocardiogram on 6/7/2018, the aortic valve area  today is significantly lower (1 versus 1.7 cm2). DVI and the mean  transvalvular gradients are also more abnormal on today's study.    TTE 2/21/20:  Moderate aortic stenosis (PV 3.5 m/s MG 28 mmHg DVI 0.26 BETI 1.1 cm2 SVI 44.8  mL/m2.) Compared with the prior study dated 4/10/19, reported stroke volume  appears to be underestimated on the prior study (though LVOT pulse wave  Doppler is somewhat suboptimal on both studies), resulting in discrepancy  between reported aortic valve area.      Assessment and Plan:   Gorge Corona is an 89 year old male with past medical history significant for CAD s/p CABG X4 (LIMA-LAD, SVG-PDA, SVG-D1, SVG-OM) 2013 and PCI LM into OM due to occluded SVG, s/p porcine bioprosthetic MVR 2013, severe low flow, low gradient aortic stenosis, paroxysmal SVT presenting for cardiology follow-up.    #Chronic progressive dyspnea:  Impression is that most his symptoms are related to chronic deconditioning as well as a component  of restrictive and obstructive lung disease. His peak AV peak velocity of 3.3 m/s is unlikely to be a significant contributer to his symptoms. Recommend being evaluated by PT in order to provide appropriate and safe therapy to work on balance and strength    #Moderate aortic stenosis:  TTE from 4/2019 with AV mPG of 26 mmHg in setting of low flow state (iSV 31 ml/m^2) and BETI 1.0 cm^2. Most recent TTE from 2/2020 with iSV increased to 41 ml/m^2. Highest Doppler assessment showed mPG of 28 mmHg and peak velocity of 3.3 m/s. DVI was 0.27. Impression is that these hemodynamics do not fully explain patient's symptoms.  -Repeat TTE in 6 months    #s/p bioprosthetic MVR:  Last TTE from 2/2020 showed mPG 4-5 mmHg at 61 BPM. PHT  ms. Impression is that there is no significant pathological stenosis given this echo read.    #CAD s/p 4v CABG, PCIs:  No evidence of ischemia on last MPI from 3/2020.  -Continue ASA 81 mg PO every day, atorvastatin 10 mg PO every day, Toprol-XL 12.5 mg PO  QD    #HTN:  BP at goal.  -Spironolactone 25 mg PO every day    Follow-up in 6 months with TTE before hand    The patient states understanding and is agreeable with plan.     Plan has been discussed with Dr. Atwood, attending cardiologist.    Barry Rock MD  Cardiology Fellow    ATTENDING ATTESTATION     Patient was seen and evaluated with Dr. Rock. History was confirmed personally by me. Labs, imaging studies, EKGs, and telemetry were reviewed. Agree with assessment and plan as outlined above. The note has been edited by me as needed to produce a single, cohesive document.   Total time spent 40 minutes, of which >50% was spent in face-to-face patient evaluation, reviewing data with patient, and coordination of care.     David Atwood MD  Staff Cardiologist            Please do not hesitate to contact me if you have any questions/concerns.     Sincerely,     David Atwood MD

## 2020-10-27 NOTE — PATIENT INSTRUCTIONS
You were seen at the [facility] clinic today.  You saw Dr. David Atwood  Here are your Instructions:    1. Recommend following up with lung doctors  2. Repeat echo of your heart in 6 months  3. See us in clinic in 6 months         If you have questions after this visit:   Send a [employer] message or contact Peyton Workman LPN  Office:  [phone] option #1, then #4 & ask for Peyton (nurse line)  Fax:  [phone]  After Hours:  [phone] option #4 ask to speak to he on-call Cardiologist  Appointments:  [phone] option #1, then option #1

## 2020-10-28 ENCOUNTER — OFFICE VISIT (OUTPATIENT)
Dept: DERMATOLOGY | Facility: CLINIC | Age: 85
End: 2020-10-28
Payer: MEDICARE

## 2020-10-28 VITALS — DIASTOLIC BLOOD PRESSURE: 75 MMHG | SYSTOLIC BLOOD PRESSURE: 123 MMHG | HEART RATE: 72 BPM

## 2020-10-28 DIAGNOSIS — D04.39 SQUAMOUS CELL CARCINOMA IN SITU (SCCIS) OF SKIN OF RIGHT CHEEK: ICD-10-CM

## 2020-10-28 PROCEDURE — 13132 CMPLX RPR F/C/C/M/N/AX/G/H/F: CPT | Performed by: DERMATOLOGY

## 2020-10-28 PROCEDURE — 17311 MOHS 1 STAGE H/N/HF/G: CPT | Performed by: DERMATOLOGY

## 2020-10-28 NOTE — LETTER
10/28/2020       RE: Gorge Corona  [id number]  [facility] [address]     Dear Colleague,    Thank you for referring your patient, Gorge Corona, to the [facility] DERMATOLOGIC SURGERY CLINIC [place] at [facility]. Please see a copy of my visit note below.    [facility] Mohs Dermatologic Surgery Procedure Note      Date of Service:  Oct 28, 2020  Surgery: Mohs micrographic surgery    Case 1  Repair Type: complex  Repair Size: 4.4 cm  Suture Material: 4.0 Monocryl, 5.0 FAG  Tumor Type: SCC  Location: R cheek  Derm-Path Accession #: [id number]   PreOp Size: 0.8x0.6 cm  PostOp Size: 2.5x2 cm  Mohs Accession #:   Level of Defect: fat      Procedure:  We discussed the principles of treatment and most likely complications including scarring, bleeding, infection, swelling, pain, crusting, nerve damage, large wound,  incomplete excision, wound dehiscence,  nerve damage, recurrence, and a second procedure may be recommended to obtain the best cosmetic or functional result.    Informed consent was obtained and the patient underwent the procedure as follows:  The patient was placed supine on the operating table.  The cancer was identified, outlined with a marker, and verified by the patient.  The entire surgical field was prepped with Hibiclens.  The surgical site was anesthetized using Lidocaine 1% with epi 1:100,000.      The area of clinically apparent tumor was debulked. The layer of tissue was then surgically excised using a #15 blade and was then transferred onto a specimen sheet maintaining the orientation of the specimen. Hemostasis was obtained using heat cautery. The wound site was then covered with a dressing while the tissue samples were processed for examination.    [facility] maintaining the tissue orientation.  The tissue specimen was relaxed so that the entire surgical  margin was in a a single horizontal plane for sectioning and inked for precise mapping.  A precise reference map was drawn to reflect the sectioning of the specimen, colored inking of the margins, and orientation on the patient. The tissue was processed using horizontal sectioning of the base and continuous peripheral margins.  The histopathologic sections were reviewed in conjunction with the reference map.    Total blocks: 1    Total slides:  2    There were no cancer cells visualized on examination, therefore Mohs surgery was complete.    Reconstruction: Complex Linear Closure    Due to one or more of the following factors, complex linear closure was required/indicated: Extensive undermining (equal to or greater than the maximum perpendicular width of the defect), exposure of underlying structure (bone, cartilage, tendon, named neurovascular), free margin involvement (helical rim, vermillion border, nostril rim), and/or placement of retention sutures.     The patient was taken to the operative suite and placed supine on the operating room table.  The defect was identified.  Appropriate markings were made with a marking pen to plan the repair.  The area was infiltrated with Lidocaine 1% with epi 1:100,000 and prepped with Hibiclens and draped with sterile towels.     The wound was debeveled and undermined widely.  Cones were excised within relaxed skin tension lines on both sides of the defect.  Hemostasis was obtained using electrocoagulation. A fascial plication suture using 4.0 monocryl suture material was placed to narrow the primary defect. Subcutaneous tissues were then approximated using buried vertical mattress sutures.  The wound edges were then approximated additional  buried sutures were placed in a similar fashion where needed.  Percutaneous simple running 5.0 FAG sutures were carefully placed for maximum eversion and meticulous approximation.    Repair Size: 4.4 cm    The wound was cleansed with  saline and ointment was applied along the wound surface.     A sterile pressure dressing was applied.  Wound care instructions were given verbally and in writing.  The patient left the operating suite in stable condition.  Patient was informed that additional refinement of the resulting surgical scar may be used as a second stage of this reconstruction.     The attending surgeon was present for the entire procedure and always immediately available.    Attending attestation:  I personally performed the entire procedure.  I have reviewed the note and edited it as necessary, and agree with its contents.    Kaushik Stanley M.D.  Professor  Director of Dermatologic Surgery  Department of Dermatology  [facility]    Dermatology Surgery Clinic  [facility] Surgery [place] [id number]

## 2020-10-28 NOTE — PATIENT INSTRUCTIONS
Wound Care Instructions  I will experience scar, altered skin color, bleeding, swelling, pain, crusting and redness. I may experience altered sensation. Risks are excessive bleeding, infection, muscle weakness, thick (hypertrophic or keloidal) scar, and recurrence,. A second procedure may be recommended to obtain the best cosmetic or functional result.  Possible complications of any surgical procedure are bleeding, infection, scarring, alteration in skin color and sensation, muscle weakness in the area, wound dehiscence or seperation, or recurrence of the lesion or disease. On occasion, after healing, a secondary procedure or revision may be recommended in order to obtain the best cosmetic or functional result.   After your surgery, a pressure bandage will be placed over the area that has sutures. This will help prevent bleeding.   For the First 48 hours After Surgery:  1. Leave the pressure bandage on and keep it dry. If it should come loose, you may retape it, but do not take it off.  2. Relax and take it easy. Do not do any vigorous exercise, heavy lifting, or bending forward. This could cause the wound to bleed.  3. Post-operative pain is usually mild. You may take plain or extra strength Tylenol every 4 hours as needed (do not take more than 4,000mg in one day). Do not take any medicine that contains aspirin, ibuprofen or motrin unless you have been recommended these by a doctor.  Avoid alcohol and vitamin E as these may increase your tendency to bleed.  4. You may put an ice pack around the bandaged area for 20 minutes every 2-3 hours. This may help reduce swelling, bruising, and pain. Make sure the ice pack is waterproof so that the pressure bandage does not get wet.   5. You may see a small amount of drainage or blood on your pressure bandage. This is normal. However, if drainage or bleeding continues or saturates the bandage, you will need to apply firm pressure over the bandage with a washcloth for 15  minutes. If bleeding continues after applying pressure for 15 minutes then go to the nearest emergency room.  48 Hours After Surgery  Carefully remove the bandage and start daily wound care and dressing changes. You may also now shower and get the wound wet. Wash wound with a mild soap and water.  Use caution when washing the wound. Be gentle and do not let the forceful shower stream hit the wound directly.  PAT dry.  Daily Wound Care:  1. Wash wound with a mild soap and water.  Use caution when washing the wound, be gentle and do not let the forceful shower stream hit the wound directly.  2. PAT DRY.  3. Apply Vaseline (from a new container or tube) over the suture line with a Q-tip. It is very important to keep the wound continuously moist, as wounds heal best in a moist environment.  4.  Keep the site covered until sutures are removed, you can cover it with a Telfa (non-stick) dressing and tape or a band-aid.    5. If you are unable to keep wound covered, you must apply Vaseline every 2 - 3 hours (while awake) to ensure it is being kept moist for optimal healing. A dressing overnight is recommended to keep the area moist.   Call Us If:  1. You have pain that is not controlled with Tylenol.  2. You have signs or symptoms of an infection, such as: fever over 100 degrees F, redness, warmth, or foul-smelling or yellow/creamy drainage from the wound.  Who should I call with questions?    [facility]: [phone]     [facility]: [phone]    For urgent needs outside of business hours call the [facility] at [phone] and ask for the dermatology resident on call

## 2020-10-29 NOTE — PROGRESS NOTES
[facility] Dermatologic Surgery Procedure Note      Date of Service:  Oct 28, 2020  Surgery: Mohs micrographic surgery    Case 1  Repair Type: complex  Repair Size: 4.4 cm  Suture Material: 4.0 Monocryl, 5.0 FAG  Tumor Type: SCC  Location: R cheek  Derm-Path Accession #: [id number]   PreOp Size: 0.8x0.6 cm  PostOp Size: 2.5x2 cm  Mohs Accession #:   Level of Defect: fat      Procedure:  We discussed the principles of treatment and most likely complications including scarring, bleeding, infection, swelling, pain, crusting, nerve damage, large wound,  incomplete excision, wound dehiscence,  nerve damage, recurrence, and a second procedure may be recommended to obtain the best cosmetic or functional result.    Informed consent was obtained and the patient underwent the procedure as follows:  The patient was placed supine on the operating table.  The cancer was identified, outlined with a marker, and verified by the patient.  The entire surgical field was prepped with Hibiclens.  The surgical site was anesthetized using Lidocaine 1% with epi 1:100,000.      The area of clinically apparent tumor was debulked. The layer of tissue was then surgically excised using a #15 blade and was then transferred onto a specimen sheet maintaining the orientation of the specimen. Hemostasis was obtained using heat cautery. The wound site was then covered with a dressing while the tissue samples were processed for examination.    The excised tissue was transported to the Mohs histology laboratory maintaining the tissue orientation.  The tissue specimen was relaxed so that the entire surgical margin was in a a single horizontal plane for sectioning and inked for precise mapping.  A precise reference map was drawn to reflect the sectioning of the specimen, colored inking of the margins, and orientation on the patient. The tissue was processed using horizontal sectioning of the base and continuous peripheral  margins.  The histopathologic sections were reviewed in conjunction with the reference map.    Total blocks: 1    Total slides:  2    There were no cancer cells visualized on examination, therefore Mohs surgery was complete.    Reconstruction: Complex Linear Closure    Due to one or more of the following factors, complex linear closure was required/indicated: Extensive undermining (equal to or greater than the maximum perpendicular width of the defect), exposure of underlying structure (bone, cartilage, tendon, named neurovascular), free margin involvement (helical rim, vermillion border, nostril rim), and/or placement of retention sutures.     The patient was taken to the operative suite and placed supine on the operating room table.  The defect was identified.  Appropriate markings were made with a marking pen to plan the repair.  The area was infiltrated with Lidocaine 1% with epi 1:100,000 and prepped with Hibiclens and draped with sterile towels.     The wound was debeveled and undermined widely.  Cones were excised within relaxed skin tension lines on both sides of the defect.  Hemostasis was obtained using electrocoagulation. A fascial plication suture using 4.0 monocryl suture material was placed to narrow the primary defect. Subcutaneous tissues were then approximated using buried vertical mattress sutures.  The wound edges were then approximated additional  buried sutures were placed in a similar fashion where needed.  Percutaneous simple running 5.0 FAG sutures were carefully placed for maximum eversion and meticulous approximation.    Repair Size: 4.4 cm    The wound was cleansed with saline and ointment was applied along the wound surface.     A sterile pressure dressing was applied.  Wound care instructions were given verbally and in writing.  The patient left the operating suite in stable condition.  Patient was informed that additional refinement of the resulting surgical scar may be used as a second  stage of this reconstruction.     The attending surgeon was present for the entire procedure and always immediately available.    Attending attestation:  I personally performed the entire procedure.  I have reviewed the note and edited it as necessary, and agree with its contents.    Kaushik Stanley M.D.  Professor  Director of Dermatologic Surgery  Department of Dermatology  [facility]    Dermatology Surgery Clinic  [facility] Surgery [place] [id number]

## 2020-10-30 ENCOUNTER — VIRTUAL VISIT (OUTPATIENT)
Dept: UROLOGY | Facility: CLINIC | Age: 85
End: 2020-10-30
Payer: MEDICARE

## 2020-10-30 DIAGNOSIS — N39.41 URGE URINARY INCONTINENCE: ICD-10-CM

## 2020-10-30 DIAGNOSIS — R39.15 URINARY URGENCY: Primary | ICD-10-CM

## 2020-10-30 PROCEDURE — 99441 PR PHYSICIAN TELEPHONE EVALUATION 5-10 MIN: CPT | Mod: 95 | Performed by: NURSE PRACTITIONER

## 2020-10-30 RX ORDER — TROSPIUM CHLORIDE 20 MG/1
20 TABLET, FILM COATED ORAL
Qty: 90 TABLET | Refills: 7 | Status: SHIPPED | OUTPATIENT
Start: 2020-10-30 | End: 2021-03-14

## 2020-10-30 NOTE — PROGRESS NOTES
"Gorge Corona is a 89 year old male who is being evaluated via a billable telephone visit.      The patient has been notified of following:     \"This telephone visit will be conducted via a call between you and your physician/provider. We have found that certain health care needs can be provided without the need for a physical exam.  This service lets us provide the care you need with a short phone conversation.  If a prescription is necessary we can send it directly to your pharmacy.  If lab work is needed we can place an order for that and you can then stop by our lab to have the test done at a later time.    Telephone visits are billed at different rates depending on your insurance coverage. During this emergency period, for some insurers they may be billed the same as an in-person visit.  Please reach out to your insurance provider with any questions.    If during the course of the call the physician/provider feels a telephone visit is not appropriate, you will not be charged for this service.\"    Patient has given verbal consent for Telephone visit?  Yes    What phone number would you like to be contacted at? [phone]    How would you like to obtain your AVS? Mail a copy    Gorge Corona complains of   Chief Complaint   Patient presents with     RECHECK     4-6 wk f/u      89 year old male with a history of LUTS, including urinary urgency, sometimes met with an inability to void, and sometimes resulting in urinary incontinence. Please see my last visit note with him dated 9/25/20 for a more comprehensive urologic history. He trialed Sanctura, which seemed to improve his urinary symptoms, but he then experienced an episode of confusion/hallucinations. It was therefore decided to discontinue this medication out of concern that it could be an anticholinergic adverse effect. He was instead started on a trial of mirabegron.    Today, however, his wife Kassi states that he continued to take the Sanctura " after our last visit and has had no further issues. His urinary symptoms have been well-controlled and he has not had any additional episodes of confusion. His wife states that he is behaving normally and having no memory issues that are different from his baseline.     Assessment/Plan:  89 year old male with LUTS, controlled well with Sanctura. There was previous concern that this medication may be causing him confusion/hallucinations, but his wife denies any additional episodes of this and states that he has been behaving normally. They are both happy with the improvement in his symptoms are would like to continue this current regimen.  -Continue Sanctura 20 mg BID before meals.   -Follow up with me in 3 months for a symptom check.       Phone call duration: 6 minutes    Dali Cheek, CNP

## 2020-10-30 NOTE — LETTER
"10/30/2020       RE: Gorge Corona  [address]  [place] [facility] [place] [facility] [address]     Dear Colleague,    Thank you for referring your patient, Gorge Corona, to the [facility] UROLOGY CLINIC [place] at [facility]. Please see a copy of my visit note below.    Gorge Corona is a 89 year old male who is being evaluated via a billable telephone visit.      The patient has been notified of following:     \"This telephone visit will be conducted via a call between you and your physician/provider. We have found that certain health care needs can be provided without the need for a physical exam.  This service lets us provide the care you need with a short phone conversation.  If a prescription is necessary we can send it directly to your pharmacy.  If lab work is needed we can place an order for that and you can then stop by our lab to have the test done at a later time.    Telephone visits are billed at different rates depending on your insurance coverage. During this emergency period, for some insurers they may be billed the same as an in-person visit.  Please reach out to your insurance provider with any questions.    If during the course of the call the physician/provider feels a telephone visit is not appropriate, you will not be charged for this service.\"    Patient has given verbal consent for Telephone visit?  Yes    What phone number would you like to be contacted at? [phone]    How would you like to obtain your AVS? Mail a copy    Gorge Corona complains of   Chief Complaint   Patient presents with     RECHECK     4-6 wk f/u      89 year old male with a history of LUTS, including urinary urgency, sometimes met with an inability to void, and sometimes resulting in urinary incontinence. Please see my last visit note with him dated 9/25/20 for a more comprehensive urologic history. He trialed Sanctura, which seemed to improve his urinary symptoms, but he then " experienced an episode of confusion/hallucinations. It was therefore decided to discontinue this medication out of concern that it could be an anticholinergic adverse effect. He was instead started on a trial of mirabegron.    Today, however, his wife Kassi states that he continued to take the Sanctura after our last visit and has had no further issues. His urinary symptoms have been well-controlled and he has not had any additional episodes of confusion. His wife states that he is behaving normally and having no memory issues that are different from his baseline.     Assessment/Plan:  89 year old male with LUTS, controlled well with Sanctura. There was previous concern that this medication may be causing him confusion/hallucinations, but his wife denies any additional episodes of this and states that he has been behaving normally. They are both happy with the improvement in his symptoms are would like to continue this current regimen.  -Continue Sanctura 20 mg BID before meals.   -Follow up with me in 3 months for a symptom check.       Phone call duration: 6 minutes    Dali Cheek, CNP

## 2020-10-30 NOTE — PATIENT INSTRUCTIONS
UROLOGY CLINIC VISIT PATIENT INSTRUCTIONS    -Continue Sanctura 20 mg twice daily before meals.  -Follow up with me in 3 months for a symptom check.     If you have any issues, questions or concerns in the meantime, do not hesitate to contact us at [phone] or via [facility].     It was a pleasure meeting with you today.  Thank you for allowing me and my team the privilege of caring for you today.  YOU are the reason we are here, and I truly hope we provided you with the excellent service you deserve.  Please let us know if there is anything else we can do for you so that we can be sure you are leaving completely satisfied with your care experience.    Dali Cheek, CNP

## 2020-11-02 ENCOUNTER — PATIENT OUTREACH (OUTPATIENT)
Dept: CARE COORDINATION | Facility: CLINIC | Age: 85
End: 2020-11-02

## 2020-11-02 NOTE — TELEPHONE ENCOUNTER
Social Work Follow-Up  [facility]    Data/Intervention:  Patient Name:  Gorge Corona  /Age:  1930 (89 year old)    Reason for Follow-Up:  Follow up with [facility] services to see what is needed for homecare referral.    Collaborated With:    --[facility], [phone]      Intervention/Education/Resources Provided:   contacted the [facility] office and left a message to be called back with more details of what they need from our clinic.  Previously left messages for [facility] on 10/19/2020 and 10/21/2020    Assessment/Plan:   will await return call from [facility].  Previously provided patient/family with writer's contact information and availability.     [facility]  Direct Phone: [phone]

## 2020-11-03 ENCOUNTER — PATIENT OUTREACH (OUTPATIENT)
Dept: CARE COORDINATION | Facility: CLINIC | Age: 85
End: 2020-11-03

## 2020-11-03 ENCOUNTER — TELEPHONE (OUTPATIENT)
Dept: UROLOGY | Facility: CLINIC | Age: 85
End: 2020-11-03

## 2020-11-03 NOTE — TELEPHONE ENCOUNTER
Social Work Follow-Up  [facility]     Data/Intervention:  Patient Name:  Gorge Corona  /Age:  1930 (89 year old)     Reason for Follow-Up:  Follow up with [facility] services to see what is needed for homecare referral.     Collaborated With:    --[facility] Services, [phone]        Intervention/Education/Resources Provided:   received a phone call from the [facility] office. [facility] for the [facility] being requested. [facility] will needt o call the [facility] at [phone] to schedule with a Primary Care Provider to be evaluated for in home care and also see a [facility] . Patient's wife bas been previously aware and scheduling with the [facility] for evaluation, but it seems she may have misunderstood.     Message was left on voicemail for Patient/Spouse Kassi to call back.      Assessment/Plan:   will reiterate information from [facility] to Patient/Spouse once return call is received.  will remain available as needed.  Previously provided patient/family with writer's contact information and availability.      [facility]  Direct Phone: [phone]

## 2020-12-04 ENCOUNTER — OFFICE VISIT (OUTPATIENT)
Dept: OPHTHALMOLOGY | Facility: CLINIC | Age: 85
End: 2020-12-04
Payer: MEDICARE

## 2020-12-04 DIAGNOSIS — H52.13 MYOPIA OF BOTH EYES WITH ASTIGMATISM AND PRESBYOPIA: ICD-10-CM

## 2020-12-04 DIAGNOSIS — H35.30 AMD (AGE-RELATED MACULAR DEGENERATION), BILATERAL: Primary | ICD-10-CM

## 2020-12-04 DIAGNOSIS — H52.4 MYOPIA OF BOTH EYES WITH ASTIGMATISM AND PRESBYOPIA: ICD-10-CM

## 2020-12-04 DIAGNOSIS — H52.203 MYOPIA OF BOTH EYES WITH ASTIGMATISM AND PRESBYOPIA: ICD-10-CM

## 2020-12-04 DIAGNOSIS — H54.3 LOW VISION, BOTH EYES: ICD-10-CM

## 2020-12-04 PROCEDURE — 92012 INTRM OPH EXAM EST PATIENT: CPT | Performed by: OPTOMETRIST

## 2020-12-04 ASSESSMENT — CONF VISUAL FIELD
METHOD: COUNTING FINGERS
OS_INFERIOR_NASAL_RESTRICTION: 3
OS_SUPERIOR_NASAL_RESTRICTION: 3
OD_INFERIOR_TEMPORAL_RESTRICTION: 3
OD_SUPERIOR_NASAL_RESTRICTION: 3
OS_SUPERIOR_TEMPORAL_RESTRICTION: 3
OS_INFERIOR_TEMPORAL_RESTRICTION: 3
OD_INFERIOR_NASAL_RESTRICTION: 3
OD_SUPERIOR_TEMPORAL_RESTRICTION: 3

## 2020-12-04 ASSESSMENT — REFRACTION_MANIFEST
OD_SPHERE: -1.00
OS_SPHERE: BALANCE
OD_AXIS: 167
OD_CYLINDER: +1.75

## 2020-12-04 ASSESSMENT — VISUAL ACUITY
OS_SC: CF @ 2'
OS_CC: <20/800
OD_PH_SC: 20/150
METHOD: SNELLEN - LINEAR
OD_SC: 20/200
OD_CC: 20/400

## 2020-12-04 ASSESSMENT — REFRACTION_WEARINGRX
OS_SPHERE: +3.00
SPECS_TYPE: READING GLASSES
OD_SPHERE: +3.00

## 2020-12-04 ASSESSMENT — TONOMETRY
IOP_METHOD: ICARE
OS_IOP_MMHG: 10
OD_IOP_MMHG: 9

## 2020-12-04 NOTE — NURSING NOTE
Chief Complaints and History of Present Illnesses   Patient presents with     Consult For     Low vision exam to AMD each eye.      Chief Complaint(s) and History of Present Illness(es)     Consult For     Laterality: both eyes    Associated symptoms: eye pain and dryness.  Negative for redness and tearing    Pain scale: 5/10    Comments: Low vision exam to AMD each eye.               Comments     Gorge ALFONSO Cj is being seen today at the request of Michelle . AMD each eye with low vision left eye>right eye has been getting increasing worse over the past year.-Follows regularly with [facility] every 3 months, per pt not getting any injections at this time. Having most trouble with reading. Finds that each eye are sore constantly. He will sometimes use lubricants to relieve.    Tika Joyner, COT COT 10:02 AM December 4, 2020

## 2020-12-04 NOTE — PROGRESS NOTES
Assessment/Plan  (H35.30) AMD (age-related macular degeneration), bilateral  (primary encounter diagnosis)  Comment: Patient follows with [facility]. Longstanding reduced vision OS more so than OD  Plan: Discussed findings with patient. Minimal improvement in distance vision over habitual spectacles. Separate distance and near prescriptions dispensed. Suspect that a low vision OT evaluation might be helpful, as a CCTV would likely be a good tool to assist with his mail and reading tasks. Magnification of approximately 4X should be adequate for most tasks. Emphasized importance of lighting and contrast when reading fine print.     (H54.3) Low vision, both eyes  Plan: See above. Patient is not currently established with [facility]. Advised patient that this organization may be able to provide tools and resources to help him with his vision.     (H52.13,  H52.203,  H52.4) Myopia of both eyes with astigmatism and presbyopia  Comment: Patient was refracted by Dr. Ortiz recently. No significant changes noted today.   Plan: Separate near and distance only prescriptions dispensed to patient. Recommended that he hold off on filling glasses Rx's, as money may be better spent towards a CCTV.       Complete documentation of historical and exam elements from today's encounter can  be found in the full encounter summary report (not reduplicated in this progress  note). I personally obtained the chief complaint(s) and history of present illness. I  confirmed and edited as necessary the review of systems, past medical/surgical  history, family history, social history, and examination findings as documented by  others; and I examined the patient myself. I personally reviewed the relevant tests,  images, and reports as documented above. I formulated and edited as necessary the  assessment and plan and discussed the findings and management plan with the  patient and family.    Chris Lombardo, OD

## 2020-12-11 ENCOUNTER — TELEPHONE (OUTPATIENT)
Dept: NEUROSURGERY | Facility: CLINIC | Age: 85
End: 2020-12-11

## 2020-12-11 NOTE — TELEPHONE ENCOUNTER
[facility] Call Center    Phone Message    May a detailed message be left on voicemail: yes     Reason for Call: Other: Patients spouse Kassi calling in regards to patients appointment on 12/15 with Dr. Ventura states that patient needs his shunt checked and is confused why it was switched to virtual.     Please advise and call Kassi back to discuss further at your earliest convenience     Action Taken: Other: [facility] NEUROSURGERY     Travel Screening: Not Applicable

## 2020-12-11 NOTE — TELEPHONE ENCOUNTER
Patient needs tp have shunt programmed post MR in clinic   Routed to Neurosurgery Clinic Scheduling to have patient   Scheduled with Imelda Coronado in clinic

## 2020-12-14 NOTE — TELEPHONE ENCOUNTER
Routed to Neurosurgery Clinic Scheduling and Neurosurgery Nurse Kanwal Ballesteros and Imelda Coronado NP

## 2020-12-14 NOTE — TELEPHONE ENCOUNTER
Routed to Kanwal Ballesteros, RNCC for Dr. Ventura, Imelda Coronado, and Neurosurgery Clinic Scheduling to ensure patient is scheduled appropriately

## 2020-12-15 ENCOUNTER — VIRTUAL VISIT (OUTPATIENT)
Dept: NEUROSURGERY | Facility: CLINIC | Age: 85
End: 2020-12-15
Payer: MEDICARE

## 2020-12-15 DIAGNOSIS — G91.2 NORMAL PRESSURE HYDROCEPHALUS (H): Primary | ICD-10-CM

## 2020-12-15 DIAGNOSIS — Z98.2 S/P VP SHUNT: ICD-10-CM

## 2020-12-15 DIAGNOSIS — R29.6 FALLS FREQUENTLY: ICD-10-CM

## 2020-12-15 PROCEDURE — 99441 PR PHYSICIAN TELEPHONE EVALUATION 5-10 MIN: CPT | Mod: 95 | Performed by: NURSE PRACTITIONER

## 2020-12-15 RX ORDER — MIRABEGRON 25 MG/1
1 TABLET, FILM COATED, EXTENDED RELEASE ORAL DAILY
COMMUNITY
Start: 2020-10-27 | End: 2021-03-22

## 2020-12-15 RX ORDER — MAGNESIUM OXIDE 420 MG/1
1 TABLET ORAL DAILY
COMMUNITY
Start: 2020-11-20

## 2020-12-15 NOTE — LETTER
"12/15/2020       RE: Gorge Corona  [address]  [place] [address]     Dear Colleague,    Thank you for referring your patient, Gorge Corona, to the [facility] NEUROSURGERY CLINIC [place] at [facility]. Please see a copy of my visit note below.      Gorge Corona is a 89 year old male who is being evaluated via a billable telephone visit.      The patient has been notified of following:     \"This telephone visit will be conducted via a call between you and your physician/provider. We have found that certain health care needs can be provided without the need for a physical exam.  This service lets us provide the care you need with a short phone conversation.  If a prescription is necessary we can send it directly to your pharmacy.  If lab work is needed we can place an order for that and you can then stop by our lab to have the test done at a later time.    Telephone visits are billed at different rates depending on your insurance coverage. During this emergency period, for some insurers they may be billed the same as an in-person visit.  Please reach out to your insurance provider with any questions.    If during the course of the call the physician/provider feels a telephone visit is not appropriate, you will not be charged for this service.\"    Patient has given verbal consent for Telephone visit?  Yes    What phone number would you like to be contacted at? [phone]    How would you like to obtain your AVS? Dagoberto    Phone call duration: 10 minutes    Imelda Coronado NP    We spoke to Mr. Corona and his daughter as part of a telephone visit in Cerebrovascular Clinic today. The majority of the visit was with his daughter because Mr. Corona is hard of hearing .  He was last seen by Dr. Ventura in 02/14/2017 for evaluation of his  shunt. According to his daughter, his urinary incontinent and urgency is getting worse. He has seen by Urologist, last on " 10/30/2020. He is currently on Sanctura 20 mg BID with some relief. He also has increasing frequency of falls with mild head impaction according to the family member.  He does not have  any loss of consciousness or any numbness or weakness preceding or after the falls.  He denies numbness, weakness, speech difficulty, swallowing problems, vision changes, or any other neurological deficits.   The patient would like to have his shunt checked as they are concerning that his current symptoms are related to the shunt. His shunt is set at 1.0.      Assessment:  (ventriculoperitoneal) shunt status for Normal pressure hydrocephalus    IMPRESSION AND PLAN:   We will order CT head without contrast and XR Shunt Malfunction survey as part of work up for his recurrent falls and check the shunt's function. He can see us on the day in the clinic after the images. At the end of the visit, all the patient's questions and concerns had been addressed and the patient was agreeable with the plan of care as outlined above. The patient has our office contact information and knows to call with any questions, concerns, or changes in his condition.      Imelda Coronado NP  Neurosurgery Nurse Practitioner  [facility]  [phone]

## 2020-12-15 NOTE — PATIENT INSTRUCTIONS
We will order CT head without contrast and XR Shunt malfunction series to check the shunt. We will follow up with the patient on the same day to discuss the result.

## 2020-12-15 NOTE — PROGRESS NOTES
"  Gorge Corona is a 89 year old male who is being evaluated via a billable telephone visit.      The patient has been notified of following:     \"This telephone visit will be conducted via a call between you and your physician/provider. We have found that certain health care needs can be provided without the need for a physical exam.  This service lets us provide the care you need with a short phone conversation.  If a prescription is necessary we can send it directly to your pharmacy.  If lab work is needed we can place an order for that and you can then stop by our lab to have the test done at a later time.    Telephone visits are billed at different rates depending on your insurance coverage. During this emergency period, for some insurers they may be billed the same as an in-person visit.  Please reach out to your insurance provider with any questions.    If during the course of the call the physician/provider feels a telephone visit is not appropriate, you will not be charged for this service.\"    Patient has given verbal consent for Telephone visit?  Yes    What phone number would you like to be contacted at? [phone]    How would you like to obtain your AVS? Dagoberto    Phone call duration: 10 minutes    Imelda Coronado NP    We spoke to Mr. Corona and his daughter as part of a telephone visit in Cerebrovascular Clinic today. The majority of the visit was with his daughter because Mr. Corona is hard of hearing .  He was last seen by Dr. Ventura in 02/14/2017 for evaluation of his  shunt. According to his daughter, his urinary incontinent and urgency is getting worse. He has seen by Urologist, last on 10/30/2020. He is currently on Sanctura 20 mg BID with some relief. He also has increasing frequency of falls with mild head impaction according to the family member.  He does not have  any loss of consciousness or any numbness or weakness preceding or after the falls.  He denies numbness, weakness, speech " difficulty, swallowing problems, vision changes, or any other neurological deficits.   The patient would like to have his shunt checked as they are concerning that his current symptoms are related to the shunt. His shunt is set at 1.0.      Assessment:  (ventriculoperitoneal) shunt status for Normal pressure hydrocephalus    IMPRESSION AND PLAN:   We will order CT head without contrast and XR Shunt Malfunction survey as part of work up for his recurrent falls and check the shunt's function. He can see us on the day in the clinic after the images. At the end of the visit, all the patient's questions and concerns had been addressed and the patient was agreeable with the plan of care as outlined above. The patient has our office contact information and knows to call with any questions, concerns, or changes in his condition.        Imelda Coronado NP  Neurosurgery Nurse Practitioner  [facility]  [phone]

## 2020-12-18 ENCOUNTER — TELEPHONE (OUTPATIENT)
Dept: NEUROSURGERY | Facility: CLINIC | Age: 85
End: 2020-12-18

## 2020-12-21 ENCOUNTER — TELEPHONE (OUTPATIENT)
Dept: NEUROSURGERY | Facility: CLINIC | Age: 85
End: 2020-12-21

## 2020-12-21 NOTE — TELEPHONE ENCOUNTER
Benzoyl Peroxide Counseling: Patient counseled that medicine may cause skin irritation and bleach clothing.  In the event of skin irritation, the patient was advised to reduce the amount of the drug applied or use it less frequently.   The patient verbalized understanding of the proper use and possible adverse effects of benzoyl peroxide.  All of the patient's questions and concerns were addressed. [facility]    Phone Message    May a detailed message be left on voicemail: yes     Reason for Call: Other: Patient calling requesting the status of a return call to disuss scheduling with Dr. Ventura, Patients wife states she was to schedule an appointment with DR. Ventura, Please call to discuss thank you.      Action Taken: Message routed to:  [facility]    Travel Screening: Not Applicable                                                                         Erythromycin Pregnancy And Lactation Text: This medication is Pregnancy Category B and is considered safe during pregnancy. It is also excreted in breast milk. Topical Sulfur Applications Pregnancy And Lactation Text: This medication is Pregnancy Category C and has an unknown safety profile during pregnancy. It is unknown if this topical medication is excreted in breast milk. Bactrim Counseling:  I discussed with the patient the risks of sulfa antibiotics including but not limited to GI upset, allergic reaction, drug rash, diarrhea, dizziness, photosensitivity, and yeast infections.  Rarely, more serious reactions can occur including but not limited to aplastic anemia, agranulocytosis, methemoglobinemia, blood dyscrasias, liver or kidney failure, lung infiltrates or desquamative/blistering drug rashes. Spironolactone Counseling: Patient advised regarding risks of diarrhea, abdominal pain, hyperkalemia, birth defects (for female patients), liver toxicity and renal toxicity. The patient may need blood work to monitor liver and kidney function and potassium levels while on therapy. The patient verbalized understanding of the proper use and possible adverse effects of spironolactone.  All of the patient's questions and concerns were addressed. Sarecycline Pregnancy And Lactation Text: This medication is Pregnancy Category D and not consider safe during pregnancy. It is also excreted in breast milk. Dapsone Counseling: I discussed with the patient the risks of dapsone including but not limited to hemolytic anemia, agranulocytosis, rashes, methemoglobinemia, kidney failure, peripheral neuropathy, headaches, GI upset, and liver toxicity.  Patients who start dapsone require monitoring including baseline LFTs and weekly CBCs for the first month, then every month thereafter.  The patient verbalized understanding of the proper use and possible adverse effects of dapsone.  All of the patient's questions and concerns were addressed. Azithromycin Pregnancy And Lactation Text: This medication is considered safe during pregnancy and is also secreted in breast milk. High Dose Vitamin A Pregnancy And Lactation Text: High dose vitamin A therapy is contraindicated during pregnancy and breast feeding. Bactrim Pregnancy And Lactation Text: This medication is Pregnancy Category D and is known to cause fetal risk.  It is also excreted in breast milk. Benzoyl Peroxide Pregnancy And Lactation Text: This medication is Pregnancy Category C. It is unknown if benzoyl peroxide is excreted in breast milk. Isotretinoin Counseling: Patient should get monthly blood tests, not donate blood, not drive at night if vision affected, not share medication, and not undergo elective surgery for 6 months after tx completed. Side effects reviewed, pt to contact office should one occur. Spironolactone Pregnancy And Lactation Text: This medication can cause feminization of the male fetus and should be avoided during pregnancy. The active metabolite is also found in breast milk. Tazorac Pregnancy And Lactation Text: This medication is not safe during pregnancy. It is unknown if this medication is excreted in breast milk. Dapsone Pregnancy And Lactation Text: This medication is Pregnancy Category C and is not considered safe during pregnancy or breast feeding. Minocycline Counseling: Patient advised regarding possible photosensitivity and discoloration of the teeth, skin, lips, tongue and gums.  Patient instructed to avoid sunlight, if possible.  When exposed to sunlight, patients should wear protective clothing, sunglasses, and sunscreen.  The patient was instructed to call the office immediately if the following severe adverse effects occur:  hearing changes, easy bruising/bleeding, severe headache, or vision changes.  The patient verbalized understanding of the proper use and possible adverse effects of minocycline.  All of the patient's questions and concerns were addressed. Tetracycline Counseling: Patient counseled regarding possible photosensitivity and increased risk for sunburn.  Patient instructed to avoid sunlight, if possible.  When exposed to sunlight, patients should wear protective clothing, sunglasses, and sunscreen.  The patient was instructed to call the office immediately if the following severe adverse effects occur:  hearing changes, easy bruising/bleeding, severe headache, or vision changes.  The patient verbalized understanding of the proper use and possible adverse effects of tetracycline.  All of the patient's questions and concerns were addressed. Patient understands to avoid pregnancy while on therapy due to potential birth defects. Azithromycin Counseling:  I discussed with the patient the risks of azithromycin including but not limited to GI upset, allergic reaction, drug rash, diarrhea, and yeast infections. Birth Control Pills Counseling: Birth Control Pill Counseling: I discussed with the patient the potential side effects of OCPs including but not limited to increased risk of stroke, heart attack, thrombophlebitis, deep venous thrombosis, hepatic adenomas, breast changes, GI upset, headaches, and depression.  The patient verbalized understanding of the proper use and possible adverse effects of OCPs. All of the patient's questions and concerns were addressed. Isotretinoin Pregnancy And Lactation Text: This medication is Pregnancy Category X and is considered extremely dangerous during pregnancy. It is unknown if it is excreted in breast milk. Topical Clindamycin Pregnancy And Lactation Text: This medication is Pregnancy Category B and is considered safe during pregnancy. It is unknown if it is excreted in breast milk. Use Enhanced Medication Counseling?: No Topical Clindamycin Counseling: Patient counseled that this medication may cause skin irritation or allergic reactions.  In the event of skin irritation, the patient was advised to reduce the amount of the drug applied or use it less frequently.   The patient verbalized understanding of the proper use and possible adverse effects of clindamycin.  All of the patient's questions and concerns were addressed. Doxycycline Counseling:  Patient counseled regarding possible photosensitivity and increased risk for sunburn.  Patient instructed to avoid sunlight, if possible.  When exposed to sunlight, patients should wear protective clothing, sunglasses, and sunscreen.  The patient was instructed to call the office immediately if the following severe adverse effects occur:  hearing changes, easy bruising/bleeding, severe headache, or vision changes.  The patient verbalized understanding of the proper use and possible adverse effects of doxycycline.  All of the patient's questions and concerns were addressed. Topical Retinoid counseling:  Patient advised to apply a pea-sized amount only at bedtime and wait 30 minutes after washing their face before applying.  If too drying, patient may add a non-comedogenic moisturizer. The patient verbalized understanding of the proper use and possible adverse effects of retinoids.  All of the patient's questions and concerns were addressed. Topical Sulfur Applications Counseling: Topical Sulfur Counseling: Patient counseled that this medication may cause skin irritation or allergic reactions.  In the event of skin irritation, the patient was advised to reduce the amount of the drug applied or use it less frequently.   The patient verbalized understanding of the proper use and possible adverse effects of topical sulfur application.  All of the patient's questions and concerns were addressed. High Dose Vitamin A Counseling: Side effects reviewed, pt to contact office should one occur. Erythromycin Counseling:  I discussed with the patient the risks of erythromycin including but not limited to GI upset, allergic reaction, drug rash, diarrhea, increase in liver enzymes, and yeast infections. Tazorac Counseling:  Patient advised that medication is irritating and drying.  Patient may need to apply sparingly and wash off after an hour before eventually leaving it on overnight.  The patient verbalized understanding of the proper use and possible adverse effects of tazorac.  All of the patient's questions and concerns were addressed. Topical Retinoid Pregnancy And Lactation Text: This medication is Pregnancy Category C. It is unknown if this medication is excreted in breast milk. Detail Level: Zone Doxycycline Pregnancy And Lactation Text: This medication is Pregnancy Category D and not consider safe during pregnancy. It is also excreted in breast milk but is considered safe for shorter treatment courses. Birth Control Pills Pregnancy And Lactation Text: This medication should be avoided if pregnant and for the first 30 days post-partum. Sarecycline Counseling: Patient advised regarding possible photosensitivity and discoloration of the teeth, skin, lips, tongue and gums.  Patient instructed to avoid sunlight, if possible.  When exposed to sunlight, patients should wear protective clothing, sunglasses, and sunscreen.  The patient was instructed to call the office immediately if the following severe adverse effects occur:  hearing changes, easy bruising/bleeding, severe headache, or vision changes.  The patient verbalized understanding of the proper use and possible adverse effects of sarecycline.  All of the patient's questions and concerns were addressed. Topical Clindamycin Counseling: Patient counseled that this medication may cause skin irritation or allergic reactions.  In the event of skin irritation, the patient was advised to reduce the amount of the drug applied or use it less frequently.   The patient verbalized understanding of the proper use and possible adverse effects of clindamycin.  All of the patient's questions and concerns were addressed. Bactrim Pregnancy And Lactation Text: This medication is Pregnancy Category D and is known to cause fetal risk.  It is also excreted in breast milk. Topical Retinoid counseling:  Patient advised to apply a pea-sized amount only at bedtime and wait 30 minutes after washing their face before applying.  If too drying, patient may add a non-comedogenic moisturizer. The patient verbalized understanding of the proper use and possible adverse effects of retinoids.  All of the patient's questions and concerns were addressed. Bactrim Counseling:  I discussed with the patient the risks of sulfa antibiotics including but not limited to GI upset, allergic reaction, drug rash, diarrhea, dizziness, photosensitivity, and yeast infections.  Rarely, more serious reactions can occur including but not limited to aplastic anemia, agranulocytosis, methemoglobinemia, blood dyscrasias, liver or kidney failure, lung infiltrates or desquamative/blistering drug rashes. Minocycline Counseling: Patient advised regarding possible photosensitivity and discoloration of the teeth, skin, lips, tongue and gums.  Patient instructed to avoid sunlight, if possible.  When exposed to sunlight, patients should wear protective clothing, sunglasses, and sunscreen.  The patient was instructed to call the office immediately if the following severe adverse effects occur:  hearing changes, easy bruising/bleeding, severe headache, or vision changes.  The patient verbalized understanding of the proper use and possible adverse effects of minocycline.  All of the patient's questions and concerns were addressed. Birth Control Pills Counseling: Birth Control Pill Counseling: I discussed with the patient the potential side effects of OCPs including but not limited to increased risk of stroke, heart attack, thrombophlebitis, deep venous thrombosis, hepatic adenomas, breast changes, GI upset, headaches, and depression.  The patient verbalized understanding of the proper use and possible adverse effects of OCPs. All of the patient's questions and concerns were addressed. Sarecycline Counseling: Patient advised regarding possible photosensitivity and discoloration of the teeth, skin, lips, tongue and gums.  Patient instructed to avoid sunlight, if possible.  When exposed to sunlight, patients should wear protective clothing, sunglasses, and sunscreen.  The patient was instructed to call the office immediately if the following severe adverse effects occur:  hearing changes, easy bruising/bleeding, severe headache, or vision changes.  The patient verbalized understanding of the proper use and possible adverse effects of sarecycline.  All of the patient's questions and concerns were addressed. Doxycycline Counseling:  Patient counseled regarding possible photosensitivity and increased risk for sunburn.  Patient instructed to avoid sunlight, if possible.  When exposed to sunlight, patients should wear protective clothing, sunglasses, and sunscreen.  The patient was instructed to call the office immediately if the following severe adverse effects occur:  hearing changes, easy bruising/bleeding, severe headache, or vision changes.  The patient verbalized understanding of the proper use and possible adverse effects of doxycycline.  All of the patient's questions and concerns were addressed. Tetracycline Counseling: Patient counseled regarding possible photosensitivity and increased risk for sunburn.  Patient instructed to avoid sunlight, if possible.  When exposed to sunlight, patients should wear protective clothing, sunglasses, and sunscreen.  The patient was instructed to call the office immediately if the following severe adverse effects occur:  hearing changes, easy bruising/bleeding, severe headache, or vision changes.  The patient verbalized understanding of the proper use and possible adverse effects of tetracycline.  All of the patient's questions and concerns were addressed. Patient understands to avoid pregnancy while on therapy due to potential birth defects. Tazorac Counseling:  Patient advised that medication is irritating and drying.  Patient may need to apply sparingly and wash off after an hour before eventually leaving it on overnight.  The patient verbalized understanding of the proper use and possible adverse effects of tazorac.  All of the patient's questions and concerns were addressed. Topical Sulfur Applications Counseling: Topical Sulfur Counseling: Patient counseled that this medication may cause skin irritation or allergic reactions.  In the event of skin irritation, the patient was advised to reduce the amount of the drug applied or use it less frequently.   The patient verbalized understanding of the proper use and possible adverse effects of topical sulfur application.  All of the patient's questions and concerns were addressed. Spironolactone Counseling: Patient advised regarding risks of diarrhea, abdominal pain, hyperkalemia, birth defects (for female patients), liver toxicity and renal toxicity. The patient may need blood work to monitor liver and kidney function and potassium levels while on therapy. The patient verbalized understanding of the proper use and possible adverse effects of spironolactone.  All of the patient's questions and concerns were addressed. Benzoyl Peroxide Counseling: Patient counseled that medicine may cause skin irritation and bleach clothing.  In the event of skin irritation, the patient was advised to reduce the amount of the drug applied or use it less frequently.   The patient verbalized understanding of the proper use and possible adverse effects of benzoyl peroxide.  All of the patient's questions and concerns were addressed. Dapsone Counseling: I discussed with the patient the risks of dapsone including but not limited to hemolytic anemia, agranulocytosis, rashes, methemoglobinemia, kidney failure, peripheral neuropathy, headaches, GI upset, and liver toxicity.  Patients who start dapsone require monitoring including baseline LFTs and weekly CBCs for the first month, then every month thereafter.  The patient verbalized understanding of the proper use and possible adverse effects of dapsone.  All of the patient's questions and concerns were addressed.

## 2021-01-01 DIAGNOSIS — F33.1 MAJOR DEPRESSIVE DISORDER, RECURRENT EPISODE, MODERATE (H): ICD-10-CM

## 2021-01-01 DIAGNOSIS — I10 ESSENTIAL HYPERTENSION, BENIGN: ICD-10-CM

## 2021-01-01 DIAGNOSIS — R06.02 SHORTNESS OF BREATH: ICD-10-CM

## 2021-01-01 DIAGNOSIS — R33.9 URINARY RETENTION: ICD-10-CM

## 2021-01-01 DIAGNOSIS — E78.5 DYSLIPIDEMIA: ICD-10-CM

## 2021-01-01 DIAGNOSIS — E87.6 HYPOKALEMIA: ICD-10-CM

## 2021-01-04 DIAGNOSIS — E03.8 OTHER SPECIFIED HYPOTHYROIDISM: ICD-10-CM

## 2021-01-05 ENCOUNTER — TELEPHONE (OUTPATIENT)
Dept: NEUROSURGERY | Facility: CLINIC | Age: 86
End: 2021-01-05

## 2021-01-05 NOTE — TELEPHONE ENCOUNTER
[facility] Call Center    Phone Message    May a detailed message be left on voicemail: yes     Reason for Call: Other: Patient spouse is calling to see of patient can see a resident or another neurosurgeon for a shunt check up on 1/11/21 because patient is scheduled for imaging appt that day and spouse can't get him to the appointment with Dr. Ventura the following day on 01/12/21. Spouse expressed that its difficult to get patient out to appointments and I offered to reschedule appt with Dr. Ventura; however, he is booking out to Feb.     Please advise.      Action Taken: Message routed to:  [facility]surgery    Travel Screening: Not Applicable

## 2021-01-05 NOTE — TELEPHONE ENCOUNTER
Nurys,   Patient having an MRI on 01/11/2021. Needs to have shunt adjustment following MRI   Jeanne Fernando is in clinic on that day and can do the patient shunt adjustment.     Katy Joyner LPN   Neurosurgery

## 2021-01-06 RX ORDER — TAMSULOSIN HYDROCHLORIDE 0.4 MG/1
0.4 CAPSULE ORAL DAILY
Qty: 90 CAPSULE | Refills: 2 | Status: SHIPPED | OUTPATIENT
Start: 2021-01-06

## 2021-01-06 RX ORDER — SPIRONOLACTONE 25 MG/1
25 TABLET ORAL DAILY
Qty: 90 TABLET | Refills: 2 | OUTPATIENT
Start: 2021-01-06

## 2021-01-06 RX ORDER — CITALOPRAM HYDROBROMIDE 20 MG/1
20 TABLET ORAL DAILY
Qty: 90 TABLET | Refills: 0 | Status: SHIPPED | OUTPATIENT
Start: 2021-01-06

## 2021-01-06 RX ORDER — ATORVASTATIN CALCIUM 10 MG/1
10 TABLET, FILM COATED ORAL DAILY
Qty: 90 TABLET | Refills: 2 | Status: SHIPPED | OUTPATIENT
Start: 2021-01-06

## 2021-01-06 NOTE — TELEPHONE ENCOUNTER
Spironolactone 25 MG Oral Tablet      Not on active med list, appears to have been discontinued during a urology appointment 10/30/20  Last Office Visit : 10/19/20  Future Office visit:  None scheduled    Routing refill request to provider for review/approval because:  Drug not active on patient's medication list

## 2021-01-07 NOTE — TELEPHONE ENCOUNTER
levothyroxine (SYNTHROID/LEVOTHROID) 50 MCG tablet   Take 1.5 tablets (75 mcg) by mouth daily   Last Written Prescription Date:  10/3/19 - 1/16/20  Last Fill Quantity: 135,   # refills: 0  Last Office Visit : 10/19/20  Future Office visit:  none    Routing refill request to provider for review/approval because:  Overdue lab - TSH.   Currently taking levothyroxine (EUTHYROX) 50 MCG tablet  Sig -Take 1.5 tablets (75 mcg) by mouth daily

## 2021-01-08 RX ORDER — LEVOTHYROXINE SODIUM 50 UG/1
TABLET ORAL
Qty: 135 TABLET | Refills: 0 | Status: SHIPPED | OUTPATIENT
Start: 2021-01-08 | End: 2021-06-04

## 2021-01-12 ENCOUNTER — TELEPHONE (OUTPATIENT)
Dept: CARDIOLOGY | Facility: CLINIC | Age: 86
End: 2021-01-12

## 2021-01-12 DIAGNOSIS — Z98.61 CAD S/P PERCUTANEOUS CORONARY ANGIOPLASTY: ICD-10-CM

## 2021-01-12 DIAGNOSIS — I50.32 CHRONIC DIASTOLIC HEART FAILURE (H): ICD-10-CM

## 2021-01-12 DIAGNOSIS — I25.10 CAD S/P PERCUTANEOUS CORONARY ANGIOPLASTY: ICD-10-CM

## 2021-01-12 RX ORDER — FUROSEMIDE 40 MG
40 TABLET ORAL DAILY
Qty: 120 TABLET | Refills: 0 | Status: SHIPPED | OUTPATIENT
Start: 2021-01-12

## 2021-01-12 NOTE — TELEPHONE ENCOUNTER
[facility] Call [facility]    Phone Message    May a detailed message be left on voicemail: yes     Reason for Call: Other: Kassi calling because pt's primary care provider canceled his prescription for fluids, which he was taking every day and she wants to discuss why it was canceled.     Action Taken: Message routed to:  [facility] cardio    Travel Screening: Not Applicable

## 2021-01-13 NOTE — TELEPHONE ENCOUNTER
Dr. Atwood reviewed patient recent labs on 01/11/2021 in ED and approved furosemide refill until next clinic follow up with repeat BMP.     Date: 1/12/2021    Time of Call: 6:36 PM     Diagnosis:  CHF     [ VORB ] Ordering provider: David Atwood MD  Order: BMP     Order received by: Peyton Workman LPN     Follow-up/additional notes: patient wife calling to get refills for patient for furosemide.         Spoke with patient wife and discussed that Dr Atwood recommends repeat BMP in March 2021 at cardiology follow up. Refills sent to pharmacy.

## 2021-01-18 ENCOUNTER — TELEPHONE (OUTPATIENT)
Dept: INTERNAL MEDICINE | Facility: CLINIC | Age: 86
End: 2021-01-18

## 2021-01-18 NOTE — TELEPHONE ENCOUNTER
[facility] Call [facility]    Phone Message    May a detailed message be left on voicemail: yes     Reason for Call: Other: Patient spouse called for information on getting home care assistance with patient. Please follow up with patient spouse to advise. Thank you!      Action Taken: Message routed to:  [facility]    Travel Screening: Not Applicable

## 2021-01-19 NOTE — TELEPHONE ENCOUNTER
I called and left VM.     Insurance will typically only cover skilled nursing work like- regular standing lab draws, med set up, catheter change, or wound care.  If she is looking for this a visit will be needed.     If she is looking for other type assistance like home health aid this is private pay.  If she is needing home health aid we can connect her with our  to help her find some options.     She can call us back with more information about what she is needing so we can help further.     Maria C Flowers, EMT at 8:14 AM on 1/19/2021.

## 2021-01-20 ENCOUNTER — VIRTUAL VISIT (OUTPATIENT)
Dept: PULMONOLOGY | Facility: CLINIC | Age: 86
End: 2021-01-20
Attending: INTERNAL MEDICINE
Payer: MEDICARE

## 2021-01-20 DIAGNOSIS — R06.00 DYSPNEA, UNSPECIFIED TYPE: Primary | ICD-10-CM

## 2021-01-20 PROCEDURE — 99442 PR PHYSICIAN TELEPHONE EVALUATION 11-20 MIN: CPT | Mod: 95 | Performed by: INTERNAL MEDICINE

## 2021-01-20 NOTE — PROGRESS NOTES
Gorge is a 89 year old who is being evaluated via a billable telephone visit.      What phone number would you like to be contacted at? [phone]  How would you like to obtain your AVS? Mail a copy  Phone call duration: 15 minutes    Telephone Follow Up    CC: follow up dyspnea      Spoke on the phone with Gorge's wife, Kassi (who I also follow for COPD).  She actually tells me today that she isn't sure why this appointment was scheduled.  While Gorge has multiple co-morbidities, she has no concerns from a pulmonary standpoint at this time.  She is working on arranging home care for him as he is completely homebound and dependent, and is rather frustrated with the insurance issues that are coming up.     He was previously using Breo for moderate COPD, but overall this has been unhelpful.  Kassi doesn't think continuing this will be all that beneficial for him. Her daughter is an RT and also doesn't feel the Breo is helpful.     Given the patient's age and current status, I agree with Kassi that it's not worth continuing as there doesn't seem to be any perceived benefit.     We discussed that she can give us a call at any time should any other questions or concerns come up and she was in agreement with this plan.     Vickie Kincaid MD

## 2021-01-20 NOTE — LETTER
1/20/2021         RE: Gorge Corona  [address]  [place] [address]        Dear Colleague,    Thank you for referring your patient, Gorge Corona, to the [facility] AND [facility] [place]. Please see a copy of my visit note below.    Gorge is a 89 year old who is being evaluated via a billable telephone visit.      What phone number would you like to be contacted at? [phone]  How would you like to obtain your AVS? Mail a copy  Phone call duration: 15 minutes    Telephone Follow Up    CC: follow up dyspnea      Spoke on the phone with Gorge's wife, Kassi (who I also follow for COPD).  She actually tells me today that she isn't sure why this appointment was scheduled.  While Gorge has multiple co-morbidities, she has no concerns from a pulmonary standpoint at this time.  She is working on arranging home care for him as he is completely homebound and dependent, and is rather frustrated with the insurance issues that are coming up.     He was previously using Breo for moderate COPD, but overall this has been unhelpful.  Kassi doesn't think continuing this will be all that beneficial for him. Her daughter is an RT and also doesn't feel the Breo is helpful.     Given the patient's age and current status, I agree with Kassi that it's not worth continuing as there doesn't seem to be any perceived benefit.     We discussed that she can give us a call at any time should any other questions or concerns come up and she was in agreement with this plan.     Vickie Kincaid MD

## 2021-02-11 ENCOUNTER — TELEPHONE (OUTPATIENT)
Dept: UROLOGY | Facility: CLINIC | Age: 86
End: 2021-02-11

## 2021-02-11 NOTE — TELEPHONE ENCOUNTER
[facility] Call Center    Phone Message    May a detailed message be left on voicemail: yes     Reason for Call: Other: Pt is having worsening symptoms with urination and trouble controlling bladder. He has appt on 2/26. Wife is wondering if a medication can be prescibed to help pt until then. please call at [phone]. Thank you.     Action Taken: Message routed to:  [facility] ([facility]): uro    Travel Screening: Not Applicable

## 2021-02-12 NOTE — TELEPHONE ENCOUNTER
Patient called and left message to find out which urology meds he is taking if any Radha Lima LPN Staff Nurse

## 2021-02-12 NOTE — TELEPHONE ENCOUNTER
Can we check to see if he is taking just the Sanctura? I had previously prescribed him mirabegron, but from my understanding, he never started it. If he hasn't, he can take this in addition to the Sanctura, which may improve his symptoms.

## 2021-02-15 NOTE — TELEPHONE ENCOUNTER
He should have a prescription already at his pharmacy for the mirabegron 25 mg daily that I previously prescribed 10/27/20 that he can start taking in addition to the Sanctura.

## 2021-02-18 ENCOUNTER — PRE VISIT (OUTPATIENT)
Dept: UROLOGY | Facility: CLINIC | Age: 86
End: 2021-02-18

## 2021-02-18 NOTE — TELEPHONE ENCOUNTER
Chief Complaint : Follow up - Sx check    Hx/Sx: LUTS (urinary urgency, urge incontinence)    Records/Orders: Available    Pt Contacted: N/a    At Rooming: Telephone    Luiz Rose EMT

## 2021-02-26 ENCOUNTER — VIRTUAL VISIT (OUTPATIENT)
Dept: UROLOGY | Facility: CLINIC | Age: 86
End: 2021-02-26
Payer: MEDICARE

## 2021-02-26 DIAGNOSIS — R39.15 URINARY URGENCY: Primary | ICD-10-CM

## 2021-02-26 DIAGNOSIS — R39.11 URINARY HESITANCY: ICD-10-CM

## 2021-02-26 PROCEDURE — 99442 PR PHYSICIAN TELEPHONE EVALUATION 11-20 MIN: CPT | Mod: 95 | Performed by: NURSE PRACTITIONER

## 2021-02-26 NOTE — LETTER
2/26/2021       RE: Gorge Corona  [address]  [place] [address]     Dear Colleague,    Thank you for referring your patient, Gorge Corona, to the [facility] [place]. Please see a copy of my visit note below.    Gorge is a 89 year old who is being evaluated via a billable telephone visit.      What phone number would you like to be contacted at? [phone]  How would you like to obtain your AVS? Mail a copy  Phone call duration: 13 minutes      Gorge Corona complains of   Chief Complaint   Patient presents with     Follow Up     LUTS       Assessment/Plan:  89 year old male with a history of urinary symptoms, including urgency and hesitancy, with occasional incontinence. Unclear if symptoms are still better with this med, but confusion is most certainly worse. Discussed with his wife that he should stop this med for now until I am able to see them in the office. I'd like to pursue a repeat uroflow/PVR to get a new baseline of his urinary function. Unclear if urodynamics would be helpful or necessary to pursue, but will discuss this further at our in-person follow up.     Dali Cheek, CNP  Department of Urology      Subjective:   89 year old male with a history of LUTS, including urinary urgency, sometimes met with an inability to void, and sometimes resulting in urinary incontinence. Please see my visit note with him dated 9/25/20 for a more comprehensive urologic history. He trialed Sanctura, which seemed to improve his urinary symptoms, but he then experienced an episode of confusion/hallucinations. It was therefore decided to discontinue this medication out of concern that it could be an anticholinergic adverse effect. He was instead started on a trial of mirabegron. However, at our last visit on 10/30/20, his wife reported that he had continued on the Sanctura despite the switch, and was no longer  having any issues with confusion. He was voiding well and symptoms were well-controlled. Sanctura was therefore cautiously resumed.     Our visit is conducted today by phone through his wife, Kassi, due to the patient's hearing impairment.     Since this visit, he has had a worsening again of confusion with thoughts of being back in [employer]. Continues to have issues of intermittent inability to void. Seems to void better after having a bowel movement. He voids ~200 mL into a bedside urinal about 5-6 times per day, his wife estimates.       Dali Cheek, CNP

## 2021-02-26 NOTE — PATIENT INSTRUCTIONS
UROLOGY CLINIC VISIT PATIENT INSTRUCTIONS      -Follow up with me for an in-person visit on Tuesday, 3/9/21 at 11:30 am (OK to add visit slot at that time).   -Stop the trospium medication for now until I see you.     If you have any issues, questions or concerns in the meantime, do not hesitate to contact us at [phone] or via [employer].     It was a pleasure meeting with you today.  Thank you for allowing me and my team the privilege of caring for you today.  YOU are the reason we are here, and I truly hope we provided you with the excellent service you deserve.  Please let us know if there is anything else we can do for you so that we can be sure you are leaving completely satisfied with your care experience.    Dali Cheek, CNP

## 2021-02-26 NOTE — PROGRESS NOTES
Gorge is a 89 year old who is being evaluated via a billable telephone visit.      What phone number would you like to be contacted at? [phone]  How would you like to obtain your AVS? Mail a copy  Phone call duration: 13 minutes      Gorge Corona complains of   Chief Complaint   Patient presents with     Follow Up     LUTS       Assessment/Plan:  89 year old male with a history of urinary symptoms, including urgency and hesitancy, with occasional incontinence. Unclear if symptoms are still better with this med, but confusion is most certainly worse. Discussed with his wife that he should stop this med for now until I am able to see them in the office. I'd like to pursue a repeat uroflow/PVR to get a new baseline of his urinary function. Unclear if urodynamics would be helpful or necessary to pursue, but will discuss this further at our in-person follow up.     Dlai Cheek, CNP  Department of Urology      Subjective:   89 year old male with a history of LUTS, including urinary urgency, sometimes met with an inability to void, and sometimes resulting in urinary incontinence. Please see my visit note with him dated 9/25/20 for a more comprehensive urologic history. He trialed Sanctura, which seemed to improve his urinary symptoms, but he then experienced an episode of confusion/hallucinations. It was therefore decided to discontinue this medication out of concern that it could be an anticholinergic adverse effect. He was instead started on a trial of mirabegron. However, at our last visit on 10/30/20, his wife reported that he had continued on the Sanctura despite the switch, and was no longer having any issues with confusion. He was voiding well and symptoms were well-controlled. Sanctura was therefore cautiously resumed.     Our visit is conducted today by phone through his wife, Kassi, due to the patient's hearing impairment.     Since this visit, he has had a worsening again of confusion with  thoughts of being back in the [employer]. Continues to have issues of intermittent inability to void. Seems to void better after having a bowel movement. He voids ~200 mL into a bedside urinal about 5-6 times per day, his wife estimates.

## 2021-03-01 ENCOUNTER — TELEPHONE (OUTPATIENT)
Dept: CARDIOLOGY | Facility: CLINIC | Age: 86
End: 2021-03-01

## 2021-03-01 NOTE — TELEPHONE ENCOUNTER
Pt needs to reschedule 4/27 appointment with Dr. Atwood with an echo prior.    Current appts will be cancelled.

## 2021-03-02 ENCOUNTER — PRE VISIT (OUTPATIENT)
Dept: UROLOGY | Facility: CLINIC | Age: 86
End: 2021-03-02

## 2021-03-02 NOTE — TELEPHONE ENCOUNTER
Reason for visit: symptom/med check     Relevant information: hearing impaired    Records/imaging/labs/orders: all records available    Pt called: no need for a call    At Rooming: PVR

## 2021-03-09 ENCOUNTER — OFFICE VISIT (OUTPATIENT)
Dept: UROLOGY | Facility: CLINIC | Age: 86
End: 2021-03-09
Payer: MEDICARE

## 2021-03-09 VITALS — SYSTOLIC BLOOD PRESSURE: 94 MMHG | DIASTOLIC BLOOD PRESSURE: 51 MMHG | HEART RATE: 54 BPM

## 2021-03-09 DIAGNOSIS — R39.15 URINARY URGENCY: Primary | ICD-10-CM

## 2021-03-09 DIAGNOSIS — N39.41 URGE URINARY INCONTINENCE: ICD-10-CM

## 2021-03-09 PROCEDURE — 99213 OFFICE O/P EST LOW 20 MIN: CPT | Performed by: NURSE PRACTITIONER

## 2021-03-09 NOTE — PATIENT INSTRUCTIONS
UROLOGY CLINIC VISIT PATIENT INSTRUCTIONS    -Follow up for urodynamics with me on a Monday.  -No need to complete voiding diary.     If you have any issues, questions or concerns in the meantime, do not hesitate to contact us at [phone] or via [employer].     It was a pleasure meeting with you today.  Thank you for allowing me and my team the privilege of caring for you today.  YOU are the reason we are here, and I truly hope we provided you with the excellent service you deserve.  Please let us know if there is anything else we can do for you so that we can be sure you are leaving completely satisfied with your care experience.    Dali Cheek, CNP

## 2021-03-09 NOTE — NURSING NOTE
Chief Complaint   Patient presents with     RECHECK     medication and symptom check       Blood pressure 94/51, pulse 54. There is no height or weight on file to calculate BMI.    Patient Active Problem List   Diagnosis     Lumbar spinal stenosis     Lumbar spondylolysis     Major depressive disorder, recurrent episode, moderate (H)     Actinic keratosis     Other malignant neoplasm of skin, site unspecified     Benign neoplasm of skin of trunk, except scrotum     Other seborrheic keratosis     History of SCC (squamous cell carcinoma) of skin     H/O colonoscopy     Normal pressure hydrocephalus (H)     ASCVD (arteriosclerotic cardiovascular disease)     Coronary artery disease, occlusive     H/O CT scan of head     Mitral valve regurgitation     CAD (coronary artery disease)     Atrial fibrillation (H)     Dyslipidemia     Preventative health care     CAD S/P percutaneous coronary angioplasty     BCC (basal cell carcinoma of skin)     Hypothyroidism     CHF (congestive heart failure) (H)     SCC (squamous cell carcinoma)     SVT (supraventricular tachycardia) (H)     Status post coronary angiogram     Near syncope     Bradycardia       Allergies   Allergen Reactions     Penicillins Rash       Current Outpatient Medications   Medication Sig Dispense Refill     albuterol (PROAIR HFA, PROVENTIL HFA, VENTOLIN HFA) 108 (90 BASE) MCG/ACT inhaler Inhale 2 puffs into the lungs every 4 hours as needed for shortness of breath / dyspnea or wheezing 1 Inhaler 1     ammonium lactate (LAC-HYDRIN) 12 % external lotion Apply topically 2 times daily as needed for dry skin 360 g 11     atorvastatin (LIPITOR) 10 MG tablet Take 1 tablet (10 mg) by mouth daily 90 tablet 2     Calcium 600 MG tablet Take 1 tablet by mouth 2 times daily. 100 tablet 3     Cholecalciferol (VITAMIN D) 1000 UNITS capsule Take 1 capsule by mouth daily.       citalopram (CELEXA) 20 MG tablet Take 1 tablet (20 mg) by mouth daily 90 tablet 0     clindamycin  (CLEOCIN) 300 MG capsule Take 2 capsules by mouth as needed for 1 dose. 1 hour prior to dental work. 6 capsule 0     coenzyme Q-10 75 MG CAPS Take  by mouth.       fluticasone-vilanterol (BREO ELLIPTA) 100-25 MCG/INH inhaler Inhale 1 puff into the lungs daily 60 Inhaler 9     furosemide (LASIX) 40 MG tablet Take 1 tablet (40 mg) by mouth daily 120 tablet 0     ipratropium (ATROVENT) 0.06 % nasal spray Spray 2 sprays into both nostrils 4 times daily 15 mL 4     levothyroxine (SYNTHROID/LEVOTHROID) 50 MCG tablet TAKE 1 & 1/2 (ONE & ONE-HALF) TABLETS BY MOUTH ONCE DAILY 135 tablet 0     Magnesium Oxide 420 MG TABS Take 1 tablet by mouth daily       MAGNESIUM OXIDE PO Take 400 mg by mouth       melatonin 3 MG tablet Take 1 tablet (3 mg) by mouth nightly as needed for sleep 100 tablet 3     metoprolol succinate ER (TOPROL-XL) 25 MG 24 hr tablet Take 0.5 tablets (12.5 mg) by mouth daily 45 tablet 2     Multiple Vitamins-Minerals (OCUVITE PO) Take  by mouth.  0     order for DME Equipment being ordered: Safe step tub / walk-in tub 1 Device 0     order for DME Walker 1 each 0     senna-docusate (SENOKOT-S;PERICOLACE) 8.6-50 MG per tablet Take 1 tablet by mouth 2 times daily. 60 tablet      tamsulosin (FLOMAX) 0.4 MG capsule Take 1 capsule (0.4 mg) by mouth daily 90 capsule 2     triamcinolone (KENALOG) 0.1 % external ointment Apply topically 2 times daily as needed (rash on the arms) 80 g 3     finasteride (PROSCAR) 5 MG tablet Take 1 tablet (5 mg) by mouth daily (Patient not taking: Reported on 2021) 90 tablet 3     MYRBETRIQ 25 MG 24 hr tablet Take 1 tablet by mouth daily       trospium (SANCTURA) 20 MG tablet Take 1 tablet (20 mg) by mouth 2 times daily (before meals) (Patient not taking: Reported on 3/9/2021) 90 tablet 7       Social History     Tobacco Use     Smoking status: Former Smoker     Types: Cigars, Pipe     Quit date: 1990     Years since quittin.2     Smokeless tobacco: Former User   Substance  Use Topics     Alcohol use: No     Alcohol/week: 0.0 standard drinks     Drug use: No       Noelle Hamilton CMA  3/9/2021  11:38 AM

## 2021-03-09 NOTE — PROGRESS NOTES
Chief Complaint:   LUTS         History of Present Illness:    Gorge Corona is a 89 year old male with a history of urinary symptoms, including urgency and hesitancy, with occasional incontinence. Had previously been managed with Sanctura, but it became unclear if his symptoms were better with this med. He had also been experiencing an increase in confusion. We therefore decided to discontinue the Sanctura at our last visit with a plan to see him in the office for repeat urinary flow testing with PVR to ensure he is emptying his bladder well. We also discussed the potential benefit of urodynamics to better sort out his symptoms     He presents today with his spouse, Kassi. They both report that urinary symptoms have remained a challenge. They often use a bedside urinal due to patient's urgency and functional difficulty getting to the bathroom quickly.          Past Medical History:     Past Medical History:   Diagnosis Date     Atrial fibrillation (H)      Atrial flutter (H)      Basal cell carcinoma of skin of trunk      BPH (benign prostatic hypertrophy) with urinary obstruction      CAD (coronary artery disease)     s/p CABG 1/2013; RUSSEL in 2/2013     Depression      H/O CT scan of head 1/11/2013 7/20/2006    Result Impression     CT of the Head without contrast 7/20/2006  History: Ataxia, incontinence, and NPH  Comparison Study: MR obtained to 4/19/2004  Technique: Axial CT images of the head were obtained at 2 intervals from the skull base to the vertex without intravenous contrast. The images were reviewed in brain, subdural and bone windows.  Findings: Patient has had a moderate promin     Hyperlipidemia      Lumbar spinal stenosis      Mitral regurgitation     s/p bioprosthesis 1/2013     Palpitations      Paroxysmal supraventricular tachycardia (H)      Squamous cell carcinoma      Tachycardia     baroreflex injury due to surgery      (ventriculoperitoneal) shunt status     for Normal  pressure hydrocephalus            Past Surgical History:     Past Surgical History:   Procedure Laterality Date     ANESTHESIA CARDIOVERSION  4/11/2013    Procedure: ANESTHESIA CARDIOVERSION;  Cardioversion;  Surgeon: Provider, Generic Anesthesia;  Location: [facility] OR     BYPASS GRAFT ARTERY CORONARY, REPAIR VALVE MITRAL, COMBINED  1/14/2013    Procedure: COMBINED BYPASS GRAFT ARTERY CORONARY, REPAIR VALVE MITRAL;  Median sternotomy, coronary artery bypass graft X3 using left internal mammary artery & right saphenous vein , mitral valve Replacement and on pump oxygenator, flexible cystoscopy, urethral dilation and insertion of stuart catheter.;  Surgeon: Chan Peres MD;  Location: [facility] OR     CATARACT IOL, RT/LT Bilateral      CORONARY ANGIOGRAPHY ADULT ORDER       CORONARY ARTERY BYPASS  1/2013    mitral valve tissue      INSERT CATHETER BLADDER  1/14/2013    Procedure: INSERT CATHETER BLADDER;  Flexible cystoscopy, urethral dilation,& insertion of stuart catheter;  Surgeon: Pete Bedoya MD;  Location: [facility] OR     MITRAL VALVE REPLACEMENT  2013    29 mm Epic porcine valve     MOHS MICROGRAPHIC PROCEDURE              Medications     Current Outpatient Medications   Medication     albuterol (PROAIR HFA, PROVENTIL HFA, VENTOLIN HFA) 108 (90 BASE) MCG/ACT inhaler     ammonium lactate (LAC-HYDRIN) 12 % external lotion     atorvastatin (LIPITOR) 10 MG tablet     Calcium 600 MG tablet     Cholecalciferol (VITAMIN D) 1000 UNITS capsule     citalopram (CELEXA) 20 MG tablet     clindamycin (CLEOCIN) 300 MG capsule     coenzyme Q-10 75 MG CAPS     fluticasone-vilanterol (BREO ELLIPTA) 100-25 MCG/INH inhaler     furosemide (LASIX) 40 MG tablet     ipratropium (ATROVENT) 0.06 % nasal spray     levothyroxine (SYNTHROID/LEVOTHROID) 50 MCG tablet     Magnesium Oxide 420 MG TABS     MAGNESIUM OXIDE PO     melatonin 3 MG tablet     metoprolol succinate ER (TOPROL-XL) 25 MG 24 hr tablet     Multiple Vitamins-Minerals  (OCUVITE PO)     order for DME     order for DME     senna-docusate (SENOKOT-S;PERICOLACE) 8.6-50 MG per tablet     tamsulosin (FLOMAX) 0.4 MG capsule     triamcinolone (KENALOG) 0.1 % external ointment     finasteride (PROSCAR) 5 MG tablet     MYRBETRIQ 25 MG 24 hr tablet     trospium (SANCTURA) 20 MG tablet     No current facility-administered medications for this visit.             Family History:     Family History   Problem Relation Age of Onset     Cancer Father      Kidney Disease Mother      Macular Degeneration No family hx of      Glaucoma No family hx of             Social History:     Social History     Socioeconomic History     Marital status:      Spouse name: Not on file     Number of children: Not on file     Years of education: Not on file     Highest education level: Not on file   Occupational History     Not on file   Social Needs     Financial resource strain: Not on file     Food insecurity     Worry: Not on file     Inability: Not on file     Transportation needs     Medical: Not on file     Non-medical: Not on file   Tobacco Use     Smoking status: Former Smoker     Types: Cigars, Pipe     Quit date: 1990     Years since quittin.2     Smokeless tobacco: Former User   Substance and Sexual Activity     Alcohol use: No     Alcohol/week: 0.0 standard drinks     Drug use: No     Sexual activity: Not on file   Lifestyle     Physical activity     Days per week: Not on file     Minutes per session: Not on file     Stress: Not on file   Relationships     Social connections     Talks on phone: Not on file     Gets together: Not on file     Attends Rastafarian service: Not on file     Active member of club or organization: Not on file     Attends meetings of clubs or organizations: Not on file     Relationship status: Not on file     Intimate partner violence     Fear of current or ex partner: Not on file     Emotionally abused: Not on file     Physically abused: Not on file     Forced  sexual activity: Not on file   Other Topics Concern     Parent/sibling w/ CABG, MI or angioplasty before 65F 55M? Not Asked   Social History Narrative     Not on file            Allergies:   Penicillins         Review of Systems:  From intake questionnaire   Negative 14 system review except as noted on HPI, nurse's note.         Physical Exam:   Patient is a 89 year old male   Vitals: Blood pressure 94/51, pulse 54.  General Appearance Adult: Alert, no acute distress, [place]  Lungs: no respiratory distress, or pursed lip breathing  Heart: No obvious jugular venous distension present  Abdomen: soft, nontender, no organomegaly or masses, There is no height or weight on file to calculate BMI.  : deferred     Uroflow and Post-Void Residual by Bladder Scan      PVR: 300 mL      Labs and Pathology:    I personally reviewed all applicable laboratory data and went over findings with patient  Significant for:    CBC RESULTS:  Recent Labs   Lab Test 03/09/20  1103 03/26/19  1203 08/17/18  1524 06/27/18  1539   WBC 6.0 6.0 7.0 6.4   HGB 12.9* 12.9* 11.0* 11.3*   * 114* 133* 143*        BMP RESULTS:  Recent Labs   Lab Test 10/26/20  0939 12/05/19  1111 03/26/19  1203 08/18/18  0644    138 138 141   POTASSIUM 4.0 4.0 4.2 4.0   CHLORIDE 108 106 105 105   CO2 28 27 27 30   ANIONGAP 5 4 6 6   GLC 92 92 91 78   BUN 19 18 22 18   CR 1.12 1.08 1.03 1.13   GFRESTIMATED 58* 61 64 61   GFRESTBLACK 67 70 75 74   HE 9.3 8.6 8.9 8.6       UA RESULTS:   Recent Labs   Lab Test 08/17/18  1525 01/19/13  1000 01/15/13  0620   SG 1.005 1.020 1.024   URINEPH 7.0 6.0 5.5   NITRITE Negative Negative Negative   RBCU 0 14* 175*   WBCU 0 6* 113*       PSA RESULTS  PSA   Date Value Ref Range Status   08/01/2011 1.19 0 - 4 ug/L Final   07/14/2010 0.86 0 - 4 ug/L Final   07/08/2009 2.88 0 - 4 ug/L Final   07/01/2008 1.17 0 - 4 ug/L Final   06/26/2007 2.68 0 - 4 ug/L Final   03/13/2006 0.74 0 - 4 ug/L Final            Assessment and Plan:      Assessment: 89 year old male with ongoing LUTS, including urgency and hesitancy, with occasional incontinence. Previously on Sanctura, but stopped due to question of cognitive side effects in the setting of worsening confusion.  mL today, demonstrating fairly poor bladder emptying. We revisited the idea of moving forward with urodynamics tesing vs CIC vs Andres placement. The patient wishes to avoid catheters and would like to complete UDS.     Plan:  -Will pursue urodynamics. Due to the patient's limited mobility and incontinence, will defer bladder diary.       Dali Cheek, CNP  Department of Urology

## 2021-03-09 NOTE — LETTER
3/9/2021       RE: Gorge Corona  [address] [facility] [address]     Dear Colleague,    Thank you for referring your patient, Gorge Corona, to the [facility] [place] at [facility]. Please see a copy of my visit note below.            Chief Complaint:   LUTS         History of Present Illness:    Gorge Corona is a 89 year old male with a history of urinary symptoms, including urgency and hesitancy, with occasional incontinence. Had previously been managed with Sanctura, but it became unclear if his symptoms were better with this med. He had also been experiencing an increase in confusion. We therefore decided to discontinue the Sanctura at our last visit with a plan to see him in the office for repeat urinary flow testing with PVR to ensure he is emptying his bladder well. We also discussed the potential benefit of urodynamics to better sort out his symptoms     He presents today with his spouse, Kassi. They both report that urinary symptoms have remained a challenge. They often use a bedside urinal due to patient's urgency and functional difficulty getting to the bathroom quickly.          Past Medical History:     Past Medical History:   Diagnosis Date     Atrial fibrillation (H)      Atrial flutter (H)      Basal cell carcinoma of skin of trunk      BPH (benign prostatic hypertrophy) with urinary obstruction      CAD (coronary artery disease)     s/p CABG 1/2013; RUSSEL in 2/2013     Depression      H/O CT scan of head 1/11/2013 7/20/2006    Result Impression     CT of the Head without contrast 7/20/2006  History: Ataxia, incontinence, and NPH  Comparison Study: MR obtained to 4/19/2004  Technique: Axial CT images of the head were obtained at 2 intervals from the skull base to the vertex without intravenous contrast. The images were reviewed in brain, subdural and bone windows.  Findings: Patient has had a moderate  promin     Hyperlipidemia      Lumbar spinal stenosis      Mitral regurgitation     s/p bioprosthesis 1/2013     Palpitations      Paroxysmal supraventricular tachycardia (H)      Squamous cell carcinoma      Tachycardia     baroreflex injury due to surgery      (ventriculoperitoneal) shunt status     for Normal pressure hydrocephalus            Past Surgical History:     Past Surgical History:   Procedure Laterality Date     ANESTHESIA CARDIOVERSION  4/11/2013    Procedure: ANESTHESIA CARDIOVERSION;  Cardioversion;  Surgeon: Provider, Generic Anesthesia;  Location: [facility] OR     BYPASS GRAFT ARTERY CORONARY, REPAIR VALVE MITRAL, COMBINED  1/14/2013    Procedure: COMBINED BYPASS GRAFT ARTERY CORONARY, REPAIR VALVE MITRAL;  Median sternotomy, coronary artery bypass graft X3 using left internal mammary artery & right saphenous vein , mitral valve Replacement and on pump oxygenator, flexible cystoscopy, urethral dilation and insertion of stuart catheter.;  Surgeon: Chan Peres MD;  Location: [facility] OR     CATARACT IOL, RT/LT Bilateral      CORONARY ANGIOGRAPHY ADULT ORDER       CORONARY ARTERY BYPASS  1/2013    mitral valve tissue      INSERT CATHETER BLADDER  1/14/2013    Procedure: INSERT CATHETER BLADDER;  Flexible cystoscopy, urethral dilation,& insertion of stuart catheter;  Surgeon: Pete Bedoya MD;  Location: [facility] OR     MITRAL VALVE REPLACEMENT  2013    29 mm Epic porcine valve     MOHS MICROGRAPHIC PROCEDURE              Medications     Current Outpatient Medications   Medication     albuterol (PROAIR HFA, PROVENTIL HFA, VENTOLIN HFA) 108 (90 BASE) MCG/ACT inhaler     ammonium lactate (LAC-HYDRIN) 12 % external lotion     atorvastatin (LIPITOR) 10 MG tablet     Calcium 600 MG tablet     Cholecalciferol (VITAMIN D) 1000 UNITS capsule     citalopram (CELEXA) 20 MG tablet     clindamycin (CLEOCIN) 300 MG capsule     coenzyme Q-10 75 MG CAPS     fluticasone-vilanterol (BREO ELLIPTA) 100-25 MCG/INH  inhaler     furosemide (LASIX) 40 MG tablet     ipratropium (ATROVENT) 0.06 % nasal spray     levothyroxine (SYNTHROID/LEVOTHROID) 50 MCG tablet     Magnesium Oxide 420 MG TABS     MAGNESIUM OXIDE PO     melatonin 3 MG tablet     metoprolol succinate ER (TOPROL-XL) 25 MG 24 hr tablet     Multiple Vitamins-Minerals (OCUVITE PO)     order for DME     order for DME     senna-docusate (SENOKOT-S;PERICOLACE) 8.6-50 MG per tablet     tamsulosin (FLOMAX) 0.4 MG capsule     triamcinolone (KENALOG) 0.1 % external ointment     finasteride (PROSCAR) 5 MG tablet     MYRBETRIQ 25 MG 24 hr tablet     trospium (SANCTURA) 20 MG tablet     No current facility-administered medications for this visit.             Family History:     Family History   Problem Relation Age of Onset     Cancer Father      Kidney Disease Mother      Macular Degeneration No family hx of      Glaucoma No family hx of             Social History:     Social History     Socioeconomic History     Marital status:      Spouse name: Not on file     Number of children: Not on file     Years of education: Not on file     Highest education level: Not on file   Occupational History     Not on file   Social Needs     Financial resource strain: Not on file     Food insecurity     Worry: Not on file     Inability: Not on file     Transportation needs     Medical: Not on file     Non-medical: Not on file   Tobacco Use     Smoking status: Former Smoker     Types: Cigars, Pipe     Quit date: 1990     Years since quittin.2     Smokeless tobacco: Former User   Substance and Sexual Activity     Alcohol use: No     Alcohol/week: 0.0 standard drinks     Drug use: No     Sexual activity: Not on file   Lifestyle     Physical activity     Days per week: Not on file     Minutes per session: Not on file     Stress: Not on file   Relationships     Social connections     Talks on phone: Not on file     Gets together: Not on file     Attends Church service: Not on file      Active member of club or organization: Not on file     Attends meetings of clubs or organizations: Not on file     Relationship status: Not on file     Intimate partner violence     Fear of current or ex partner: Not on file     Emotionally abused: Not on file     Physically abused: Not on file     Forced sexual activity: Not on file   Other Topics Concern     Parent/sibling w/ CABG, MI or angioplasty before 65F 55M? Not Asked   Social History Narrative     Not on file            Allergies:   Penicillins         Review of Systems:  From intake questionnaire   Negative 14 system review except as noted on HPI, nurse's note.         Physical Exam:   Patient is a 89 year old male   Vitals: Blood pressure 94/51, pulse 54.  General Appearance Adult: Alert, no acute distress, [facility]  Lungs: no respiratory distress, or pursed lip breathing  Heart: No obvious jugular venous distension present  Abdomen: soft, nontender, no organomegaly or masses, There is no height or weight on file to calculate BMI.  : deferred     Uroflow and Post-Void Residual by Bladder Scan      PVR: 300 mL      Labs and Pathology:    I personally reviewed all applicable laboratory data and went over findings with patient  Significant for:    CBC RESULTS:  Recent Labs   Lab Test 03/09/20  1103 03/26/19  1203 08/17/18  1524 06/27/18  1539   WBC 6.0 6.0 7.0 6.4   HGB 12.9* 12.9* 11.0* 11.3*   * 114* 133* 143*        BMP RESULTS:  Recent Labs   Lab Test 10/26/20  0939 12/05/19  1111 03/26/19  1203 08/18/18  0644    138 138 141   POTASSIUM 4.0 4.0 4.2 4.0   CHLORIDE 108 106 105 105   CO2 28 27 27 30   ANIONGAP 5 4 6 6   GLC 92 92 91 78   BUN 19 18 22 18   CR 1.12 1.08 1.03 1.13   GFRESTIMATED 58* 61 64 61   GFRESTBLACK 67 70 75 74   HE 9.3 8.6 8.9 8.6       UA RESULTS:   Recent Labs   Lab Test 08/17/18  1525 01/19/13  1000 01/15/13  0620   SG 1.005 1.020 1.024   URINEPH 7.0 6.0 5.5   NITRITE Negative Negative Negative   RBCU 0 14* 175*    WBCU 0 6* 113*       PSA RESULTS  PSA   Date Value Ref Range Status   08/01/2011 1.19 0 - 4 ug/L Final   07/14/2010 0.86 0 - 4 ug/L Final   07/08/2009 2.88 0 - 4 ug/L Final   07/01/2008 1.17 0 - 4 ug/L Final   06/26/2007 2.68 0 - 4 ug/L Final   03/13/2006 0.74 0 - 4 ug/L Final            Assessment and Plan:     Assessment: 89 year old male with ongoing LUTS, including urgency and hesitancy, with occasional incontinence. Previously on Sanctura, but stopped due to question of cognitive side effects in the setting of worsening confusion.  mL today, demonstrating fairly poor bladder emptying. We revisited the idea of moving forward with urodynamics tesing vs CIC vs Andres placement. The patient wishes to avoid catheters and would like to complete UDS.     Plan:  -Will pursue urodynamics. Due to the patient's limited mobility and incontinence, will defer bladder diary.       Dali Cheek, CNP  Department of Urology

## 2021-03-11 ENCOUNTER — PRE VISIT (OUTPATIENT)
Dept: UROLOGY | Facility: CLINIC | Age: 86
End: 2021-03-11

## 2021-03-15 ENCOUNTER — TELEPHONE (OUTPATIENT)
Dept: UROLOGY | Facility: CLINIC | Age: 86
End: 2021-03-15

## 2021-03-15 NOTE — TELEPHONE ENCOUNTER
Called wife and told her that I spoke with susanne long and April 26th is fine I will watch her schedule to see if any one cancels Radha Lima LPN Staff Nurse

## 2021-03-15 NOTE — TELEPHONE ENCOUNTER
[facility] Call Center    Phone Message    May a detailed message be left on voicemail: yes     Reason for Call: Other: Pt's wife calling in to reschedule urodynamic due to weather. When scheduleing the next availiability wasn't tell 4/26. We scheduled and added pt to wait list. But wife stated that Dali Cheek wanted pt to juan urodynamic completed as soon as possible. Please contact pt's wife to discuss options at [phone]. Thank you.     Action Taken: Message routed to:  [facility] uro    Travel Screening: Not Applicable

## 2021-03-15 NOTE — TELEPHONE ENCOUNTER
Patient called and left message that there is no other time before April 26th with you or rosina Alvarez offered catheter but he declined  Are you concerned about this being out 6 weeks  yessy

## 2021-03-15 NOTE — TELEPHONE ENCOUNTER
[facility] Call Center    Phone Message    May a detailed message be left on voicemail: yes     Reason for Call: Other: Per Pt Spouse Paula called back, stated she missed call from clinic to reschedule urodynamic due to weather. Pls call her.     Action Taken: Message routed to:  [facility] LETA URO    Travel Screening: Not Applicable

## 2021-03-22 ENCOUNTER — ANCILLARY PROCEDURE (OUTPATIENT)
Dept: RADIOLOGY | Facility: AMBULATORY SURGERY CENTER | Age: 86
End: 2021-03-22
Attending: NURSE PRACTITIONER
Payer: MEDICARE

## 2021-03-22 ENCOUNTER — ALLIED HEALTH/NURSE VISIT (OUTPATIENT)
Dept: UROLOGY | Facility: CLINIC | Age: 86
End: 2021-03-22
Payer: MEDICARE

## 2021-03-22 ENCOUNTER — MEDICAL CORRESPONDENCE (OUTPATIENT)
Dept: HEALTH INFORMATION MANAGEMENT | Facility: CLINIC | Age: 86
End: 2021-03-22

## 2021-03-22 VITALS — DIASTOLIC BLOOD PRESSURE: 70 MMHG | HEART RATE: 71 BPM | SYSTOLIC BLOOD PRESSURE: 117 MMHG

## 2021-03-22 DIAGNOSIS — R39.11 URINARY HESITANCY: ICD-10-CM

## 2021-03-22 DIAGNOSIS — N39.41 URGE URINARY INCONTINENCE: ICD-10-CM

## 2021-03-22 DIAGNOSIS — R39.15 URINARY URGENCY: Primary | ICD-10-CM

## 2021-03-22 LAB
ALBUMIN UR-MCNC: NEGATIVE MG/DL
APPEARANCE UR: CLEAR
BILIRUB UR QL STRIP: NEGATIVE
COLOR UR AUTO: YELLOW
GLUCOSE UR STRIP-MCNC: NEGATIVE MG/DL
HGB UR QL STRIP: ABNORMAL
KETONES UR STRIP-MCNC: NEGATIVE MG/DL
LEUKOCYTE ESTERASE UR QL STRIP: NEGATIVE
NITRATE UR QL: NEGATIVE
PH UR STRIP: 7 PH (ref 5–7)
SP GR UR STRIP: 1.01 (ref 1–1.03)
UROBILINOGEN UR STRIP-ACNC: 1 EU/DL (ref 0.2–1)

## 2021-03-22 PROCEDURE — 99207 PR NO CHARGE INJECTABLE MED/DRUG: CPT | Performed by: NURSE PRACTITIONER

## 2021-03-22 PROCEDURE — 81003 URINALYSIS AUTO W/O SCOPE: CPT | Performed by: PATHOLOGY

## 2021-03-22 PROCEDURE — 74455 X-RAY URETHRA/BLADDER: CPT | Performed by: NURSE PRACTITIONER

## 2021-03-22 PROCEDURE — 51741 ELECTRO-UROFLOWMETRY FIRST: CPT | Mod: 51 | Performed by: NURSE PRACTITIONER

## 2021-03-22 PROCEDURE — 81003 URINALYSIS AUTO W/O SCOPE: CPT | Performed by: NURSE PRACTITIONER

## 2021-03-22 PROCEDURE — 51600 INJECTION FOR BLADDER X-RAY: CPT | Mod: 51 | Performed by: NURSE PRACTITIONER

## 2021-03-22 PROCEDURE — 51784 ANAL/URINARY MUSCLE STUDY: CPT | Mod: 51 | Performed by: NURSE PRACTITIONER

## 2021-03-22 PROCEDURE — 51728 CYSTOMETROGRAM W/VP: CPT | Performed by: NURSE PRACTITIONER

## 2021-03-22 PROCEDURE — 51797 INTRAABDOMINAL PRESSURE TEST: CPT | Performed by: NURSE PRACTITIONER

## 2021-03-22 RX ORDER — MIRABEGRON 50 MG/1
50 TABLET, EXTENDED RELEASE ORAL DAILY
Qty: 30 TABLET | Refills: 6 | Status: SHIPPED | OUTPATIENT
Start: 2021-03-22 | End: 2021-06-04

## 2021-03-22 RX ORDER — TROSPIUM CHLORIDE 20 MG/1
TABLET, FILM COATED ORAL
COMMUNITY
Start: 2021-02-05

## 2021-03-22 RX ORDER — SULFAMETHOXAZOLE/TRIMETHOPRIM 800-160 MG
1 TABLET ORAL ONCE
Status: COMPLETED | OUTPATIENT
Start: 2021-03-22 | End: 2021-03-22

## 2021-03-22 RX ORDER — FINASTERIDE 5 MG/1
TABLET, FILM COATED ORAL
COMMUNITY
Start: 2021-02-05

## 2021-03-22 RX ADMIN — SULFAMETHOXAZOLE AND TRIMETHOPRIM 1 TABLET: 800; 160 TABLET ORAL at 11:54

## 2021-03-22 ASSESSMENT — PAIN SCALES - GENERAL: PAINLEVEL: NO PAIN (0)

## 2021-03-22 NOTE — PATIENT INSTRUCTIONS
UROLOGY CLINIC VISIT PATIENT INSTRUCTIONS    -Check on whether Gorge is taking the mirabegron or not. The dose in the chart is 25 mg, and we could consider going up to 50 mg.   -Work on reducing caffeine/coffee intake. One cup in the morning and one in the afternoon.  -No fluids two hours before bed.  -Follow up with me in 3 months for a virtual visit to discuss symptoms.   -We could revisit the possibility of placing a catheter in the future if symptoms unable to be controlled with medication alone.     If you have any issues, questions or concerns in the meantime, do not hesitate to contact us at [phone] or via [employer] [employer].     It was a pleasure meeting with you today.  Thank you for allowing me and my team the privilege of caring for you today.  YOU are the reason we are here, and I truly hope we provided you with the excellent service you deserve.  Please let us know if there is anything else we can do for you so that we can be sure you are leaving completely satisfied with your care experience.    Dali Cheek, CNP

## 2021-03-22 NOTE — PROGRESS NOTES
PREPROCEDURE DIAGNOSES:    1. LUTS (urgency and hesitancy, with occasional incontinence)    POSTPROCEDURE DIAGNOSES:  -Normal bladder capacity (480 mL) with somewhat delayed filling sensations.  -Slow rise in pressure, starting at fill volume of ~300 mL, leading to UDC reaching 36 cm H20, at which time the patient begins to involuntarily leak. Pressure declines as he leaks. Filling initiated again, at which time he quickly experiences another UDC and is given permission at that time to try to empty his bladder completely.  -Good/fair bladder compliance between episodes of DO/DOI.  -Moderate detrusor contraction during voiding to max Pdet 28 cm H2O, although it is unclear how much of this contraction is voluntary vs DO.   -Very slow flow rate (Qmax 5.4 mL/s) with a plateau flow curve and incomplete bladder emptying (final  mL).  -Quiet EMG activity during voiding.  -BOOI is 15.4 which is negative for bladder outlet obstruction.  -Fluoroscopy reveals a mildly-trabeculated bladder wall without diverticulae or cellules.  No vesicoureteral reflux was observed.  The bladder neck was closed during filling and open during voiding/incontinence.     PROCEDURE:    1. Sterile urethral catheterization for measurement of postvoid residual urine volume.  2. Complex filling cystometrogram with measurement of bladder and rectal pressures.  3. Complex voiding cystometrogram with measurement of bladder and rectal pressures.  4. Electromyography of the pelvic floor during urodynamics.  5. Fluoroscopic imaging of the bladder during urodynamics, at least 3 views.    6. Interpretation of urodynamics and flouroscopic imaging.      INDICATIONS FOR PROCEDURE:  Mr. Gorge Corona is a pleasant 89 year old male with LUTS (urgency and hesitancy, with occasional incontinence). Baseline video urodynamic assessment is requested today to better characterize Mr. Gorge Corona's voiding dysfunction.      VOIDING DIARY:  Not  completed.    DESCRIPTION OF PROCEDURE:  Risks, benefits, and alternatives to urodynamics were discussed with the patient and he wished to proceed.  Urodynamics are planned to better assess the primary etiology for Mr. Corona's urologic dysfunction.  After informed consent was obtained, the patient was taken to the procedure room where the study was initiated. Findings below.     PRE-STUDY UROFLOWMETRY:  Residual urine by catheter: 460 mL.  Pretest urine dipstick was negative for leukocytes and nitrites.    Next a 7F double-lumen urodynamics catheter was inserted into the bladder under sterile technique via the urethra.  A 7F abdominal manometry catheter was placed in the rectum.  EMG pads were placed on both sides of the anal verge.  The bladder was filled with 200 mL of Omnipaque at 30 mL/minute and serial pressures were recorded.  With coughing there was an appropriate rise in vesical and abdominal pressures with no change in detrusor pressure, confirming good study catheter placement.    DURING THE FILLING PHASE:  First sensation: 458 mL.  First Desire: 476 mL.  Maximum Capacity: 548 mL.    Uninhibited detrusor contractions: A slow rise in pressure, starting at fill volume of ~300 mL, leads to a UDC reaching 36 cm H20, at which time the patient begins to involuntarily leak. Pressure declines as he leaks. Filling initiated again, at which time he quickly experiences another UDC and is given permission at that time to try to empty his bladder completely.  Compliance: Good/Fair.   Continence: DOI, as described above.   EMG: Mostly concordant during filling.    DURING THE VOIDING PHASE:  During second episode of DOI, patient instructed to empty his bladder completely. This yielded the following:   Maximum detrusor contraction with void: 28 cm of H2O pressure.  Voided volume: 97 mL.  Maximum flow rate: 5.4 mL/sec.  Average flow rate: 2.9 mL/sec.  Postvoid Residual: 400 mL.  EMG activity: Quiet.  Character of voiding  curve: Plateau.  BOOI: 15.4 (suggesting no obstruction - see key below)  [obstructed (HIGGINBOTHAM index [BOOI] ? 40); equivocal (no definite   obstruction; BOOI 20-40); and no obstruction (BOOI ? 20)]    FLUOROSCOPIC IMAGING OF THE BLADDER DURING URODYNAMICS:  Please note, image numbers on UDS tracings correlate with iSite series numbers on PACS images. Fluoroscopy during today's procedure demonstrated a mildly-trabeculated bladder wall without diverticulae or cellules.  No vesicoureteral reflux was observed.  The bladder neck was closed during filling and open during voiding/incontinence.  After voiding to completion, all catheters were removed and the patient was brought back into the consultation room to further discuss today's study results.      ASSESSMENT/PLAN:  Mr. Gorge Corona is a pleasant 89 year old male with LUTS (urgency and hesitancy, with occasional incontinence) who demonstrated the following findings today on urodynamic evaluation:    -Normal bladder capacity (480 mL) with somewhat delayed filling sensations.  -Slow rise in pressure, starting at fill volume of ~300 mL, leading to UDC reaching 36 cm H20, at which time the patient begins to involuntarily leak. Pressure declines as he leaks. Filling initiated again, at which time he quickly experiences another UDC and is given permission at that time to try to empty his bladder completely.  -Good/fair bladder compliance between episodes of DO/DOI.  -Moderate detrusor contraction during voiding to max Pdet 28 cm H2O, although it is unclear how much of this contraction is voluntary vs DO.   -Very slow flow rate (Qmax 5.4 mL/s) with a plateau flow curve and incomplete bladder emptying (final  mL).  -Quiet EMG activity during voiding.  -BOOI is 15.4 which is negative for bladder outlet obstruction.  -Fluoroscopy reveals a mildly-trabeculated bladder wall without diverticulae or cellules.  No vesicoureteral reflux was observed.  The bladder neck was  closed during filling and open during voiding/incontinence.     The results of the study were reviewed with the patient, daughter, and PCA today in detail. His wife, who is in primary caregiver, was unfortunately unable to accompany him to today's visit, as she is in the hospital. Evidence of DO with incomplete bladder emptying. No evidence of obstruction. Per medication review, mirabegron remains on his active med list, although during previous visits, he denied taking this. PCA will plan to clarify if he is taking this medication later today. We did discuss catheterization, given evidence of mild retention and enormous impact of urgency and frequency symptoms on quality of life. However, patient and family would prefer to defer catheter placement at this time.   -Will initiate mirabegron 50 mg today.   -Patient to follow up with me in 3 months for a virtual visit to discuss symptoms.     - A single Bactrim DS was provided for UTI prophylaxis following completion of today's study per department protocol.  The risk of UTI with VUDS is low at ~2.5-3%.      Thank you for allowing me to participate in the care of Mr. Gorge Corona and please don't hesitate to contact me with any questions or concerns.      This procedure was performed under a collaborative agreement with Dr. Jw Rivas, Professor and  of Urology, [facility].    ZAID Arguelles, CNP  Department of Urology

## 2021-03-22 NOTE — NURSING NOTE
Chief Complaint   Patient presents with     Urodynamics Study       Blood pressure 117/70, pulse 71.     Patient Active Problem List   Diagnosis     Lumbar spinal stenosis     Lumbar spondylolysis     Major depressive disorder, recurrent episode, moderate (H)     Actinic keratosis     Other malignant neoplasm of skin, site unspecified     Benign neoplasm of skin of trunk, except scrotum     Other seborrheic keratosis     History of SCC (squamous cell carcinoma) of skin     H/O colonoscopy     Normal pressure hydrocephalus (H)     ASCVD (arteriosclerotic cardiovascular disease)     Coronary artery disease, occlusive     H/O CT scan of head     Mitral valve regurgitation     CAD (coronary artery disease)     Atrial fibrillation (H)     Dyslipidemia     Preventative health care     CAD S/P percutaneous coronary angioplasty     BCC (basal cell carcinoma of skin)     Hypothyroidism     CHF (congestive heart failure) (H)     SCC (squamous cell carcinoma)     SVT (supraventricular tachycardia) (H)     Status post coronary angiogram     Near syncope     Bradycardia       Allergies   Allergen Reactions     Penicillins Rash       Current Outpatient Medications   Medication Sig Dispense Refill     albuterol (PROAIR HFA, PROVENTIL HFA, VENTOLIN HFA) 108 (90 BASE) MCG/ACT inhaler Inhale 2 puffs into the lungs every 4 hours as needed for shortness of breath / dyspnea or wheezing 1 Inhaler 1     ammonium lactate (LAC-HYDRIN) 12 % external lotion Apply topically 2 times daily as needed for dry skin 360 g 11     atorvastatin (LIPITOR) 10 MG tablet Take 1 tablet (10 mg) by mouth daily 90 tablet 2     Calcium 600 MG tablet Take 1 tablet by mouth 2 times daily. 100 tablet 3     Cholecalciferol (VITAMIN D) 1000 UNITS capsule Take 1 capsule by mouth daily.       citalopram (CELEXA) 20 MG tablet Take 1 tablet (20 mg) by mouth daily 90 tablet 0     clindamycin (CLEOCIN) 300 MG capsule Take 2 capsules by mouth as needed for 1 dose. 1 hour  prior to dental work. 6 capsule 0     coenzyme Q-10 75 MG CAPS Take  by mouth.       finasteride (PROSCAR) 5 MG tablet        fluticasone-vilanterol (BREO ELLIPTA) 100-25 MCG/INH inhaler Inhale 1 puff into the lungs daily 60 Inhaler 9     furosemide (LASIX) 40 MG tablet Take 1 tablet (40 mg) by mouth daily 120 tablet 0     ipratropium (ATROVENT) 0.06 % nasal spray Spray 2 sprays into both nostrils 4 times daily 15 mL 4     levothyroxine (SYNTHROID/LEVOTHROID) 50 MCG tablet TAKE 1 & 1/2 (ONE & ONE-HALF) TABLETS BY MOUTH ONCE DAILY 135 tablet 0     Magnesium Oxide 420 MG TABS Take 1 tablet by mouth daily       MAGNESIUM OXIDE PO Take 400 mg by mouth       melatonin 3 MG tablet Take 1 tablet (3 mg) by mouth nightly as needed for sleep 100 tablet 3     metoprolol succinate ER (TOPROL-XL) 25 MG 24 hr tablet Take 0.5 tablets (12.5 mg) by mouth daily 45 tablet 2     Multiple Vitamins-Minerals (OCUVITE PO) Take  by mouth.  0     MYRBETRIQ 25 MG 24 hr tablet Take 1 tablet by mouth daily       order for DME Equipment being ordered: Safe step tub / walk-in tub 1 Device 0     order for DME Walker 1 each 0     senna-docusate (SENOKOT-S;PERICOLACE) 8.6-50 MG per tablet Take 1 tablet by mouth 2 times daily. 60 tablet      tamsulosin (FLOMAX) 0.4 MG capsule Take 1 capsule (0.4 mg) by mouth daily 90 capsule 2     triamcinolone (KENALOG) 0.1 % external ointment Apply topically 2 times daily as needed (rash on the arms) 80 g 3     trospium (SANCTURA) 20 MG tablet          Social History     Tobacco Use     Smoking status: Former Smoker     Types: Cigars, Pipe     Quit date: 1990     Years since quittin.2     Smokeless tobacco: Former User   Substance Use Topics     Alcohol use: No     Alcohol/week: 0.0 standard drinks     Drug use: No       Invasive Procedure Safety Checklist:    Procedure: Urodynamics    Action: Complete sections and checkboxes as appropriate.  Pre-procedure:  1. Patient ID Verified with 2 identifiers (Alisson  and  or MRN) : YES    2. Procedure and site verified with patient/designee (when able) : YES    3. Accurate consent documentation in medical record : YES    4. H&P (or appropriate assessment) documented in medical record : N/A  H&P must be up to 30 days prior to procedure an updated within 24 hours of Procedure as applicable.     5. Relevant diagnostic and radiology test results appropriately labeled and displayed as applicable : YES    6. Blood products, implants, devices, and/or special equipment available for the procedure as applicable : YES    7. Procedure site(s) marked with provider initials [Exclusions: none] : NO    8. Marking not required. Reason : Yes  Procedure does not require site marking    Time Out:     Time-Out performed immediately prior to starting procedure, including verbal and active participation of all team members addressing: YES    1. Correct patient identity.  2. Confirmed that the correct side and site are marked.  3. An accurate procedure to be done.  4. Agreement on the procedure to be done.  5. Correct patient position.  6. Relevant images and results are properly labeled and appropriately displayed.  7. The need to administer antibiotics or fluids for irrigation purposes during the procedure as applicable.  8. Safety precautions based on patient history or medication use.    During Procedure: Verification of correct person, site, and procedure occurs any time the responsibility for care of the patient is transferred to another member of the care team.    The following medication was given: Sulfamethoxazole / Trimethoprim    MEDICATION:  Bactrim  ROUTE: PO  SITE: Orally  DOSE: 800/160mg  LOT #: [id number]  : [employer]  EXPIRATION DATE: 2022  NDC#: 0930-8477-04   Was there drug waste? No      The following medication was given:     MEDICATION:  Omnipaque (Iohexol Injection) (240mgI/mL)  ROUTE: Provider Administered  SITE: Provider Administered via catheter  DOSE:  200mL  LOT #: [id number]  : [employer]  EXPIRATION DATE: 7/27/2023  NDC#: [address]-19   Was there drug waste? No      Regine Gomez CMA  3/22/2021  11:30 AM

## 2021-03-24 ENCOUNTER — DOCUMENTATION ONLY (OUTPATIENT)
Dept: CARE COORDINATION | Facility: CLINIC | Age: 86
End: 2021-03-24

## 2021-03-25 ENCOUNTER — TELEPHONE (OUTPATIENT)
Dept: INTERNAL MEDICINE | Facility: CLINIC | Age: 86
End: 2021-03-25

## 2021-03-25 NOTE — TELEPHONE ENCOUNTER
[facility] Call Center    Phone Message    May a detailed message be left on voicemail: yes     Reason for Call: Other: Per Deidre is wanting to get a call back. Deidre states Patients wife is trying to get patient in a TCU stay and states Dr. Hobbs is needing to provide a H and P, Updated medicaton list, past visit notes, and an order from the doctor to have patient go to the TCU , please advise.      Action Taken: Message routed to:  [facility] ([facility]    Travel Screening: Not Applicable

## 2021-03-25 NOTE — TELEPHONE ENCOUNTER
Per Patients wife is calling. Patients wife states she is wanting to get a call back, Patients wife states she is trying to get patient into the TCU. Patients wife states patient cannot be left at home alone. Patients wife states she is not able to get patient to the clinic, please advise.

## 2021-03-26 ENCOUNTER — PATIENT OUTREACH (OUTPATIENT)
Dept: CARE COORDINATION | Facility: CLINIC | Age: 86
End: 2021-03-26

## 2021-03-26 ENCOUNTER — HOSPITAL ENCOUNTER (OUTPATIENT)
Facility: CLINIC | Age: 86
Setting detail: OBSERVATION
Discharge: NURSING, REHAB OR LONG-TERM CARE | End: 2021-03-27
Attending: EMERGENCY MEDICINE | Admitting: INTERNAL MEDICINE
Payer: MEDICARE

## 2021-03-26 DIAGNOSIS — Z11.52 ENCOUNTER FOR SCREENING LABORATORY TESTING FOR SEVERE ACUTE RESPIRATORY SYNDROME CORONAVIRUS 2 (SARS-COV-2): ICD-10-CM

## 2021-03-26 DIAGNOSIS — G91.9 EXTERNAL HYDROCEPHALUS (H): ICD-10-CM

## 2021-03-26 DIAGNOSIS — G91.9 HYDROCEPHALUS, UNSPECIFIED TYPE (H): ICD-10-CM

## 2021-03-26 DIAGNOSIS — R32 URINARY INCONTINENCE, UNSPECIFIED TYPE: ICD-10-CM

## 2021-03-26 PROBLEM — N13.8 BENIGN PROSTATIC HYPERPLASIA WITH URINARY OBSTRUCTION: Status: ACTIVE | Noted: 2021-03-26

## 2021-03-26 PROBLEM — E78.00 PURE HYPERCHOLESTEROLEMIA: Status: ACTIVE | Noted: 2021-03-26

## 2021-03-26 PROBLEM — R62.7 FAILURE TO THRIVE IN ADULT: Status: ACTIVE | Noted: 2021-03-26

## 2021-03-26 PROBLEM — N40.1 BENIGN PROSTATIC HYPERPLASIA WITH URINARY OBSTRUCTION: Status: ACTIVE | Noted: 2021-03-26

## 2021-03-26 PROBLEM — H90.3 SENSORINEURAL HEARING LOSS (SNHL) OF BOTH EARS: Status: ACTIVE | Noted: 2021-03-26

## 2021-03-26 LAB
LABORATORY COMMENT REPORT: NORMAL
SARS-COV-2 RNA RESP QL NAA+PROBE: NEGATIVE
SPECIMEN SOURCE: NORMAL

## 2021-03-26 PROCEDURE — 250N000013 HC RX MED GY IP 250 OP 250 PS 637: Performed by: EMERGENCY MEDICINE

## 2021-03-26 PROCEDURE — C9803 HOPD COVID-19 SPEC COLLECT: HCPCS | Performed by: EMERGENCY MEDICINE

## 2021-03-26 PROCEDURE — 99285 EMERGENCY DEPT VISIT HI MDM: CPT | Performed by: EMERGENCY MEDICINE

## 2021-03-26 PROCEDURE — 87635 SARS-COV-2 COVID-19 AMP PRB: CPT | Performed by: EMERGENCY MEDICINE

## 2021-03-26 PROCEDURE — G0378 HOSPITAL OBSERVATION PER HR: HCPCS

## 2021-03-26 RX ORDER — ONDANSETRON 4 MG/1
4 TABLET, ORALLY DISINTEGRATING ORAL EVERY 6 HOURS PRN
Status: DISCONTINUED | OUTPATIENT
Start: 2021-03-26 | End: 2021-03-27 | Stop reason: HOSPADM

## 2021-03-26 RX ORDER — OXYCODONE AND ACETAMINOPHEN 5; 325 MG/1; MG/1
1-2 TABLET ORAL EVERY 4 HOURS PRN
Status: DISCONTINUED | OUTPATIENT
Start: 2021-03-26 | End: 2021-03-27 | Stop reason: HOSPADM

## 2021-03-26 RX ORDER — AMOXICILLIN 250 MG
2 CAPSULE ORAL 2 TIMES DAILY
Status: DISCONTINUED | OUTPATIENT
Start: 2021-03-27 | End: 2021-03-27 | Stop reason: HOSPADM

## 2021-03-26 RX ORDER — AMMONIUM LACTATE 12 G/100G
LOTION TOPICAL 2 TIMES DAILY PRN
Status: DISCONTINUED | OUTPATIENT
Start: 2021-03-26 | End: 2021-03-27 | Stop reason: HOSPADM

## 2021-03-26 RX ORDER — NALOXONE HYDROCHLORIDE 0.4 MG/ML
0.4 INJECTION, SOLUTION INTRAMUSCULAR; INTRAVENOUS; SUBCUTANEOUS
Status: DISCONTINUED | OUTPATIENT
Start: 2021-03-26 | End: 2021-03-27 | Stop reason: HOSPADM

## 2021-03-26 RX ORDER — NALOXONE HYDROCHLORIDE 0.4 MG/ML
0.2 INJECTION, SOLUTION INTRAMUSCULAR; INTRAVENOUS; SUBCUTANEOUS
Status: DISCONTINUED | OUTPATIENT
Start: 2021-03-26 | End: 2021-03-27 | Stop reason: HOSPADM

## 2021-03-26 RX ORDER — ACETAMINOPHEN 650 MG/1
650 SUPPOSITORY RECTAL EVERY 4 HOURS PRN
Status: DISCONTINUED | OUTPATIENT
Start: 2021-03-26 | End: 2021-03-27 | Stop reason: HOSPADM

## 2021-03-26 RX ORDER — FINASTERIDE 5 MG/1
5 TABLET, FILM COATED ORAL DAILY
Status: DISCONTINUED | OUTPATIENT
Start: 2021-03-27 | End: 2021-03-27 | Stop reason: HOSPADM

## 2021-03-26 RX ORDER — HALOPERIDOL 1 MG/1
1 TABLET ORAL
Status: DISCONTINUED | OUTPATIENT
Start: 2021-03-26 | End: 2021-03-27 | Stop reason: HOSPADM

## 2021-03-26 RX ORDER — ATORVASTATIN CALCIUM 10 MG/1
10 TABLET, FILM COATED ORAL DAILY
Status: DISCONTINUED | OUTPATIENT
Start: 2021-03-27 | End: 2021-03-27 | Stop reason: HOSPADM

## 2021-03-26 RX ORDER — AMOXICILLIN 250 MG
1 CAPSULE ORAL 2 TIMES DAILY
Status: DISCONTINUED | OUTPATIENT
Start: 2021-03-27 | End: 2021-03-27 | Stop reason: HOSPADM

## 2021-03-26 RX ORDER — ALBUTEROL SULFATE 90 UG/1
2 AEROSOL, METERED RESPIRATORY (INHALATION) EVERY 4 HOURS PRN
Status: DISCONTINUED | OUTPATIENT
Start: 2021-03-26 | End: 2021-03-27 | Stop reason: HOSPADM

## 2021-03-26 RX ORDER — MIRABEGRON 50 MG/1
50 TABLET, EXTENDED RELEASE ORAL DAILY
Status: DISCONTINUED | OUTPATIENT
Start: 2021-03-27 | End: 2021-03-27 | Stop reason: HOSPADM

## 2021-03-26 RX ORDER — ACETAMINOPHEN 325 MG/1
650 TABLET ORAL EVERY 4 HOURS PRN
Status: DISCONTINUED | OUTPATIENT
Start: 2021-03-26 | End: 2021-03-27 | Stop reason: HOSPADM

## 2021-03-26 RX ORDER — ONDANSETRON 2 MG/ML
4 INJECTION INTRAMUSCULAR; INTRAVENOUS EVERY 6 HOURS PRN
Status: DISCONTINUED | OUTPATIENT
Start: 2021-03-26 | End: 2021-03-27 | Stop reason: HOSPADM

## 2021-03-26 RX ORDER — TAMSULOSIN HYDROCHLORIDE 0.4 MG/1
0.4 CAPSULE ORAL DAILY
Status: DISCONTINUED | OUTPATIENT
Start: 2021-03-27 | End: 2021-03-27 | Stop reason: HOSPADM

## 2021-03-26 RX ORDER — TOLTERODINE TARTRATE 2 MG/1
2 TABLET, EXTENDED RELEASE ORAL 2 TIMES DAILY
Status: DISCONTINUED | OUTPATIENT
Start: 2021-03-27 | End: 2021-03-27 | Stop reason: HOSPADM

## 2021-03-26 RX ORDER — LEVOTHYROXINE SODIUM 75 UG/1
75 TABLET ORAL DAILY
Status: DISCONTINUED | OUTPATIENT
Start: 2021-03-27 | End: 2021-03-27 | Stop reason: HOSPADM

## 2021-03-26 RX ORDER — FUROSEMIDE 40 MG
40 TABLET ORAL DAILY
Status: DISCONTINUED | OUTPATIENT
Start: 2021-03-27 | End: 2021-03-27 | Stop reason: HOSPADM

## 2021-03-26 RX ORDER — IPRATROPIUM BROMIDE 42 UG/1
2 SPRAY, METERED NASAL 4 TIMES DAILY
Status: DISCONTINUED | OUTPATIENT
Start: 2021-03-27 | End: 2021-03-27 | Stop reason: HOSPADM

## 2021-03-26 RX ORDER — IBUPROFEN 200 MG
1 CAPSULE ORAL 2 TIMES DAILY
Status: DISCONTINUED | OUTPATIENT
Start: 2021-03-27 | End: 2021-03-27 | Stop reason: HOSPADM

## 2021-03-26 RX ORDER — MAGNESIUM OXIDE 400 MG/1
400 TABLET ORAL DAILY
Status: DISCONTINUED | OUTPATIENT
Start: 2021-03-27 | End: 2021-03-27 | Stop reason: HOSPADM

## 2021-03-26 RX ORDER — CITALOPRAM HYDROBROMIDE 20 MG/1
20 TABLET ORAL DAILY
Status: DISCONTINUED | OUTPATIENT
Start: 2021-03-27 | End: 2021-03-27 | Stop reason: HOSPADM

## 2021-03-26 RX ORDER — TRIAMCINOLONE ACETONIDE 1 MG/G
OINTMENT TOPICAL 2 TIMES DAILY PRN
Status: DISCONTINUED | OUTPATIENT
Start: 2021-03-26 | End: 2021-03-27 | Stop reason: HOSPADM

## 2021-03-26 RX ADMIN — HALOPERIDOL 1 MG: 1 TABLET ORAL at 22:16

## 2021-03-26 ASSESSMENT — MIFFLIN-ST. JEOR: SCORE: 1622

## 2021-03-26 NOTE — LETTER
Transition Communication Hand-off for Care Transitions to Next Level of Care Provider    Name: Gorge Corona  : 1930  MRN #: [id number]  Primary Care Provider: Lefty Hobbs     Primary Clinic: [address] [place] [address] [address] [place] [id number]     Reason for Hospitalization:  Urinary incontinence, unspecified type [R32]  Hydrocephalus, unspecified type (H) [G91.9]  Admit Date/Time: 3/26/2021  6:52 PM  Discharge Date: 3/27/21  Payor Source: Payor: MEDICARE / Plan: RR MEDICARE / Product Type: Medicare /            Reason for Communication Hand-off Referral: Discharge to a TCU    Discharge Plan:  Monica Gonzalez [facility] (Admissions phone: [phone] RN Report: [phone]  Main Phone: [phone] Fax: [phone])       Concern for non-adherence with plan of care:   Y/N No  Discharge Needs Assessment:  Needs      Most Recent Value   # of Referrals Placed by [facility]  External Care Coordination, Post Acute Facilities, Transportation   PAS Number  [id number]        Follow-up plan:    Future Appointments   Date Time Provider Department Center   2021 12:15 PM Jose Coyle MD [facility]   10/7/2021 11:40 AM Zuleyka Ortiz OD [facility]           Referrals     Future Labs/Procedures    Occupational Therapy Adult Consult     Comments:    Evaluate and treat as clinically indicated.    Reason:  Physical deconditioning    Physical Therapy Adult Consult     Comments:    Evaluate and treat as clinically indicated.    Reason:  Physical deconditioning                Doris Clifford RN    AVS/Discharge Summary is the source of truth; this is a helpful guide for improved communication of patient story

## 2021-03-26 NOTE — TELEPHONE ENCOUNTER
[facility] Monica Gonzalez [facility]  Address: [address], [place] [address]  Phone: [phone]  Maria C Flowers EMT at 10:01 AM on 3/26/2021.

## 2021-03-26 NOTE — PROGRESS NOTES
Social Work Intervention  [facility]    Data/Intervention:    Patient Name:  Gorge Corona  /Age:  1930 (89 year old)    Visit Type: telephone  Referral Source: Alexandra Connolly RN  Reason for Referral:  No caregivers for Pt, spouse in a TCU    Collaborated With:    -Alexandra Connolly RN  -Kassi Pt's wife/caregiver  -Deidre, adms at [facility] where Kassi is residing  -Care management/SW in [facility] ED    Patient Concerns/Issues:   Notified by Alexandra Connolly RN that Pt's wife is in a TCU and he will no longer have caregivers available for him iron at night starting today. The TCU she is at has an opening but Pt needs an H &P completed and admission orders and we have no appts today in PCC. He hasn't been seen for several months in PCC.  I spoke with Kassi.  She has hired caregivers but can't find someone . She said she is already paying $9000/month and only has enough funds for a few months. Her 2 dtrs have been helping out but they now have to go back to their jobs/families. His son has indicated he will not help at all. Pt and Kassi have been  36 years but had children from previous relationships. She reports that he has had falls and his mobility is poor and needs someone with him. He also has low vision and hearing. He goes to several of the specialty clinics here. Kassi is aware of the private pay costs for the nursing home admission. Discussed that she may need to apply for Medical assistance for him for future coverage.     Intervention/Education/Resources Provided:  Faxed clinic records to [facility] admissions. Kassi was going to contact someone to take him to the local ED which is [facility]. I contacted their SW/care mgmt dept and handed off info.     Assessment/Plan:  Emergency nursing home placement needed due to lack of available caregivers at home due to wife's illness and family unable to provide care.  Pt arrived in the   [facility] and they will facilitate transfer to [facility]. No further f/u planned at this time.     Provided patient/family with contact information and availability.    DADA Cartwright, [facility]    [facility] [place]  [phone]

## 2021-03-26 NOTE — ED NOTES
Pt becomes agitated and aggressive to others. Wife in tcu and needs more cares. Pt is calm currently.

## 2021-03-26 NOTE — TELEPHONE ENCOUNTER
I spoke with Kassi. She is currently in a TCU. Gorge has a PCA though only for a few hours a day. Patient's children are unable to help care for patient per Kassi. Kassi stated Gorge will be alone tonight if they are unable to get him into a TCU today.    I spoke with Deidre (Monica Gonzalez) who reported she will have an admission form sent to the clinic. New updated H&P (within past 30 days) would be needed for admission, though Kassi stated this would not be possible as Gorge is currently alone at home.    I will update PCP and social work.    Alexandra Slaughter) ADALGISA Brannon

## 2021-03-27 VITALS
HEIGHT: 72 IN | WEIGHT: 203.71 LBS | BODY MASS INDEX: 27.59 KG/M2 | SYSTOLIC BLOOD PRESSURE: 119 MMHG | TEMPERATURE: 97.8 F | HEART RATE: 62 BPM | RESPIRATION RATE: 18 BRPM | DIASTOLIC BLOOD PRESSURE: 58 MMHG | OXYGEN SATURATION: 93 %

## 2021-03-27 PROCEDURE — G0378 HOSPITAL OBSERVATION PER HR: HCPCS

## 2021-03-27 PROCEDURE — 96372 THER/PROPH/DIAG INJ SC/IM: CPT | Performed by: INTERNAL MEDICINE

## 2021-03-27 PROCEDURE — 250N000011 HC RX IP 250 OP 636: Performed by: INTERNAL MEDICINE

## 2021-03-27 PROCEDURE — 99207 PR APP CREDIT; MD BILLING SHARED VISIT: CPT | Performed by: INTERNAL MEDICINE

## 2021-03-27 PROCEDURE — 99225 PR SUBSEQUENT OBSERVATION CARE,LEVEL II: CPT | Mod: 95 | Performed by: INTERNAL MEDICINE

## 2021-03-27 RX ADMIN — ENOXAPARIN SODIUM 30 MG: 30 INJECTION SUBCUTANEOUS at 06:19

## 2021-03-27 NOTE — H&P
Hospitalist History and Physical     Name: Gorge Corona  MRN: [id number]  CSN: [id number]  Admission Date/Time: 3/26/2021  6:52 PM  Primary Care Provider / Referring Physician:  Monisha Medina     Principal Problem:   Failure to thrive in adult    Assessment/Plan:   Active medical problems:  # Failure to thrive  - consult for nursing home placement  -Medication reconciliation  -pain medications and antiemetics when necessary    Chronic medical problems:  Depression  CAD  Atrial fibrillation  Hypothyroidism  Urinary incontinence  Hyperlipidemia  Urinary incontinence  Hearing loss    VTE prophylaxis:   Enoxaparin (Lovenox) SQ and Pneumatic Compression Devices  CODE- Full Code    Chief Complaint:     Chief Complaint   Patient presents with     Needs Placement       History of Presenting Illness:   Patient is 89 year old male with history of CAD, hypertension, hyperlipidemia, atrial fibrillation, normal pressure hydrocephalus, heart murmur presents to the ER for medical clearance and nursing home placement. He has a bed waiting for him at [place].  The patient is not able to care for himself. He is poor historian.    Past Medical History:     Past Medical History:   Diagnosis Date     Atrial fibrillation (H)      Atrial flutter (H)      Basal cell carcinoma of skin of trunk      BPH (benign prostatic hypertrophy) with urinary obstruction      CAD (coronary artery disease)     s/p CABG 1/2013; RUSSEL in 2/2013     Depression      H/O CT scan of head 1/11/2013 7/20/2006    Result Impression     CT of the Head without contrast 7/20/2006  History: Ataxia, incontinence, and NPH  Comparison Study: MR obtained to 4/19/2004  Technique: Axial CT images of the head were obtained at 2 intervals from the skull base to the vertex without intravenous contrast. The images were reviewed in brain, subdural and bone windows.  Findings: Patient has had a moderate promin     Hyperlipidemia      Lumbar spinal  stenosis      Mitral regurgitation     s/p bioprosthesis 1/2013     Palpitations      Paroxysmal supraventricular tachycardia (H)      Squamous cell carcinoma      Tachycardia     baroreflex injury due to surgery      (ventriculoperitoneal) shunt status     for Normal pressure hydrocephalus         Past Surgical History:   Gorge  has a past surgical history that includes Bypass graft artery coronary, repair valve mitral, combined (1/14/2013); Insert catheter bladder (1/14/2013); Anesthesia cardioversion (4/11/2013); Mohs micrographic procedure; mitral valve replacement (2013); Coronary Angiography Adult Order; coronary artery bypass (1/2013); and cataract iol, rt/lt (Bilateral).     Social History:   Gorge  reports that he quit smoking about 31 years ago. His smoking use included cigars and pipe. He has quit using smokeless tobacco. He reports that he does not drink alcohol or use drugs.    Family History:   Gorge's family history includes Cancer in his father; Kidney Disease in his mother.    Allergies:   Allergies have been reviewed. Gorge is allergic to penicillins.    Prior to Admission Medications:     Medications Prior to Admission   Medication Sig Dispense Refill Last Dose     albuterol (PROAIR HFA, PROVENTIL HFA, VENTOLIN HFA) 108 (90 BASE) MCG/ACT inhaler Inhale 2 puffs into the lungs every 4 hours as needed for shortness of breath / dyspnea or wheezing 1 Inhaler 1      ammonium lactate (LAC-HYDRIN) 12 % external lotion Apply topically 2 times daily as needed for dry skin 360 g 11      atorvastatin (LIPITOR) 10 MG tablet Take 1 tablet (10 mg) by mouth daily 90 tablet 2      Calcium 600 MG tablet Take 1 tablet by mouth 2 times daily. 100 tablet 3      Cholecalciferol (VITAMIN D) 1000 UNITS capsule Take 1 capsule by mouth daily.        citalopram (CELEXA) 20 MG tablet Take 1 tablet (20 mg) by mouth daily 90 tablet 0      clindamycin (CLEOCIN) 300 MG capsule Take 2 capsules by mouth as needed for 1 dose.  1 hour prior to dental work. 6 capsule 0      coenzyme Q-10 75 MG CAPS Take  by mouth.        finasteride (PROSCAR) 5 MG tablet         fluticasone-vilanterol (BREO ELLIPTA) 100-25 MCG/INH inhaler Inhale 1 puff into the lungs daily 60 Inhaler 9      furosemide (LASIX) 40 MG tablet Take 1 tablet (40 mg) by mouth daily 120 tablet 0      ipratropium (ATROVENT) 0.06 % nasal spray Spray 2 sprays into both nostrils 4 times daily 15 mL 4      levothyroxine (SYNTHROID/LEVOTHROID) 50 MCG tablet TAKE 1 & 1/2 (ONE & ONE-HALF) TABLETS BY MOUTH ONCE DAILY 135 tablet 0      Magnesium Oxide 420 MG TABS Take 1 tablet by mouth daily        MAGNESIUM OXIDE PO Take 400 mg by mouth        melatonin 3 MG tablet Take 1 tablet (3 mg) by mouth nightly as needed for sleep 100 tablet 3      metoprolol succinate ER (TOPROL-XL) 25 MG 24 hr tablet Take 0.5 tablets (12.5 mg) by mouth daily 45 tablet 2      mirabegron (MYRBETRIQ) 50 MG 24 hr tablet Take 1 tablet (50 mg) by mouth daily 30 tablet 6      Multiple Vitamins-Minerals (OCUVITE PO) Take  by mouth.  0      order for DME Equipment being ordered: Safe step tub / walk-in tub 1 Device 0      order for DME Walker 1 each 0      senna-docusate (SENOKOT-S;PERICOLACE) 8.6-50 MG per tablet Take 1 tablet by mouth 2 times daily. 60 tablet       tamsulosin (FLOMAX) 0.4 MG capsule Take 1 capsule (0.4 mg) by mouth daily 90 capsule 2      triamcinolone (KENALOG) 0.1 % external ointment Apply topically 2 times daily as needed (rash on the arms) 80 g 3      trospium (SANCTURA) 20 MG tablet           Review of Systems:   A 14 point comprehensive review of system was performed as per the history of presenting illness, otherwise essentially unremarkable.     Vitals:   Blood pressure 138/68, pulse 66, temperature 97.6  F (36.4  C), temperature source Oral, resp. rate 18, height 1.829 m (6'), weight 92.4 kg (203 lb 11.3 oz), SpO2 94 %.  Body mass index is 27.63 kg/m .  First recorded vital signs:  Temp: 97.3  F  (36.3  C) - BP: 115/68 - Pulse: 61 - Resp: 18 - SpO2: 94 %      Most recent vital signs:  Temp: 97.6  F (36.4  C) - BP: 138/68 - Pulse: 66 - Resp: 18 - SpO2: 94 %   - Body mass index is 27.63 kg/m .     Physical Exam:    General: the patient is a Fragile elderly male in no apparent distress.  Head: atraumatic  Eyes: extraocular movements intact, pupils are reactive to light bilateral from 4 mm to 2 mm  ENT: nares patent, moist mucous membranes, no oral or pharyngeal lesions  Neck: no thyroid goiter or mass, no carotid bruits, neck has normal flexion and extension  Respiratory: patient is breathing comfortably, breath sounds are equal bilateral, no wheezes, no crackles on auscultation exam. No chest wall tenderness  Cardiovascular: regular rate and rhythm, systolic murmurs,  Abdomen: audible bowel sounds present, there is no tenderness to palpation, liver and spleen not palpated  Skin: Same skin findings as admission H and P  Neuro: grossly intact, alert and disoriented  MSK: No pitting edema or joint swelling    Labs:    CMP:  Recent Labs   Lab Test 10/26/20  0939 08/18/18  0644 08/18/18  0644      < > 141   CO2 28   < > 30   BUN 19   < > 18   ALBUMIN 3.5   < >  --    ALKPHOS 84   < >  --    AST 13   < >  --    ALT 16   < >  --    ANIONGAP 5   < > 6   PHOS  --   --  3.7    < > = values in this interval not displayed.     CBC:  Recent Labs   Lab Test 03/09/20  1103   WBC 6.0   HGB 12.9*   *     Coags::  Recent Labs   Lab Test 08/17/18  1524   INR 1.12     Troponin:    Recent Labs   Lab Test 08/17/18  1524 08/17/18  1517   TROPI <0.015  --    TROPONIN  --  0.00     INR:    Recent Labs   Lab Test 08/17/18  1524   INR 1.12     D-DIMER:  No results found for: DIMER  BNP:  No results found for: BNP  UA:  Recent Labs   Lab 03/22/21  1146   COLOR Yellow   APPEARANCE Clear   URINEGLC Negative   URINEBILI Negative   URINEKETONE Negative   SG 1.015   UBLD Trace*   URINEPH 7.0   PROTEIN Negative   UROBILINOGEN 1.0    NITRITE Negative   LEUKEST Negative     Lactic Acid:    Lab Results   Component Value Date    LACT 2.0 04/08/2013         ABG:  -No lab results found in last 7 days.  Imaging:   Xr Surgery Kadi L/t 5 Min Fluoro W Stills    Result Date: 3/22/2021  This exam was marked as non-reportable because it will not be read by a radiologist or a [place] non-radiologist provider.       Visit/Communication Style   Virtual (Video) communication was used to evaluate Gorge.  Gorge consented to the use of video communication: yes  Video START time: 0300, 3/27/2021  Video STOP time: 0325, 3/27/2021   Patient's location: [facility]   Provider's location during the visit: [facility] Tele-medicine site        I expect the patient to stay 2 midnights considering patient's medical condition and ongoing work up in regards to current symptoms.    Zain Juarez,   3/27/2021  3:26 AM    I performed this consultation using real-time telehealth tools, including a live video connection between my location and the patient's location.    As the provider for this telehealth service, I attest that I introduced myself to the patient, provided my credentials, disclosed my location, and determined that, based on a review of the patients chart and/or a discussion with members of the patient's treatment team, telemedicine via a real-time, two-way, interactive audio and video platform is an appropriate and effective means of providing this service. The patient and I mutually agree that this visit is appropriate for telemedicine as well.    Disclaimer Note: To increase efficiency, your provider may have prepared this document using voice recognition technology. In that case, if a word or phrase is confusing, or does not make sense, this is likely due to a recognition error within the program which was not discovered during the provider s review. If you believe an error has occurred, please notify your provider s office  at your earliest convenience, so we can correct any mistakes.

## 2021-03-27 NOTE — ED NOTES
Patient has  [place] to Observation  order. Patient has been given Patient Bill of Rights, Observation brochure and  What does Observation mean to me  forms.  Patient has been given the opportunity to ask questions about observation status and their plan of care.  Patient has been oriented to the observation room, bathroom, and call light is in place.    Stephanie Storey RN

## 2021-03-27 NOTE — PROGRESS NOTES
Care Management Discharge Note    Discharge Date: 03/27/21       Discharge Disposition: Skilled Nursing Facilty    Discharge Services: None    Discharge DME: None    Discharge Transportation: agency    Private pay costs discussed: transportation costs    PAS Confirmation Code: [id number]  Patient/family educated on Medicare website which has current facility and service quality ratings: yes    Education Provided on the Discharge Plan:  Yes  Persons Notified of Discharge Plans: Kassi Ashraf  Patient/Family in Agreement with the Plan: yes    Handoff Referral Completed: Yes    Additional Information:    Plan:  Monica Clifford RN

## 2021-03-27 NOTE — PLAN OF CARE
Skin affirmation note    Admitting nurse completed full skin assessment, Cj score and Cj interventions. This writer agrees with the initial skin assessment findings.  Sowmya Gallego RN on 3/26/2021 at 11:38 PM

## 2021-03-27 NOTE — DISCHARGE SUMMARY
[facility]  Hospitalist Discharge Summary       Date of Admission:  3/26/2021  Date of Discharge:  3/27/2021 12:25 PM  Discharging Provider: Tim Sheets MD      Discharge Diagnoses     Failure to thrive  Macular degeneration, bilateral  Hearing loss  Lumbar spinal stenosis  Lumbar spondylolysis  Depression  CAD  Atrial fibrillation and flutter  Hyperlipidemia  Chronic diastolic heart failure  Hypothyroidism  Urinary incontinence  Benign prostatic hypertrophy  Moderate COPD  Normal pressure hydrocephalus  History of  shunt    Follow-ups Needed After Discharge   Follow-up Appointments     Follow Up and recommended labs and tests      Follow up with long term physician.  The following labs/tests are   recommended: BMP and CBC.           Discharge Disposition   Discharged to short-term care facility  Condition at discharge: Stable    Hospital Course   This is a 89 year old male who presented to the hospital from home on 3/26/2021 with failure to thrive.    Failure to thrive  Macular degeneration, bilateral  Hearing loss  Lumbar spinal stenosis  Lumbar spondylolysis  Patient to discharge to [facility]    Depression  - Continue with prior to admission citalopram 20 mg daily.    CAD  Atrial fibrillation and flutter  Hyperlipidemia  Chronic diastolic heart failure  CABG X4 (LIMA-LAD, SVG-PDA, SVG-D1, SVG-OM) 2013 and PCI LM into OM due to occluded SVG, s/p porcine bioprosthetic MVR 2013.  Patient does not appear to currently be on aspirin, although this was recommended by Cardiology in 10/2020.  - Echo from 2/21/2020 demonstrates EF 55-60%, normal LV size, mod LVH, normal RV size and fxn, normal atria, normal MVR, severe aortic calcification, mild AR, mod aortic stenosis  - Continue with prior to admission atorvastatin 10 mg daily, Toprol XL 25 mg daily, Lasix 40 mg daily  - He is not currently on anticoagulation    Hypothyroidism  - Most recent TSH 1.67 from 12/5/2019.   Continue with prior to admission levothyroxine.    Urinary incontinence  Benign prostatic hypertrophy  - Continue with prior to admission tamsulosin, trospium, finasteride and mirabegron.    Moderate COPD  No evidence of acute exacerbation.    - Continue prior to admission Breo Ellipta and prn albuterol    Normal pressure hydrocephalus  History of  shunt  CT head on 1/11/2021 at [facility] demonstrates only mild diffuse enlargement of lateral ventricles, likely due to age-related brain parenchymal volume loss.  No acute changes  - Follow up with Neurosurgery as directed    Consultations This Hospital Stay   CARE MANAGEMENT / SOCIAL WORK IP CONSULT  PHYSICAL THERAPY ADULT IP CONSULT  OCCUPATIONAL THERAPY ADULT IP CONSULT    Code Status   Full Code    Time Spent on this Encounter   I, Tim Sheets MD, personally saw the patient today and spent greater than 30 minutes discharging this patient.       Tim Sheets MD  [facility]  ______________________________________________________________________    Physical Exam   Vital Signs: Temp: 97.8  F (36.6  C) Temp src: Oral BP: 119/58 Pulse: 62   Resp: 18 SpO2: 93 % O2 Device: None (Room air)    Weight: 203 lbs 11.28 oz       Gen: Well nourished, debilitated elderly female, alert and oriented x 1, no acute distressed  HEENT: Atraumatic, normocephalic; sclera non-injected, anicterric; oral mucosa moist, no lesion, no exudate  Lungs: Clear to ausculation, no wheezes, no rhonchi, no rales  Heart: Regular rate, regular rhythm, no gallops, no rubs, no murmurs  GI: Bowel sound normal, no hepatosplenomegaly, no masses, non-tender, non-distended, no guarding, no rebound tenderness  Lymph: No lymphadenopathy, no edema  Skin: No rashes, no chronic venous stasis     Primary Care Physician   Lefty Hobbs    Discharge Orders      General info for SNF    Length of Stay Estimate: Short Term Care: Estimated # of Days <30  Condition  at Discharge: Improving  Level of care:skilled   Rehabilitation Potential: Good  Admission H&P remains valid and up-to-date: Yes  Recent Chemotherapy: N/A  Use Nursing Home Standing Orders: Yes     Mantoux instructions    Give two-step Mantoux (PPD) Per Facility Policy Yes     Reason for your hospital stay    This is a 89 year old male admitted with failure to thrive.     Follow Up and recommended labs and tests    Follow up with half-way physician.  The following labs/tests are recommended: BMP and CBC.     Activity - Up with nursing assistance     Physical Therapy Adult Consult    Evaluate and treat as clinically indicated.    Reason:  Physical deconditioning     Occupational Therapy Adult Consult    Evaluate and treat as clinically indicated.    Reason:  Physical deconditioning     Fall precautions     Advance Diet as Tolerated    Follow this diet upon discharge: Orders Placed This Encounter      Combination Diet Low Saturated Fat Na <2400mg Diet       Significant Results and Procedures   Most Recent 3 CBC's:  Recent Labs   Lab Test 03/09/20  1103 03/26/19  1203 08/17/18  1524   WBC 6.0 6.0 7.0   HGB 12.9* 12.9* 11.0*   MCV 96 94 85   * 114* 133*     Most Recent 3 BMP's:  Recent Labs   Lab Test 10/26/20  0939 12/05/19  1111 03/26/19  1203    138 138   POTASSIUM 4.0 4.0 4.2   CHLORIDE 108 106 105   CO2 28 27 27   BUN 19 18 22   CR 1.12 1.08 1.03   ANIONGAP 5 4 6   HE 9.3 8.6 8.9   GLC 92 92 91   ,   Results for orders placed or performed in visit on 03/22/21   XR Surgery LALA L/T 5 Min Fluoro w Stills    Narrative    This exam was marked as non-reportable because it will not be read by a   radiologist or a Central non-radiologist provider.             Discharge Medications   Discharge Medication List as of 3/27/2021 12:08 PM      CONTINUE these medications which have NOT CHANGED    Details   albuterol (PROAIR HFA, PROVENTIL HFA, VENTOLIN HFA) 108 (90 BASE) MCG/ACT inhaler Inhale 2 puffs into the  lungs every 4 hours as needed for shortness of breath / dyspnea or wheezing, Disp-1 Inhaler, R-1, E-Prescribe      ammonium lactate (LAC-HYDRIN) 12 % external lotion Apply topically 2 times daily as needed for dry skinDisp-360 g,[id number]      atorvastatin (LIPITOR) 10 MG tablet Take 1 tablet (10 mg) by mouth daily, Disp-90 tablet, R-2, E-Prescribe      Calcium 600 MG tablet Take 1 tablet by mouth 2 times daily., Disp-100 tablet, R-3, Historical      Cholecalciferol (VITAMIN D) 1000 UNITS capsule Take 1 capsule by mouth daily., Historical      citalopram (CELEXA) 20 MG tablet Take 1 tablet (20 mg) by mouth daily, Disp-90 tablet, R-0, E-Prescribe      clindamycin (CLEOCIN) 300 MG capsule Take 2 capsules by mouth as needed for 1 dose. 1 hour prior to dental work., Disp-6 capsule, R-0, E-Prescribe      coenzyme Q-10 75 MG CAPS Take  by mouth., Historical      finasteride (PROSCAR) 5 MG tablet Historical      fluticasone-vilanterol (BREO ELLIPTA) 100-25 MCG/INH inhaler Inhale 1 puff into the lungs daily, Disp-60 Inhaler,R-9, E-PrescribeOK to dispense 30-day supply      furosemide (LASIX) 40 MG tablet Take 1 tablet (40 mg) by mouth daily, Disp-120 tablet, R-0, E-Prescribe      ipratropium (ATROVENT) 0.06 % nasal spray Spray 2 sprays into both nostrils 4 times daily, Disp-15 mL,R-4, E-Prescribe      levothyroxine (SYNTHROID/LEVOTHROID) 50 MCG tablet TAKE 1 & 1/2 (ONE & ONE-HALF) TABLETS BY MOUTH ONCE DAILY, Disp-135 tablet, R-0, E-PrescribePatient needs to have labs for further refills. We placed the future lab order. Please call for the lab appointment at [phone].      Magnesium Oxide 420 MG TABS Take 1 tablet by mouth daily, Historical      melatonin 3 MG tablet Take 1 tablet (3 mg) by mouth nightly as needed for sleep, Disp-100 tablet, R-3, E-Prescribe      metoprolol succinate ER (TOPROL-XL) 25 MG 24 hr tablet Take 0.5 tablets (12.5 mg) by mouth daily, Disp-45 tablet,R-2, E-Prescribe      mirabegron  (MYRBETRIQ) 50 MG 24 hr tablet Take 1 tablet (50 mg) by mouth daily, Disp-30 tablet, R-6, E-Prescribe      Multiple Vitamins-Minerals (OCUVITE PO) Take  by mouth., R-0, Historical      !! order for DME Equipment being ordered: Safe step tub / walk-in tubDisp-1 Device, R-0, Local Print      !! order for DME WalkerDisp-1 each, R-0, Local Print      senna-docusate (SENOKOT-S;PERICOLACE) 8.6-50 MG per tablet Take 1 tablet by mouth 2 times daily., Disp-60 tablet, Transitional      tamsulosin (FLOMAX) 0.4 MG capsule Take 1 capsule (0.4 mg) by mouth daily, Disp-90 capsule, R-2, E-Prescribe      triamcinolone (KENALOG) 0.1 % external ointment Apply topically 2 times daily as needed (rash on the arms)Disp-80 g, [id number]      trospium (SANCTURA) 20 MG tablet Historical       !! - Potential duplicate medications found. Please discuss with provider.        Allergies   Allergies   Allergen Reactions     Penicillins Rash

## 2021-03-27 NOTE — CONSULTS
Care Management Initial Consult    General Information  Assessment completed with: SpouseKassi  Type of CM/SW Visit: Initial Assessment    Primary Care Provider verified and updated as needed: Yes   Readmission within the last 30 days: no previous admission in last 30 days         Advance Care Planning: Advance Care Planning Reviewed: no concerns identified          Communication Assessment  Patient's communication style: spoken language (English or Bilingual)    Hearing Difficulty or Deaf: no   Wear Glasses or Blind: no    Cognitive  Cognitive/Neuro/Behavioral: .WDL except, orientation  Level of Consciousness: alert  Arousal Level: opens eyes spontaneously  Orientation: disoriented to, situation  Mood/Behavior: cooperative, behavior appropriate to situation  Best Language: 0 - No aphasia  Speech: clear, spontaneous    Living Environment:   People in home: spouse  Kassi  Current living Arrangements: condominium      Able to return to prior arrangements: no       Family/Social Support:  Care provided by: spouse/significant other  Provides care for: no one  Marital Status:   Wife  Kassi       Description of Support System: Supportive, Involved    Support Assessment: Adequate family and caregiver support, Lacks necessary supervision and assistance, Lacks adequate physical care       Financial Concerns: No concerns identified           Lifestyle & Psychosocial Needs:        Socioeconomic History     Marital status:      Spouse name: Not on file     Number of children: Not on file     Years of education: Not on file     Highest education level: Not on file     Tobacco Use     Smoking status: Former Smoker     Types: Cigars, Pipe     Quit date: 1990     Years since quittin.2     Smokeless tobacco: Former User   Substance and Sexual Activity     Alcohol use: No     Alcohol/week: 0.0 standard drinks     Drug use: No       Mental Health Status:  Mental Health Status: No Current Concerns        Chemical Dependency Status:  Chemical Dependency Status: No Current Concerns             Values/Beliefs:  Spiritual, Cultural Beliefs, Jew Practices, Values that affect care:            Values/Beliefs Comment: Not discussed    Additional Information:    Spoke with the Pt's wife Kassi as the Pt is confused regarding discharge planning.    The Pt lives in the community with his wife, Kassi.  She is his caregiver.  Kassi is currently in a TCU and has no one to care for the Pt.  He is admitted for disposition.  Referral placed for [facility] (Admissions phone: [phone] RN Report: [phone]  Main Phone: [phone] Fax: [phone]) to be with his wife.  [facility] has accepted the Pt.  He will private pay for TCU.  Transportation will be provided by [employer].  Discussed transportation options with Kassi.  Discussed vendor, mode of transportation and anticipated private pay cost.  She agrees to private pay cost associated with [employer] Transportation services.    Plan:  [facility] T[facility] today.      Doris Clifford RN

## 2021-03-27 NOTE — PROGRESS NOTES
[facility] DISCHARGE NOTE    Patient discharged to transitional care unit at 12:30 PM via cart. Report given to transport service by Ryladn-kenia RN.  Accompanied by transport and staff. Discharge instructions reviewed with Rudi at [facility]. Discharge orders sent with transport . All belongings sent with patient.  Discussed with Rudi that pt has not had medications today due to pharmacy being unable to verify doses and meds.      Jaqueline Baron RN

## 2021-03-27 NOTE — PROGRESS NOTES
[facility] [place]    Patient admitted to room 2401 at approximately 2321 via cart from emergency room. Patient was accompanied by transport tech.     Verbal SBAR report received from Roxi SANTILLAN RN prior to patient arrival.     Patient ambulated to bed w/ 2 assist. Patient alert and oriented X 1. The patient is not having any pain.  . Admission vital signs: Blood pressure 138/68, pulse 66, temperature 97.6  F (36.4  C), temperature source Oral, resp. rate 18, height 1.829 m (6'), weight 92.4 kg (203 lb 11.3 oz), SpO2 94 %. Patient was oriented to plan of care, call light, bed controls, tv, telephone, bathroom and visiting hours.     Risk Assessment    The following safety risks were identified during admission: fall. Yellow risk band applied: YES.     Skin Initial Assessment    This writer admitted this patient and completed a full skin assessment and Cj score in the Adult PCS flowsheet. Appropriate interventions initiated as needed.     Secondary skin check completed by Sowmya SINGH RN.    Cj Risk Assessment  Sensory Perception: 4-->no impairment  Moisture: 3-->occasionally moist  Activity: 3-->walks occasionally  Mobility: 3-->slightly limited  Nutrition: 3-->adequate  Friction and Shear: 3-->no apparent problem  Cj Score: 19  Moisture Interventions: Incontinence pad, Perineal cleanser  Mattress: Standard Hospital Mattress (Foam)  Bed Frame: Standard width and length    Education    Patient has a Otis to Observation order: Yes  Observation education completed and documented: Yes     Unable to complete PTA med reconciliation due to patient is only oriented to self and unable to contact family member.      Radha Osorio RN

## 2021-03-27 NOTE — PLAN OF CARE
Pt has remained pleasant & cooperative w/ cares tonight. Communicates clearly with short simple statements. Is aware of pending placement at [facility] and has asked a few times about when he will be going there. He tells this writer he is anxious to see his wife. Pt has used his call light appropriately to request assistance w/ urinal & changing incontinence pad.

## 2021-03-27 NOTE — PROGRESS NOTES
"Pt here for \"placement\" per step daughter. Pt is confused and disoriented x3. Alert to self only. Pt has no complaints other than waiting too long in the waiting room. Assist of 1-2 transferring. Pt to go to [facility] after being 'checked out' by doctor.  "

## 2021-03-27 NOTE — ED PROVIDER NOTES
History     Chief Complaint   Patient presents with     Needs Placement     HPI  Gorge Corona is a 89 year old male who presents for medical clearance. Patient's wife is in [facility] after a medical procedure and per report he is unable to care for himself. Has a bed waiting for him at [facility]. For some reason he was sent to this ER for medical clearance prior to being accepted.    Patient has CAD, a known heart murmur, a history of SCC, normal pressure hydrocephalus, and is incontinent. He also per wife (who called) is aggressive sometimes. Take melatonin for sleep per stepdaughter who is at bedside.     Unfortunately the ED is extremely busy with very sick patients today, and [facility] stopped accepting patients by the time the patient was roomed. They will not allow the patient to come in today for that reason.    The patient is a [place] [employer].        Allergies:  Allergies   Allergen Reactions     Penicillins Rash       Problem List:    Patient Active Problem List    Diagnosis Date Noted     Urinary incontinence, unspecified type 03/26/2021     Priority: Medium     Hydrocephalus, unspecified type (H) 03/26/2021     Priority: Medium     Near syncope 08/17/2018     Priority: Medium     Bradycardia 08/17/2018     Priority: Medium     Status post coronary angiogram 02/25/2015     Priority: Medium     SVT (supraventricular tachycardia) (H) 11/12/2014     Priority: Medium     SCC (squamous cell carcinoma) 09/03/2013     Priority: Medium     CHF (congestive heart failure) (H) 04/08/2013     Priority: Medium     BCC (basal cell carcinoma of skin) 04/01/2013     Priority: Medium     Hypothyroidism 04/01/2013     Priority: Medium     Problem list name updated by automated process. Provider to review       CAD S/P percutaneous coronary angioplasty 02/22/2013     Priority: Medium     Dyslipidemia 01/30/2013     Priority: Medium     Preventative health care 01/30/2013     Priority: Medium     Atrial  fibrillation (H) 01/23/2013     Priority: Medium     CAD (coronary artery disease) 01/14/2013     Priority: Medium     H/O CT scan of head 01/11/2013     Priority: Medium     7/20/2006          Result Impression         CT of the Head without contrast 7/20/2006    History: Ataxia, incontinence, and NPH    Comparison Study: MR obtained to 4/19/2004    Technique: Axial CT images of the head were obtained at 2 intervals  from the skull base to the vertex without intravenous contrast. The  images were reviewed in brain, subdural and bone windows.    Findings: Patient has had a moderate prominence of ventricles are  compared to mild prominence of the sulci compatible with diagnosis of  NPH. This is seen on the previous study without change. No abnormal  densities in brain parenchyma. Extra-axial spaces unremarkable. The  bone settings demonstrated these all base, paranasal sinuses, and  calvarium to be unremarkable.    Impression:   1. A normal-pressure hydrocephalus unchanged previous study.  2. No hemorrhage.                 Mitral valve regurgitation 01/11/2013     Priority: Medium     ASCVD (arteriosclerotic cardiovascular disease) 01/10/2013     Priority: Medium     Coronary artery disease, occlusive 01/10/2013     Priority: Medium     H/O colonoscopy 11/28/2012     Priority: Medium     Last colonoscopy 8/6/2010, normal; next in 2015 (h/o adenomatous polyps)       Normal pressure hydrocephalus (H) 11/28/2012     Priority: Medium     S/P  shunt       History of SCC (squamous cell carcinoma) of skin 11/27/2012     Priority: Medium     Actinic keratosis 08/01/2011     Priority: Medium     Other malignant neoplasm of skin, site unspecified 08/01/2011     Priority: Medium     Problem list name updated by automated process. Provider to review and confirm       Benign neoplasm of skin of trunk, except scrotum 08/01/2011     Priority: Medium     Other seborrheic keratosis 08/01/2011     Priority: Medium     Major  depressive disorder, recurrent episode, moderate (H) 07/31/2011     Priority: Medium     Lumbar spinal stenosis 07/27/2011     Priority: Medium     Lumbar spondylolysis 07/27/2011     Priority: Medium     L5-S1          Past Medical History:    Past Medical History:   Diagnosis Date     Atrial fibrillation (H)      Atrial flutter (H)      Basal cell carcinoma of skin of trunk      BPH (benign prostatic hypertrophy) with urinary obstruction      CAD (coronary artery disease)      Depression      H/O CT scan of head 1/11/2013     Hyperlipidemia      Lumbar spinal stenosis      Mitral regurgitation      Palpitations      Paroxysmal supraventricular tachycardia (H)      Squamous cell carcinoma      Tachycardia       (ventriculoperitoneal) shunt status        Past Surgical History:    Past Surgical History:   Procedure Laterality Date     ANESTHESIA CARDIOVERSION  4/11/2013    Procedure: ANESTHESIA CARDIOVERSION;  Cardioversion;  Surgeon: Provider, Generic Anesthesia;  Location: [facility] OR     BYPASS GRAFT ARTERY CORONARY, REPAIR VALVE MITRAL, COMBINED  1/14/2013    Procedure: COMBINED BYPASS GRAFT ARTERY CORONARY, REPAIR VALVE MITRAL;  Median sternotomy, coronary artery bypass graft X3 using left internal mammary artery & right saphenous vein , mitral valve Replacement and on pump oxygenator, flexible cystoscopy, urethral dilation and insertion of stuart catheter.;  Surgeon: Chan Peres MD;  Location: [facility] OR     CATARACT IOL, RT/LT Bilateral      CORONARY ANGIOGRAPHY ADULT ORDER       CORONARY ARTERY BYPASS  1/2013    mitral valve tissue      INSERT CATHETER BLADDER  1/14/2013    Procedure: INSERT CATHETER BLADDER;  Flexible cystoscopy, urethral dilation,& insertion of stuart catheter;  Surgeon: Pete Bedoya MD;  Location: [facility] OR     MITRAL VALVE REPLACEMENT  2013    29 mm Epic porcine valve     MOHS MICROGRAPHIC PROCEDURE         Family History:    Family History   Problem Relation Age of Onset      Cancer Father      Kidney Disease Mother      Macular Degeneration No family hx of      Glaucoma No family hx of        Social History:  Marital Status:   [2]  Social History     Tobacco Use     Smoking status: Former Smoker     Types: Cigars, Pipe     Quit date: 1990     Years since quittin.2     Smokeless tobacco: Former User   Substance Use Topics     Alcohol use: No     Alcohol/week: 0.0 standard drinks     Drug use: No        Medications:    albuterol (PROAIR HFA, PROVENTIL HFA, VENTOLIN HFA) 108 (90 BASE) MCG/ACT inhaler  ammonium lactate (LAC-HYDRIN) 12 % external lotion  atorvastatin (LIPITOR) 10 MG tablet  Calcium 600 MG tablet  Cholecalciferol (VITAMIN D) 1000 UNITS capsule  citalopram (CELEXA) 20 MG tablet  clindamycin (CLEOCIN) 300 MG capsule  coenzyme Q-10 75 MG CAPS  finasteride (PROSCAR) 5 MG tablet  fluticasone-vilanterol (BREO ELLIPTA) 100-25 MCG/INH inhaler  furosemide (LASIX) 40 MG tablet  ipratropium (ATROVENT) 0.06 % nasal spray  levothyroxine (SYNTHROID/LEVOTHROID) 50 MCG tablet  Magnesium Oxide 420 MG TABS  MAGNESIUM OXIDE PO  melatonin 3 MG tablet  metoprolol succinate ER (TOPROL-XL) 25 MG 24 hr tablet  mirabegron (MYRBETRIQ) 50 MG 24 hr tablet  Multiple Vitamins-Minerals (OCUVITE PO)  order for DME  order for DME  senna-docusate (SENOKOT-S;PERICOLACE) 8.6-50 MG per tablet  tamsulosin (FLOMAX) 0.4 MG capsule  triamcinolone (KENALOG) 0.1 % external ointment  trospium (SANCTURA) 20 MG tablet          Review of Systems   Genitourinary:        Incontinence   All other systems reviewed and are negative.      Physical Exam   BP: 115/68  Pulse: 61  Temp: 97.3  F (36.3  C)  Resp: 18  Weight: 94.3 kg (208 lb)  SpO2: 94 %      Physical Exam  Vitals signs reviewed.   Constitutional:       Appearance: He is not ill-appearing.   HENT:      Head: Normocephalic and atraumatic.      Right Ear: External ear normal.      Left Ear: External ear normal.      Nose: No congestion or rhinorrhea.       Mouth/Throat:      Pharynx: No oropharyngeal exudate.   Eyes:      Extraocular Movements: Extraocular movements intact.      Pupils: Pupils are equal, round, and reactive to light.   Neck:      Musculoskeletal: Normal range of motion. No neck rigidity.   Cardiovascular:      Rate and Rhythm: Normal rate.      Heart sounds: Murmur present.   Pulmonary:      Effort: Pulmonary effort is normal. No respiratory distress.      Breath sounds: Normal breath sounds.   Chest:      Chest wall: No tenderness.   Abdominal:      General: Abdomen is flat. There is no distension.      Palpations: There is no mass.   Musculoskeletal:         General: No swelling.   Skin:     Capillary Refill: Capillary refill takes less than 2 seconds.      Coloration: Skin is not jaundiced or pale.      Findings: No bruising or erythema.   Neurological:      General: No focal deficit present.      Mental Status: He is alert. Mental status is at baseline.      Comments: AAOx3  Cooperative   Psychiatric:         Mood and Affect: Mood normal.      Comments: Very nice gentleman         ED Course       920 - accepted by hospitalist for overnight admission to obs. Patient agreeable to staying. Stepdaughter is at bedside, made aware.          Procedures         Results for orders placed or performed during the hospital encounter of 03/26/21 (from the past 24 hour(s))   Asymptomatic SARS-CoV-2 COVID-19 Virus (Coronavirus) by PCR    Specimen: Nasopharyngeal   Result Value Ref Range    SARS-CoV-2 Virus Specimen Source Nasopharyngeal     SARS-CoV-2 PCR Result NEGATIVE     SARS-CoV-2 PCR Comment (Note)        Medications   haloperidol (HALDOL) tablet 1 mg (1 mg Oral Given 3/26/21 7079)       Assessments & Plan (with Medical Decision Making)     This patient was sent in by a living facility for medical clearance, and then the living facility stopped accepting patients because they all go home for the day. Therefore I will have to admit him to obs overnight.      I have reviewed the nursing notes.    I have reviewed the findings, diagnosis, plan and need for follow up with the patient.      New Prescriptions    No medications on file       Final diagnoses:   Urinary incontinence, unspecified type   Hydrocephalus, unspecified type (H)       3/26/2021   [facility] EMERGENCY DEPT     Tirso [place] MD  03/26/21 2225       Tirso [place] MD  03/26/21 2222

## 2021-04-01 ENCOUNTER — RECORDS - HEALTHEAST (OUTPATIENT)
Dept: LAB | Facility: CLINIC | Age: 86
End: 2021-04-01

## 2021-04-02 LAB — TSH SERPL DL<=0.005 MIU/L-ACNC: 3.51 UIU/ML (ref 0.3–5)

## 2021-04-06 ENCOUNTER — TELEPHONE (OUTPATIENT)
Dept: CARDIOLOGY | Facility: CLINIC | Age: 86
End: 2021-04-06

## 2021-05-31 ENCOUNTER — RECORDS - HEALTHEAST (OUTPATIENT)
Dept: ADMINISTRATIVE | Facility: CLINIC | Age: 86
End: 2021-05-31

## 2021-06-01 ENCOUNTER — PRE VISIT (OUTPATIENT)
Dept: UROLOGY | Facility: CLINIC | Age: 86
End: 2021-06-01

## 2021-06-04 ENCOUNTER — VIRTUAL VISIT (OUTPATIENT)
Dept: UROLOGY | Facility: CLINIC | Age: 86
End: 2021-06-04
Attending: NURSE PRACTITIONER
Payer: MEDICARE

## 2021-06-04 DIAGNOSIS — R35.0 URINARY FREQUENCY: Primary | ICD-10-CM

## 2021-06-04 DIAGNOSIS — N39.41 URGE INCONTINENCE: ICD-10-CM

## 2021-06-04 PROBLEM — I25.10 ARTERIOSCLEROSIS OF CORONARY ARTERY: Status: ACTIVE | Noted: 2021-06-04

## 2021-06-04 PROBLEM — R53.1 WEAKNESS: Status: ACTIVE | Noted: 2021-06-04

## 2021-06-04 PROBLEM — S72.142A INTERTROCHANTERIC FRACTURE OF LEFT HIP, CLOSED, INITIAL ENCOUNTER (H): Status: ACTIVE | Noted: 2018-04-08

## 2021-06-04 PROBLEM — H91.90 HEARING LOSS: Status: ACTIVE | Noted: 2021-06-04

## 2021-06-04 PROBLEM — H35.30 MACULAR DEGENERATION: Status: ACTIVE | Noted: 2021-06-04

## 2021-06-04 PROBLEM — H26.9 CATARACT: Status: ACTIVE | Noted: 2021-06-04

## 2021-06-04 PROBLEM — I51.7 MILD CONCENTRIC LEFT VENTRICULAR HYPERTROPHY (LVH): Status: ACTIVE | Noted: 2018-04-11

## 2021-06-04 PROBLEM — Z96.1 LENS REPLACED BY OTHER MEANS: Status: ACTIVE | Noted: 2021-06-04

## 2021-06-04 PROBLEM — H54.50 LOW VISION, ONE EYE: Status: ACTIVE | Noted: 2021-06-04

## 2021-06-04 PROBLEM — I50.9 CONGESTIVE HEART FAILURE (H): Status: ACTIVE | Noted: 2021-06-04

## 2021-06-04 PROCEDURE — 99442 PR PHYSICIAN TELEPHONE EVALUATION 11-20 MIN: CPT | Mod: 95 | Performed by: NURSE PRACTITIONER

## 2021-06-04 RX ORDER — LEVOTHYROXINE SODIUM 75 UG/1
TABLET ORAL
COMMUNITY
Start: 2021-04-22

## 2021-06-04 RX ORDER — GABAPENTIN 100 MG/1
CAPSULE ORAL
COMMUNITY
Start: 2021-05-21

## 2021-06-04 NOTE — LETTER
"6/4/2021       RE: Gorge Corona  [address]  [place] [facility] [address]     Dear Colleague,    Thank you for referring your patient, Gorge Corona, to the [facility] [place] at [facility]. Please see a copy of my visit note below.    Gorge is a 89 year old who is being evaluated via a billable telephone visit    How would you like to obtain your AVS? Mail a copy    Telephone call duration: 15 minutes     Gorge Corona complains of   Chief Complaint   Patient presents with     RECHECK     Urinary incontinence follow up       Assessment/Plan:  89 year old male with ongoing urinary frequency, urgency, and incontinence despite optimized medication therapy. Had a detailed discussion today with the patient's spouse and nurse. Unfortunately, further options for treatment are limited. We revisited the idea of catheter placement, but his nurse has safety concerns about the patient dislodging the catheter himself due to memory issues, tripping over it, etc. Family would also like to defer CIC due to risk of infection. Given his age and other co-morbidities, I do not feel he is an appropriate candidate for more invasive treatment measures, including bladder Botox, PTNS, Interstim, etc. Bladder Botox would undoubtedly worsen urinary retention, and would necessitate catheter use. Nursing home staff conducting a \"Stream\" study, which monitors timing of incontinence to evaluate for a pattern. Will be interested in reviewing these results, and they will plan to fax to us once completed.  -For now, advised ongoing medication therapy, timed voiding, and a reduction in fluids before bed to reduce nocturia and nighttime incontinence.   -Patient to follow up with me in 6 months.     Dali Cheek, CNP  Department of Urology      Subjective:    Gorge Corona is a 89 year old male with a complicated history of urinary symptoms, including urgency and " "hesitancy, with incontinence. Has trialed various different medications for this, including tamsulosin, finasteride, trospium, and mirabegron. Underwent urodynamics with me on 3/22/21 to better understand his voiding dysfunction, which demonstrated the following:     -Normal bladder capacity (480 mL) with somewhat delayed filling sensations.  -Slow rise in Pdet pressure, starting at fill volume of ~300 mL, leading to UDC reaching 36 cm H20, at which time the patient begins to involuntarily leak. Pressure declines as he leaks. Filling initiated again, at which time he quickly experiences another UDC and is given permission at that time to try to empty his bladder completely.  -Good/fair bladder compliance between episodes of DO/DOI.  -Moderate detrusor contraction during voiding to max Pdet 28 cm H2O, although it is unclear how much of this contraction is voluntary vs DO.   -Very slow flow rate (Qmax 5.4 mL/s) with a plateau flow curve and incomplete bladder emptying (final  mL).  -Quiet EMG activity during voiding.  -BOOI is 15.4 which is negative for bladder outlet obstruction.  -Fluoroscopy reveals a mildly-trabeculated bladder wall without diverticulae or cellules.  No vesicoureteral reflux was observed.  The bladder neck was closed during filling and open during voiding/incontinence.     The results of the study were reviewed with the patient, daughter, and PCA in detail at that time. Discussed the options of continuing with his current, optimized medication regimen vs placing a catheter to help relieve his frequency. Family opposed to catheters.     Our visit today is conducted via telephone visit with the patient's wife, Kassi, and a nurse at his current facility, [facility]. The patient has continued to have a lot of frequency. Has been incontinent of urine every nursing shift. The staff are planning a \"Stream\" study, which will monitor for a pattern to his incontinence.     Again, thank you for " allowing me to participate in the care of your patient.      Sincerely,    Dali Cheek, CNP

## 2021-06-04 NOTE — NURSING NOTE
Chief Complaint   Patient presents with     RECHECK     Urinary incontinence follow up     Regine Gomez, CMA

## 2021-06-04 NOTE — PROGRESS NOTES
"Gorge is a 89 year old who is being evaluated via a billable telephone visit    How would you like to obtain your AVS? Mail a copy    Telephone call duration: 15 minutes     Gorge Corona complains of   Chief Complaint   Patient presents with     RECHECK     Urinary incontinence follow up       Assessment/Plan:  89 year old male with ongoing urinary frequency, urgency, and incontinence despite optimized medication therapy. Had a detailed discussion today with the patient's spouse and nurse. Unfortunately, further options for treatment are limited. We revisited the idea of catheter placement, but his nurse has safety concerns about the patient dislodging the catheter himself due to memory issues, tripping over it, etc. Family would also like to defer CIC due to risk of infection. Given his age and other co-morbidities, I do not feel he is an appropriate candidate for more invasive treatment measures, including bladder Botox, PTNS, Interstim, etc. Bladder Botox would undoubtedly worsen urinary retention, and would necessitate catheter use. Nursing home staff conducting a \"Stream\" study, which monitors timing of incontinence to evaluate for a pattern. Will be interested in reviewing these results, and they will plan to fax to us once completed.  -For now, advised ongoing medication therapy, timed voiding, and a reduction in fluids before bed to reduce nocturia and nighttime incontinence.   -Patient to follow up with me in 6 months.     Dali Cheek CNP  Department of Urology      Subjective:    Gorge Corona is a 89 year old male with a complicated history of urinary symptoms, including urgency and hesitancy, with incontinence. Has trialed various different medications for this, including tamsulosin, finasteride, trospium, and mirabegron. Underwent urodynamics with me on 3/22/21 to better understand his voiding dysfunction, which demonstrated the following:     -Normal bladder capacity (480 mL) with somewhat " "delayed filling sensations.  -Slow rise in Pdet pressure, starting at fill volume of ~300 mL, leading to UDC reaching 36 cm H20, at which time the patient begins to involuntarily leak. Pressure declines as he leaks. Filling initiated again, at which time he quickly experiences another UDC and is given permission at that time to try to empty his bladder completely.  -Good/fair bladder compliance between episodes of DO/DOI.  -Moderate detrusor contraction during voiding to max Pdet 28 cm H2O, although it is unclear how much of this contraction is voluntary vs DO.   -Very slow flow rate (Qmax 5.4 mL/s) with a plateau flow curve and incomplete bladder emptying (final  mL).  -Quiet EMG activity during voiding.  -BOOI is 15.4 which is negative for bladder outlet obstruction.  -Fluoroscopy reveals a mildly-trabeculated bladder wall without diverticulae or cellules.  No vesicoureteral reflux was observed.  The bladder neck was closed during filling and open during voiding/incontinence.     The results of the study were reviewed with the patient, daughter, and PCA in detail at that time. Discussed the options of continuing with his current, optimized medication regimen vs placing a catheter to help relieve his frequency. Family opposed to catheters.     Our visit today is conducted via telephone visit with the patient's wife, Kassi, and a nurse at his current facility, [facility]. The patient has continued to have a lot of frequency. Has been incontinent of urine every nursing shift. The staff are planning a \"Stream\" study, which will monitor for a pattern to his incontinence.               "

## 2021-06-15 ENCOUNTER — RECORDS - HEALTHEAST (OUTPATIENT)
Dept: LAB | Facility: CLINIC | Age: 86
End: 2021-06-15

## 2021-06-16 LAB
ANION GAP SERPL CALCULATED.3IONS-SCNC: 9 MMOL/L (ref 5–18)
BASOPHILS # BLD AUTO: 0 THOU/UL (ref 0–0.2)
BASOPHILS NFR BLD AUTO: 0 % (ref 0–2)
BUN SERPL-MCNC: 23 MG/DL (ref 8–28)
CALCIUM SERPL-MCNC: 8.8 MG/DL (ref 8.5–10.5)
CHLORIDE BLD-SCNC: 103 MMOL/L (ref 98–107)
CO2 SERPL-SCNC: 29 MMOL/L (ref 22–31)
CREAT SERPL-MCNC: 0.99 MG/DL (ref 0.7–1.3)
EOSINOPHIL # BLD AUTO: 0.2 THOU/UL (ref 0–0.4)
EOSINOPHIL NFR BLD AUTO: 2 % (ref 0–6)
ERYTHROCYTE [DISTWIDTH] IN BLOOD BY AUTOMATED COUNT: 14.9 % (ref 11–14.5)
GFR SERPL CREATININE-BSD FRML MDRD: >60 ML/MIN/1.73M2
GLUCOSE BLD-MCNC: 77 MG/DL (ref 70–125)
HCT VFR BLD AUTO: 32.6 % (ref 40–54)
HGB BLD-MCNC: 10.4 G/DL (ref 14–18)
IMM GRANULOCYTES # BLD: 0 THOU/UL
IMM GRANULOCYTES NFR BLD: 0 %
LYMPHOCYTES # BLD AUTO: 1.3 THOU/UL (ref 0.8–4.4)
LYMPHOCYTES NFR BLD AUTO: 14 % (ref 20–40)
MCH RBC QN AUTO: 30.7 PG (ref 27–34)
MCHC RBC AUTO-ENTMCNC: 31.9 G/DL (ref 32–36)
MCV RBC AUTO: 96 FL (ref 80–100)
MONOCYTES # BLD AUTO: 1.2 THOU/UL (ref 0–0.9)
MONOCYTES NFR BLD AUTO: 13 % (ref 2–10)
NEUTROPHILS # BLD AUTO: 6.4 THOU/UL (ref 2–7.7)
NEUTROPHILS NFR BLD AUTO: 71 % (ref 50–70)
PLATELET # BLD AUTO: 107 THOU/UL (ref 140–440)
PMV BLD AUTO: 14 FL (ref 8.5–12.5)
POTASSIUM BLD-SCNC: 3.9 MMOL/L (ref 3.5–5)
RBC # BLD AUTO: 3.39 MILL/UL (ref 4.4–6.2)
SODIUM SERPL-SCNC: 141 MMOL/L (ref 136–145)
WBC: 9.1 THOU/UL (ref 4–11)

## 2021-06-22 ENCOUNTER — TELEPHONE (OUTPATIENT)
Dept: UROLOGY | Facility: CLINIC | Age: 86
End: 2021-06-22

## 2021-06-22 NOTE — TELEPHONE ENCOUNTER
Contacted Zahra DIANA to discuss. Patient is to take all four medications per Dali Cheek's last note.   Namita Munoz LPN  Urology Clinic Service   [facility]

## 2021-06-22 NOTE — TELEPHONE ENCOUNTER
[facility] Call Center    Phone Message    May a detailed message be left on voicemail: yes     Reason for Call: Other: Zahra would like clarification on medication and if pt should still be taking all Flomax, finasteride, trospium, and mirabegron. Please return her call at [phone]. Thank you.      Action Taken: Message routed to:  [facility] ([facility]    Travel Screening: Not Applicable

## 2021-06-29 ENCOUNTER — RECORDS - HEALTHEAST (OUTPATIENT)
Dept: LAB | Facility: CLINIC | Age: 86
End: 2021-06-29

## 2021-06-30 LAB
ANION GAP SERPL CALCULATED.3IONS-SCNC: 9 MMOL/L (ref 5–18)
BUN SERPL-MCNC: 13 MG/DL (ref 8–28)
CALCIUM SERPL-MCNC: 8.6 MG/DL (ref 8.5–10.5)
CHLORIDE BLD-SCNC: 100 MMOL/L (ref 98–107)
CO2 SERPL-SCNC: 31 MMOL/L (ref 22–31)
CREAT SERPL-MCNC: 0.85 MG/DL (ref 0.7–1.3)
ERYTHROCYTE [DISTWIDTH] IN BLOOD BY AUTOMATED COUNT: 13.3 % (ref 11–14.5)
GFR SERPL CREATININE-BSD FRML MDRD: >60 ML/MIN/1.73M2
GLUCOSE BLD-MCNC: 96 MG/DL (ref 70–125)
HCT VFR BLD AUTO: 34.9 % (ref 40–54)
HGB BLD-MCNC: 11.2 G/DL (ref 14–18)
MCH RBC QN AUTO: 30.2 PG (ref 27–34)
MCHC RBC AUTO-ENTMCNC: 32.1 G/DL (ref 32–36)
MCV RBC AUTO: 94 FL (ref 80–100)
PLATELET # BLD AUTO: 179 THOU/UL (ref 140–440)
PMV BLD AUTO: 13 FL (ref 8.5–12.5)
POTASSIUM BLD-SCNC: 3.7 MMOL/L (ref 3.5–5)
RBC # BLD AUTO: 3.71 MILL/UL (ref 4.4–6.2)
SODIUM SERPL-SCNC: 140 MMOL/L (ref 136–145)
WBC: 8.4 THOU/UL (ref 4–11)

## (undated) RX ORDER — REGADENOSON 0.08 MG/ML
INJECTION, SOLUTION INTRAVENOUS
Status: DISPENSED
Start: 2020-03-09

## (undated) RX ORDER — ALBUTEROL SULFATE 0.83 MG/ML
SOLUTION RESPIRATORY (INHALATION)
Status: DISPENSED
Start: 2017-10-09

## (undated) RX ORDER — SULFAMETHOXAZOLE/TRIMETHOPRIM 800-160 MG
TABLET ORAL
Status: DISPENSED
Start: 2021-03-22